# Patient Record
Sex: MALE | Race: WHITE | Employment: UNEMPLOYED | URBAN - METROPOLITAN AREA
[De-identification: names, ages, dates, MRNs, and addresses within clinical notes are randomized per-mention and may not be internally consistent; named-entity substitution may affect disease eponyms.]

---

## 2018-02-06 ENCOUNTER — ANESTHESIA EVENT (OUTPATIENT)
Dept: OPERATING ROOM | Age: 58
End: 2018-02-06
Payer: COMMERCIAL

## 2018-02-06 ENCOUNTER — ANESTHESIA (OUTPATIENT)
Dept: OPERATING ROOM | Age: 58
End: 2018-02-06
Payer: COMMERCIAL

## 2018-02-06 ENCOUNTER — HOSPITAL ENCOUNTER (OUTPATIENT)
Age: 58
Setting detail: OUTPATIENT SURGERY
Discharge: HOME | End: 2018-02-06
Attending: ORTHOPAEDIC SURGERY | Admitting: ORTHOPAEDIC SURGERY
Payer: COMMERCIAL

## 2018-02-06 VITALS
DIASTOLIC BLOOD PRESSURE: 80 MMHG | BODY MASS INDEX: 22.53 KG/M2 | SYSTOLIC BLOOD PRESSURE: 134 MMHG | OXYGEN SATURATION: 94 % | RESPIRATION RATE: 16 BRPM | HEART RATE: 45 BPM | HEIGHT: 73 IN | WEIGHT: 170 LBS | TEMPERATURE: 97.3 F

## 2018-02-06 VITALS
SYSTOLIC BLOOD PRESSURE: 87 MMHG | TEMPERATURE: 91.4 F | DIASTOLIC BLOOD PRESSURE: 53 MMHG | RESPIRATION RATE: 13 BRPM | OXYGEN SATURATION: 100 %

## 2018-02-06 LAB
EKG ATRIAL RATE: 51 BPM
EKG P AXIS: 26 DEGREES
EKG P-R INTERVAL: 126 MS
EKG Q-T INTERVAL: 460 MS
EKG QRS DURATION: 104 MS
EKG QTC CALCULATION (BAZETT): 423 MS
EKG R AXIS: 57 DEGREES
EKG T AXIS: 43 DEGREES
EKG VENTRICULAR RATE: 51 BPM

## 2018-02-06 PROCEDURE — 93010 ELECTROCARDIOGRAM REPORT: CPT | Performed by: INTERNAL MEDICINE

## 2018-02-06 PROCEDURE — 3700000001 HC ADD 15 MINUTES (ANESTHESIA): Performed by: ORTHOPAEDIC SURGERY

## 2018-02-06 PROCEDURE — 2500000003 HC RX 250 WO HCPCS: Performed by: ANESTHESIOLOGY

## 2018-02-06 PROCEDURE — 3600000003 HC SURGERY LEVEL 3 BASE: Performed by: ORTHOPAEDIC SURGERY

## 2018-02-06 PROCEDURE — 7100000011 HC PHASE II RECOVERY - ADDTL 15 MIN: Performed by: ORTHOPAEDIC SURGERY

## 2018-02-06 PROCEDURE — C1713 ANCHOR/SCREW BN/BN,TIS/BN: HCPCS | Performed by: ORTHOPAEDIC SURGERY

## 2018-02-06 PROCEDURE — 3700000000 HC ANESTHESIA ATTENDED CARE: Performed by: ORTHOPAEDIC SURGERY

## 2018-02-06 PROCEDURE — 7100000001 HC PACU RECOVERY - ADDTL 15 MIN: Performed by: ORTHOPAEDIC SURGERY

## 2018-02-06 PROCEDURE — 6360000002 HC RX W HCPCS: Performed by: ANESTHESIOLOGY

## 2018-02-06 PROCEDURE — 6360000002 HC RX W HCPCS: Performed by: ORTHOPAEDIC SURGERY

## 2018-02-06 PROCEDURE — 6370000000 HC RX 637 (ALT 250 FOR IP): Performed by: ORTHOPAEDIC SURGERY

## 2018-02-06 PROCEDURE — 7100000010 HC PHASE II RECOVERY - FIRST 15 MIN: Performed by: ORTHOPAEDIC SURGERY

## 2018-02-06 PROCEDURE — 7100000000 HC PACU RECOVERY - FIRST 15 MIN: Performed by: ORTHOPAEDIC SURGERY

## 2018-02-06 PROCEDURE — 2580000003 HC RX 258: Performed by: ANESTHESIOLOGY

## 2018-02-06 PROCEDURE — 64415 NJX AA&/STRD BRCH PLXS IMG: CPT | Performed by: ANESTHESIOLOGY

## 2018-02-06 PROCEDURE — 93005 ELECTROCARDIOGRAM TRACING: CPT

## 2018-02-06 PROCEDURE — 2580000003 HC RX 258: Performed by: ORTHOPAEDIC SURGERY

## 2018-02-06 PROCEDURE — 3600000013 HC SURGERY LEVEL 3 ADDTL 15MIN: Performed by: ORTHOPAEDIC SURGERY

## 2018-02-06 DEVICE — ANCHOR SUT L14.5MM DIA3MM BIOCOMP W/ 1.3MM SUTTAPE SUTTAK: Type: IMPLANTABLE DEVICE | Status: FUNCTIONAL

## 2018-02-06 DEVICE — ANCHOR SUTURE BIOCOMP 3X14.5 MM FIBERWIRE 2 TIGERWIRE: Type: IMPLANTABLE DEVICE | Status: FUNCTIONAL

## 2018-02-06 RX ORDER — SODIUM CHLORIDE 0.9 % (FLUSH) 0.9 %
10 SYRINGE (ML) INJECTION PRN
Status: DISCONTINUED | OUTPATIENT
Start: 2018-02-06 | End: 2018-02-06 | Stop reason: HOSPADM

## 2018-02-06 RX ORDER — MAGNESIUM HYDROXIDE 1200 MG/15ML
LIQUID ORAL CONTINUOUS PRN
Status: DISCONTINUED | OUTPATIENT
Start: 2018-02-06 | End: 2018-02-06 | Stop reason: HOSPADM

## 2018-02-06 RX ORDER — PROPOFOL 10 MG/ML
INJECTION, EMULSION INTRAVENOUS PRN
Status: DISCONTINUED | OUTPATIENT
Start: 2018-02-06 | End: 2018-02-06 | Stop reason: SDUPTHER

## 2018-02-06 RX ORDER — OXYCODONE HYDROCHLORIDE AND ACETAMINOPHEN 5; 325 MG/1; MG/1
1 TABLET ORAL EVERY 4 HOURS PRN
Status: DISCONTINUED | OUTPATIENT
Start: 2018-02-06 | End: 2018-02-06 | Stop reason: HOSPADM

## 2018-02-06 RX ORDER — SODIUM CHLORIDE 9 MG/ML
50 INJECTION, SOLUTION INTRAVENOUS CONTINUOUS
Status: ACTIVE | OUTPATIENT
Start: 2018-02-06 | End: 2018-02-06

## 2018-02-06 RX ORDER — SODIUM CHLORIDE, SODIUM LACTATE, POTASSIUM CHLORIDE, CALCIUM CHLORIDE 600; 310; 30; 20 MG/100ML; MG/100ML; MG/100ML; MG/100ML
INJECTION, SOLUTION INTRAVENOUS CONTINUOUS
Status: DISCONTINUED | OUTPATIENT
Start: 2018-02-06 | End: 2018-02-06

## 2018-02-06 RX ORDER — LIDOCAINE HYDROCHLORIDE 10 MG/ML
1 INJECTION, SOLUTION EPIDURAL; INFILTRATION; INTRACAUDAL; PERINEURAL
Status: DISCONTINUED | OUTPATIENT
Start: 2018-02-06 | End: 2018-02-06 | Stop reason: HOSPADM

## 2018-02-06 RX ORDER — MORPHINE SULFATE 2 MG/ML
2 INJECTION, SOLUTION INTRAMUSCULAR; INTRAVENOUS
Status: DISCONTINUED | OUTPATIENT
Start: 2018-02-06 | End: 2018-02-06 | Stop reason: HOSPADM

## 2018-02-06 RX ORDER — MEPERIDINE HYDROCHLORIDE 50 MG/ML
12.5 INJECTION INTRAMUSCULAR; INTRAVENOUS; SUBCUTANEOUS EVERY 5 MIN PRN
Status: DISCONTINUED | OUTPATIENT
Start: 2018-02-06 | End: 2018-02-06 | Stop reason: HOSPADM

## 2018-02-06 RX ORDER — OXYCODONE HYDROCHLORIDE AND ACETAMINOPHEN 5; 325 MG/1; MG/1
2 TABLET ORAL EVERY 4 HOURS PRN
Status: DISCONTINUED | OUTPATIENT
Start: 2018-02-06 | End: 2018-02-06 | Stop reason: HOSPADM

## 2018-02-06 RX ORDER — HYDROCODONE BITARTRATE AND ACETAMINOPHEN 5; 325 MG/1; MG/1
1 TABLET ORAL PRN
Status: DISCONTINUED | OUTPATIENT
Start: 2018-02-06 | End: 2018-02-06 | Stop reason: HOSPADM

## 2018-02-06 RX ORDER — ONDANSETRON 2 MG/ML
INJECTION INTRAMUSCULAR; INTRAVENOUS PRN
Status: DISCONTINUED | OUTPATIENT
Start: 2018-02-06 | End: 2018-02-06 | Stop reason: SDUPTHER

## 2018-02-06 RX ORDER — DIPHENHYDRAMINE HYDROCHLORIDE 50 MG/ML
12.5 INJECTION INTRAMUSCULAR; INTRAVENOUS
Status: DISCONTINUED | OUTPATIENT
Start: 2018-02-06 | End: 2018-02-06 | Stop reason: HOSPADM

## 2018-02-06 RX ORDER — METOCLOPRAMIDE HYDROCHLORIDE 5 MG/ML
10 INJECTION INTRAMUSCULAR; INTRAVENOUS
Status: DISCONTINUED | OUTPATIENT
Start: 2018-02-06 | End: 2018-02-06 | Stop reason: HOSPADM

## 2018-02-06 RX ORDER — HYDROMORPHONE HCL 110MG/55ML
0.5 PATIENT CONTROLLED ANALGESIA SYRINGE INTRAVENOUS EVERY 10 MIN PRN
Status: COMPLETED | OUTPATIENT
Start: 2018-02-06 | End: 2018-02-06

## 2018-02-06 RX ORDER — SODIUM CHLORIDE, SODIUM LACTATE, POTASSIUM CHLORIDE, CALCIUM CHLORIDE 600; 310; 30; 20 MG/100ML; MG/100ML; MG/100ML; MG/100ML
INJECTION, SOLUTION INTRAVENOUS CONTINUOUS
Status: DISCONTINUED | OUTPATIENT
Start: 2018-02-06 | End: 2018-02-06 | Stop reason: HOSPADM

## 2018-02-06 RX ORDER — SODIUM CHLORIDE 0.9 % (FLUSH) 0.9 %
10 SYRINGE (ML) INJECTION EVERY 12 HOURS SCHEDULED
Status: DISCONTINUED | OUTPATIENT
Start: 2018-02-06 | End: 2018-02-06 | Stop reason: HOSPADM

## 2018-02-06 RX ORDER — LISINOPRIL 10 MG/1
10 TABLET ORAL DAILY
COMMUNITY

## 2018-02-06 RX ORDER — MIDAZOLAM HYDROCHLORIDE 1 MG/ML
INJECTION INTRAMUSCULAR; INTRAVENOUS
Status: DISCONTINUED
Start: 2018-02-06 | End: 2018-02-06 | Stop reason: HOSPADM

## 2018-02-06 RX ORDER — ACETAMINOPHEN 325 MG/1
650 TABLET ORAL EVERY 4 HOURS PRN
Status: DISCONTINUED | OUTPATIENT
Start: 2018-02-06 | End: 2018-02-06 | Stop reason: HOSPADM

## 2018-02-06 RX ORDER — ONDANSETRON 2 MG/ML
4 INJECTION INTRAMUSCULAR; INTRAVENOUS
Status: DISCONTINUED | OUTPATIENT
Start: 2018-02-06 | End: 2018-02-06 | Stop reason: HOSPADM

## 2018-02-06 RX ORDER — FENTANYL CITRATE 50 UG/ML
INJECTION, SOLUTION INTRAMUSCULAR; INTRAVENOUS
Status: DISCONTINUED
Start: 2018-02-06 | End: 2018-02-06 | Stop reason: HOSPADM

## 2018-02-06 RX ORDER — ROCURONIUM BROMIDE 10 MG/ML
INJECTION, SOLUTION INTRAVENOUS PRN
Status: DISCONTINUED | OUTPATIENT
Start: 2018-02-06 | End: 2018-02-06 | Stop reason: SDUPTHER

## 2018-02-06 RX ORDER — HYDROCODONE BITARTRATE AND ACETAMINOPHEN 5; 325 MG/1; MG/1
2 TABLET ORAL PRN
Status: DISCONTINUED | OUTPATIENT
Start: 2018-02-06 | End: 2018-02-06 | Stop reason: HOSPADM

## 2018-02-06 RX ORDER — FENTANYL CITRATE 50 UG/ML
50 INJECTION, SOLUTION INTRAMUSCULAR; INTRAVENOUS EVERY 10 MIN PRN
Status: DISCONTINUED | OUTPATIENT
Start: 2018-02-06 | End: 2018-02-06 | Stop reason: HOSPADM

## 2018-02-06 RX ORDER — MORPHINE SULFATE 4 MG/ML
4 INJECTION, SOLUTION INTRAMUSCULAR; INTRAVENOUS
Status: DISCONTINUED | OUTPATIENT
Start: 2018-02-06 | End: 2018-02-06 | Stop reason: HOSPADM

## 2018-02-06 RX ORDER — MIDAZOLAM HYDROCHLORIDE 1 MG/ML
INJECTION INTRAMUSCULAR; INTRAVENOUS PRN
Status: DISCONTINUED | OUTPATIENT
Start: 2018-02-06 | End: 2018-02-06 | Stop reason: SDUPTHER

## 2018-02-06 RX ORDER — GLYCOPYRROLATE 0.2 MG/ML
INJECTION INTRAMUSCULAR; INTRAVENOUS PRN
Status: DISCONTINUED | OUTPATIENT
Start: 2018-02-06 | End: 2018-02-06 | Stop reason: SDUPTHER

## 2018-02-06 RX ORDER — ROPIVACAINE HYDROCHLORIDE 5 MG/ML
INJECTION, SOLUTION EPIDURAL; INFILTRATION; PERINEURAL PRN
Status: DISCONTINUED | OUTPATIENT
Start: 2018-02-06 | End: 2018-02-06 | Stop reason: SDUPTHER

## 2018-02-06 RX ORDER — ONDANSETRON 2 MG/ML
4 INJECTION INTRAMUSCULAR; INTRAVENOUS EVERY 6 HOURS PRN
Status: DISCONTINUED | OUTPATIENT
Start: 2018-02-06 | End: 2018-02-06 | Stop reason: HOSPADM

## 2018-02-06 RX ORDER — ROPIVACAINE HYDROCHLORIDE 5 MG/ML
INJECTION, SOLUTION EPIDURAL; INFILTRATION; PERINEURAL
Status: DISCONTINUED
Start: 2018-02-06 | End: 2018-02-06 | Stop reason: HOSPADM

## 2018-02-06 RX ADMIN — PROPOFOL 200 MG: 10 INJECTION, EMULSION INTRAVENOUS at 09:25

## 2018-02-06 RX ADMIN — FENTANYL CITRATE 100 MCG: 50 INJECTION, SOLUTION INTRAMUSCULAR; INTRAVENOUS at 09:25

## 2018-02-06 RX ADMIN — HYDROMORPHONE HYDROCHLORIDE 0.5 MG: 2 INJECTION, SOLUTION INTRAMUSCULAR; INTRAVENOUS; SUBCUTANEOUS at 11:55

## 2018-02-06 RX ADMIN — OXYCODONE HYDROCHLORIDE AND ACETAMINOPHEN 1 TABLET: 5; 325 TABLET ORAL at 12:11

## 2018-02-06 RX ADMIN — GLYCOPYRROLATE 0.2 MG: 0.2 INJECTION INTRAMUSCULAR; INTRAVENOUS at 10:05

## 2018-02-06 RX ADMIN — SODIUM CHLORIDE, POTASSIUM CHLORIDE, SODIUM LACTATE AND CALCIUM CHLORIDE: 600; 310; 30; 20 INJECTION, SOLUTION INTRAVENOUS at 08:53

## 2018-02-06 RX ADMIN — ROCURONIUM BROMIDE 50 MG: 10 INJECTION INTRAVENOUS at 09:25

## 2018-02-06 RX ADMIN — ONDANSETRON HYDROCHLORIDE 4 MG: 2 INJECTION, SOLUTION INTRAVENOUS at 09:42

## 2018-02-06 RX ADMIN — MIDAZOLAM HYDROCHLORIDE 2 MG: 1 INJECTION, SOLUTION INTRAMUSCULAR; INTRAVENOUS at 08:35

## 2018-02-06 RX ADMIN — FENTANYL CITRATE 100 MCG: 50 INJECTION, SOLUTION INTRAMUSCULAR; INTRAVENOUS at 08:35

## 2018-02-06 RX ADMIN — ROPIVACAINE HYDROCHLORIDE 40 ML: 5 INJECTION, SOLUTION EPIDURAL; INFILTRATION; PERINEURAL at 08:40

## 2018-02-06 RX ADMIN — SODIUM CHLORIDE, POTASSIUM CHLORIDE, SODIUM LACTATE AND CALCIUM CHLORIDE: 600; 310; 30; 20 INJECTION, SOLUTION INTRAVENOUS at 11:21

## 2018-02-06 RX ADMIN — CEFAZOLIN SODIUM 2 G: 2 SOLUTION INTRAVENOUS at 09:25

## 2018-02-06 RX ADMIN — HYDROMORPHONE HYDROCHLORIDE 0.5 MG: 2 INJECTION, SOLUTION INTRAMUSCULAR; INTRAVENOUS; SUBCUTANEOUS at 11:23

## 2018-02-06 RX ADMIN — HYDROMORPHONE HYDROCHLORIDE 0.5 MG: 2 INJECTION, SOLUTION INTRAMUSCULAR; INTRAVENOUS; SUBCUTANEOUS at 11:33

## 2018-02-06 RX ADMIN — HYDROMORPHONE HYDROCHLORIDE 0.5 MG: 2 INJECTION, SOLUTION INTRAMUSCULAR; INTRAVENOUS; SUBCUTANEOUS at 11:44

## 2018-02-06 ASSESSMENT — PULMONARY FUNCTION TESTS
PIF_VALUE: 12
PIF_VALUE: 11
PIF_VALUE: 12
PIF_VALUE: 11
PIF_VALUE: 12
PIF_VALUE: 15
PIF_VALUE: 2
PIF_VALUE: 27
PIF_VALUE: 12
PIF_VALUE: 3
PIF_VALUE: 12
PIF_VALUE: 2
PIF_VALUE: 12
PIF_VALUE: 11
PIF_VALUE: 12
PIF_VALUE: 5
PIF_VALUE: 12
PIF_VALUE: 2
PIF_VALUE: 1
PIF_VALUE: 12
PIF_VALUE: 2
PIF_VALUE: 12
PIF_VALUE: 12
PIF_VALUE: 6
PIF_VALUE: 12
PIF_VALUE: 11
PIF_VALUE: 12
PIF_VALUE: 11
PIF_VALUE: 11
PIF_VALUE: 12
PIF_VALUE: 12
PIF_VALUE: 2
PIF_VALUE: 1
PIF_VALUE: 11
PIF_VALUE: 12
PIF_VALUE: 11
PIF_VALUE: 12
PIF_VALUE: 11
PIF_VALUE: 12
PIF_VALUE: 5
PIF_VALUE: 12
PIF_VALUE: 7
PIF_VALUE: 12
PIF_VALUE: 2
PIF_VALUE: 12
PIF_VALUE: 10
PIF_VALUE: 12

## 2018-02-06 ASSESSMENT — PAIN SCALES - GENERAL
PAINLEVEL_OUTOF10: 8
PAINLEVEL_OUTOF10: 10
PAINLEVEL_OUTOF10: 6
PAINLEVEL_OUTOF10: 5
PAINLEVEL_OUTOF10: 8
PAINLEVEL_OUTOF10: 6
PAINLEVEL_OUTOF10: 10

## 2018-02-06 ASSESSMENT — PAIN DESCRIPTION - LOCATION: LOCATION: SHOULDER

## 2018-02-06 ASSESSMENT — PAIN - FUNCTIONAL ASSESSMENT: PAIN_FUNCTIONAL_ASSESSMENT: 0-10

## 2018-02-06 ASSESSMENT — PAIN DESCRIPTION - PAIN TYPE: TYPE: SURGICAL PAIN

## 2018-02-06 ASSESSMENT — PAIN DESCRIPTION - ORIENTATION: ORIENTATION: RIGHT;POSTERIOR

## 2018-02-06 NOTE — PROGRESS NOTES
Assisted Dr. Sandra Martins with nerve block. Patient tolerated procedure well. Patient will be monitored until surgery.

## 2018-02-07 NOTE — OP NOTE
[address] [address], [address]                                 OPERATIVE REPORT    PATIENT NAME: Jm Acosta                       :        1960  MED REC NO:   [id number]                              ROOM:  ACCOUNT NO:   [de-identified]                           ADMIT DATE: 2018  PROVIDER:     Ashley Granados MD    DATE OF PROCEDURE:  2018    PREOPERATIVE DIAGNOSIS:  Right shoulder instability with labral tear. POSTOPERATIVE DIAGNOSES:  1. Right shoulder instability with labral tear. 2.  Right shoulder adhesive capsulitis. PROCEDURES:  1. Right shoulder manipulation under anesthesia. 2.  Right shoulder arthroscopy with labral repair. 3.  Right shoulder arthroscopic capsulorrhaphy of the anterior capsule. 4.  Right shoulder arthroscopic remplissage. 5.  Right shoulder arthroscopic extensive debridement of glenohumeral  joint, labrum, biceps, and rotator cuff ring as well as subacromial space  and bursa. SURGEON:  Ashley Granados MD.    ASSISTANT:  Mary Knight PA-C    ANESTHESIA:  General endotracheal plus block. COMPLICATIONS:  None. INDICATIONS:  The patient is a 61-year-old male with a history of recurrent  right shoulder instability. He has known to have several dislocations. He  had a large anterior Bankart tear as well as a significant posterior  Hill-Sachs lesion. Preoperatively, we discussed multiple options for  treatment including arthroscopic labral repair with capsulorrhaphy and  remplissage procedure. We discussed multiple other options and discussing  the risks and benefits of each, he elected to proceed. Over the last few  months, he had been using a sling and developed a significant frozen  shoulder.   Understanding the risks and benefits of surgery to include,  infection, stiffness, nerve damage, continued pain and instability as well  as likely need for subsequent anterior  anchor for the Bankart repair as well as a single double loaded posterior  anchor in the Hill-Sachs lesion for remplissage to avoid over tightening in  the setting of a frozen shoulder. Given the patient's severe instability  issues however, it was felt that at least partial stabilization may provide  some support during his recovery. The double loaded _____ was placed into  the posterior Hill-Sachs lesion after debridement of the bone. Attention  was then directed to the subacromial space and a thorough bursectomy was  performed. A formal decompression was not performed. The bursal side of  the cuff was intact. The sutures had been grasped through the cannula  using the the Bird Beak and once found in the subacromial space were tied  down providing a decent apposition of infraspinatus tendon down to the  Hill-Sachs lesion. At this point, the space was then copiously irrigated  through the shaver and suctioned dry. The portal sites were closed with  3-0 nylon suture. All sponge and needle counts were correct prior to  closure. Sterile dressing was applied. He was placed in a sling, awoken  from general anesthetic. He was taken to recovery room in stable  condition. Andres Fonseca PA-C was present throughout the entire case. Given the  nature of the disease process and the procedure, a skilled surgical first  assistant was necessary during the case. The assistant was necessary to  hold retractors and manipulate the extremity during the procedure. A  certified scrub tech was at the back table managing the instruments and  supplies for the surgical case.         Mio Mancera MD    D: 02/06/2018 11:12:35       T: 02/06/2018 21:53:15     [email or web address]  Job#: [id number]     Doc#: [id number]    CC:

## 2020-01-08 LAB
ALBUMIN: 3.3 G/DL (ref 3.4–5)
ALP BLD-CCNC: 57 U/L (ref 33–120)
ALT SERPL-CCNC: 11 U/L (ref 10–52)
AST SERPL-CCNC: 14 U/L (ref 9–39)
BASOPHILS # BLD: 0.05 X10E9/L (ref 0–0.1)
BASOPHILS RELATIVE PERCENT: 0.7 % (ref 0–2)
BILIRUB SERPL-MCNC: 0.3 MG/DL (ref 0–1.2)
BILIRUBIN DIRECT: 0.1 MG/DL (ref 0–0.3)
EOSINOPHIL # BLD: 0.03 X10E9/L (ref 0–0.7)
EOSINOPHILS RELATIVE PERCENT: 0.4 % (ref 0–6)
ERYTHROCYTE [DISTWIDTH] IN BLOOD BY AUTOMATED COUNT: 14.3 % (ref 11.5–14)
HCT VFR BLD CALC: 43 % (ref 41–52)
HEMOGLOBIN: 13.7 G/DL (ref 13.5–17)
IMMATURE GRANULOCYTES %: 0.3 % (ref 0–0.9)
LYMPHOCYTES # BLD: 19.9 % (ref 13–44)
LYMPHOCYTES RELATIVE PERCENT: 1.37 X10E9/L (ref 1.2–4.8)
MCHC RBC AUTO-ENTMCNC: 31.9 G/DL (ref 32–36)
MCV RBC AUTO: 92 FL (ref 80–100)
MONOCYTES # BLD: 0.42 X10E9/L (ref 0.1–1)
MONOCYTES RELATIVE PERCENT: 6.1 % (ref 2–10)
NEUTROPHILS RELATIVE PERCENT: 72.6 % (ref 40–80)
NEUTROPHILS: 4.98 X10E9/L (ref 1.2–7.7)
PLATELET # BLD: 247 X10E9/L (ref 150–450)
RBC # BLD: 4.69 X10E12/L (ref 4.5–5.9)
TOTAL PROTEIN: 5.9 G/DL (ref 6.4–8.2)
WBC: 6.9 X10E9/L (ref 4.4–11.3)

## 2020-01-09 LAB
HBV SURFACE AB TITR SER: < 3.1 MIU/ML
HEPATITIS B CORE TOTAL ANTIBODY: NONREACTIVE
HEPATITIS B SURFACE ANTIGEN: NONREACTIVE
HEPATITIS C ANTIBODY: NORMAL
HIV AG/AB: NON REACTIVE
SPECIMEN SOURCE: NORMAL
SYPHILIS TREPONEMAL ANTIBODY: NONREACTIVE

## 2020-09-03 PROBLEM — M80.88XA: Status: ACTIVE | Noted: 2020-09-03

## 2022-10-10 NOTE — PROGRESS NOTES
----- Message from GEOVANY Edmonds sent at 10/10/2022  9:04 AM CDT -----  Please tell Ashley  Chest xray  is clear     noted red drainage coming from underneath dressing. Dr. Andrew Archuleta PA here reinforced dressing.  Fingers remain warm to touch color normal.

## 2023-04-28 ENCOUNTER — HOSPITAL ENCOUNTER (OUTPATIENT)
Dept: DATA CONVERSION | Facility: HOSPITAL | Age: 63
End: 2023-04-28
Attending: UROLOGY | Admitting: UROLOGY
Payer: COMMERCIAL

## 2023-04-28 DIAGNOSIS — F33.9 MAJOR DEPRESSIVE DISORDER, RECURRENT, UNSPECIFIED (CMS-HCC): ICD-10-CM

## 2023-04-28 DIAGNOSIS — F17.200 NICOTINE DEPENDENCE, UNSPECIFIED, UNCOMPLICATED: ICD-10-CM

## 2023-04-28 DIAGNOSIS — J44.9 CHRONIC OBSTRUCTIVE PULMONARY DISEASE, UNSPECIFIED (MULTI): ICD-10-CM

## 2023-04-28 DIAGNOSIS — F41.9 ANXIETY DISORDER, UNSPECIFIED: ICD-10-CM

## 2023-04-28 DIAGNOSIS — M19.90 UNSPECIFIED OSTEOARTHRITIS, UNSPECIFIED SITE: ICD-10-CM

## 2023-04-28 DIAGNOSIS — I10 ESSENTIAL (PRIMARY) HYPERTENSION: ICD-10-CM

## 2023-04-28 DIAGNOSIS — F43.10 POST-TRAUMATIC STRESS DISORDER, UNSPECIFIED: ICD-10-CM

## 2023-04-28 DIAGNOSIS — N35.919 UNSPECIFIED URETHRAL STRICTURE, MALE, UNSPECIFIED SITE: ICD-10-CM

## 2023-04-28 DIAGNOSIS — N35.911 UNSPECIFIED URETHRAL STRICTURE, MALE, MEATAL: ICD-10-CM

## 2023-04-28 DIAGNOSIS — Z01.812 ENCOUNTER FOR PREPROCEDURAL LABORATORY EXAMINATION: ICD-10-CM

## 2023-04-28 DIAGNOSIS — E78.5 HYPERLIPIDEMIA, UNSPECIFIED: ICD-10-CM

## 2023-04-28 DIAGNOSIS — Z87.440 PERSONAL HISTORY OF URINARY (TRACT) INFECTIONS: ICD-10-CM

## 2023-04-28 DIAGNOSIS — R33.8 OTHER RETENTION OF URINE: ICD-10-CM

## 2023-04-28 DIAGNOSIS — G89.4 CHRONIC PAIN SYNDROME: ICD-10-CM

## 2023-04-28 DIAGNOSIS — N48.1 BALANITIS: ICD-10-CM

## 2023-04-28 DIAGNOSIS — F11.10 OPIOID ABUSE, UNCOMPLICATED (MULTI): ICD-10-CM

## 2023-04-28 DIAGNOSIS — N40.1 BENIGN PROSTATIC HYPERPLASIA WITH LOWER URINARY TRACT SYMPTOMS: ICD-10-CM

## 2023-04-29 LAB — URINE CULTURE: NO GROWTH

## 2023-05-19 LAB
ALANINE AMINOTRANSFERASE (SGPT) (U/L) IN SER/PLAS: 10 U/L (ref 10–52)
ALBUMIN (G/DL) IN SER/PLAS: 4 G/DL (ref 3.4–5)
ALKALINE PHOSPHATASE (U/L) IN SER/PLAS: 48 U/L (ref 33–136)
ANION GAP IN SER/PLAS: 11 MMOL/L (ref 10–20)
ASPARTATE AMINOTRANSFERASE (SGOT) (U/L) IN SER/PLAS: 15 U/L (ref 9–39)
BILIRUBIN TOTAL (MG/DL) IN SER/PLAS: 0.5 MG/DL (ref 0–1.2)
CALCIUM (MG/DL) IN SER/PLAS: 9.1 MG/DL (ref 8.6–10.3)
CARBON DIOXIDE, TOTAL (MMOL/L) IN SER/PLAS: 28 MMOL/L (ref 21–32)
CHLORIDE (MMOL/L) IN SER/PLAS: 103 MMOL/L (ref 98–107)
CHOLESTEROL (MG/DL) IN SER/PLAS: 136 MG/DL (ref 0–199)
CHOLESTEROL IN HDL (MG/DL) IN SER/PLAS: 51.9 MG/DL
CHOLESTEROL IN LDL (MG/DL) IN SER/PLAS BY DIRECT ASSAY: 77 MG/DL (ref 0–129)
CHOLESTEROL/HDL RATIO: 2.6
CREATININE (MG/DL) IN SER/PLAS: 1.06 MG/DL (ref 0.5–1.3)
ERYTHROCYTE DISTRIBUTION WIDTH (RATIO) BY AUTOMATED COUNT: 14.6 % (ref 11.5–14.5)
ERYTHROCYTE MEAN CORPUSCULAR HEMOGLOBIN CONCENTRATION (G/DL) BY AUTOMATED: 32.2 G/DL (ref 32–36)
ERYTHROCYTE MEAN CORPUSCULAR VOLUME (FL) BY AUTOMATED COUNT: 89 FL (ref 80–100)
ERYTHROCYTES (10*6/UL) IN BLOOD BY AUTOMATED COUNT: 4.34 X10E12/L (ref 4.5–5.9)
GFR MALE: 80 ML/MIN/1.73M2
GLUCOSE (MG/DL) IN SER/PLAS: 89 MG/DL (ref 74–99)
HEMATOCRIT (%) IN BLOOD BY AUTOMATED COUNT: 38.8 % (ref 41–52)
HEMOGLOBIN (G/DL) IN BLOOD: 12.5 G/DL (ref 13.5–17.5)
LDL: 74 MG/DL (ref 0–99)
LEUKOCYTES (10*3/UL) IN BLOOD BY AUTOMATED COUNT: 4.7 X10E9/L (ref 4.4–11.3)
PLATELETS (10*3/UL) IN BLOOD AUTOMATED COUNT: 223 X10E9/L (ref 150–450)
POTASSIUM (MMOL/L) IN SER/PLAS: 3.8 MMOL/L (ref 3.5–5.3)
PROSTATE SPECIFIC AG (NG/ML) IN SER/PLAS: 0.12 NG/ML (ref 0–4)
PROTEIN TOTAL: 6.7 G/DL (ref 6.4–8.2)
SODIUM (MMOL/L) IN SER/PLAS: 138 MMOL/L (ref 136–145)
TRIGLYCERIDE (MG/DL) IN SER/PLAS: 52 MG/DL (ref 0–149)
UREA NITROGEN (MG/DL) IN SER/PLAS: 18 MG/DL (ref 6–23)
VLDL: 10 MG/DL (ref 0–40)

## 2023-07-20 LAB
ALANINE AMINOTRANSFERASE (SGPT) (U/L) IN SER/PLAS: 13 U/L (ref 10–52)
ALBUMIN (G/DL) IN SER/PLAS: 4.1 G/DL (ref 3.4–5)
ALKALINE PHOSPHATASE (U/L) IN SER/PLAS: 58 U/L (ref 33–136)
ANION GAP IN SER/PLAS: 12 MMOL/L (ref 10–20)
ASPARTATE AMINOTRANSFERASE (SGOT) (U/L) IN SER/PLAS: 15 U/L (ref 9–39)
BASOPHILS (10*3/UL) IN BLOOD BY AUTOMATED COUNT: 0.03 X10E9/L (ref 0–0.1)
BASOPHILS/100 LEUKOCYTES IN BLOOD BY AUTOMATED COUNT: 0.5 % (ref 0–2)
BILIRUBIN TOTAL (MG/DL) IN SER/PLAS: 0.4 MG/DL (ref 0–1.2)
C REACTIVE PROTEIN (MG/L) IN SER/PLAS BY HIGH SENSIT: 1.3 MG/L
C REACTIVE PROTEIN (MG/L) IN SER/PLAS: 0.15 MG/DL
CALCIDIOL (25 OH VITAMIN D3) (NG/ML) IN SER/PLAS: 13 NG/ML
CALCIUM (MG/DL) IN SER/PLAS: 8.8 MG/DL (ref 8.6–10.3)
CARBON DIOXIDE, TOTAL (MMOL/L) IN SER/PLAS: 24 MMOL/L (ref 21–32)
CHLORIDE (MMOL/L) IN SER/PLAS: 107 MMOL/L (ref 98–107)
CHOLESTEROL (MG/DL) IN SER/PLAS: 124 MG/DL (ref 0–199)
CHOLESTEROL IN HDL (MG/DL) IN SER/PLAS: 54.1 MG/DL
CHOLESTEROL/HDL RATIO: 2.3
COBALAMIN (VITAMIN B12) (PG/ML) IN SER/PLAS: 125 PG/ML (ref 211–911)
CREATININE (MG/DL) IN SER/PLAS: 0.71 MG/DL (ref 0.5–1.3)
EOSINOPHILS (10*3/UL) IN BLOOD BY AUTOMATED COUNT: 0.25 X10E9/L (ref 0–0.7)
EOSINOPHILS/100 LEUKOCYTES IN BLOOD BY AUTOMATED COUNT: 3.9 % (ref 0–6)
ERYTHROCYTE DISTRIBUTION WIDTH (RATIO) BY AUTOMATED COUNT: 15.2 % (ref 11.5–14.5)
ERYTHROCYTE MEAN CORPUSCULAR HEMOGLOBIN CONCENTRATION (G/DL) BY AUTOMATED: 32.5 G/DL (ref 32–36)
ERYTHROCYTE MEAN CORPUSCULAR VOLUME (FL) BY AUTOMATED COUNT: 90 FL (ref 80–100)
ERYTHROCYTES (10*6/UL) IN BLOOD BY AUTOMATED COUNT: 4.18 X10E12/L (ref 4.5–5.9)
ESTIMATED AVERAGE GLUCOSE FOR HBA1C: 111 MG/DL
GFR MALE: >90 ML/MIN/1.73M2
GLUCOSE (MG/DL) IN SER/PLAS: 78 MG/DL (ref 74–99)
HEMATOCRIT (%) IN BLOOD BY AUTOMATED COUNT: 37.5 % (ref 41–52)
HEMOGLOBIN (G/DL) IN BLOOD: 12.2 G/DL (ref 13.5–17.5)
HEMOGLOBIN A1C/HEMOGLOBIN TOTAL IN BLOOD: 5.5 %
HEPATITIS A VIRUS IGM AB PRESENCE IN SER/PLAS BY IMMUNOASSAY: NONREACTIVE
HEPATITIS B VIRUS CORE IGM AB PRESENCE IN SER/PLAS BY IMMUNOASSY: NONREACTIVE
HEPATITIS B VIRUS SURFACE AG PRESENCE IN SERUM: NONREACTIVE
HEPATITIS C VIRUS AB PRESENCE IN SERUM: NONREACTIVE
HIV 1/ 2 AG/AB SCREEN: NONREACTIVE
IMMATURE GRANULOCYTES/100 LEUKOCYTES IN BLOOD BY AUTOMATED COUNT: 0.2 % (ref 0–0.9)
LDL: 63 MG/DL (ref 0–99)
LEUKOCYTES (10*3/UL) IN BLOOD BY AUTOMATED COUNT: 6.4 X10E9/L (ref 4.4–11.3)
LYMPHOCYTES (10*3/UL) IN BLOOD BY AUTOMATED COUNT: 1.68 X10E9/L (ref 1.2–4.8)
LYMPHOCYTES/100 LEUKOCYTES IN BLOOD BY AUTOMATED COUNT: 26.5 % (ref 13–44)
MAGNESIUM (MG/DL) IN SER/PLAS: 2.18 MG/DL (ref 1.6–2.4)
MONOCYTES (10*3/UL) IN BLOOD BY AUTOMATED COUNT: 0.48 X10E9/L (ref 0.1–1)
MONOCYTES/100 LEUKOCYTES IN BLOOD BY AUTOMATED COUNT: 7.6 % (ref 2–10)
NEUTROPHILS (10*3/UL) IN BLOOD BY AUTOMATED COUNT: 3.9 X10E9/L (ref 1.2–7.7)
NEUTROPHILS/100 LEUKOCYTES IN BLOOD BY AUTOMATED COUNT: 61.3 % (ref 40–80)
PLATELETS (10*3/UL) IN BLOOD AUTOMATED COUNT: 195 X10E9/L (ref 150–450)
POTASSIUM (MMOL/L) IN SER/PLAS: 3.7 MMOL/L (ref 3.5–5.3)
PROSTATE SPECIFIC AG (NG/ML) IN SER/PLAS: 0.17 NG/ML (ref 0–4)
PROTEIN TOTAL: 7 G/DL (ref 6.4–8.2)
RHEUMATOID FACTOR (IU/ML) IN SERUM OR PLASMA: <10 IU/ML (ref 0–15)
SEDIMENTATION RATE, ERYTHROCYTE: 21 MM/H (ref 0–20)
SODIUM (MMOL/L) IN SER/PLAS: 139 MMOL/L (ref 136–145)
THYROTROPIN (MIU/L) IN SER/PLAS BY DETECTION LIMIT <= 0.05 MIU/L: 0.82 MIU/L (ref 0.44–3.98)
THYROXINE (T4) (UG/DL) IN SER/PLAS: 7.1 UG/DL (ref 4.5–11.1)
TRIGLYCERIDE (MG/DL) IN SER/PLAS: 35 MG/DL (ref 0–149)
UREA NITROGEN (MG/DL) IN SER/PLAS: 16 MG/DL (ref 6–23)
VLDL: 7 MG/DL (ref 0–40)

## 2023-09-14 NOTE — H&P
History & Physical Reviewed:   I have reviewed the History and Physical dated:  08-Apr-2023   History and Physical reviewed and relevant findings noted. Patient examined to review pertinent physical  findings.: Significant findings noted below   Findings: Urethral stricture, meatal stenosis, balanitis,  incomplete bladder emptying, BPH with moderate lower urinary tract symptoms  On today's visit, patient reports that he has begun having obstructive voiding symptoms similar to last year when he required cystoscopy dilation, reports hesitancy, straining to void, slow urinary flow, mixed incontinence with dribbling, often double  voiding  However urine has been clear no dysuria no hematuria no signs of infection  Patient is currently admitted for cystoscopy, urethral dilation, possible internal urethrotomy as clinically indicated  The considered procedures along with alternatives, complications, risks, benefits, short and long-term followup were reviewed in detail and patient indicates understanding and is agreeable to proceed and followup as recommended.    Patient was just recently in hospital as noted in the below medical history physical, on today's visit the patient seems appropriately interactive, follows a logical train of thought and answers questions appropriately, no anxiety seems stable and understands  our conversation        Most recent medical history physical from admission earlier this month is listed below for additional background medical information       Consult-Medicine [Charted Location: [place] [id number]] [Date of Service: 08-Apr-2023 13:00, Authored: 08-Apr-2023 13:56]- for Visit: [id number], Final, Appended Only, Signed in Full, General    Service:   Service: Medicine    Consult   Consult requested by (Attending Name): Bigg Lopez  Reason: Hospitalist team consulted for adult medical examination and optimization for behavioral health unit.    History of Present Illness:   Admission Reason:  Suicidal ideation and attempt  HPI:   FARA NATION is a 62 year old Male to [facility] inpatient psychiatric unit under the primary services of psychiatry due to suicidal ideation and a suicide attempt.  Patient with significant depressive symptoms compounded by his multiple  medical issues. Patient was medically cleared by EPAT . Hospitalist team consulted for adult medical examination and optimization for behavioral health unit.     Patient examined and seen. Alert and oriented x3, explained to patient assessment, right to , and the right to decline assessment. Patient verbalized understanding and agreed to assessment with RN as . Patient denies chest pain, shortness  of breath, palpitations, abdominal pain, fever or chills.     Hospitalist to evaluate medical optimization for psychiatric treatment and evaluation.  Neurological evaluation completed.  No acute or chronic medical issues identified at this time that could be contributing to underlying psychiatric symptoms. Pt is  medically optimized for inpatient behavioral health evaluation and treatment.    Labs reviewed, white blood cell count of 4.1 with a hemoglobin of 13.2 and hematocrit of 40.2.  BMP grossly unremarkable with a creatinine of 0.92 and a BUN of 8.  Blood glucose level 84.  Potassium 2.7 with a sodium of 139.  GFR greater than 90.  CK  level at 40.  Urine drug screen positive for fentanyl.  Urinalysis with no correlation with UTI.  COVID-19 negative.  Chest x-ray unremarkable.  EKG on admission of poor quality, likely sinus bradycardia.    PMHx: HTN, COPD, hypertension, hyperlipidemia, chronic back pain, polysubstance use, BPH with LUTS, history of urethral strictures, depression    PSHx: hernia repair, right elbow surgery , cystoscopy    SocHx: homeless, (+) etoh, (+) smoker, (+) illicit drug use (pt admits to taking non-prescribed fentanyl)    Fam Hx: Reviewed not pertinent to patient presentation    Review of  systems: 10 system were reviewed and were negative except what was mentioned in history of present illness       Review Family/Social History and ROS:   Social History     Smoking Status: moderate user (uses 11-30 cig/day, OR 0.5-1.5 ppd, OR 2-3 cans/pouches loose leaf tobacco per week, OR 0.5-1.5 vape pods per day) (1)  Alcohol Use: history of abuse(1)  Drug Use: history of abuse  opioids (1)  Drug 2 Use: denies (1)  Occupation: unemployed(1)  Social History:   [place] Citizen: Yes  Payee: N/A  Guardian/POA: Self  Current Stressors: Patient reports disagreement with friend whom he stays with, reports withdrawal symptoms unspecified  (1)           Allergies:  ·  No Known Allergies:     Objective:     Objective Information:       T   P  R  BP   MAP  SpO2   Value  36.6  49  16  109/55      99%  Date/Time 4/8 8:00 4/8 8:00 4/8 8:00 4/8 8:00    4/8 8:00  Range  (36.2C - 36.9C )  (49 - 78 )  (16 - 18 )  (109 - 170 )/ (55 - 91 )    (98% - 100% )  Highest temp of 36.9 C was recorded at 4/7 23:21        Pain reported at 4/7 16:37: 8 = Severe         Weights   4/8 2:44: Weight in kg (Weight (kg))  66  4/8 2:44: Weight in lbs ((lbs))  145.5  4/8 2:44: BMI (kg/m2) (BMI (kg/m2))  19.221    Physical Exam Narrative   · Physical Exam:   Constitutional: Well developed, awake/alert/oriented x3, no distress, cooperative  Eyes: PERRL, EOMI, clear sclera  ENMT: mucous membranes moist, no apparent injury, no lesions seen  Head/Neck: Neck supple, no apparent injury,   Respiratory/Thorax: Patent airways,  normal breath sounds   Cardiovascular: Regular, rate and rhythm, no murmurs, 2+ equal pulses of the extremities, normal S 1and S 2  Gastrointestinal: Nondistended, soft, non-tender,    Genitourinary: denies CVA tenderness  or suprapubic tenderness,  voiding freely   Musculoskeletal: ROM intact, no joint swelling,   Extremities: normal extremities,  no contusions or wounds seen   Skin: warm, dry, intact  Neurological: alert/oriented x 3,  speech clear, cranial nerves II through XII  grossly intact  Psychiatric: appropriate   Cranial Nerve Exam: II, III, IV, VI: Visual acuity within normal limits bilaterally, visual fields normal in all quadrants, AVELINO, EOMI  Cranial Nerve exam: V: Facial sensations intact bilaterally to dull, sharp, and light touch stimuli  Cranial Nerve exam VII: Facial muscle strength normal/equal bilaterally  Cranial Nerve Exam VIII: Hearing is normal bilaterally  Cranial Nerve IX,X: Palate and Uvula symmetrical, voice is normal  Cranial Nerve XI: Shoulder shrug strong, equal bilaterally  Cranial Nerve XII: Tongue moves symmetrically  Motor : Good muscle tone, Strength equal upper and lower extremities unless otherwise stated above  Cerebellar: normal gait unless otherwise stated above    Refresh Home Medications List   · Select to Refresh Home Medication List Refresh Home Medications    Medications     Medications:     CENTRAL NERVOUS SYSTEM AGENTS:    1. Acetaminophen:  650  mg  Oral  Every 6 Hours   PRN       2. Ibuprofen:  400  mg  Oral  Every 6 Hours   PRN       3. hydrOXYzine HCl (ATARAX) IntraMuscular:  25  mg  IntraMuscular  Every 6 Hours   PRN       4. hydrOXYzine Hydrochloride (ATARAX):  50  mg  Oral  Every 6 Hours   PRN         GASTROINTESTINAL AGENTS:    1. Magnesium Hydroxide -Al Hydrox -Simethicone Oral Liquid:  30  mL  Oral  Every 6 Hours   PRN       2. Magnesium Hydroxide Oral Liquid CONCENTRATE:  10  mL  Oral  Every 24 Hours   PRN         PSYCHOTHERAPEUTIC AGENTS:    1. Mirtazapine:  15  mg  Oral  At Bedtime    2. OLANZapine:  5  mg  Oral  Every 6 Hours   PRN       3. OLANZapine IntraMuscular:  5  mg  IntraMuscular  Every 6 Hours   PRN         RESPIRATORY AGENTS:    1. diphenhydrAMINE:  50  mg  Oral  Every 6 Hours   PRN       2. diphenhydrAMINE Injectable:  50  mg  IntraMuscular  Every 6 Hours   PRN         Recent Lab Results     Results:       I have reviewed these laboratory results:    Coronavirus 2019 by PCR   07-Apr-2023 17:59:00     Result Value   Fluid Source  Nasal, Nasopharyngeal   Coronavirus 2019,PCR  NOT DETECTED  Reference Range: Not Detected .This test has received FDA Emergency Use Authorization (EUA) and has been verified by [facility]. This test is only authorized for the duration of time  that circumsta     Drug Screen, Urine  07-Apr-2023 17:48:00     Result Value   Comments.  SEE BELOW Drug screen results are presumptive and should not be used to assess compliance with prescribed medication. Contact the performing [facility] laboratory to add-on definitive confirmatory testing if clinically indicated.  .Toxicology  scre   Amphetamine Screen, Urine  PRESUMPTIVE NEGATIVE CUTOFF LEVEL:  500 NG/ML Cross-reactivity has been reported with high concentrations of the following drugs: buproprion, chloroquine, chlorpromazine, ephedrine, mephentermine, fenfluramine, phentermine,  phenylpropanolamine   Barbiturate Screen, Urine  PRESUMPTIVE NEGATIVE PRESUMPTIVE NEGATIVE  CUTOFF LEVEL: 200 NG/ML   Benzodiazepine Screen, Urine  PRESUMPTIVE NEGATIVE PRESUMPTIVE NEGATIVE  CUTOFF LEVEL: 200 NG/ML   Cannabinoid Screen, Urine  PRESUMPTIVE NEGATIVE PRESUMPTIVE NEGATIVE  CUTOFF LEVEL: 50 NG/ML   Cocaine Metabolite Screen, Urine  PRESUMPTIVE NEGATIVE PRESUMPTIVE NEGATIVE  CUTOFF LEVEL: 150 NG/ML   Fentanyl Screen, Urine  PRESUMPTIVE POSITIVE PRESUMPTIVE POSITIVE  CUTOFF LEVEL:  5 NG/ML   A  Methadone Screen, Urine  PRESUMPTIVE NEGATIVE CUTOFF LEVEL: 150 NG/ML The metabolite L-alpha-acetylmethadol (LAAM) is not detected by this method in concentrations that would be found in the urine of patients on LAAM therapy.   Opiate Screen, Urine  PRESUMPTIVE NEGATIVE CUTOFF LEVEL: 300 NG/ML The opiate screen does not detect fentanyl, meperidine, or  tramadol. Oxycodone is not consistently detected (refer to Oxycodone Screen, Urine result).   Oxycodone Screen, Urine (item)  PRESUMPTIVE NEGATIVE CUTOFF LEVEL: 100  NG/ML  This test will accurately detect both oxycodone and oxymorphone.   PCP Screen, Urine  PRESUMPTIVE NEGATIVE CUTOFF LEVEL:  25 NG/ML Cross-reactivity has been reported with dextromethorphan.     Urinalysis with Culture if Indicated  07-Apr-2023 17:48:00     Result Value   Color, Urine  STRAW  Reference Range: STRAW,YELLOW   Appearance, Urine  CLEAR   Specific Gravity, Urine  1.002   L  pH, Urine  7.0   Protein, Urine  NEGATIVE   Glucose, Urine  NEGATIVE   Blood, Urine  NEGATIVE   Ketones, Urine  NEGATIVE   Bilirubin, Urine  NEGATIVE   Urobilinogen, Urine  <2.0   Nitrite, Urine  NEGATIVE   Leukocyte Esterase, Urine  NEGATIVE     Complete Blood Count + Differential  07-Apr-2023 17:21:00     Result Value   White Blood Cell Count  4.1   L  Red Blood Cell Count  4.61   HGB  13.2   L  HCT  40.2   L  MCV  87   MCHC  32.8   PLT  207   RDW-CV  13.7   Neutrophil %  68.5   Immature Granulocytes %  0.2   Lymphocyte %  23.9   Monocyte %  5.7   Eosinophil %  0.7   Basophil %  1.0   Neutrophil Count  2.78   Lymphocyte Count  0.97   L  Monocyte Count  0.23   Eosinophil Count  0.03   Basophil Count  0.04     Comprehensive Metabolic Panel  07-Apr-2023 17:21:00     Result Value   Glucose, Serum  84   NA  139   K  3.7   CL  105   Bicarbonate, Serum  26   Anion Gap, Serum  12   BUN  8   CREAT  0.92   GFR Male  >90   Calcium, Serum  9.3   ALB  4.1   ALKP  59   T Pro  7.2   T Bili  0.6   Alanine Aminotransferase, Serum  10   Aspartate Transaminase, Serum  15     Ethanol Level  07-Apr-2023 17:21:00     Result Value   Ethanol Level  <10     Acetylsalicylic Acid Level, Serum  07-Apr-2023 17:21:00     Result Value   Acetylsalicylic Acid Level, Serum  <3     Acetaminophen Level, Serum  07-Apr-2023 17:21:00     Result Value   Acetaminophen Level, Serum  <10.0     Creatine Kinase, Level  07-Apr-2023 17:21:00     Result Value   Creatine Kinase, Level  40       Radiology Results     Results:       Impression:    No airspace consolidation or  pleural effusion.        Xray Chest 1 View [Apr 7 2023  6:10PM]        Assessment:     Suicidal ideation and attempt    Depression     BPH with moderate lower urinary tract symptoms  H/o Urethral stricture, meatal stenosis, balanitis, incomplete bladder emptying  H/o multiple cystoscopy    COPD  History of aspiration pneumonia    Essential hypertension      Hyperlipidemia      Chronic back pain  History of sacral fracture      Polysubstance use    Alcohol, tobacco, fentanyl    PLAN  Admit to Behavioral Health Unit  Contract for Safety   Safety Protocols  Medications to be determined by psychiatry   Vital Signs BID  Group Therapy as appropriate  Social Work for outpatient therapy   Encourage Healthy Lifestyle and Exercise as appropriate  Substance use cessation encouraged. Nicotine patch at patient request  Continue home medications as appropriate   Patient with chronic back pain and substance abuse- defer use of narcotics. Utilize tylenol and ibuprofen on order. Consider Lidocaine patch for back pain if added analgesia needed.   No further recommendations, hospital medicine will sign off.     Plan of care was discussed extensively with patient. Patient verbalized understanding through teach back method. All questions and concerns addressed upon examination.     ***Of note, this documentation is completed using the Dragon Dictation system (voice recognition software). There may be spelling and/or grammatical errors that were not corrected prior to final submission.***     Plan of Care Reviewed With   Plan of Care Reviewed With: patient    Consult Status   Consult Order ID: [id number]    Electronic Signatures for Addendum Section:   Yuliana Contreras (APRN-CNP) (Signed Addendum 08-Apr-2023 15:19)   Patient without any complaints of urine retention acutely. Nursing reporting patient has been voiding without difficulty.    Electronic Signatures:  Yuliana Contreras (APRN-CNP)  (Signed 08-Apr-2023 15:10)   Authored: Service,  "History of Present Illness, Review Family/Social History and ROS, Allergies, Objective, Assessment/Recommendations, Note Completion      Last Updated: 08-Apr-2023 15:19 by Yuliana Contreras (APRN-CNP)    References:  1.  Data Referenced From \"Psychiatric Assessment - Social Work-Inpatient\" 08-Apr-2023 08:56   Home Medications Reviewed: no changes noted   Allergies Reviewed: no changes noted       Electronic Signatures:  D'amico, Louis (MD)  (Signed 28-Apr-2023 14:04)   Authored: History & Physical Reviewed, Note Completion      Last Updated: 28-Apr-2023 14:04 by D'amico, Louis (MD)   "

## 2023-10-02 NOTE — OP NOTE
Post Operative Note:     Post-Procedure Diagnosis: .   Procedure: CYSTOURETHROSCOPY WITH URETHRAL CALIBRATION  AND DILATION  INSERTION OF A 20 Italian COUD TIP BARD TAN CATHETER   Surgeon: LOUIS D'AMICO M.D.   Resident/Fellow/Other Assistant: None   Anesthesia: Parenteral MAC IV sedation along with  20 cc of 2% plain lidocaine jelly per urethra   I.V. Fluids: As per record   Estimated Blood Loss (mL): none   Specimen: yes. Bladder urine was sent for culture  sensitivity   Complications: None   Findings: PLEASE REFER TO THE FORMAL DRAGON VOICE  TRANSCRIPTION DICTATED OPERATIVE NOTE BELOW PLEASE REFER TO THE FORMAL DRAGON VOICE TRANSCRIPTION DICTATED OPERATIVE NOTE BELOW     Operative Report Dictated:  Dictation: not applicable - note contains Operative  Report   Note Recipients: D'amico, Louis, MD - [id number]  [Preferred]  Demond Bentley MD   Operative Report:    PREOPERATIVE DIAGNOSIS: []: Urethral stricture,  meatal stenosis, balanitis, incomplete bladder emptying, BPH with moderate lower urinary tract symptoms, normal PSA of 0.1, negative urinalysis culture, normal CBC BMP profile    POSTOPERATIVE DIAGNOSIS: []Same as preop    OPERATIVE PROCEDURE: []CYSTOSCOPY WITH URETHRAL CALIBRATION DILATION, INSERT 20 Italian COUD TIP TAN CATHETER    ANESTHESIA: []Parenteral MAC IV sedation along with 20 cc of 2% plain lidocaine jelly per urethra    ASSISTANTS: []None    IV FLUIDS: []As per record    ESTIMATED BLOOD LOSS: []None    PROCEDURE AS FOLLOWS: []    AN APPROPRIATE HUDDLE AND TIMEOUT PROCEDURE WAS APPROPRIATELY CARRIED OUT BEFORE AND AFTER ANESTHESIA WAS ADMINISTERED IN SATISFACTORY FASHION WITH SURGEON PATIENT AND ALL OPERATING ROOM PERSONNEL PRESENT ACCORDING TO ROUTINE POLICY.    After administration of appropriate parenteral MAC IV sedation the patient was placed in lithotomy position and prepped and draped per routine.  Initial examination again shows a narrow penile meatus and 20 cc of 2% plain  lidocaine jelly was gradually instilled into the urethra.  Karri sounds were used to dilate the meatus and distal urethra up to 24 Persian again noting a combination of distal urethral stricture and meatal stenosis.  Skin integrity has improved from prior examination, no unusual or abnormal localizing lesions    The 17 Persian rigid Olympus cystoscope was then used for further examination  The remainder of the internal urethra was unremarkable  The bladder neck shows minimal prostatic enlargement which is otherwise soft rectally at the end of the procedure  Entry into the bladder returned a residual of 300 cc of yellow urine and a sample was sent for culture sensitivity  The bladder was carefully inspected and appears to be over distended larger than normal with smooth-walled redundant folds that would be more consistent with some element of occult atonic bladder, there is minimal if any trabeculation of the Bugbee or  evidence suggestive of bladder neck obstruction.  There are no other lesions tumors or stones and the right and left ureteral orifice otherwise unremarkable  The cystoscope was removed and a 20 Persian coudé tip Ho catheter left indwelling for short time postoperatively and will be removed in the recovery area with a 300 cc voiding trial.  Patient tolerated procedure well and was returned to the outpatient recovery discharge area in satisfactory condition.    Follow-up    Await current urine culture sensitivity  Added Flomax daily in an effort to possibly improve bladder emptying and flow  Will recheck in the office in 1 month for a follow-up post cystoscopy visit to review any improvement with Flomax and establish further periodic urologic follow-up, currently clinically suspect patient should probably have cystoscopy dilations and examinations  about every 6 to 12 months urologically        Attestation:   Note Completion:  Attending Attestation I performed the procedure without a resident          Electronic Signatures:  D'amico, Louis (MD)  (Signed 28-Apr-2023 17:24)   Authored: Post Operative Note, Note Completion      Last Updated: 28-Apr-2023 17:24 by D'amico, Louis (MD)

## 2023-10-04 ENCOUNTER — TELEPHONE (OUTPATIENT)
Dept: ORTHOPEDIC SURGERY | Age: 63
End: 2023-10-04

## 2023-10-04 NOTE — TELEPHONE ENCOUNTER
Received referral from [facility] for cervical pain. Referral scanned into media.

## 2024-01-25 ENCOUNTER — HOSPITAL ENCOUNTER (EMERGENCY)
Facility: HOSPITAL | Age: 64
Discharge: HOME | End: 2024-01-25
Payer: COMMERCIAL

## 2024-01-25 ENCOUNTER — APPOINTMENT (OUTPATIENT)
Dept: RADIOLOGY | Facility: HOSPITAL | Age: 64
End: 2024-01-25
Payer: COMMERCIAL

## 2024-01-25 VITALS
SYSTOLIC BLOOD PRESSURE: 133 MMHG | TEMPERATURE: 98.4 F | RESPIRATION RATE: 16 BRPM | DIASTOLIC BLOOD PRESSURE: 71 MMHG | HEART RATE: 70 BPM | WEIGHT: 150 LBS | OXYGEN SATURATION: 98 % | HEIGHT: 73 IN | BODY MASS INDEX: 19.88 KG/M2

## 2024-01-25 DIAGNOSIS — R10.84 ABDOMINAL PAIN, GENERALIZED: Primary | ICD-10-CM

## 2024-01-25 DIAGNOSIS — E27.8 ADRENAL NODULE (MULTI): ICD-10-CM

## 2024-01-25 LAB
ALBUMIN SERPL BCP-MCNC: 4 G/DL (ref 3.4–5)
ALP SERPL-CCNC: 75 U/L (ref 33–136)
ALT SERPL W P-5'-P-CCNC: 7 U/L (ref 10–52)
ANION GAP SERPL CALC-SCNC: 11 MMOL/L (ref 10–20)
APPEARANCE UR: CLEAR
AST SERPL W P-5'-P-CCNC: 14 U/L (ref 9–39)
BASOPHILS # BLD AUTO: 0.03 X10*3/UL (ref 0–0.1)
BASOPHILS NFR BLD AUTO: 0.6 %
BILIRUB SERPL-MCNC: 0.9 MG/DL (ref 0–1.2)
BILIRUB UR STRIP.AUTO-MCNC: NEGATIVE MG/DL
BUN SERPL-MCNC: 21 MG/DL (ref 6–23)
CALCIUM SERPL-MCNC: 9 MG/DL (ref 8.6–10.3)
CHLORIDE SERPL-SCNC: 101 MMOL/L (ref 98–107)
CO2 SERPL-SCNC: 28 MMOL/L (ref 21–32)
COLOR UR: ABNORMAL
CREAT SERPL-MCNC: 0.98 MG/DL (ref 0.5–1.3)
EGFRCR SERPLBLD CKD-EPI 2021: 88 ML/MIN/1.73M*2
EOSINOPHIL # BLD AUTO: 0.1 X10*3/UL (ref 0–0.7)
EOSINOPHIL NFR BLD AUTO: 2 %
ERYTHROCYTE [DISTWIDTH] IN BLOOD BY AUTOMATED COUNT: 15 % (ref 11.5–14.5)
FLUAV RNA RESP QL NAA+PROBE: NOT DETECTED
FLUBV RNA RESP QL NAA+PROBE: NOT DETECTED
GLUCOSE SERPL-MCNC: 83 MG/DL (ref 74–99)
GLUCOSE UR STRIP.AUTO-MCNC: NEGATIVE MG/DL
HCT VFR BLD AUTO: 40.4 % (ref 41–52)
HGB BLD-MCNC: 13 G/DL (ref 13.5–17.5)
IMM GRANULOCYTES # BLD AUTO: 0.02 X10*3/UL (ref 0–0.7)
IMM GRANULOCYTES NFR BLD AUTO: 0.4 % (ref 0–0.9)
KETONES UR STRIP.AUTO-MCNC: ABNORMAL MG/DL
LACTATE SERPL-SCNC: 1.1 MMOL/L (ref 0.4–2)
LEUKOCYTE ESTERASE UR QL STRIP.AUTO: NEGATIVE
LIPASE SERPL-CCNC: 12 U/L (ref 9–82)
LYMPHOCYTES # BLD AUTO: 1.3 X10*3/UL (ref 1.2–4.8)
LYMPHOCYTES NFR BLD AUTO: 25.4 %
MCH RBC QN AUTO: 28.7 PG (ref 26–34)
MCHC RBC AUTO-ENTMCNC: 32.2 G/DL (ref 32–36)
MCV RBC AUTO: 89 FL (ref 80–100)
MONOCYTES # BLD AUTO: 0.3 X10*3/UL (ref 0.1–1)
MONOCYTES NFR BLD AUTO: 5.9 %
NEUTROPHILS # BLD AUTO: 3.36 X10*3/UL (ref 1.2–7.7)
NEUTROPHILS NFR BLD AUTO: 65.7 %
NITRITE UR QL STRIP.AUTO: NEGATIVE
NRBC BLD-RTO: 0 /100 WBCS (ref 0–0)
PH UR STRIP.AUTO: 5 [PH]
PLATELET # BLD AUTO: 234 X10*3/UL (ref 150–450)
POTASSIUM SERPL-SCNC: 4.7 MMOL/L (ref 3.5–5.3)
PROT SERPL-MCNC: 7.3 G/DL (ref 6.4–8.2)
PROT UR STRIP.AUTO-MCNC: NEGATIVE MG/DL
RBC # BLD AUTO: 4.53 X10*6/UL (ref 4.5–5.9)
RBC # UR STRIP.AUTO: NEGATIVE /UL
SARS-COV-2 RNA RESP QL NAA+PROBE: NOT DETECTED
SODIUM SERPL-SCNC: 135 MMOL/L (ref 136–145)
SP GR UR STRIP.AUTO: 1.03
UROBILINOGEN UR STRIP.AUTO-MCNC: 4 MG/DL
WBC # BLD AUTO: 5.1 X10*3/UL (ref 4.4–11.3)

## 2024-01-25 PROCEDURE — 99284 EMERGENCY DEPT VISIT MOD MDM: CPT | Mod: 25

## 2024-01-25 PROCEDURE — 87636 SARSCOV2 & INF A&B AMP PRB: CPT | Performed by: HOME HEALTH

## 2024-01-25 PROCEDURE — 81003 URINALYSIS AUTO W/O SCOPE: CPT | Performed by: HOME HEALTH

## 2024-01-25 PROCEDURE — 2500000004 HC RX 250 GENERAL PHARMACY W/ HCPCS (ALT 636 FOR OP/ED): Performed by: HOME HEALTH

## 2024-01-25 PROCEDURE — 83690 ASSAY OF LIPASE: CPT | Performed by: HOME HEALTH

## 2024-01-25 PROCEDURE — 96360 HYDRATION IV INFUSION INIT: CPT

## 2024-01-25 PROCEDURE — 74177 CT ABD & PELVIS W/CONTRAST: CPT

## 2024-01-25 PROCEDURE — 2550000001 HC RX 255 CONTRASTS: Performed by: HOME HEALTH

## 2024-01-25 PROCEDURE — 84075 ASSAY ALKALINE PHOSPHATASE: CPT | Performed by: HOME HEALTH

## 2024-01-25 PROCEDURE — 85025 COMPLETE CBC W/AUTO DIFF WBC: CPT | Performed by: HOME HEALTH

## 2024-01-25 PROCEDURE — 36415 COLL VENOUS BLD VENIPUNCTURE: CPT | Performed by: HOME HEALTH

## 2024-01-25 PROCEDURE — 83605 ASSAY OF LACTIC ACID: CPT | Performed by: HOME HEALTH

## 2024-01-25 RX ORDER — DICYCLOMINE HYDROCHLORIDE 20 MG/1
10 TABLET ORAL 4 TIMES DAILY PRN
Qty: 16 TABLET | Refills: 0 | Status: SHIPPED | OUTPATIENT
Start: 2024-01-25 | End: 2024-02-23 | Stop reason: HOSPADM

## 2024-01-25 RX ORDER — ONDANSETRON 4 MG/1
4 TABLET, ORALLY DISINTEGRATING ORAL EVERY 8 HOURS PRN
Qty: 12 TABLET | Refills: 0 | Status: SHIPPED | OUTPATIENT
Start: 2024-01-25 | End: 2024-02-23 | Stop reason: HOSPADM

## 2024-01-25 RX ORDER — DICYCLOMINE HYDROCHLORIDE 10 MG/ML
10 INJECTION INTRAMUSCULAR ONCE
Status: COMPLETED | OUTPATIENT
Start: 2024-01-25 | End: 2024-01-25

## 2024-01-25 RX ADMIN — IOHEXOL 75 ML: 350 INJECTION, SOLUTION INTRAVENOUS at 13:17

## 2024-01-25 RX ADMIN — SODIUM CHLORIDE, POTASSIUM CHLORIDE, SODIUM LACTATE AND CALCIUM CHLORIDE 1000 ML: 600; 310; 30; 20 INJECTION, SOLUTION INTRAVENOUS at 11:40

## 2024-01-25 RX ADMIN — DICYCLOMINE HYDROCHLORIDE 10 MG: 20 INJECTION, SOLUTION INTRAMUSCULAR at 11:39

## 2024-01-25 ASSESSMENT — COLUMBIA-SUICIDE SEVERITY RATING SCALE - C-SSRS
6. HAVE YOU EVER DONE ANYTHING, STARTED TO DO ANYTHING, OR PREPARED TO DO ANYTHING TO END YOUR LIFE?: NO
1. IN THE PAST MONTH, HAVE YOU WISHED YOU WERE DEAD OR WISHED YOU COULD GO TO SLEEP AND NOT WAKE UP?: NO
2. HAVE YOU ACTUALLY HAD ANY THOUGHTS OF KILLING YOURSELF?: NO

## 2024-01-25 ASSESSMENT — PAIN SCALES - GENERAL
PAINLEVEL_OUTOF10: 8
PAINLEVEL_OUTOF10: 0 - NO PAIN

## 2024-01-25 ASSESSMENT — PAIN DESCRIPTION - DESCRIPTORS: DESCRIPTORS: CRAMPING

## 2024-01-25 ASSESSMENT — LIFESTYLE VARIABLES
REASON UNABLE TO ASSESS: NO
HAVE YOU EVER FELT YOU SHOULD CUT DOWN ON YOUR DRINKING: NO
EVER HAD A DRINK FIRST THING IN THE MORNING TO STEADY YOUR NERVES TO GET RID OF A HANGOVER: NO
EVER FELT BAD OR GUILTY ABOUT YOUR DRINKING: NO
HAVE PEOPLE ANNOYED YOU BY CRITICIZING YOUR DRINKING: NO

## 2024-01-25 ASSESSMENT — PAIN - FUNCTIONAL ASSESSMENT: PAIN_FUNCTIONAL_ASSESSMENT: 0-10

## 2024-01-25 ASSESSMENT — PAIN DESCRIPTION - LOCATION: LOCATION: ABDOMEN

## 2024-01-25 NOTE — ED PROVIDER NOTES
HPI   Chief Complaint   Patient presents with    Abdominal Pain     5 days with loose stools and nausea        Patient is a 63-year-old male presenting to the ED with abdominal pain.  Patient has a past medical history of chronic pain and hypertension.  Patient is a former opioid user denies any recent alcohol or drug use.  Patient states for approximately last 5 days he has had progressively worsening abdominal pain.  Pain located in the lower abdominal region and specifically in the suprapubic and left lower quadrant.  Known history of diverticulitis but states that this does not feel similar.  Most recent colonoscopy scheduled approximately 6 years ago.  Reports loose stools but no watery stools.  No hematochezia, melena or tarry stools.  No previous abdominal surgeries.  Eating and drinking without complication with his most recent meal yesterday evening with no complication.  Denies urinary symptoms such as dysuria, hematuria increasing frequency or foul-smelling odor.  No fever or chills.  No recent illnesses or sick contacts                          No data recorded                Patient History   Past Medical History:   Diagnosis Date    Essential (primary) hypertension     Benign essential hypertension    Personal history of other diseases of the digestive system     History of diverticulosis    Personal history of other diseases of the musculoskeletal system and connective tissue     History of gout    Personal history of other diseases of the respiratory system     History of COPD     Past Surgical History:   Procedure Laterality Date    OTHER SURGICAL HISTORY  05/06/2020    Flexor tendon repair    OTHER SURGICAL HISTORY  05/06/2020    Oral surgery    OTHER SURGICAL HISTORY  05/06/2020    Shoulder surgery     No family history on file.  Social History     Tobacco Use    Smoking status: Every Day     Types: Cigarettes    Smokeless tobacco: Never   Substance Use Topics    Alcohol use: Not on file    Drug  use: Not on file       Physical Exam   ED Triage Vitals [01/25/24 1110]   Temperature Heart Rate Respirations BP   36.9 °C (98.4 °F) 68 16 139/68      Pulse Ox Temp src Heart Rate Source Patient Position   97 % -- -- --      BP Location FiO2 (%)     -- --       Physical Exam  Vitals reviewed.   Constitutional:       Appearance: He is well-developed.   HENT:      Head: Normocephalic.      Mouth/Throat:      Mouth: Mucous membranes are moist.      Pharynx: Oropharynx is clear.   Eyes:      Extraocular Movements: Extraocular movements intact.      Pupils: Pupils are equal, round, and reactive to light.   Cardiovascular:      Rate and Rhythm: Normal rate and regular rhythm.      Heart sounds: Normal heart sounds.   Pulmonary:      Effort: Pulmonary effort is normal.      Breath sounds: Normal breath sounds. No wheezing, rhonchi or rales.   Abdominal:      General: Abdomen is flat. Bowel sounds are normal.      Palpations: Abdomen is soft.      Tenderness: There is abdominal tenderness in the suprapubic area and left lower quadrant. There is no guarding or rebound. Negative signs include McBurney's sign, psoas sign and obturator sign.   Skin:     General: Skin is warm.   Neurological:      General: No focal deficit present.      Mental Status: He is alert.   Psychiatric:         Mood and Affect: Mood normal.         Behavior: Behavior normal.       Labs Reviewed   CBC WITH AUTO DIFFERENTIAL - Abnormal       Result Value    WBC 5.1      nRBC 0.0      RBC 4.53      Hemoglobin 13.0 (*)     Hematocrit 40.4 (*)     MCV 89      MCH 28.7      MCHC 32.2      RDW 15.0 (*)     Platelets 234      Neutrophils % 65.7      Immature Granulocytes %, Automated 0.4      Lymphocytes % 25.4      Monocytes % 5.9      Eosinophils % 2.0      Basophils % 0.6      Neutrophils Absolute 3.36      Immature Granulocytes Absolute, Automated 0.02      Lymphocytes Absolute 1.30      Monocytes Absolute 0.30      Eosinophils Absolute 0.10      Basophils  Absolute 0.03     COMPREHENSIVE METABOLIC PANEL - Abnormal    Glucose 83      Sodium 135 (*)     Potassium 4.7      Chloride 101      Bicarbonate 28      Anion Gap 11      Urea Nitrogen 21      Creatinine 0.98      eGFR 88      Calcium 9.0      Albumin 4.0      Alkaline Phosphatase 75      Total Protein 7.3      AST 14      Bilirubin, Total 0.9      ALT 7 (*)    URINALYSIS WITH REFLEX MICROSCOPIC - Abnormal    Color, Urine Ladonna (*)     Appearance, Urine Clear      Specific Gravity, Urine 1.028      pH, Urine 5.0      Protein, Urine NEGATIVE      Glucose, Urine NEGATIVE      Blood, Urine NEGATIVE      Ketones, Urine 5 (TRACE) (*)     Bilirubin, Urine NEGATIVE      Urobilinogen, Urine 4.0 (*)     Nitrite, Urine NEGATIVE      Leukocyte Esterase, Urine NEGATIVE     LIPASE - Normal    Lipase 12      Narrative:     Venipuncture immediately after or during the administration of Metamizole may lead to falsely low results. Testing should be performed immediately prior to Metamizole dosing.   LACTATE - Normal    Lactate 1.1      Narrative:     Venipuncture immediately after or during the administration of Metamizole may lead to falsely low results. Testing should be performed immediately  prior to Metamizole dosing.   SARS-COV-2 AND INFLUENZA A/B PCR - Normal    Flu A Result Not Detected      Flu B Result Not Detected      Coronavirus 2019, PCR Not Detected      Narrative:     This assay has received FDA Emergency Use Authorization (EUA) and  is only authorized for the duration of time that circumstances exist to justify the authorization of the emergency use of in vitro diagnostic tests for the detection of SARS-CoV-2 virus and/or diagnosis of COVID-19 infection under section 564(b)(1) of the Act, 21 U.S.C. 360bbb-3(b)(1). Testing for SARS-CoV-2 is only recommended for patients who meet current clinical and/or epidemiological criteria as defined by federal, state, or local public health directives. This assay is an in vitro  diagnostic nucleic acid amplification test for the qualitative detection of SARS-CoV-2, Influenza A, and Influenza B from nasopharyngeal specimens and has been validated for use at [facility]. Negative results do not preclude COVID-19 infections or Influenza A/B infections, and should not be used as the sole basis for diagnosis, treatment, or other management decisions. If Influenza A/B and RSV PCR results are negative, testing for Parainfluenza virus, Adenovirus and Metapneumovirus is routinely performed for [facility] pediatric oncology and intensive care inpatients, and is available on other patients by placing an add-on request.      CT abdomen pelvis w IV contrast   Final Result   Colonic diverticulosis but no findings of acute diverticulitis        Unchanged bilateral adrenal nodules which are most likely adrenal   adenomas        Small nonobstructive left upper pole renal stones             MACRO:   None.        Signed by: June Reid 1/25/2024 2:00 PM   Dictation workstation:   [id number]          ED Course & MDM   Diagnoses as of 01/25/24 1428   Abdominal pain, generalized   Adrenal nodule (CMS/HCC)       Medical Decision Making  Independent Historians:  patient    MDM:   63-year-old male presenting to the ED with abdominal pain.  Patient states that symptoms started approximate 5 days ago and progressively worsening.  Pain located in suprapubic and left lower quadrant region.  Has associated loose stools and intermittent episodes of watery stools.  No hematochezia, melena or tarry stools.  No previous abdominal surgeries.  Does have a previous history of diverticulitis but states that this feels different.  Most recent colonoscopy was approximately 6 years ago.  Denies urinary symptoms.  No chest pain or shortness of breath.  No recent illnesses or sick contacts.  No fever or chills.  On exam patient is nontoxic-appearing with no signs of tachycardia, afebrile normotensive.  Bowel  sounds auscultated x 4.  Patient has reproducible tenderness in left lower quadrant suprapubic region.  No guarding, [place] rebound tenderness.  Patient was given Bentyl IM.  I did avoid giving the patient narcotics given his history of opioid use.  Patient given IV fluids.  Labs and imaging ordered to further evaluate patient's symptoms.    My interpretation of labs just CBC with no leukocytosis.  Anemia with a hemoglobin of 13.0 and hematocrit of 40.4 consistent with previous laboratory studies.  CMP suggest mild hyponatremia with a sodium 135.  No DI or liver injury.  Lipase unremarkable dissipation pancreatitis.  Lactate within normal limits.  COVID and flu not detected via PCR.  Urinalysis suggest trace ketones consistent with dehydration.  No signs of hematuria or UTI.  CT scan mentions colonic diverticulosis without acute findings of diverticulitis.  Unchanged bilateral adrenal nodules.  Small nonobstructing left upper pole renal stones.  Discussed these findings with the patient which she understands.    Patient reassessed, patient resting comfortably.  Discussed with patient that symptoms consistent with a viral gastroenteritis.  Best treated with symptomatic care.  Encourage fluid intake to prevent dehydration.  Advised to progress slowly with a soft diet.  Patient  will be given a prescription for Bentyl p.o. and Zofran p.o.  Vies to follow-up with his PCP and return to ED for worse concerning symptoms.  Patient is able to tolerate p.o. including a sandwich and fluids without complication.  At this time patient is stable for discharge.  Diagnosed the patient with generalized abdominal pain and adrenal nodule    DDx/Differential: gastroenteritis, cystitis, UTI, diverticulitis, colitis, appendicitis    Diagnosis: generalized abdominal pain, adrenal        Dictation Disclaimer: Due to voice recognition software, sound alike and misspelled words may be contained in documentation. If you have any questions  please contact me.            Procedure  Procedures     Beto Mathias PA-C  01/25/24 1677

## 2024-02-14 ENCOUNTER — TELEPHONE (OUTPATIENT)
Dept: GASTROENTEROLOGY | Facility: CLINIC | Age: 64
End: 2024-02-14
Payer: MEDICARE

## 2024-02-14 NOTE — TELEPHONE ENCOUNTER
I left a message to call the office to schedule an appointment; referral from Dr. MERRY Cordoba for colon screening consult.  TM

## 2024-02-19 ENCOUNTER — HOSPITAL ENCOUNTER (EMERGENCY)
Facility: HOSPITAL | Age: 64
Discharge: STILL ADMITTED OR ADMINISTRATIVE | End: 2024-02-20
Attending: EMERGENCY MEDICINE
Payer: COMMERCIAL

## 2024-02-19 DIAGNOSIS — R45.851 SUICIDAL IDEATION: Primary | ICD-10-CM

## 2024-02-19 SDOH — HEALTH STABILITY: MENTAL HEALTH: SUICIDE ASSESSMENT: ADULT (C-SSRS)

## 2024-02-19 ASSESSMENT — LIFESTYLE VARIABLES
EVER FELT BAD OR GUILTY ABOUT YOUR DRINKING: NO
HAVE PEOPLE ANNOYED YOU BY CRITICIZING YOUR DRINKING: NO
HAVE YOU EVER FELT YOU SHOULD CUT DOWN ON YOUR DRINKING: NO
EVER HAD A DRINK FIRST THING IN THE MORNING TO STEADY YOUR NERVES TO GET RID OF A HANGOVER: NO

## 2024-02-19 ASSESSMENT — COLUMBIA-SUICIDE SEVERITY RATING SCALE - C-SSRS
1. IN THE PAST MONTH, HAVE YOU WISHED YOU WERE DEAD OR WISHED YOU COULD GO TO SLEEP AND NOT WAKE UP?: NO
2. HAVE YOU ACTUALLY HAD ANY THOUGHTS OF KILLING YOURSELF?: YES
4. HAVE YOU HAD THESE THOUGHTS AND HAD SOME INTENTION OF ACTING ON THEM?: YES
6. HAVE YOU EVER DONE ANYTHING, STARTED TO DO ANYTHING, OR PREPARED TO DO ANYTHING TO END YOUR LIFE?: YES
1. IN THE PAST MONTH, HAVE YOU WISHED YOU WERE DEAD OR WISHED YOU COULD GO TO SLEEP AND NOT WAKE UP?: YES
5. HAVE YOU STARTED TO WORK OUT OR WORKED OUT THE DETAILS OF HOW TO KILL YOURSELF? DO YOU INTEND TO CARRY OUT THIS PLAN?: YES
6. HAVE YOU EVER DONE ANYTHING, STARTED TO DO ANYTHING, OR PREPARED TO DO ANYTHING TO END YOUR LIFE?: YES
6. HAVE YOU EVER DONE ANYTHING, STARTED TO DO ANYTHING, OR PREPARED TO DO ANYTHING TO END YOUR LIFE?: NO
2. HAVE YOU ACTUALLY HAD ANY THOUGHTS OF KILLING YOURSELF?: NO

## 2024-02-19 ASSESSMENT — PAIN DESCRIPTION - LOCATION: LOCATION: ARM

## 2024-02-19 ASSESSMENT — PAIN DESCRIPTION - DESCRIPTORS: DESCRIPTORS: ACHING

## 2024-02-19 ASSESSMENT — PAIN - FUNCTIONAL ASSESSMENT: PAIN_FUNCTIONAL_ASSESSMENT: 0-10

## 2024-02-19 ASSESSMENT — PAIN DESCRIPTION - ORIENTATION: ORIENTATION: RIGHT

## 2024-02-19 ASSESSMENT — PAIN SCALES - GENERAL: PAINLEVEL_OUTOF10: 8

## 2024-02-19 ASSESSMENT — PAIN DESCRIPTION - PAIN TYPE: TYPE: ACUTE PAIN

## 2024-02-20 ENCOUNTER — APPOINTMENT (OUTPATIENT)
Dept: RADIOLOGY | Facility: HOSPITAL | Age: 64
End: 2024-02-20
Payer: COMMERCIAL

## 2024-02-20 ENCOUNTER — HOSPITAL ENCOUNTER (INPATIENT)
Facility: HOSPITAL | Age: 64
LOS: 3 days | Discharge: HOME | End: 2024-02-23
Attending: PSYCHIATRY & NEUROLOGY | Admitting: PSYCHIATRY & NEUROLOGY
Payer: COMMERCIAL

## 2024-02-20 ENCOUNTER — APPOINTMENT (OUTPATIENT)
Dept: CARDIOLOGY | Facility: HOSPITAL | Age: 64
End: 2024-02-20
Payer: COMMERCIAL

## 2024-02-20 VITALS
DIASTOLIC BLOOD PRESSURE: 76 MMHG | TEMPERATURE: 98.1 F | HEART RATE: 72 BPM | WEIGHT: 150 LBS | OXYGEN SATURATION: 96 % | BODY MASS INDEX: 19.88 KG/M2 | HEIGHT: 73 IN | RESPIRATION RATE: 16 BRPM | SYSTOLIC BLOOD PRESSURE: 123 MMHG

## 2024-02-20 DIAGNOSIS — F33.1 MDD (MAJOR DEPRESSIVE DISORDER), RECURRENT EPISODE, MODERATE: Primary | ICD-10-CM

## 2024-02-20 DIAGNOSIS — M54.50 CHRONIC MIDLINE LOW BACK PAIN, UNSPECIFIED WHETHER SCIATICA PRESENT: ICD-10-CM

## 2024-02-20 DIAGNOSIS — G89.29 CHRONIC MIDLINE LOW BACK PAIN, UNSPECIFIED WHETHER SCIATICA PRESENT: ICD-10-CM

## 2024-02-20 LAB
ALBUMIN SERPL BCP-MCNC: 4.6 G/DL (ref 3.4–5)
ALP SERPL-CCNC: 82 U/L (ref 33–136)
ALT SERPL W P-5'-P-CCNC: 8 U/L (ref 10–52)
AMPHETAMINES UR QL SCN: ABNORMAL
ANION GAP SERPL CALC-SCNC: 13 MMOL/L (ref 10–20)
APAP SERPL-MCNC: <10 UG/ML
APPEARANCE UR: CLEAR
AST SERPL W P-5'-P-CCNC: 13 U/L (ref 9–39)
BARBITURATES UR QL SCN: ABNORMAL
BASOPHILS # BLD AUTO: 0.05 X10*3/UL (ref 0–0.1)
BASOPHILS NFR BLD AUTO: 0.5 %
BENZODIAZ UR QL SCN: ABNORMAL
BILIRUB SERPL-MCNC: 0.4 MG/DL (ref 0–1.2)
BILIRUB UR STRIP.AUTO-MCNC: ABNORMAL MG/DL
BNP SERPL-MCNC: 14 PG/ML (ref 0–99)
BUN SERPL-MCNC: 15 MG/DL (ref 6–23)
BZE UR QL SCN: ABNORMAL
CALCIUM SERPL-MCNC: 9.2 MG/DL (ref 8.6–10.3)
CANNABINOIDS UR QL SCN: ABNORMAL
CARDIAC TROPONIN I PNL SERPL HS: 6 NG/L (ref 0–20)
CARDIAC TROPONIN I PNL SERPL HS: 6 NG/L (ref 0–20)
CHLORIDE SERPL-SCNC: 101 MMOL/L (ref 98–107)
CO2 SERPL-SCNC: 27 MMOL/L (ref 21–32)
COLOR UR: ABNORMAL
CREAT SERPL-MCNC: 1.35 MG/DL (ref 0.5–1.3)
EGFRCR SERPLBLD CKD-EPI 2021: 60 ML/MIN/1.73M*2
EOSINOPHIL # BLD AUTO: 0.12 X10*3/UL (ref 0–0.7)
EOSINOPHIL NFR BLD AUTO: 1.2 %
ERYTHROCYTE [DISTWIDTH] IN BLOOD BY AUTOMATED COUNT: 13.8 % (ref 11.5–14.5)
ETHANOL SERPL-MCNC: <10 MG/DL
FENTANYL+NORFENTANYL UR QL SCN: ABNORMAL
FLUAV RNA RESP QL NAA+PROBE: NOT DETECTED
FLUBV RNA RESP QL NAA+PROBE: NOT DETECTED
GLUCOSE SERPL-MCNC: 122 MG/DL (ref 74–99)
GLUCOSE UR STRIP.AUTO-MCNC: NEGATIVE MG/DL
HCT VFR BLD AUTO: 45.5 % (ref 41–52)
HGB BLD-MCNC: 14.9 G/DL (ref 13.5–17.5)
HOLD SPECIMEN: NORMAL
HYALINE CASTS #/AREA URNS AUTO: ABNORMAL /LPF
IMM GRANULOCYTES # BLD AUTO: 0.03 X10*3/UL (ref 0–0.7)
IMM GRANULOCYTES NFR BLD AUTO: 0.3 % (ref 0–0.9)
INR PPP: 1.1 (ref 0.9–1.1)
KETONES UR STRIP.AUTO-MCNC: ABNORMAL MG/DL
LACTATE SERPL-SCNC: 1.6 MMOL/L (ref 0.4–2)
LEUKOCYTE ESTERASE UR QL STRIP.AUTO: ABNORMAL
LIPASE SERPL-CCNC: 17 U/L (ref 9–82)
LYMPHOCYTES # BLD AUTO: 1.3 X10*3/UL (ref 1.2–4.8)
LYMPHOCYTES NFR BLD AUTO: 13.1 %
MAGNESIUM SERPL-MCNC: 2.14 MG/DL (ref 1.6–2.4)
MCH RBC QN AUTO: 29.7 PG (ref 26–34)
MCHC RBC AUTO-ENTMCNC: 32.7 G/DL (ref 32–36)
MCV RBC AUTO: 91 FL (ref 80–100)
MONOCYTES # BLD AUTO: 0.66 X10*3/UL (ref 0.1–1)
MONOCYTES NFR BLD AUTO: 6.6 %
MUCOUS THREADS #/AREA URNS AUTO: ABNORMAL /LPF
NEUTROPHILS # BLD AUTO: 7.77 X10*3/UL (ref 1.2–7.7)
NEUTROPHILS NFR BLD AUTO: 78.3 %
NITRITE UR QL STRIP.AUTO: NEGATIVE
NRBC BLD-RTO: 0 /100 WBCS (ref 0–0)
OPIATES UR QL SCN: ABNORMAL
OXYCODONE+OXYMORPHONE UR QL SCN: ABNORMAL
PCP UR QL SCN: ABNORMAL
PH UR STRIP.AUTO: 5 [PH]
PLATELET # BLD AUTO: 291 X10*3/UL (ref 150–450)
POTASSIUM SERPL-SCNC: 3.7 MMOL/L (ref 3.5–5.3)
PROT SERPL-MCNC: 8.3 G/DL (ref 6.4–8.2)
PROT UR STRIP.AUTO-MCNC: ABNORMAL MG/DL
PROTHROMBIN TIME: 11.9 SECONDS (ref 9.8–12.8)
RBC # BLD AUTO: 5.02 X10*6/UL (ref 4.5–5.9)
RBC # UR STRIP.AUTO: NEGATIVE /UL
RBC #/AREA URNS AUTO: ABNORMAL /HPF
SALICYLATES SERPL-MCNC: <3 MG/DL
SARS-COV-2 RNA RESP QL NAA+PROBE: NOT DETECTED
SODIUM SERPL-SCNC: 137 MMOL/L (ref 136–145)
SP GR UR STRIP.AUTO: 1.02
SQUAMOUS #/AREA URNS AUTO: ABNORMAL /HPF
UROBILINOGEN UR STRIP.AUTO-MCNC: 4 MG/DL
WBC # BLD AUTO: 9.9 X10*3/UL (ref 4.4–11.3)
WBC #/AREA URNS AUTO: ABNORMAL /HPF

## 2024-02-20 PROCEDURE — 73564 X-RAY EXAM KNEE 4 OR MORE: CPT | Mod: RIGHT SIDE | Performed by: RADIOLOGY

## 2024-02-20 PROCEDURE — 73564 X-RAY EXAM KNEE 4 OR MORE: CPT | Mod: RT

## 2024-02-20 PROCEDURE — 99222 1ST HOSP IP/OBS MODERATE 55: CPT | Performed by: REGISTERED NURSE

## 2024-02-20 PROCEDURE — 83690 ASSAY OF LIPASE: CPT | Performed by: PHYSICIAN ASSISTANT

## 2024-02-20 PROCEDURE — 85610 PROTHROMBIN TIME: CPT | Performed by: PHYSICIAN ASSISTANT

## 2024-02-20 PROCEDURE — 71045 X-RAY EXAM CHEST 1 VIEW: CPT

## 2024-02-20 PROCEDURE — 70450 CT HEAD/BRAIN W/O DYE: CPT | Performed by: RADIOLOGY

## 2024-02-20 PROCEDURE — 80053 COMPREHEN METABOLIC PANEL: CPT | Performed by: PHYSICIAN ASSISTANT

## 2024-02-20 PROCEDURE — 85025 COMPLETE CBC W/AUTO DIFF WBC: CPT | Performed by: PHYSICIAN ASSISTANT

## 2024-02-20 PROCEDURE — 81001 URINALYSIS AUTO W/SCOPE: CPT | Performed by: PHYSICIAN ASSISTANT

## 2024-02-20 PROCEDURE — 83880 ASSAY OF NATRIURETIC PEPTIDE: CPT | Performed by: PHYSICIAN ASSISTANT

## 2024-02-20 PROCEDURE — 83605 ASSAY OF LACTIC ACID: CPT | Performed by: PHYSICIAN ASSISTANT

## 2024-02-20 PROCEDURE — 36415 COLL VENOUS BLD VENIPUNCTURE: CPT | Performed by: PHYSICIAN ASSISTANT

## 2024-02-20 PROCEDURE — 2500000002 HC RX 250 W HCPCS SELF ADMINISTERED DRUGS (ALT 637 FOR MEDICARE OP, ALT 636 FOR OP/ED): Performed by: PSYCHIATRY & NEUROLOGY

## 2024-02-20 PROCEDURE — 70450 CT HEAD/BRAIN W/O DYE: CPT

## 2024-02-20 PROCEDURE — 2500000002 HC RX 250 W HCPCS SELF ADMINISTERED DRUGS (ALT 637 FOR MEDICARE OP, ALT 636 FOR OP/ED): Performed by: REGISTERED NURSE

## 2024-02-20 PROCEDURE — 80354 DRUG SCREENING FENTANYL: CPT | Mod: ELYLAB | Performed by: PHYSICIAN ASSISTANT

## 2024-02-20 PROCEDURE — 87636 SARSCOV2 & INF A&B AMP PRB: CPT | Performed by: PHYSICIAN ASSISTANT

## 2024-02-20 PROCEDURE — 84484 ASSAY OF TROPONIN QUANT: CPT | Performed by: PHYSICIAN ASSISTANT

## 2024-02-20 PROCEDURE — 71045 X-RAY EXAM CHEST 1 VIEW: CPT | Performed by: RADIOLOGY

## 2024-02-20 PROCEDURE — 1140000001 HC PRIVATE PSYCH ROOM DAILY

## 2024-02-20 PROCEDURE — 80143 DRUG ASSAY ACETAMINOPHEN: CPT | Performed by: PHYSICIAN ASSISTANT

## 2024-02-20 PROCEDURE — S4991 NICOTINE PATCH NONLEGEND: HCPCS | Performed by: PSYCHIATRY & NEUROLOGY

## 2024-02-20 PROCEDURE — 2500000004 HC RX 250 GENERAL PHARMACY W/ HCPCS (ALT 636 FOR OP/ED): Performed by: PHYSICIAN ASSISTANT

## 2024-02-20 PROCEDURE — 80307 DRUG TEST PRSMV CHEM ANLYZR: CPT | Performed by: PHYSICIAN ASSISTANT

## 2024-02-20 PROCEDURE — 93005 ELECTROCARDIOGRAM TRACING: CPT

## 2024-02-20 PROCEDURE — 87086 URINE CULTURE/COLONY COUNT: CPT | Mod: ELYLAB | Performed by: PHYSICIAN ASSISTANT

## 2024-02-20 PROCEDURE — 2500000001 HC RX 250 WO HCPCS SELF ADMINISTERED DRUGS (ALT 637 FOR MEDICARE OP): Performed by: REGISTERED NURSE

## 2024-02-20 PROCEDURE — 83735 ASSAY OF MAGNESIUM: CPT | Performed by: PHYSICIAN ASSISTANT

## 2024-02-20 RX ORDER — MIRTAZAPINE 30 MG/1
30 TABLET, FILM COATED ORAL NIGHTLY
COMMUNITY
Start: 2024-01-03 | End: 2024-02-23 | Stop reason: HOSPADM

## 2024-02-20 RX ORDER — DICYCLOMINE HYDROCHLORIDE 10 MG/1
20 CAPSULE ORAL 3 TIMES DAILY
Status: ON HOLD | COMMUNITY
Start: 2024-01-30 | End: 2024-02-20 | Stop reason: SDUPTHER

## 2024-02-20 RX ORDER — OLANZAPINE 5 MG/1
5 TABLET, FILM COATED ORAL EVERY 6 HOURS PRN
Status: DISCONTINUED | OUTPATIENT
Start: 2024-02-20 | End: 2024-02-23 | Stop reason: HOSPADM

## 2024-02-20 RX ORDER — HYDROXYZINE HYDROCHLORIDE 25 MG/1
50 TABLET, FILM COATED ORAL EVERY 6 HOURS PRN
Status: DISCONTINUED | OUTPATIENT
Start: 2024-02-20 | End: 2024-02-23 | Stop reason: HOSPADM

## 2024-02-20 RX ORDER — IBUPROFEN 200 MG
1 TABLET ORAL DAILY
Status: DISCONTINUED | OUTPATIENT
Start: 2024-02-20 | End: 2024-02-23 | Stop reason: HOSPADM

## 2024-02-20 RX ORDER — ALUMINUM HYDROXIDE, MAGNESIUM HYDROXIDE, AND SIMETHICONE 1200; 120; 1200 MG/30ML; MG/30ML; MG/30ML
10 SUSPENSION ORAL 4 TIMES DAILY PRN
Status: DISCONTINUED | OUTPATIENT
Start: 2024-02-20 | End: 2024-02-23 | Stop reason: HOSPADM

## 2024-02-20 RX ORDER — IBUPROFEN 600 MG/1
600 TABLET, FILM COATED ORAL EVERY 6 HOURS PRN
Status: DISCONTINUED | OUTPATIENT
Start: 2024-02-20 | End: 2024-02-23 | Stop reason: HOSPADM

## 2024-02-20 RX ORDER — DULOXETIN HYDROCHLORIDE 30 MG/1
30 CAPSULE, DELAYED RELEASE ORAL 2 TIMES DAILY
Status: DISCONTINUED | OUTPATIENT
Start: 2024-02-20 | End: 2024-02-21

## 2024-02-20 RX ORDER — DULOXETIN HYDROCHLORIDE 60 MG/1
60 CAPSULE, DELAYED RELEASE ORAL 2 TIMES DAILY
Status: ON HOLD | COMMUNITY
Start: 2024-01-03 | End: 2024-02-23 | Stop reason: SDUPTHER

## 2024-02-20 RX ORDER — ARIPIPRAZOLE 15 MG/1
15 TABLET ORAL 2 TIMES DAILY
COMMUNITY
Start: 2024-01-03 | End: 2024-02-23 | Stop reason: HOSPADM

## 2024-02-20 RX ORDER — CYANOCOBALAMIN 1000 UG/ML
1000 INJECTION, SOLUTION INTRAMUSCULAR; SUBCUTANEOUS ONCE
COMMUNITY
Start: 2024-01-31 | End: 2024-02-23 | Stop reason: HOSPADM

## 2024-02-20 RX ORDER — ACETAMINOPHEN 325 MG/1
650 TABLET ORAL EVERY 6 HOURS PRN
Status: DISCONTINUED | OUTPATIENT
Start: 2024-02-20 | End: 2024-02-23 | Stop reason: HOSPADM

## 2024-02-20 RX ORDER — CLONIDINE HYDROCHLORIDE 0.1 MG/1
0.1 TABLET ORAL NIGHTLY
Status: ON HOLD | COMMUNITY
End: 2024-02-23 | Stop reason: SDUPTHER

## 2024-02-20 RX ORDER — QUETIAPINE FUMARATE 25 MG/1
12.5 TABLET, FILM COATED ORAL NIGHTLY
Status: DISCONTINUED | OUTPATIENT
Start: 2024-02-20 | End: 2024-02-21

## 2024-02-20 RX ORDER — OLANZAPINE 10 MG/2ML
5 INJECTION, POWDER, FOR SOLUTION INTRAMUSCULAR EVERY 6 HOURS PRN
Status: DISCONTINUED | OUTPATIENT
Start: 2024-02-20 | End: 2024-02-23 | Stop reason: HOSPADM

## 2024-02-20 RX ORDER — HYDROXYZINE PAMOATE 25 MG/1
50 CAPSULE ORAL 3 TIMES DAILY PRN
COMMUNITY
Start: 2024-01-03 | End: 2024-02-23 | Stop reason: HOSPADM

## 2024-02-20 RX ORDER — TRAZODONE HYDROCHLORIDE 50 MG/1
50 TABLET ORAL NIGHTLY PRN
Status: DISCONTINUED | OUTPATIENT
Start: 2024-02-20 | End: 2024-02-23 | Stop reason: HOSPADM

## 2024-02-20 RX ADMIN — Medication 1 PATCH: at 15:59

## 2024-02-20 RX ADMIN — SODIUM CHLORIDE 1000 ML: 9 INJECTION, SOLUTION INTRAVENOUS at 01:10

## 2024-02-20 RX ADMIN — DULOXETINE HYDROCHLORIDE 30 MG: 30 CAPSULE, DELAYED RELEASE ORAL at 20:28

## 2024-02-20 RX ADMIN — QUETIAPINE FUMARATE 12.5 MG: 25 TABLET, FILM COATED ORAL at 20:28

## 2024-02-20 RX ADMIN — SODIUM CHLORIDE 500 ML: 9 INJECTION, SOLUTION INTRAVENOUS at 00:15

## 2024-02-20 RX ADMIN — TRAZODONE HYDROCHLORIDE 50 MG: 50 TABLET ORAL at 20:28

## 2024-02-20 RX ADMIN — IBUPROFEN 600 MG: 600 TABLET, FILM COATED ORAL at 20:28

## 2024-02-20 SDOH — SOCIAL STABILITY: SOCIAL INSECURITY: WITHIN THE LAST YEAR, HAVE YOU BEEN AFRAID OF YOUR PARTNER OR EX-PARTNER?: NO

## 2024-02-20 SDOH — HEALTH STABILITY: MENTAL HEALTH: HAVE YOU ACTUALLY HAD ANY THOUGHTS OF KILLING YOURSELF?: YES

## 2024-02-20 SDOH — SOCIAL STABILITY: SOCIAL NETWORK
DO YOU BELONG TO ANY CLUBS OR ORGANIZATIONS SUCH AS CHURCH GROUPS, UNIONS, FRATERNAL OR ATHLETIC GROUPS, OR SCHOOL GROUPS?: NO

## 2024-02-20 SDOH — HEALTH STABILITY: MENTAL HEALTH: HAVE YOU HAD THESE THOUGHTS AND HAD SOME INTENTION OF ACTING ON THEM?: NO

## 2024-02-20 SDOH — ECONOMIC STABILITY: INCOME INSECURITY: IN THE PAST 12 MONTHS, HAS THE ELECTRIC, GAS, OIL, OR WATER COMPANY THREATENED TO SHUT OFF SERVICE IN YOUR HOME?: YES

## 2024-02-20 SDOH — HEALTH STABILITY: MENTAL HEALTH: BEHAVIORS/MOOD: APPROPRIATE FOR SITUATION

## 2024-02-20 SDOH — ECONOMIC STABILITY: FOOD INSECURITY: HOW HARD IS IT FOR YOU TO PAY FOR THE VERY BASICS LIKE FOOD, HOUSING, MEDICAL CARE, AND HEATING?: VERY HARD

## 2024-02-20 SDOH — ECONOMIC STABILITY: TRANSPORTATION INSECURITY: IN THE PAST 12 MONTHS, HAS LACK OF TRANSPORTATION KEPT YOU FROM MEDICAL APPOINTMENTS OR FROM GETTING MEDICATIONS?: YES

## 2024-02-20 SDOH — SOCIAL STABILITY: SOCIAL NETWORK: HOW OFTEN DO YOU GET TOGETHER WITH FRIENDS OR RELATIVES?: NEVER

## 2024-02-20 SDOH — HEALTH STABILITY: MENTAL HEALTH: BEHAVIORS/MOOD: CALM;COOPERATIVE

## 2024-02-20 SDOH — ECONOMIC STABILITY: HOUSING INSECURITY
IN THE LAST 12 MONTHS, WAS THERE A TIME WHEN YOU DID NOT HAVE A STEADY PLACE TO SLEEP OR SLEPT IN A SHELTER (INCLUDING NOW)?: NO

## 2024-02-20 SDOH — SOCIAL STABILITY: SOCIAL NETWORK: IN A TYPICAL WEEK, HOW MANY TIMES DO YOU TALK ON THE PHONE WITH FAMILY, FRIENDS, OR NEIGHBORS?: NEVER

## 2024-02-20 SDOH — SOCIAL STABILITY: SOCIAL INSECURITY
WITHIN THE LAST YEAR, HAVE YOU BEEN KICKED, HIT, SLAPPED, OR OTHERWISE PHYSICALLY HURT BY YOUR PARTNER OR EX-PARTNER?: NO

## 2024-02-20 SDOH — HEALTH STABILITY: MENTAL HEALTH

## 2024-02-20 SDOH — HEALTH STABILITY: PHYSICAL HEALTH: ON AVERAGE, HOW MANY MINUTES DO YOU ENGAGE IN EXERCISE AT THIS LEVEL?: 0 MIN

## 2024-02-20 SDOH — HEALTH STABILITY: MENTAL HEALTH: NON-SPECIFIC ACTIVE SUICIDAL THOUGHTS (PAST 1 MONTH): YES

## 2024-02-20 SDOH — SOCIAL STABILITY: SOCIAL NETWORK: HOW OFTEN DO YOU ATTEND CHURCH OR RELIGIOUS SERVICES?: NEVER

## 2024-02-20 SDOH — HEALTH STABILITY: PHYSICAL HEALTH: ON AVERAGE, HOW MANY DAYS PER WEEK DO YOU ENGAGE IN MODERATE TO STRENUOUS EXERCISE (LIKE A BRISK WALK)?: 0 DAYS

## 2024-02-20 SDOH — SOCIAL STABILITY: SOCIAL INSECURITY: WITHIN THE LAST YEAR, HAVE YOU BEEN HUMILIATED OR EMOTIONALLY ABUSED IN OTHER WAYS BY YOUR PARTNER OR EX-PARTNER?: NO

## 2024-02-20 SDOH — HEALTH STABILITY: MENTAL HEALTH: WISH TO BE DEAD (PAST 1 MONTH): YES

## 2024-02-20 SDOH — SOCIAL STABILITY: SOCIAL INSECURITY
WITHIN THE LAST YEAR, HAVE TO BEEN RAPED OR FORCED TO HAVE ANY KIND OF SEXUAL ACTIVITY BY YOUR PARTNER OR EX-PARTNER?: NO

## 2024-02-20 SDOH — HEALTH STABILITY: MENTAL HEALTH: SUICIDE ASSESSMENT: ADULT (C-SSRS)

## 2024-02-20 SDOH — SOCIAL STABILITY: SOCIAL NETWORK: ARE YOU MARRIED, WIDOWED, DIVORCED, SEPARATED, NEVER MARRIED, OR LIVING WITH A PARTNER?: NEVER MARRIED

## 2024-02-20 SDOH — ECONOMIC STABILITY: FOOD INSECURITY: WITHIN THE PAST 12 MONTHS, THE FOOD YOU BOUGHT JUST DIDN'T LAST AND YOU DIDN'T HAVE MONEY TO GET MORE.: OFTEN TRUE

## 2024-02-20 SDOH — HEALTH STABILITY: MENTAL HEALTH: SLEEP PATTERN: DISTURBED/INTERRUPTED SLEEP

## 2024-02-20 SDOH — HEALTH STABILITY: MENTAL HEALTH: HAVE YOU BEEN THINKING ABOUT HOW YOU MIGHT DO THIS?: YES

## 2024-02-20 SDOH — SOCIAL STABILITY: SOCIAL INSECURITY: FAMILY BEHAVIORS: UNABLE TO ASSESS

## 2024-02-20 SDOH — SOCIAL STABILITY: SOCIAL INSECURITY
WITHIN THE LAST YEAR, HAVE YOU BEEN RAPED OR FORCED TO HAVE ANY KIND OF SEXUAL ACTIVITY BY YOUR PARTNER OR EX-PARTNER?: NO

## 2024-02-20 SDOH — SOCIAL STABILITY: SOCIAL INSECURITY: DOES ANYONE TRY TO KEEP YOU FROM HAVING/CONTACTING OTHER FRIENDS OR DOING THINGS OUTSIDE YOUR HOME?: NO

## 2024-02-20 SDOH — ECONOMIC STABILITY: INCOME INSECURITY: IN THE PAST 12 MONTHS HAS THE ELECTRIC, GAS, OIL, OR WATER COMPANY THREATENED TO SHUT OFF SERVICES IN YOUR HOME?: YES

## 2024-02-20 SDOH — HEALTH STABILITY: MENTAL HEALTH
DO YOU FEEL STRESS - TENSE, RESTLESS, NERVOUS, OR ANXIOUS, OR UNABLE TO SLEEP AT NIGHT BECAUSE YOUR MIND IS TROUBLED ALL THE TIME - THESE DAYS?: RATHER MUCH

## 2024-02-20 SDOH — ECONOMIC STABILITY: FOOD INSECURITY: WITHIN THE PAST 12 MONTHS, YOU WORRIED THAT YOUR FOOD WOULD RUN OUT BEFORE YOU GOT MONEY TO BUY MORE.: OFTEN TRUE

## 2024-02-20 SDOH — HEALTH STABILITY: MENTAL HEALTH: CONTENT: UNREMARKABLE

## 2024-02-20 SDOH — SOCIAL STABILITY: SOCIAL NETWORK: HOW OFTEN DO YOU ATTEND MEETINGS OF THE CLUBS OR ORGANIZATIONS YOU BELONG TO?: NEVER

## 2024-02-20 SDOH — HEALTH STABILITY: MENTAL HEALTH: HAVE YOU WISHED YOU WERE DEAD OR WISHED YOU COULD GO TO SLEEP AND NOT WAKE UP?: YES

## 2024-02-20 SDOH — SOCIAL STABILITY: SOCIAL INSECURITY: DO YOU FEEL ANYONE HAS EXPLOITED OR TAKEN ADVANTAGE OF YOU FINANCIALLY OR OF YOUR PERSONAL PROPERTY?: NO

## 2024-02-20 SDOH — HEALTH STABILITY: MENTAL HEALTH: SUICIDAL BEHAVIOR (LIFETIME): YES

## 2024-02-20 SDOH — HEALTH STABILITY: MENTAL HEALTH: ACTIVE SUICIDAL IDEATION WITH SPECIFIC PLAN AND INTENT (PAST 1 MONTH): YES

## 2024-02-20 SDOH — HEALTH STABILITY: MENTAL HEALTH: ACTIVE SUICIDAL IDEATION WITH SOME INTENT TO ACT, WITHOUT SPECIFIC PLAN (PAST 1 MONTH): YES

## 2024-02-20 SDOH — ECONOMIC STABILITY: TRANSPORTATION INSECURITY
IN THE PAST 12 MONTHS, HAS THE LACK OF TRANSPORTATION KEPT YOU FROM MEDICAL APPOINTMENTS OR FROM GETTING MEDICATIONS?: YES

## 2024-02-20 SDOH — HEALTH STABILITY: MENTAL HEALTH: HAVE YOU EVER DONE ANYTHING, STARTED TO DO ANYTHING, OR PREPARED TO DO ANYTHING TO END YOUR LIFE?: NO

## 2024-02-20 SDOH — SOCIAL STABILITY: SOCIAL INSECURITY: ARE THERE ANY APPARENT SIGNS OF INJURIES/BEHAVIORS THAT COULD BE RELATED TO ABUSE/NEGLECT?: NO

## 2024-02-20 SDOH — SOCIAL STABILITY: SOCIAL NETWORK
DO YOU BELONG TO ANY CLUBS OR ORGANIZATIONS SUCH AS CHURCH GROUPS UNIONS, FRATERNAL OR ATHLETIC GROUPS, OR SCHOOL GROUPS?: NO

## 2024-02-20 SDOH — SOCIAL STABILITY: SOCIAL NETWORK: HOW OFTEN DO YOU ATTENT MEETINGS OF THE CLUB OR ORGANIZATION YOU BELONG TO?: NEVER

## 2024-02-20 SDOH — HEALTH STABILITY: MENTAL HEALTH
HAVE YOU STARTED TO WORK OUT OR WORKED OUT THE DETAILS OF HOW TO KILL YOURSELF? DO YOU INTENT TO CARRY OUT THIS PLAN?: NO

## 2024-02-20 SDOH — HEALTH STABILITY: MENTAL HEALTH: EXPERIENCED ANY OF THE FOLLOWING LIFE EVENTS: DEATH OF FAMILY/FRIEND

## 2024-02-20 SDOH — ECONOMIC STABILITY: INCOME INSECURITY: IN THE LAST 12 MONTHS, WAS THERE A TIME WHEN YOU WERE NOT ABLE TO PAY THE MORTGAGE OR RENT ON TIME?: YES

## 2024-02-20 SDOH — HEALTH STABILITY: MENTAL HEALTH: WHEN DID YOU TRY TO KILL YOURSELF?: YEARS AGO

## 2024-02-20 SDOH — ECONOMIC STABILITY: INCOME INSECURITY: HOW HARD IS IT FOR YOU TO PAY FOR THE VERY BASICS LIKE FOOD, HOUSING, MEDICAL CARE, AND HEATING?: VERY HARD

## 2024-02-20 SDOH — HEALTH STABILITY: MENTAL HEALTH: SUICIDAL BEHAVIOR (3 MONTHS): NO

## 2024-02-20 SDOH — ECONOMIC STABILITY: HOUSING INSECURITY: IN THE LAST 12 MONTHS, WAS THERE A TIME WHEN YOU WERE NOT ABLE TO PAY THE MORTGAGE OR RENT ON TIME?: YES

## 2024-02-20 SDOH — SOCIAL STABILITY: SOCIAL NETWORK: VISITOR BEHAVIORS: UNABLE TO ASSESS

## 2024-02-20 SDOH — SOCIAL STABILITY: SOCIAL INSECURITY: ARE YOU MARRIED, WIDOWED, DIVORCED, SEPARATED, NEVER MARRIED, OR LIVING WITH A PARTNER?: NEVER MARRIED

## 2024-02-20 SDOH — ECONOMIC STABILITY: HOUSING INSECURITY: IN THE LAST 12 MONTHS, HOW MANY PLACES HAVE YOU LIVED?: 1

## 2024-02-20 SDOH — ECONOMIC STABILITY: FOOD INSECURITY: WITHIN THE PAST 12 MONTHS, YOU WORRIED THAT YOUR FOOD WOULD RUN OUT BEFORE YOU GOT THE MONEY TO BUY MORE.: OFTEN TRUE

## 2024-02-20 SDOH — HEALTH STABILITY: MENTAL HEALTH: HAVE YOU EVER TRIED TO KILL YOURSELF?: YES

## 2024-02-20 SDOH — HEALTH STABILITY: MENTAL HEALTH: IN THE PAST FEW WEEKS, HAVE YOU WISHED YOU WERE DEAD?: YES

## 2024-02-20 SDOH — HEALTH STABILITY: MENTAL HEALTH: IN THE PAST WEEK, HAVE YOU BEEN HAVING THOUGHTS ABOUT KILLING YOURSELF?: YES

## 2024-02-20 SDOH — SOCIAL STABILITY: SOCIAL INSECURITY: ARE YOU OR HAVE YOU BEEN THREATENED OR ABUSED PHYSICALLY, EMOTIONALLY, OR SEXUALLY BY ANYONE?: NO

## 2024-02-20 SDOH — SOCIAL STABILITY: SOCIAL INSECURITY: HAS ANYONE EVER THREATENED TO HURT YOUR FAMILY OR YOUR PETS?: NO

## 2024-02-20 SDOH — ECONOMIC STABILITY: TRANSPORTATION INSECURITY
IN THE PAST 12 MONTHS, HAS LACK OF TRANSPORTATION KEPT YOU FROM MEETINGS, WORK, OR FROM GETTING THINGS NEEDED FOR DAILY LIVING?: YES

## 2024-02-20 SDOH — HEALTH STABILITY: MENTAL HEALTH: ANXIETY SYMPTOMS: NO PROBLEMS REPORTED OR OBSERVED.

## 2024-02-20 SDOH — HEALTH STABILITY: MENTAL HEALTH: DELUSIONS: OTHER (COMMENT)

## 2024-02-20 SDOH — HEALTH STABILITY: MENTAL HEALTH: ARE YOU HAVING THOUGHTS OF KILLING YOURSELF RIGHT NOW?: YES

## 2024-02-20 SDOH — HEALTH STABILITY: MENTAL HEALTH: IN THE PAST FEW WEEKS, HAVE YOU FELT THAT YOU OR YOUR FAMILY WOULD BE BETTER OFF IF YOU WERE DEAD?: YES

## 2024-02-20 SDOH — SOCIAL STABILITY: SOCIAL INSECURITY: ABUSE: ADULT

## 2024-02-20 SDOH — HEALTH STABILITY: MENTAL HEALTH
STRESS IS WHEN SOMEONE FEELS TENSE, NERVOUS, ANXIOUS, OR CAN'T SLEEP AT NIGHT BECAUSE THEIR MIND IS TROUBLED. HOW STRESSED ARE YOU?: RATHER MUCH

## 2024-02-20 SDOH — ECONOMIC STABILITY: HOUSING INSECURITY: FEELS SAFE LIVING IN HOME: YES

## 2024-02-20 SDOH — HEALTH STABILITY: MENTAL HEALTH: SUICIDAL BEHAVIOR (DESCRIPTION): CUTTING YEARS AGO

## 2024-02-20 SDOH — SOCIAL STABILITY: SOCIAL INSECURITY: HAVE YOU HAD THOUGHTS OF HARMING ANYONE ELSE?: NO

## 2024-02-20 SDOH — SOCIAL STABILITY: SOCIAL NETWORK: PARENT/GUARDIAN/SIGNIFICANT OTHER INVOLVEMENT: NO INVOLVEMENT

## 2024-02-20 SDOH — HEALTH STABILITY: MENTAL HEALTH: HOW DID YOU TRY TO KILL YOURSELF?: CUTTING

## 2024-02-20 SDOH — HEALTH STABILITY: MENTAL HEALTH: DESCRIBE YOUR THOUGHTS OF KILLING YOURSELF RIGHT NOW:: "I'M HOPELESS WITH EVERYTHING", DENIES PLAN

## 2024-02-20 SDOH — SOCIAL STABILITY: SOCIAL INSECURITY: WERE YOU ABLE TO COMPLETE ALL THE BEHAVIORAL HEALTH SCREENINGS?: YES

## 2024-02-20 SDOH — HEALTH STABILITY: MENTAL HEALTH
DEPRESSION SYMPTOMS: APPETITE CHANGE;CHANGE IN ENERGY LEVEL;FEELINGS OF HELPLESSNESS;FEELINGS OF HOPELESSESS;FEELINGS OF WORTHLESSNESS;IMPAIRED CONCENTRATION;ISOLATIVE;LOSS OF INTEREST;SLEEP DISTURBANCE

## 2024-02-20 SDOH — SOCIAL STABILITY: SOCIAL INSECURITY: DO YOU FEEL UNSAFE GOING BACK TO THE PLACE WHERE YOU ARE LIVING?: YES

## 2024-02-20 ASSESSMENT — LIFESTYLE VARIABLES
TOTAL_SCORE: 0
SUBSTANCE_ABUSE_PAST_12_MONTHS: NO
AUDITORY DISTURBANCES: NOT PRESENT
AUDIT-C TOTAL SCORE: 0
AGITATION: NORMAL ACTIVITY
HEADACHE, FULLNESS IN HEAD: NOT PRESENT
HOW OFTEN DO YOU HAVE A DRINK CONTAINING ALCOHOL: NEVER
VISUAL DISTURBANCES: NOT PRESENT
HOW OFTEN DO YOU HAVE 6 OR MORE DRINKS ON ONE OCCASION: NEVER
PAROXYSMAL SWEATS: NO SWEAT VISIBLE
TREMOR: NO TREMOR
SUBSTANCE_ABUSE_PAST_12_MONTHS: NO
ANXIETY: NO ANXIETY, AT EASE
AUDIT-C TOTAL SCORE: 0
PRESCIPTION_ABUSE_PAST_12_MONTHS: NO
HOW MANY STANDARD DRINKS CONTAINING ALCOHOL DO YOU HAVE ON A TYPICAL DAY: PATIENT DOES NOT DRINK
CIWA TOTAL SCORE: 0
NAUSEA AND VOMITING: NO NAUSEA AND NO VOMITING
SKIP TO QUESTIONS 9-10: 1
ORIENTATION AND CLOUDING OF SENSORIUM: ORIENTED AND CAN DO SERIAL ADDITIONS
PRESCIPTION_ABUSE_PAST_12_MONTHS: NO

## 2024-02-20 ASSESSMENT — PATIENT HEALTH QUESTIONNAIRE - PHQ9
SUM OF ALL RESPONSES TO PHQ9 QUESTIONS 1 & 2: 4
2. FEELING DOWN, DEPRESSED OR HOPELESS: MORE THAN HALF THE DAYS
1. LITTLE INTEREST OR PLEASURE IN DOING THINGS: MORE THAN HALF THE DAYS

## 2024-02-20 ASSESSMENT — PAIN - FUNCTIONAL ASSESSMENT
PAIN_FUNCTIONAL_ASSESSMENT: 0-10

## 2024-02-20 ASSESSMENT — PAIN DESCRIPTION - LOCATION: LOCATION: BACK

## 2024-02-20 ASSESSMENT — PAIN SCALES - GENERAL
PAINLEVEL_OUTOF10: 3
PAINLEVEL_OUTOF10: 8
PAINLEVEL_OUTOF10: 0 - NO PAIN

## 2024-02-20 ASSESSMENT — ACTIVITIES OF DAILY LIVING (ADL): LACK_OF_TRANSPORTATION: YES

## 2024-02-20 NOTE — PROGRESS NOTES
"Emergency Medicine Transition of Care Note.    I received Joe Magaña in signout from ELIZABETH Chicas.  Please see the previous ED provider note for all HPI, PE and MDM up to the time of signout at 0600. This is in addition to the primary record.    In brief Joe Magaañ is an 63 y.o. male presenting for   Chief Complaint   Patient presents with    other      \"I feel woozy\" Pt will not elaborate on his symptoms and continues to fall asleep in triage. Pt admits to SI with a plan to cut wrists      At the time of signout we were awaiting: EPAT Placement         Medical Decision Making    Patient was evaluated by EPAT, ultimately accepted to [facility] for hospitalization for SI.      Time in the ED the patient's been calm and cooperative, no concerns.    Final diagnoses:   None           Procedure  Procedures    Raine Crowder, APRN-CNP   "

## 2024-02-20 NOTE — PROGRESS NOTES
EPAT - Social Work Psychiatric Assessment    Arrival Details  Mode of Arrival: Ambulance  Admission Source:  (work)  Admission Type: Voluntary  EPAT Assessment Start Date: 02/20/24  EPAT Assessment Start Time: 0300  Name of : MIGEL Darling LSW    History of Present Illness  Admission Reason: suicidal ideation  HPI: Patient is a 63 year old  male, with a history of depression, presenting to the ED for suicidal ideation. ED provider note, nursing notes, [place] suicide risk scale and community records reviewed, patient reportedly as feeling “woozy” while at work and coworker sent him to the hospital. During provider evaluation, patient voiced suicidal ideation with active plan to cut his wrist. He denies homicidal ideation, visual/auditory hallucinations or delusional thinking. Triage indicates high risk, negative BAL. Patient is currently linked with [facility] for psychiatry and case management, and taking clonidine and Cymbalta. Reportedly his  should take him to his psychiatrist for refill but he missed it. Patient has several prior admissions, 4/2023 [facility] and more. He has one prior SA via cutting.     Readmission Information   Readmission within 30 Days: No    Psychiatric Symptoms  Anxiety Symptoms: No problems reported or observed.  Depression Symptoms: Appetite change, Change in energy level, Feelings of helplessness, Feelings of hopelessess, Feelings of worthlessness, Impaired concentration, Isolative, Loss of interest, Sleep disturbance  Carey Symptoms: No problems reported or observed.    Psychosis Symptoms  Hallucination Type: No problems reported or observed.  Delusion Type: No problems reported or observed.    Additional Symptoms - Adult  Generalized Anxiety Disorder: No problems reported or observed.  Obsessive Compulsive Disorder: No problems reported or observed.  Panic Attack: No problems reported or observed.  Post Traumatic Stress Disorder: No problems reported or  "observed.  Delirium: No problems reported or observed.    Past Psychiatric History/Meds/Treatments  Past Psychiatric History: hx of depression // prior admission 4/2023 [facility] and more // hx of SA via cuting, no NSSIB // family hx of depression // denies trauma hx  Past Psychiatric Meds/Treatments: clonidine, cymbalta  Past Violence/Victimization History: none    Current Mental Health Contacts   Name/Phone Number: JUDE   Last Appointment Date: unknown  Provider Name/Phone Number: GILBERT  Provider Last Appointment Date: missed it today    Support System:  (denies)    Living Arrangement: House    Home Safety  Feels Safe Living in Home: Yes (however just got evicted, needs to find a place in 30 days)    Income Information  Employment Status for: Patient  Employment Status: Employed  Income Source: Employed  Current/Previous Occupation:  (\"side jobs\")    Infomous Service/Education History  Current or Previous  Service: None  Education Level:  (did not assess)  History of Learning Problems:  (did not assess)    Social/Cultural History  Social History: [place] citizen, pt is his own guardian  Cultural Requests During Hospitalization: none  Spiritual Requests During Hospitalization: none  Important Activities:  (denies)    Legal  Legal Considerations: Patient/ Family Ability to Make Healthcare Decisions  Assistance with Managing/Advocating Healthcare Needs:  (self)  Criminal Activity/ Legal Involvement Pertinent to Current Situation/ Hospitalization: none    Drug Screening  Have you used any substances (canabis, cocaine, heroin, hallucinogens, inhalants, etc.) in the past 12 months?: No  Have you used any prescription drugs other than prescribed in the past 12 months?: No  Is a toxicology screen needed?: Yes    Stage of Change  Stage of Change: Maintenance  History of Treatment: Inpatient  Type of Treatment Offered:  (none)  Treatment Offered:  (n/a)  Duration of Substance Use: unknown  Frequency of " "Substance Use: hx of using fentanyl, denies recent use  Age of First Substance Use: unknown    Psychosocial  Psychosocial (WDL): Within Defined Limits    Orientation  Orientation Level: Oriented X4    General Appearance  Motor Activity: Unremarkable  Speech Pattern:  (regular rate and tone)  General Attitude: Cooperative  Appearance/Hygiene: Unremarkable    Thought Process  Coherency:  (linear and organized)  Content: Unremarkable  Delusions:  (none)  Perception: Not altered  Hallucination: None  Judgment/Insight:  (fair)  Confusion: None  Cognition: Appropriate judgement    Sleep Pattern  Sleep Pattern: Disturbed/interrupted sleep    Risk Factors  Self Harm/Suicidal Ideation Plan: SI with plan to cut wrist  Previous Self Harm/Suicidal Plans: none  Risk Factors: Male, Lower socioeconomic status    Violence Risk Assessment  Assessment of Violence: None noted  Thoughts of Harm to Others: No    Ability to Assess Risk Screen  Risk Screen - Ability to Assess: Able to be screened  Ask Suicide-Screening Questions  1. In the past few weeks, have you wished you were dead?: Yes  2. In the past few weeks, have you felt that you or your family would be better off if you were dead?: Yes  3. In the past week, have you been having thoughts about killing yourself?: Yes  4. Have you ever tried to kill yourself?: Yes  How did you try to kill yourself?: cutting  When did you try to kill yourself?: years ago  5. Are you having thoughts of killing yourself right now?: Yes  Describe your thoughts of killing yourself right now:: \"I'm hopeless with everything\", denies plan  Calculated Risk Score: Imminent Risk  [place] Suicide Severity Rating Scale (Screener/Recent Self-Report)  1. Wish to be Dead (Past 1 Month): Yes  2. Non-Specific Active Suicidal Thoughts (Past 1 Month): Yes  3. Active Suicidal Ideation with any Methods (Not Plan) Without Intent to Act (Past 1 Month): Yes  4. Active Suicidal Ideation with Some Intent to Act, Without " Specific Plan (Past 1 Month): Yes  5. Active Suicidal Ideation with Specific Plan and Intent (Past 1 Month): Yes  6. Suicidal Behavior (Lifetime): Yes  6. Suicidal Behavior (3 Months): No  6. Suicidal Behavior (Description): cutting years ago  Calculated C-SSRS Risk Score (Lifetime/Recent): High Risk  Step 1: Risk Factors  Current & Past Psychiatric Dx: Mood disorder  Presenting Symptoms: Hopelessness or despair  Precipitants/Stressors: Triggering events leading to humiliation, shame, and/or despair (e.g. loss of relationship, financial or health status) (real or anticipated), Social isolation  Change in Treatment:  (none)  Access to Lethal Methods : No  Step 2: Protective Factors   Protective Factors Internal: Ability to cope with stress, Frustration tolerance  Protective Factors External: Cultural, spiritual and/or moral attitudes against suicide  Step 3: Suicidal Ideation Intensity  Most Severe Suicidal Ideation Identified: SI with plan to cut wrist  How Many Times Have You Had These Thoughts: Daily or almost daily  When You Have the Thoughts How Long do They Last : 1-4 hours/a lot of the time  Could/Can You Stop Thinking About Killing Yourself or Wanting to Die if You Want to: Can control thoughts with some difficulty  Are There Things - Anyone or Anything - That Stopped You From Wanting to Die or Acting on: Uncertain that deterrents stopped you  What Sort of Reasons Did You Have For Thinking About Wanting to Die or Killing Yourself: Completely to end or stop the pain (you couldn't go on living with the pain or how you were feeling)  Total Score: 18  Step 5: Documentation  Risk Level: High suicide risk    Patient is a 63 year old  male, with a history of depression, presenting to the ED for suicidal ideation. Prior to assessment, patient is calm and cooperative in the ED, comply with all lab works. Upon assessment, he presents as upset with dysphoric affect. Patient endorses low mood, difficulty  controlling worries, excessive worries, sleep disturbance, loss of appetite, anhedonia, helplessness, hopelessness, persistent thoughts of death and impulsivity. He denies homicidal ideation, visual/auditory hallucinations or delusional thinking. Patient reports he is currently residing at his friend's house however he just got evicted last week and needs to find another place to go in 30 days. He states today he was no feeling well during work and his coworker sent him to the hospital. Patient endorses suicidal ideation with a plan to cut his wrist. He states he has been on and off having suicidal thoughts in the past several months, mostly due to financial issues, housing insecurity, and loss of father in 12/2023. Patient repots he experienced worsening depression in the past several days, “I'm fed up with all the crap happening to me”, he states he has trouble transporting to places, stress from work, and feeling isolated. He states he used to go out and do things to make himself feel better but has lost motivations to do anything, “I don't want to do anything anymore”. By the end of assessment, he still endorses active suicidal ideation, “really hopeless about everything”, denies active plan. Patient does not seem actively responding to internal stimuli. She is unable to identify reasons for living, coping mechanisms, or social support.    Due to his SI symptoms, helplessness, hopelessness, persistent thoughts of death, recent loss of father, limited social support, multiple psychosocial stressors, and impulsivity, patient continues to be considered as an increasing risk of harm to himself. He is being recommended for psychiatric hospitalization for safety and stabilization, PAC Min Roman in agreement.       Psychiatric Impression and Plan of Care  Assessment and Plan: see above  Specific Resources Provided to Patient: psychiatric hospitalization  CM Notified: none  PHP/IOP Recommended: none  Specific  Information Provided for PHP/IOP: none    Outcome/Disposition  Patient's Perception of Outcome Achieved: patient agrees  Assessment, Recommendations and Risk Level Reviewed with: STARLA Roman  Contact Name: none  Contact Number(s): -  Contact Relationship: -  EPAT Assessment Completed Date: 02/20/24  EPAT Assessment Completed Time: 0321

## 2024-02-20 NOTE — H&P
"History Of Present Illness  Joe Magaña is a 63 y.o. year old male patient with past psych history of major depressive disorder who presented to the ED with suicidal ideation.  Patient initially presented stating that he was feeling \"woozy \"while at work and a coworker encouraged him to go to the hospital.  Patient states that he works in the  at the [employer].  Patient later expressed suicidal ideation.  Reports he is currently linked with the [facility], has a  through them.  Was supposed to have an appointment today.  Patient reports he has been dealing with worsening depression due to multiple stressors, patient states his father passed away 2 months ago.  He currently has unstable housing, a friend that he was living with is getting evicted.  Patient reports that he has not taken his psychiatric medications for 5 days as he ran out.  Patient reports he is currently prescribed Cymbalta, mirtazapine, was previously prescribed Abilify.  Patient previously treated on 5 W. in April 2023.  At that time patient started on Cymbalta and Seroquel for insomnia.  Patient reports occasional alcohol use, denies any other substance use.  Patient reports family history of depression, no family history of completed suicide.  Patient denies any past suicide attempts, denies that he had a plan with regard to his suicidal thoughts.     Past Medical History  Past Medical History:   Diagnosis Date    Essential (primary) hypertension     Benign essential hypertension    Personal history of other diseases of the digestive system     History of diverticulosis    Personal history of other diseases of the musculoskeletal system and connective tissue     History of gout    Personal history of other diseases of the respiratory system     History of COPD       Past Psychiatric History:   Previous therapy: yes  Previous psychiatric treatment and medication trials: yes  Previous psychiatric " hospitalizations: yes  Previous suicide attempts: no  History of violence: no    Allergies  No Known Allergies     MSE  General: Appropriately groomed and dressed.  Appearance: Appears stated age.  Attitude: Calm, cooperative.  Behavior: Appropriate eye contact.  Motor activity: No agitation or retardation. no EPS.  Normal gait.  Speech: Regular rate, rhythm, volume and tone.  Mood: Depressed  Affect: Flat  Thought process: Organized, linear, goal-directed.  Associations are logical.  Thought content: Does not endorse suicidal or homicidal ideation, no delusions elicited.  Thought perception: Did not endorse auditory or visual hallucinations.  Cognition: Alert, oriented x3.  no deficit in memory or attention.  Insight: Fair.  Judgment: Fair.    Psychiatric Risk Assessment  Violence Risk Assessment: major mental illness and male  Acute Risk of Harm to Others is Considered: low   Suicide Risk Assessment: , current psychiatric illness, and male  Protective Factors against Suicide: employment, hopefulness/future orientation, and social support/connectedness  Acute Risk of Harm to Self is Considered: moderate    Last Recorded Vitals  Vitals:    02/20/24 1526   BP: 146/83   Pulse: 78   Resp: 16   Temp: 36.7 °C (98.1 °F)   SpO2: 95%        Relevant Results  Scheduled medications  DULoxetine, 30 mg, oral, BID  nicotine, 1 patch, transdermal, Daily  QUEtiapine, 12.5 mg, oral, Nightly      Continuous medications     PRN medications  PRN medications: acetaminophen, alum-mag hydroxide-simeth, hydrOXYzine HCL, ibuprofen, OLANZapine, OLANZapine, traZODone     Results for orders placed or performed during the hospital encounter of 02/19/24 (from the past 96 hour(s))   CBC and Auto Differential   Result Value Ref Range    WBC 9.9 4.4 - 11.3 x10*3/uL    nRBC 0.0 0.0 - 0.0 /100 WBCs    RBC 5.02 4.50 - 5.90 x10*6/uL    Hemoglobin 14.9 13.5 - 17.5 g/dL    Hematocrit 45.5 41.0 - 52.0 %    MCV 91 80 - 100 fL    MCH 29.7 26.0 -  34.0 pg    MCHC 32.7 32.0 - 36.0 g/dL    RDW 13.8 11.5 - 14.5 %    Platelets 291 150 - 450 x10*3/uL    Neutrophils % 78.3 40.0 - 80.0 %    Immature Granulocytes %, Automated 0.3 0.0 - 0.9 %    Lymphocytes % 13.1 13.0 - 44.0 %    Monocytes % 6.6 2.0 - 10.0 %    Eosinophils % 1.2 0.0 - 6.0 %    Basophils % 0.5 0.0 - 2.0 %    Neutrophils Absolute 7.77 (H) 1.20 - 7.70 x10*3/uL    Immature Granulocytes Absolute, Automated 0.03 0.00 - 0.70 x10*3/uL    Lymphocytes Absolute 1.30 1.20 - 4.80 x10*3/uL    Monocytes Absolute 0.66 0.10 - 1.00 x10*3/uL    Eosinophils Absolute 0.12 0.00 - 0.70 x10*3/uL    Basophils Absolute 0.05 0.00 - 0.10 x10*3/uL   Comprehensive Metabolic Panel   Result Value Ref Range    Glucose 122 (H) 74 - 99 mg/dL    Sodium 137 136 - 145 mmol/L    Potassium 3.7 3.5 - 5.3 mmol/L    Chloride 101 98 - 107 mmol/L    Bicarbonate 27 21 - 32 mmol/L    Anion Gap 13 10 - 20 mmol/L    Urea Nitrogen 15 6 - 23 mg/dL    Creatinine 1.35 (H) 0.50 - 1.30 mg/dL    eGFR 60 (L) >60 mL/min/1.73m*2    Calcium 9.2 8.6 - 10.3 mg/dL    Albumin 4.6 3.4 - 5.0 g/dL    Alkaline Phosphatase 82 33 - 136 U/L    Total Protein 8.3 (H) 6.4 - 8.2 g/dL    AST 13 9 - 39 U/L    Bilirubin, Total 0.4 0.0 - 1.2 mg/dL    ALT 8 (L) 10 - 52 U/L   Magnesium   Result Value Ref Range    Magnesium 2.14 1.60 - 2.40 mg/dL   Lactate   Result Value Ref Range    Lactate 1.6 0.4 - 2.0 mmol/L   B-Type Natriuretic Peptide   Result Value Ref Range    BNP 14 0 - 99 pg/mL   Protime-INR   Result Value Ref Range    Protime 11.9 9.8 - 12.8 seconds    INR 1.1 0.9 - 1.1   Lipase   Result Value Ref Range    Lipase 17 9 - 82 U/L   Acute Toxicology Panel, Blood   Result Value Ref Range    Acetaminophen <10.0 10.0 - 30.0 ug/mL    Salicylate  <3 4 - 20 mg/dL    Alcohol <10 <=10 mg/dL   Troponin I, High Sensitivity, Initial   Result Value Ref Range    Troponin I, High Sensitivity 6 0 - 20 ng/L   Sars-CoV-2 and Influenza A/B PCR   Result Value Ref Range    Flu A Result Not  Detected Not Detected    Flu B Result Not Detected Not Detected    Coronavirus 2019, PCR Not Detected Not Detected   ECG 12 lead   Result Value Ref Range    Ventricular Rate 81 BPM    Atrial Rate 81 BPM    TN Interval 152 ms    QRS Duration 90 ms    QT Interval 400 ms    QTC Calculation(Bazett) 464 ms    P Axis 72 degrees    R Axis 62 degrees    T Axis 55 degrees    QRS Count 13 beats    Q Onset 218 ms    P Onset 142 ms    P Offset 196 ms    T Offset 418 ms    QTC Fredericia 442 ms   Troponin, High Sensitivity, 1 Hour   Result Value Ref Range    Troponin I, High Sensitivity 6 0 - 20 ng/L   Drug Screen, Urine With Reflex to Confirmation   Result Value Ref Range    Amphetamine Screen, Urine Presumptive Negative Presumptive Negative    Barbiturate Screen, Urine Presumptive Negative Presumptive Negative    Benzodiazepines Screen, Urine Presumptive Negative Presumptive Negative    Cannabinoid Screen, Urine Presumptive Negative Presumptive Negative    Cocaine Metabolite Screen, Urine Presumptive Negative Presumptive Negative    Fentanyl Screen, Urine Presumptive Positive (A) Presumptive Negative    Opiate Screen, Urine Presumptive Negative Presumptive Negative    Oxycodone Screen, Urine Presumptive Negative Presumptive Negative    PCP Screen, Urine Presumptive Negative Presumptive Negative   Urinalysis with Reflex Culture and Microscopic   Result Value Ref Range    Color, Urine Ladonna (N) Straw, Yellow    Appearance, Urine Clear Clear    Specific Gravity, Urine 1.023 1.005 - 1.035    pH, Urine 5.0 5.0, 5.5, 6.0, 6.5, 7.0, 7.5, 8.0    Protein, Urine 30 (1+) (N) NEGATIVE mg/dL    Glucose, Urine NEGATIVE NEGATIVE mg/dL    Blood, Urine NEGATIVE NEGATIVE    Ketones, Urine 5 (TRACE) (A) NEGATIVE mg/dL    Bilirubin, Urine SMALL (1+) (A) NEGATIVE    Urobilinogen, Urine 4.0 (N) <2.0 mg/dL    Nitrite, Urine NEGATIVE NEGATIVE    Leukocyte Esterase, Urine TRACE (A) NEGATIVE   Microscopic Only, Urine   Result Value Ref Range    WBC,  Urine 21-50 (A) 1-5, NONE /HPF    RBC, Urine 3-5 NONE, 1-2, 3-5 /HPF    Squamous Epithelial Cells, Urine 1-9 (SPARSE) Reference range not established. /HPF    Mucus, Urine 2+ Reference range not established. /LPF    Hyaline Casts, Urine 3+ (A) NONE /LPF         Assessment/Plan   Principal Problem:    MDD (major depressive disorder), recurrent episode, moderate (CMS/HCC)     Patient presented ED with suicidal ideation.  Patient stressors include loss of his father 2 months ago, currently homeless as his friend that he has been staying with is getting evicted.  Patient reports he has not taken his psychiatric medication in 5 days as he was not able to fill it.  Patient currently contacted with the [facility].  Reports he is prescribed Cymbalta and mirtazapine.  States he has been doubling up on the mirtazapine for sleep.  Advised patient that he needs to continue to take his medication as prescribed.  Will restart Cymbalta 30 mg oral twice daily, will start Seroquel 12.5 mg oral daily at bedtime for insomnia as patient has done well on this medication in the past.    Impression:     Labs and Chart: reviewed   Case discussed with treatment team members  Encouraged patient to attend group and other mileu activity  Collateral from family - pending  Discharge planing  Medication:       -Cymbalta 30 mg oral twice daily       -Seroquel 12.5 mg oral daily at bedtime    Medication Consent  Medication Consent: risks, benefits, side effects reviewed for all ordered meds and patient expressed understanding and consent obtained    Beto Leung, UMBERTO-CNP

## 2024-02-20 NOTE — SIGNIFICANT EVENT
Application for Emergency Admission      Ready for Transfer?  Is the patient medically cleared for transfer to inpatient psychiatry: Yes  Has the patient been accepted to an inpatient psychiatric hospital: Yes    Application for Emergency Admission  IN ACCORDANCE [place]  The Chief Clinical Officer of: [facility] 2/20/2024 .2:27 PM    Reason for Hospitalization  The undersigned has reason to believe that: Joe Magaña Is a mentally ill person subject to hospitalization by court order under division B Section 5122.01 of the Revised Code, i.e., this person:    1.Yes  Represents a substantial risk of physical harm to self as manifested by evidence of threats of, or attempts at, suicide or serious self-inflicted bodily harm    2.No Represents a substantial risk of physical harm to others as manifested by evidence of recent homicidal or other violent behavior, evidence of recent threats that place another in reasonable fear of violent behavior and serious physical harm, or other evidence of present dangerousness    3.No Represents a substantial and immediate risk of serious physical impairment or injury to self as manifested by  evidence that the person is unable to provide for and is not providing for the person's basic physical needs because of the person's mental illness and that appropriate provision for those needs cannot be made  immediately available in the community    4.Yes Would benefit from treatment in a hospital for his mental illness and is in need of such treatment as manifested by evidence of behavior that creates a grave and imminent risk to substantial rights of others or  himself.    5.No Would benefit from treatment as manifested by evidence of behavior that indicates all of the following:       (a) The person is unlikely to survive safely in the community without supervision, based on a clinical determination.       (b) The person has a history of lack of  compliance with treatment for mental illness and one of the following applies:      (i) At least twice within the thirty-six months prior to the filing of an affidavit seeking court-ordered treatment of the person under section 5122.111 of the Revised Code, the lack of compliance has been a significant factor in necessitating hospitalization in a hospital or receipt of services in a forensic or other mental health unit of a correctional facility, provided that the thirty-six-month period shall be extended by the length of any hospitalization or incarceration of the person that occurred within the thirty-six-month period.      (ii) Within the forty-eight months prior to the filing of an affidavit seeking court-ordered treatment of the person under section 5122.111 of the Revised Code, the lack of compliance resulted in one or more acts of serious violent behavior toward self or others or threats of, or attempts at, serious physical harm to self or others, provided that the forty-eight-month period shall be extended by the length of any hospitalization or incarceration of the person that occurred within the forty-eight-month period.      (c) The person, as a result of mental illness, is unlikely to voluntarily participate in necessary treatment.       (d) In view of the person's treatment history and current behavior, the person is in need of treatment in order to prevent a relapse or deterioration that would be likely to result in substantial risk of serious harm to the person or others.    (e) Represents a substantial risk of physical harm to self or others if allowed to remain at liberty pending examination.    Therefore, it is requested that said person be admitted to the above named facility.    STATEMENT OF BELIEF    Must be filled out by one of the following: a psychiatrist, licensed physician, licensed clinical psychologist, health or ,  or .  (Statement shall include the  circumstances under which the individual was taken into custody and the reason for the person's belief that hospitalization is necessary. The statement shall also include a reference to efforts made to secure the individual's property at his residence if he was taken into custody there. Every reasonable and appropriate effort should be made to take this person into custody in the least conspicuous manner possible.)    Patient presented to the ED with concerns for SI, would benefit from inpatient psychiatric hospitalization for stabilization     OCTAVIA Javed 2/20/2024   Dr Vazquez    _____________________________________________________________   Place of Employment: [facility]    STATEMENT OF OBSERVATION BY PSYCHIATRIST, LICENSED PHYSICIAN, OR LICENSED CLINICAL PSYCHOLOGIST, IF APPLICABLE    Place of Observation (e.g., [facility], general hospital, office, emergency facility)  (If applicable, please complete)    OCTAVIA Javed 2/20/2024    _____________________________________________________________

## 2024-02-20 NOTE — CARE PLAN
Problem: Ineffective Coping  Goal: STG - Verbalizes control of suicidal thoughts/behaviors  Outcome: Progressing     Problem: Ineffective Coping  Goal: Verbalizes ways to manage anxiety  Outcome: Progressing   The patient's goals for the shift include eat dinner    The clinical goals for the shift include complete admit     Pt cooperative with admit process. Pt states that he works at  the [employer], has to walk from work to home and has issues leaving at 4 am to get home at times. Pt states that he has no family anymore, dad just passed and feels that he has no work friends, just acquaintances. Pt was at work when passed out and work friends called to have him brought in. Pt told er he was having suicidal thoughts, pt states that he is not having any suicidal thoughts now on unit. Pt says that he was living with a friend who just got evicted so he will have to move. Pt states  that he is ok with it due to friends home having bed bugs. Pt has applied at [facility] and asking shauna for help Pt up to dayroom for meals and to watch tv.

## 2024-02-20 NOTE — ED PROVIDER NOTES
"HPI   Chief Complaint   Patient presents with    other      \"I feel woozy\" Pt will not elaborate on his symptoms and continues to fall asleep in triage. Pt admits to SI with a plan to cut wrists        63-year-old male patient with history of hypertension, COPD comes into the emergency department today with complaints of feeling woozy while at work.  His coworkers became concerned and called EMS and therefore is in the emergency department today.  He describes that he often feels this way.  Denies any shortness of breath or chest discomfort.  Denies any drug use.  States he occasionally drinks alcohol.  Patient states he has some right knee pain secondary to fall many days ago.  Denies blood thinner use.  Also complains of suicidal ideations with plans to cut his wrists.  States that 1 suicide attempt in his past.  Denies homicidal ideations auditory visual donations                          [employer] Coma Scale Score: 15                     Patient History   Past Medical History:   Diagnosis Date    Essential (primary) hypertension     Benign essential hypertension    Personal history of other diseases of the digestive system     History of diverticulosis    Personal history of other diseases of the musculoskeletal system and connective tissue     History of gout    Personal history of other diseases of the respiratory system     History of COPD     Past Surgical History:   Procedure Laterality Date    OTHER SURGICAL HISTORY  05/06/2020    Flexor tendon repair    OTHER SURGICAL HISTORY  05/06/2020    Oral surgery    OTHER SURGICAL HISTORY  05/06/2020    Shoulder surgery     No family history on file.  Social History     Tobacco Use    Smoking status: Every Day     Types: Cigarettes    Smokeless tobacco: Never   Substance Use Topics    Alcohol use: Not on file    Drug use: Not on file       Physical Exam   ED Triage Vitals [02/19/24 2345]   Temperature Heart Rate Respirations BP   36.6 °C (97.9 °F) 89 17 (!) 153/95    "   Pulse Ox Temp Source Heart Rate Source Patient Position   98 % Temporal Monitor Sitting      BP Location FiO2 (%)     Right arm --       Physical Exam  Constitutional:       General: He is not in acute distress.     Appearance: He is not ill-appearing or diaphoretic.      Comments: Thin   HENT:      Head: Normocephalic and atraumatic.      Nose: Nose normal.   Eyes:      Extraocular Movements: Extraocular movements intact.      Conjunctiva/sclera: Conjunctivae normal.      Pupils: Pupils are equal, round, and reactive to light.   Cardiovascular:      Rate and Rhythm: Normal rate and regular rhythm.   Pulmonary:      Effort: Pulmonary effort is normal. No respiratory distress.      Breath sounds: Normal breath sounds. No stridor. No wheezing.   Abdominal:      General: Abdomen is flat.      Palpations: Abdomen is soft.   Musculoskeletal:         General: Normal range of motion.      Cervical back: Normal range of motion.   Skin:     General: Skin is warm and dry.      Findings: Bruising (Anterior right knee) present.   Neurological:      General: No focal deficit present.      Mental Status: He is alert and oriented to person, place, and time. Mental status is at baseline.      Comments: NIH: 0   Psychiatric:      Comments: Flat affect         ED Course & MDM        Medical Decision Making  63-year-old male patient with history of hypertension, COPD comes into the emergency department today with complaints of feeling woozy while at work.  His coworkers became concerned and called EMS and therefore is in the emergency department today.  He describes that he often feels this way.  Denies any shortness of breath or chest discomfort.  Denies any drug use.  States he occasionally drinks alcohol.  Patient states he has some right knee pain secondary to fall many days ago.  Denies blood thinner use.  Also complains of suicidal ideations with plans to cut his wrists.  States that 1 suicide attempt in his past.  Denies  homicidal ideations auditory visual donations    CT study of the head, chest x-ray, x-ray of the knee, EKG, laboratory studies are ordered.  Rule out ACS, arrhythmias, electrolyte abnormalities, pneumonia, leukocytosis, intracranial bleed, brain edema, calvarial fracture, right knee fracture, drug use, intoxication    Patient's BMP is negative at 14 troponin negative lactate negative lipase negative negative for alcohol Tylenol and aspirin.  Patient's creatinine 1.35 GFR 60.  This is new for the patient caging possible dehydration this could be contributing to patient's feeling of lightheadedness.  Patient magnesium negative CBC negative for no elevated white count.    More IV saline ordered for the patient as well as orthostatics.    Medically cleared for psychiatric evaluation.    Patient evaluated by psychiatry recommending inpatient psychiatric care.    Handoff to R[facility] Crowder pending EPAT placement          Labs Reviewed   CBC WITH AUTO DIFFERENTIAL - Abnormal       Result Value    WBC 9.9      nRBC 0.0      RBC 5.02      Hemoglobin 14.9      Hematocrit 45.5      MCV 91      MCH 29.7      MCHC 32.7      RDW 13.8      Platelets 291      Neutrophils % 78.3      Immature Granulocytes %, Automated 0.3      Lymphocytes % 13.1      Monocytes % 6.6      Eosinophils % 1.2      Basophils % 0.5      Neutrophils Absolute 7.77 (*)     Immature Granulocytes Absolute, Automated 0.03      Lymphocytes Absolute 1.30      Monocytes Absolute 0.66      Eosinophils Absolute 0.12      Basophils Absolute 0.05     COMPREHENSIVE METABOLIC PANEL - Abnormal    Glucose 122 (*)     Sodium 137      Potassium 3.7      Chloride 101      Bicarbonate 27      Anion Gap 13      Urea Nitrogen 15      Creatinine 1.35 (*)     eGFR 60 (*)     Calcium 9.2      Albumin 4.6      Alkaline Phosphatase 82      Total Protein 8.3 (*)     AST 13      Bilirubin, Total 0.4      ALT 8 (*)    MAGNESIUM - Normal    Magnesium 2.14     LACTATE - Normal    Lactate  1.6      Narrative:     Venipuncture immediately after or during the administration of Metamizole may lead to falsely low results. Testing should be performed immediately  prior to Metamizole dosing.   B-TYPE NATRIURETIC PEPTIDE - Normal    BNP 14      Narrative:        <100 pg/mL - Heart failure unlikely  100-299 pg/mL - Intermediate probability of acute heart                  failure exacerbation. Correlate with clinical                  context and patient history.    >=300 pg/mL - Heart Failure likely. Correlate with clinical                  context and patient history.    BNP testing is performed using different testing methodology at [facility] than at other [facility]. Direct result comparisons should only be made within the same method.      PROTIME-INR - Normal    Protime 11.9      INR 1.1     LIPASE - Normal    Lipase 17      Narrative:     Venipuncture immediately after or during the administration of Metamizole may lead to falsely low results. Testing should be performed immediately prior to Metamizole dosing.   SARS-COV-2 AND INFLUENZA A/B PCR - Normal    Flu A Result Not Detected      Flu B Result Not Detected      Coronavirus 2019, PCR Not Detected      Narrative:     This assay has received FDA Emergency Use Authorization (EUA) and  is only authorized for the duration of time that circumstances exist to justify the authorization of the emergency use of in vitro diagnostic tests for the detection of SARS-CoV-2 virus and/or diagnosis of COVID-19 infection under section 564(b)(1) of the Act, 21 U.S.C. 360bbb-3(b)(1). Testing for SARS-CoV-2 is only recommended for patients who meet current clinical and/or epidemiological criteria as defined by federal, state, or local public health directives. This assay is an in vitro diagnostic nucleic acid amplification test for the qualitative detection of SARS-CoV-2, Influenza A, and Influenza B from nasopharyngeal specimens and has been  validated for use at [facility]. Negative results do not preclude COVID-19 infections or Influenza A/B infections, and should not be used as the sole basis for diagnosis, treatment, or other management decisions. If Influenza A/B and RSV PCR results are negative, testing for Parainfluenza virus, Adenovirus and Metapneumovirus is routinely performed for [facility] pediatric oncology and intensive care inpatients, and is available on other patients by placing an add-on request.    ACUTE TOXICOLOGY PANEL, BLOOD - Normal    Acetaminophen <10.0      Salicylate  <3      Alcohol <10     SERIAL TROPONIN-INITIAL - Normal    Troponin I, High Sensitivity 6      Narrative:     Less than 99th percentile of normal range cutoff-  Female and children under 18 years old <14 ng/L; Male <21 ng/L: Negative  Repeat testing should be performed if clinically indicated.     Female and children under 18 years old 14-50 ng/L; Male 21-50 ng/L:  Consistent with possible cardiac damage and possible increased clinical   risk. Serial measurements may help to assess extent of myocardial damage.     >50 ng/L: Consistent with cardiac damage, increased clinical risk and  myocardial infarction. Serial measurements may help assess extent of   myocardial damage.      NOTE: Children less than 1 year old may have higher baseline troponin   levels and results should be interpreted in conjunction with the overall   clinical context.     NOTE: Troponin I testing is performed using a different   testing methodology at [facility] than at other   [facility]. Direct result comparisons should only   be made within the same method.   SERIAL TROPONIN, 1 HOUR - Normal    Troponin I, High Sensitivity 6      Narrative:     Less than 99th percentile of normal range cutoff-  Female and children under 18 years old <14 ng/L; Male <21 ng/L: Negative  Repeat testing should be performed if clinically indicated.     Female and children  under 18 years old 14-50 ng/L; Male 21-50 ng/L:  Consistent with possible cardiac damage and possible increased clinical   risk. Serial measurements may help to assess extent of myocardial damage.     >50 ng/L: Consistent with cardiac damage, increased clinical risk and  myocardial infarction. Serial measurements may help assess extent of   myocardial damage.      NOTE: Children less than 1 year old may have higher baseline troponin   levels and results should be interpreted in conjunction with the overall   clinical context.     NOTE: Troponin I testing is performed using a different   testing methodology at [facility] than at other   [facility]. Direct result comparisons should only   be made within the same method.   TROPONIN SERIES- (INITIAL, 1 HR)    Narrative:     The following orders were created for panel order Troponin I Series, High Sensitivity (0, 1 HR).  Procedure                               Abnormality         Status                     ---------                               -----------         ------                     Troponin I, High Sensiti...[[id number]]  Normal              Final result               Troponin, High Sensitivi...[[id number]]  Normal              Final result                 Please view results for these tests on the individual orders.   DRUG SCREEN, URINE WITH REFLEX TO CONFIRMATION   URINALYSIS WITH REFLEX CULTURE AND MICROSCOPIC    Narrative:     The following orders were created for panel order Urinalysis with Reflex Culture and Microscopic.  Procedure                               Abnormality         Status                     ---------                               -----------         ------                     Urinalysis with Reflex C...[[id number]]                                                 Extra Urine Gray Tube[[id number]]                                                         Please view results for these tests on the individual orders.    URINALYSIS WITH REFLEX CULTURE AND MICROSCOPIC   EXTRA URINE GRAY TUBE        XR knee right 4+ views   Final Result   No acute osseous abnormality.             MACRO:   None        Signed by: Muriel Enciso 2/20/2024 1:16 AM   Dictation workstation:   [id number]      XR chest 1 view   Final Result   No acute cardiopulmonary process.        MACRO:   None        Signed by: Muriel Enciso 2/20/2024 1:15 AM   Dictation workstation:   [id number]      CT head wo IV contrast   Final Result   No acute intracranial abnormality.             MACRO:   None        Signed by: Muriel Enciso 2/20/2024 1:13 AM   Dictation workstation:   [id number]            Procedure  ECG 12 lead    Performed by: Min Roman PA-C  Authorized by: Min Roman PA-C    Interpretation:     Details:  My EKG interpretation  Rate:     ECG rate:  81    ECG rate assessment: normal    Rhythm:     Rhythm: sinus rhythm    ST segments:     ST segments:  Normal  T waves:     T waves: normal         Min Roman PA-C  02/20/24 0555

## 2024-02-21 ENCOUNTER — APPOINTMENT (OUTPATIENT)
Dept: RADIOLOGY | Facility: HOSPITAL | Age: 64
End: 2024-02-21
Payer: COMMERCIAL

## 2024-02-21 LAB
ANION GAP SERPL CALC-SCNC: 12 MMOL/L (ref 10–20)
BACTERIA UR CULT: NO GROWTH
BUN SERPL-MCNC: 20 MG/DL (ref 6–23)
CALCIUM SERPL-MCNC: 8.5 MG/DL (ref 8.6–10.3)
CHLORIDE SERPL-SCNC: 105 MMOL/L (ref 98–107)
CHOLEST SERPL-MCNC: 132 MG/DL (ref 0–199)
CHOLESTEROL/HDL RATIO: 3.6
CO2 SERPL-SCNC: 27 MMOL/L (ref 21–32)
CREAT SERPL-MCNC: 1.03 MG/DL (ref 0.5–1.3)
EGFRCR SERPLBLD CKD-EPI 2021: 83 ML/MIN/1.73M*2
EST. AVERAGE GLUCOSE BLD GHB EST-MCNC: 105 MG/DL
GLUCOSE SERPL-MCNC: 97 MG/DL (ref 74–99)
HBA1C MFR BLD: 5.3 %
HDLC SERPL-MCNC: 36.7 MG/DL
HOLD SPECIMEN: NORMAL
HOLD SPECIMEN: NORMAL
LDLC SERPL CALC-MCNC: 84 MG/DL
NON HDL CHOLESTEROL: 95 MG/DL (ref 0–149)
POTASSIUM SERPL-SCNC: 4.3 MMOL/L (ref 3.5–5.3)
SODIUM SERPL-SCNC: 140 MMOL/L (ref 136–145)
TRIGL SERPL-MCNC: 59 MG/DL (ref 0–149)
TSH SERPL-ACNC: 0.4 MIU/L (ref 0.44–3.98)
VLDL: 12 MG/DL (ref 0–40)

## 2024-02-21 PROCEDURE — 83036 HEMOGLOBIN GLYCOSYLATED A1C: CPT | Mod: ELYLAB | Performed by: REGISTERED NURSE

## 2024-02-21 PROCEDURE — 1140000001 HC PRIVATE PSYCH ROOM DAILY

## 2024-02-21 PROCEDURE — 2500000001 HC RX 250 WO HCPCS SELF ADMINISTERED DRUGS (ALT 637 FOR MEDICARE OP): Performed by: PSYCHIATRY & NEUROLOGY

## 2024-02-21 PROCEDURE — 2500000002 HC RX 250 W HCPCS SELF ADMINISTERED DRUGS (ALT 637 FOR MEDICARE OP, ALT 636 FOR OP/ED): Performed by: PSYCHIATRY & NEUROLOGY

## 2024-02-21 PROCEDURE — 73060 X-RAY EXAM OF HUMERUS: CPT | Mod: RT

## 2024-02-21 PROCEDURE — S4991 NICOTINE PATCH NONLEGEND: HCPCS | Performed by: PSYCHIATRY & NEUROLOGY

## 2024-02-21 PROCEDURE — 73060 X-RAY EXAM OF HUMERUS: CPT | Mod: RIGHT SIDE | Performed by: RADIOLOGY

## 2024-02-21 PROCEDURE — 80048 BASIC METABOLIC PNL TOTAL CA: CPT | Performed by: REGISTERED NURSE

## 2024-02-21 PROCEDURE — 80061 LIPID PANEL: CPT | Performed by: REGISTERED NURSE

## 2024-02-21 PROCEDURE — 73030 X-RAY EXAM OF SHOULDER: CPT | Mod: RT

## 2024-02-21 PROCEDURE — 97165 OT EVAL LOW COMPLEX 30 MIN: CPT | Mod: GO

## 2024-02-21 PROCEDURE — 99221 1ST HOSP IP/OBS SF/LOW 40: CPT | Performed by: REGISTERED NURSE

## 2024-02-21 PROCEDURE — 2500000001 HC RX 250 WO HCPCS SELF ADMINISTERED DRUGS (ALT 637 FOR MEDICARE OP): Performed by: REGISTERED NURSE

## 2024-02-21 PROCEDURE — 84443 ASSAY THYROID STIM HORMONE: CPT | Performed by: REGISTERED NURSE

## 2024-02-21 PROCEDURE — 99232 SBSQ HOSP IP/OBS MODERATE 35: CPT | Performed by: PSYCHIATRY & NEUROLOGY

## 2024-02-21 PROCEDURE — 36415 COLL VENOUS BLD VENIPUNCTURE: CPT | Performed by: REGISTERED NURSE

## 2024-02-21 PROCEDURE — 2500000005 HC RX 250 GENERAL PHARMACY W/O HCPCS: Performed by: REGISTERED NURSE

## 2024-02-21 PROCEDURE — 73030 X-RAY EXAM OF SHOULDER: CPT | Mod: RIGHT SIDE | Performed by: RADIOLOGY

## 2024-02-21 RX ORDER — DULOXETIN HYDROCHLORIDE 20 MG/1
40 CAPSULE, DELAYED RELEASE ORAL 2 TIMES DAILY
Status: DISCONTINUED | OUTPATIENT
Start: 2024-02-21 | End: 2024-02-22

## 2024-02-21 RX ORDER — CLONIDINE HYDROCHLORIDE 0.1 MG/1
0.1 TABLET ORAL NIGHTLY
Status: DISCONTINUED | OUTPATIENT
Start: 2024-02-21 | End: 2024-02-23 | Stop reason: HOSPADM

## 2024-02-21 RX ORDER — LIDOCAINE 560 MG/1
3 PATCH PERCUTANEOUS; TOPICAL; TRANSDERMAL DAILY
Status: DISCONTINUED | OUTPATIENT
Start: 2024-02-21 | End: 2024-02-23 | Stop reason: HOSPADM

## 2024-02-21 RX ORDER — QUETIAPINE FUMARATE 25 MG/1
25 TABLET, FILM COATED ORAL NIGHTLY
Status: DISCONTINUED | OUTPATIENT
Start: 2024-02-21 | End: 2024-02-23

## 2024-02-21 RX ADMIN — DULOXETINE HYDROCHLORIDE 30 MG: 30 CAPSULE, DELAYED RELEASE ORAL at 09:09

## 2024-02-21 RX ADMIN — QUETIAPINE FUMARATE 25 MG: 25 TABLET, FILM COATED ORAL at 20:33

## 2024-02-21 RX ADMIN — Medication 1 PATCH: at 09:09

## 2024-02-21 RX ADMIN — DULOXETINE HYDROCHLORIDE 40 MG: 20 CAPSULE, DELAYED RELEASE ORAL at 20:33

## 2024-02-21 RX ADMIN — IBUPROFEN 600 MG: 600 TABLET, FILM COATED ORAL at 06:34

## 2024-02-21 RX ADMIN — LIDOCAINE 3 PATCH: 4 PATCH TOPICAL at 16:48

## 2024-02-21 RX ADMIN — CLONIDINE HYDROCHLORIDE 0.1 MG: 0.1 TABLET ORAL at 20:34

## 2024-02-21 SDOH — ECONOMIC STABILITY: GENERAL

## 2024-02-21 SDOH — HEALTH STABILITY: MENTAL HEALTH: ANXIETY SYMPTOMS: GENERALIZED

## 2024-02-21 SDOH — HEALTH STABILITY: MENTAL HEALTH
DEPRESSION SYMPTOMS: APPETITE CHANGE;CHANGE IN ENERGY LEVEL;FEELINGS OF HELPLESSNESS;FEELINGS OF HOPELESSESS;SLEEP DISTURBANCE

## 2024-02-21 SDOH — ECONOMIC STABILITY: HOUSING INSECURITY: FEELS SAFE LIVING IN HOME: YES

## 2024-02-21 ASSESSMENT — PAIN DESCRIPTION - LOCATION: LOCATION: BACK

## 2024-02-21 ASSESSMENT — COLUMBIA-SUICIDE SEVERITY RATING SCALE - C-SSRS
1. SINCE LAST CONTACT, HAVE YOU WISHED YOU WERE DEAD OR WISHED YOU COULD GO TO SLEEP AND NOT WAKE UP?: NO
5. HAVE YOU STARTED TO WORK OUT OR WORKED OUT THE DETAILS OF HOW TO KILL YOURSELF? DO YOU INTEND TO CARRY OUT THIS PLAN?: NO
6. HAVE YOU EVER DONE ANYTHING, STARTED TO DO ANYTHING, OR PREPARED TO DO ANYTHING TO END YOUR LIFE?: NO
2. HAVE YOU ACTUALLY HAD ANY THOUGHTS OF KILLING YOURSELF?: NO

## 2024-02-21 ASSESSMENT — PAIN SCALES - GENERAL
PAINLEVEL_OUTOF10: 6
PAINLEVEL_OUTOF10: 0 - NO PAIN
PAINLEVEL_OUTOF10: 0 - NO PAIN

## 2024-02-21 ASSESSMENT — PAIN - FUNCTIONAL ASSESSMENT
PAIN_FUNCTIONAL_ASSESSMENT: 0-10

## 2024-02-21 NOTE — CARE PLAN
The patient's goals for the shift include sleeping    The clinical goals for the shift include patient will sleep through the night    Over the shift, the patient did make progress toward the following goals. Patient was calm and cooperative throughout shift. He took PRN trazodone and slept through the night. He denies any SI or HI at this time but does have anxiety related to housing issues.

## 2024-02-21 NOTE — CARE PLAN
Problem: Ineffective Coping  Goal: STG - Verbalizes control of suicidal thoughts/behaviors  Outcome: Progressing     Problem: Ineffective Coping  Goal: Verbalizes ways to manage anxiety  Outcome: Progressing   The patient's goals for the shift include rest, go to a group    The clinical goals for the shift include no med side effects    Over the shift, the patient did not make progress toward the following goals of attending group. Barriers to progression include depression, labile mood. Recommendations to address these barriers include continued hospitalization and med review.     Pt up this morning to breakfast. Pt states that he slept well the first part of the night but woke early due to some back pain. Pt denies any suicidal thoughts, denies any homicidal thoughts, denies any auditory or visual hallucinations. Pt upset at times over coffee, would not participate in groups today. Pt stating things like he just needed his coffee. Pt was seen by hospitalist and sent to [facility]. Pt cooperative. Pt up to eat meals. Pt mildly interactive with peers. Pt is now back in bed resting.

## 2024-02-21 NOTE — PROGRESS NOTES
"Occupational Therapy    OT Behavioral Health Evaluation    Patient Name: Joe Magaña  MRN: [id number]  Today's Date: 2/21/2024       OT Intervention Plan:  Skilled OT interventions to include: therapeutic use of self, therapeutic use of occupations and activities, therapeutic groups (including task skill groups, life skill groups, coping skill groups, anger/grief management groups, and ADL/IADL groups), and 1:1 sessions focused on individualized goals for mental health management to improve ADL/IADL performance.         Subjective   Current Problem:  No diagnosis found.  General:  General  Reason for Referral: impaired ADLs & IADLs  Referred By: Dr. Wakefield  Past Medical History Relevant to Rehab: HTN, COPD, depression, 1 prior suicide attempt via cutting  Prior to Session Communication: Bedside nurse  Patient Position Received: Up in chair  General Comment: 62 y/o male presents to ED initially due to experiencing wooziness & passing out at work. Pt reports this was due to increase in BP. Upon being admitted, pt endorsed SI. Pt reports he has dealt w/ passive SI for many years. These feelings have increased recently due to a variety of strsesors, including financial issues, housing issues (friend he was living w/ just got evicted), & his father recently passing. Pt has limited coping skills & social supports, & currently has no alternate housing plans. At time of assessment, pt denies SI, HI, & A/V hallucinations.    Precautions:     Meaningful Occupations:  works part-time in  at [employer]. When asked about how he spends his time, pt only able to name job & watching TV. He says, \"If I didn't have this part-time job, I don't know what I'd do.\" He admits he has lost interest in many of his previous hobbies & leisure activities over the years.     Sources of Support:     & psychiatrist at [facility]. Otherwise very limited. Based on pt's report he seems to be pretty socially " isolated.    Prior Level of Function/Living Situation:    Activities of Daily Living/Self Care  Living Situation: currently uncertain - friend pt was living w/ has been evicted & pt has no current backup plan for housing  Hygiene/Grooming: independence  Bathing: independence  Dressing: independence  Toileting: independence  Continence: independence  Feeding: independence  Functional Mobility: independence  Medication Use/Follows Medical Advice: compliant  ADL Comment:      Instrumental Activities of Daily Living  Parenting/: N/a   Driving/Community Mobility: impaired - Pt has license but no car, says he walks everywhere  Financial Management: impaired  Physical Self-care : WFL  Emotional Self-care: impaired  Home Care/Chores: WFL  Cooking: WFL  Safety Judgement: impaired  Rest/Sleep: impaired  Education:  unknown  Work/Volunteering:   part time  IADL/Daily Routine Comment:       Social Participation/Community Involvement:  Socializing: Pt reports very limited social support & social involvement.  Balanced Relationships:  Pt recently lost his father. He does not report any other close family connections or friendships.    Emotional Regulation Skills:  Emotional Expression:  Affect: constricted  Speech: regular rhythm, rate, volume, & tone  Mood/Impulse Modulation: poor anxiety management, poor depression management, and poor grief management  Coping Skills:  Helpful: very limited per pt report  Harmful: suicidal or homicidal ideation    Cognitive & Task Performance Skills:  Orientation: person, place, time, and situation  Attention Span: sustained  Problem-solving: impaired  Decision-making: WFL  Thought Content: [facility]  Thought Processes: coherent  Organizational Skills: thought processes are linear and organized  Short Term Memory: [facility]  Remote Memory: WFL  Safety Judgement: impaired  Perseveration:  not present  Insight/Judgement:  impaired    Communication and Interpersonal Skills:  Boundaries: continue to  assess  Appropriate Disclosure: WFL  Assertion: Continue to assess   Communication/Interpersonal Comment:      Goals:   Encounter Problems       Encounter Problems (Active)       OT Goals       Pt will explore, identify, and appropriately utilize effective coping strategies to cope with daily stressors and manage emotions with independence prior to discharge.        Start:  02/21/24    Expected End:  03/20/24            Pt will demonstrate ability to appropriately modulate emotions and impulses with independence prior to discharge.        Start:  02/21/24    Expected End:  03/20/24            Pt will explore, identify, and appropriately utilize effective communication strategies to express feelings, wants, and needs with independence prior to discharge.        Start:  02/21/24    Expected End:  03/20/24                 Education:  Pt provided overview of stay on inpatient psychiatric unit, including general expectations, occupational therapy's role, and interdisciplinary approach to care. Pt verbalized understanding.

## 2024-02-21 NOTE — CONSULTS
"Consults    Reason For Consult  Adult medical examination and optimization for behavioral health unit      History Of Present Illness  Joe Magaña is a 63 y.o. male presented to ED with complaint of suicidal ideations with plan to cut his wrist. He did complain of right knee pain after sustaining a fall a few days prior to admission. He stated he was at work and felt \"woozy\" so he came to the ED. CXR was negative for acute cardiopulmonary process, right knee xrays were negative for acute fracture or effusion, toxicology + for fentanyl, UA positive for trace leukocytes, WBC 21-50, bacteria  not noted, CT of head negative. CMP shows creatinine 1.35 eGFR 60. CBC with diff unremarkable. Patient was medically cleared for EPAT evaluation. Hospitalist team consulted for adult medical examination and optimization for behavioral health unit.     Patient examined and seen. Alert and oriented x3, explained to patient assessment, right to , and the right to decline assessment. Patient verbalized understanding and agreed to assessment with RN as . Patient denies chest pain, shortness of breath, palpitations, abdominal pain, fever or chills.   States that he is not suicidal and is not having hallucinations. Reports right humerus and right shoulder tenderness with palpation and difficulty with ROM. Will obtain Xrays of right shoulder and humerus. Patient has a history of right shoulder labral repair 2018 by Orthopedics Team. He states he started having pain to his right arm after his fall prior to coming to the ED. Reports snorting fentanyl for his chronic back and neck pain prior to admission.  Repeat BMP showed close 97, sodium 140, potassium 4.3, creatinine 1.03 down from 1.35 on admission, EGFR 83 resumed normal.  Hemoglobin A1c 5.3.    Hospitalist to evaluate medical optimization for psychiatric treatment and evaluation.  Neurological evaluation completed.  No acute or chronic medical issues identified at " this time that could be contributing to underlying psychiatric symptoms. Pt is medically optimized for inpatient behavioral health evaluation and treatment.     Review of systems: 10 system were reviewed and were negative except what was mentioned in history of present illness           Past Medical History  Polysubstance abuse, HTN, COPD, right knee pain from fall  Hx of chronic back and neck pain   Right shoulder arthoplasty 2018   HTN   BPH   Arthritis       Social History  Daily smoker  Denies alcohol use  Snorts fentanyl     Family History  Reviewed, not pertinent to admission      Allergies  Patient has no known allergies.       Physical Exam  Constitutional: elderly appearing, awake/alert/oriented x3, no distress, cooperative  Eyes: PERRL, EOMI, clear sclera  ENMT: mucous membranes moist, no apparent injury, no lesions seen  Head/Neck: Neck supple, no apparent injury,   Respiratory/Thorax: Patent airways,  normal breath sounds   Cardiovascular: Regular, rate and rhythm, no murmurs,  normal S 1and S 2  Gastrointestinal: Nondistended, soft, non-tender,    Genitourinary: denies CVA tenderness  or suprapubic tenderness,  voiding freely   Musculoskeletal: ROM intact, no joint swelling, right humerus with tenderness to palpation, flexion and extension within WNL, but patient reports at times tenderness with movement, decrease range of motion to put arm above head - unchanged from prior   Extremities: normal extremities,  no contusions or wounds seen   Skin: warm, dry, intact  Neurological: alert/oriented x 3, speech clear, cranial nerves II through XII  grossly intact  Psychiatric: appropriate mood and behavior  Cranial Nerve Exam: II, III, IV, VI: Visual acuity within normal limits bilaterally, visual fields normal in all quadrants, AVELINO, EOMI  Cranial Nerve exam: V: Facial sensations intact bilaterally to dull, sharp, and light touch stimuli  Cranial Nerve exam VII: Facial muscle strength normal/equal  bilaterally  Cranial Nerve Exam VIII: Hearing is normal bilaterally  Cranial Nerve IX,X: Palate and Uvula symmetrical, voice is normal  Cranial Nerve XI: Shoulder shrug strong, equal bilaterally  Cranial Nerve XII: Tongue moves symmetrically  Motor : Good muscle tone, Strength equal upper and lower extremities unless otherwise stated above  Cerebellar: normal gait unless otherwise stated above         Last Recorded Vitals  /69   Pulse 73   Temp 37 °C (98.6 °F)   Resp 16   SpO2 94%     Relevant Results  Scheduled medications  cloNIDine, 0.1 mg, oral, Nightly  DULoxetine, 40 mg, oral, BID  nicotine, 1 patch, transdermal, Daily  QUEtiapine, 25 mg, oral, Nightly      Continuous medications     PRN medications  PRN medications: acetaminophen, alum-mag hydroxide-simeth, hydrOXYzine HCL, ibuprofen, OLANZapine, OLANZapine, traZODone    Results for orders placed or performed during the hospital encounter of 02/20/24 (from the past 24 hour(s))   Lipid Panel   Result Value Ref Range    Cholesterol 132 0 - 199 mg/dL    HDL-Cholesterol 36.7 mg/dL    Cholesterol/HDL Ratio 3.6     LDL Calculated 84 <=99 mg/dL    VLDL 12 0 - 40 mg/dL    Triglycerides 59 0 - 149 mg/dL    Non HDL Cholesterol 95 0 - 149 mg/dL   Thyroid Stimulating Hormone   Result Value Ref Range    Thyroid Stimulating Hormone 0.40 (L) 0.44 - 3.98 mIU/L   Lavender Top   Result Value Ref Range    Extra Tube Hold for add-ons.    SST TOP   Result Value Ref Range    Extra Tube Hold for add-ons.           Assessment/Plan     # Suicidal Ideations   Worsening Depression   Admit to Behavioral Health Unit  Contract for Safety   Safety Protocols  Medications to be determined by psychiatry   Vital Signs BID  Group Therapy as appropriate  Social Work for outpatient therapy   Encourage Healthy Lifestyle and Exercise as appropriate     #Tobacco Dependency / Substance Abuse  - Fentanyl   Nicotine cessation   Risks discussed  Encourage abstinence  Inpatient/Outpatient  treatment therapies     # Right Shoulder Pain / Humerus Pain   S/P mechanical fall prior to admission   Right Shoulder and Humerus Xray pending   CT head negative  CT of chest negative   Right Knee Xrays negative for acute findings   Scratches are seen on right lateral of patella, no signs of infection, edema or erythema noted. Full ROM   Add Lidocaine patch  Tylenol   Supportive care     # HTN  Continue clonidine if ok with psych   Hemodynamically stable at this time     Thank you for consult  Medicine to follow for results    Time spent  49  minutes obtaining labs, imaging, recommendations, interview, assessment, examination, medication review/ordering, and EMR review.    Plan of care was discussed extensively with patient, RN and Psych NP. Patient verbalized understanding through teach back method. All questions and concerns addressed upon examination.     Of note, this documentation is completed using the Dragon Dictation system (voice recognition software). There may be spelling and/or grammatical errors that were not corrected prior to final submission.              Ailyn Jay, APRN-CNP

## 2024-02-21 NOTE — PROGRESS NOTES
"Social Work Assessment and Discharge Planning Note     02/21/24 0945   Arrival Details   Admission Type   (inpatient psych)   History of Present Illness   Admission Reason Suicidal Ideation, depression   HPI Pt with hx of depression here due to SI.  Pt states he is here because \"I passed out at work and they couldn't wake me up. I was real woozy.\"  Per chart while in ED pt disclosed SI to cut wrist.  Pt admits to depression exacerbated by back pain impacting low sleep. Pt admits to use of street fentanyl last week - self medicating back pain.  Pt denies current HI, and a/v hallucinations.  When asked about current SI, pt says \"not yet\" and laughed, saying he has to see how the day goes.   SW Readmission Information    Readmission within 30 Days No   Psychiatric Symptoms   Anxiety Symptoms Generalized  (worse worrying)   Depression Symptoms Appetite change;Change in energy level;Feelings of helplessness;Feelings of hopelessess;Sleep disturbance   Psychosis Symptoms   Hallucination Type No problems reported or observed.   Delusion Type No problems reported or observed.   Past Psychiatric History/Meds/Treatments   Past Psychiatric History Hx of 4/2023 OD at [facility], hx [place], Denies hx of cutting,  only \"talking about it.\"  Sister had depression.   Past Psychiatric Meds/Treatments Clonadine, Cymbalta   Past Violence/Victimization History denies both   Current Mental Health Contacts    Name/Phone Number [facility]   Provider Name/Phone Number [facility]   Provider Last Appointment Date missed it 2/20/24   Support System   Support System   ([facility] (friend he lives with/awkward situation), downstairs tenant [facility])   Living Arrangement   Living Arrangement   (living with friend, who is being evicted in 30 days)   Home Safety   Feels Safe Living in Home Yes   Income Information   Employment Status Employed   Income Source   (2-3 nights a week at [employer], was denied SSI appeal, gets no " "food stamps)   Shift Worked   (Nights 2-3 per week)   Income/Expense Information   (very low income, but knowlegable about food sites and meals. Said [facility] helped with food stamp application)   Financial Concerns   (gets paid every two weeks, gets paid next week)   Employment/ Finance Comments Plans to get a new  and a new doctor to reapply for SSi.   [place] Service/Education History   Current or Previous  Service None   Education Level High school   History of Learning Problems No   History of School Behavior Problems No   School History [employer]   Social/Cultural History   Social History Pt was raised in [place].  Sister is .  Father who was a support  2023 and step mother is not supportive. Pt is only family member remaining. He reports he is heterosexual, never  and never had kids.   Cultural Requests During Hospitalization none   Spiritual Requests During Hospitalization none   Important Activities   (fishing, billards, car racing, websites on his phone.)   Legal   Legal Concerns none   Drug Screening   Have you used any substances (canabis, cocaine, heroin, hallucinogens, inhalants, etc.) in the past 12 months?   (Pt reported \"no\", but later reported recent street Fentanyl use)   Stage of Change   Stage of Change Precontemplation  (Maintenance?)   History of Treatment   ([place], hx [facility], sober living per chart)   Type of Treatment Offered   (assessment, meetings, resources)   Treatment Offered Declined   Frequency of Substance Use etoh - 1 lg beer monthly,  street Fentanyl \"a little bit last week\"   Age of First Substance Use 42 per chart   Psychosocial   Behaviors/Mood Anxious;Cooperative        Pt with hx of depression, here due to SI to cut self.  Pt reports feeling \"trapped\" by multiple stressors, including case mgt turnover, and processes and red tape to access resources. This writer reached out to [facility] liaison to help ensure timely engagement with  " "post-discharge.      Pt's stressors include symptoms, medical issues, financial issues, housing insecurity, food insecurity, grief issues - father's death 12/2023, limited support, and transportation dependency.  Chart reflects other stressors may be work related or bed bug related, but pt did not discuss either of those with LISW.      Pt is knowledgeable about utilizing/ sometimes utilizes insurance and  for transportation and food help thru [facility]. He has been working with [facility] on housing and knows about [facility].  Has been working with [facility] and Dentistry for some health issues.   Pt refused shelter information saying, \"I've gone that route before and wasted 1.5 years of my life.\"  LISW encouraged use of local library to reduce isolation and provided grief/grief support information.      Pt plan's to return to friend's at  [address] [facility] and follow up with his providers at [facility].     "

## 2024-02-21 NOTE — PROGRESS NOTES
Joe Magaña is a 63 y.o. male on day 1 of admission presenting with MDD.    Subjective   Patient reports slightly improved mood, states he is sleeping better.  Patient tolerating medication with no adverse effects.  Will increase Cymbalta to 40 mg oral twice daily today, Seroquel to 25 mg oral daily at bedtime.  Patient encouraged remain active on the unit, participating group therapy.    Objective     MSE  General: Appropriately groomed and dressed.  Appearance: Appears stated age.  Attitude: Calm, cooperative.  Behavior: Appropriate eye contact.  Motor activity: No agitation or retardation. no EPS.  Normal gait.  Speech: Regular rate, rhythm, volume and tone.  Mood: Less depressed  Affect: Appropriate range  Thought process: Organized, linear, goal-directed.  Associations are logical.  Thought content: Does not endorse suicidal or homicidal ideation, no delusions elicited.  Thought perception: Did not endorse auditory or visual hallucinations.  Cognition: Alert, oriented x3.  no deficit in memory or attention.  Insight: Fair.  Judgment: Fair.    Last Recorded Vitals  /69   Pulse 73   Temp 37 °C (98.6 °F)   Resp 16   SpO2 94%      Relevant Results  Scheduled medications  cloNIDine, 0.1 mg, oral, Nightly  DULoxetine, 40 mg, oral, BID  nicotine, 1 patch, transdermal, Daily  QUEtiapine, 25 mg, oral, Nightly      Continuous medications     PRN medications  PRN medications: acetaminophen, alum-mag hydroxide-simeth, hydrOXYzine HCL, ibuprofen, OLANZapine, OLANZapine, traZODone    Results for orders placed or performed during the hospital encounter of 02/20/24 (from the past 24 hour(s))   Lipid Panel   Result Value Ref Range    Cholesterol 132 0 - 199 mg/dL    HDL-Cholesterol 36.7 mg/dL    Cholesterol/HDL Ratio 3.6     LDL Calculated 84 <=99 mg/dL    VLDL 12 0 - 40 mg/dL    Triglycerides 59 0 - 149 mg/dL    Non HDL Cholesterol 95 0 - 149 mg/dL   Thyroid Stimulating Hormone   Result Value Ref Range    Thyroid  Stimulating Hormone 0.40 (L) 0.44 - 3.98 mIU/L   Hemoglobin A1C   Result Value Ref Range    Hemoglobin A1C 5.3 see below %    Estimated Average Glucose 105 Not Established mg/dL   Lavender Top   Result Value Ref Range    Extra Tube Hold for add-ons.    SST TOP   Result Value Ref Range    Extra Tube Hold for add-ons.    Basic Metabolic Panel   Result Value Ref Range    Glucose 97 74 - 99 mg/dL    Sodium 140 136 - 145 mmol/L    Potassium 4.3 3.5 - 5.3 mmol/L    Chloride 105 98 - 107 mmol/L    Bicarbonate 27 21 - 32 mmol/L    Anion Gap 12 10 - 20 mmol/L    Urea Nitrogen 20 6 - 23 mg/dL    Creatinine 1.03 0.50 - 1.30 mg/dL    eGFR 83 >60 mL/min/1.73m*2    Calcium 8.5 (L) 8.6 - 10.3 mg/dL        Assessment/Plan   Diagnosis:  MDD    Impression:     Labs and Chart: reviewed  Case discussed with treatment team members  Encouraged patient to attend group and other mileu activity  Collateral from family - pending  Discharge planing  Medication:       -Cymbalta 40 mg oral twice daily       -Seroquel 25 mg oral daily at bedtime    Medication Consent  Medication Consent: risks, benefits, side effects reviewed for all ordered meds and patient expressed understanding and consent obtained    UMBERTO Renteria-CNP

## 2024-02-22 PROCEDURE — 2500000001 HC RX 250 WO HCPCS SELF ADMINISTERED DRUGS (ALT 637 FOR MEDICARE OP): Performed by: REGISTERED NURSE

## 2024-02-22 PROCEDURE — 1140000001 HC PRIVATE PSYCH ROOM DAILY

## 2024-02-22 PROCEDURE — 2500000005 HC RX 250 GENERAL PHARMACY W/O HCPCS: Performed by: REGISTERED NURSE

## 2024-02-22 PROCEDURE — 2500000001 HC RX 250 WO HCPCS SELF ADMINISTERED DRUGS (ALT 637 FOR MEDICARE OP): Performed by: PSYCHIATRY & NEUROLOGY

## 2024-02-22 PROCEDURE — 2500000002 HC RX 250 W HCPCS SELF ADMINISTERED DRUGS (ALT 637 FOR MEDICARE OP, ALT 636 FOR OP/ED): Performed by: PSYCHIATRY & NEUROLOGY

## 2024-02-22 PROCEDURE — S4991 NICOTINE PATCH NONLEGEND: HCPCS | Performed by: PSYCHIATRY & NEUROLOGY

## 2024-02-22 PROCEDURE — 99232 SBSQ HOSP IP/OBS MODERATE 35: CPT | Performed by: PSYCHIATRY & NEUROLOGY

## 2024-02-22 RX ORDER — DULOXETIN HYDROCHLORIDE 30 MG/1
60 CAPSULE, DELAYED RELEASE ORAL 2 TIMES DAILY
Status: DISCONTINUED | OUTPATIENT
Start: 2024-02-22 | End: 2024-02-23 | Stop reason: HOSPADM

## 2024-02-22 RX ADMIN — LIDOCAINE 3 PATCH: 4 PATCH TOPICAL at 08:53

## 2024-02-22 RX ADMIN — IBUPROFEN 600 MG: 600 TABLET, FILM COATED ORAL at 02:43

## 2024-02-22 RX ADMIN — DULOXETINE HYDROCHLORIDE 40 MG: 20 CAPSULE, DELAYED RELEASE ORAL at 08:54

## 2024-02-22 RX ADMIN — CLONIDINE HYDROCHLORIDE 0.1 MG: 0.1 TABLET ORAL at 20:24

## 2024-02-22 RX ADMIN — QUETIAPINE FUMARATE 25 MG: 25 TABLET, FILM COATED ORAL at 20:24

## 2024-02-22 RX ADMIN — DULOXETINE HYDROCHLORIDE 60 MG: 30 CAPSULE, DELAYED RELEASE ORAL at 20:24

## 2024-02-22 RX ADMIN — HYDROXYZINE HYDROCHLORIDE 50 MG: 25 TABLET ORAL at 09:01

## 2024-02-22 RX ADMIN — IBUPROFEN 600 MG: 600 TABLET, FILM COATED ORAL at 08:52

## 2024-02-22 RX ADMIN — TRAZODONE HYDROCHLORIDE 50 MG: 50 TABLET ORAL at 20:24

## 2024-02-22 RX ADMIN — HYDROXYZINE HYDROCHLORIDE 50 MG: 25 TABLET ORAL at 23:13

## 2024-02-22 RX ADMIN — Medication 1 PATCH: at 08:52

## 2024-02-22 RX ADMIN — IBUPROFEN 600 MG: 600 TABLET, FILM COATED ORAL at 20:25

## 2024-02-22 ASSESSMENT — PAIN DESCRIPTION - LOCATION
LOCATION: BACK
LOCATION: BACK

## 2024-02-22 ASSESSMENT — COLUMBIA-SUICIDE SEVERITY RATING SCALE - C-SSRS
5. HAVE YOU STARTED TO WORK OUT OR WORKED OUT THE DETAILS OF HOW TO KILL YOURSELF? DO YOU INTEND TO CARRY OUT THIS PLAN?: NO
6. HAVE YOU EVER DONE ANYTHING, STARTED TO DO ANYTHING, OR PREPARED TO DO ANYTHING TO END YOUR LIFE?: NO
1. SINCE LAST CONTACT, HAVE YOU WISHED YOU WERE DEAD OR WISHED YOU COULD GO TO SLEEP AND NOT WAKE UP?: NO
2. HAVE YOU ACTUALLY HAD ANY THOUGHTS OF KILLING YOURSELF?: NO
6. HAVE YOU EVER DONE ANYTHING, STARTED TO DO ANYTHING, OR PREPARED TO DO ANYTHING TO END YOUR LIFE?: NO
1. SINCE LAST CONTACT, HAVE YOU WISHED YOU WERE DEAD OR WISHED YOU COULD GO TO SLEEP AND NOT WAKE UP?: NO
2. HAVE YOU ACTUALLY HAD ANY THOUGHTS OF KILLING YOURSELF?: NO

## 2024-02-22 ASSESSMENT — PAIN SCALES - GENERAL
PAINLEVEL_OUTOF10: 10 - WORST POSSIBLE PAIN
PAINLEVEL_OUTOF10: 7

## 2024-02-22 ASSESSMENT — PAIN DESCRIPTION - ORIENTATION: ORIENTATION: MID

## 2024-02-22 NOTE — CARE PLAN
Problem: Ineffective Coping  Goal: LTG - Verbalizes alternatives to suicide  Outcome: Progressing  Goal: STG - Verbalizes control of suicidal thoughts/behaviors  Outcome: Progressing  Goal: Notifies staff when experiencing harmful thoughts toward self/others  Outcome: Progressing  Goal: Verbalizes improved well being  Outcome: Progressing  Goal: Verbalizes ways to manage anxiety  Outcome: Progressing     Problem: Self Care Deficit  Goal: STG - Patient completes hygiene  Outcome: Progressing  Goal: Increase group attendance  Outcome: Progressing  Goal: Accepts need for medications  Outcome: Progressing     Problem: Alteration in Sleep  Goal: STG - Reports nightly sleep, duration, and quality  Outcome: Progressing  Goal: STG - Identifies sleep hygiene aids  Outcome: Progressing  Goal: STG - Informs staff if unable to sleep  Outcome: Progressing     Problem: Altered Thought Processes as Evidenced by  Goal: STG - Desires improvement in ability to think and concentrate  Outcome: Progressing     Problem: Ineffective Coping  Goal: Identifies ineffective coping skills  Outcome: Progressing  Goal: Identifies healthy coping skills  Outcome: Progressing  Goal: Demonstrates healthy coping skills  Outcome: Progressing  Goal: Participates in unit activities  Outcome: Progressing     Problem: Alteration in Sleep  Goal: STG - Identifies sleep hygiene aids  Outcome: Progressing  Goal: STG - Informs staff if unable to sleep  Outcome: Progressing     Problem: Anxiety  Goal: Attempts to manage anxiety with help  Outcome: Progressing  Goal: Verbalizes ways to manage anxiety  Outcome: Progressing  Goal: Implements measures to reduce anxiety  Outcome: Progressing   The patient's goals for the shift include sleep    The clinical goals for the shift include attend group activities    Pt denies SI/HI/AVH, visual on unit, Encouraged to attend groups. Verbal with select peers and staff. Compliant with plan care.

## 2024-02-22 NOTE — CARE PLAN
Problem: Ineffective Coping  Goal: LTG - Verbalizes alternatives to suicide  Outcome: Progressing  Goal: STG - Verbalizes control of suicidal thoughts/behaviors  Outcome: Progressing  Goal: Notifies staff when experiencing harmful thoughts toward self/others  Outcome: Progressing  Goal: Verbalizes improved well being  Outcome: Progressing  Goal: Verbalizes ways to manage anxiety  Outcome: Progressing     Problem: Self Care Deficit  Goal: STG - Patient completes hygiene  Outcome: Progressing  Goal: Increase group attendance  Outcome: Progressing  Goal: Accepts need for medications  Outcome: Progressing     Problem: Alteration in Sleep  Goal: STG - Reports nightly sleep, duration, and quality  Outcome: Progressing  Goal: STG - Identifies sleep hygiene aids  Outcome: Progressing  Goal: STG - Informs staff if unable to sleep  Outcome: Progressing     Problem: Altered Thought Processes as Evidenced by  Goal: STG - Desires improvement in ability to think and concentrate  Outcome: Progressing     Problem: Sensory Perceptual Alteration as Evidenced by  Goal: Patient/Family participate in treatment and discharge plans  Outcome: Progressing  Goal: Participates in unit activities  Outcome: Progressing  Goal: Discusses signs/symptoms of illness/treatment options  Outcome: Progressing  Goal: Initiates reality-based interactions  Outcome: Progressing     Problem: Ineffective Coping  Goal: Identifies ineffective coping skills  Outcome: Progressing  Goal: Identifies healthy coping skills  Outcome: Progressing  Goal: Demonstrates healthy coping skills  Outcome: Progressing  Goal: Participates in unit activities  Outcome: Progressing     Problem: Alteration in Sleep  Goal: STG - Identifies sleep hygiene aids  Outcome: Progressing  Goal: STG - Informs staff if unable to sleep  Outcome: Progressing     Problem: Anxiety  Goal: Attempts to manage anxiety with help  Outcome: Progressing  Goal: Verbalizes ways to manage anxiety  Outcome:  Progressing  Goal: Implements measures to reduce anxiety  Outcome: Progressing     Problem: Self Care Deficit  Goal: STG - Patient completes hygiene  Outcome: Progressing   The patient's goals for the shift include sleep    The clinical goals for the shift include sleep    Pt has been visible on the unit and social with peers and ate a snack. Pt currently denies suicidal and homicidal ideation and denies auditory and visual hallucinations. Pt affect is reactive and mood is anxious and depressed. And pt took bed time meds. Will continue to monitor.

## 2024-02-22 NOTE — PROGRESS NOTES
Joe Magaña is a 63 y.o. male on day 2 of admission presenting with MDD.    Subjective   Patient reports improved mood, still had some difficulty with sleep due to back pain.  Patient admits that he has been using fentanyl that he gets on the street at times to treat his back pain.  Patient understands the dangers of continued use of this illicit substance.  Patient reports that he was previously in pain management, but was unable to make it to a recent intake appointment.  Will attempt to reach out to [facility] and dentistry who referred the patient for pain management to see if they can assist with getting him another appointment.  In the meantime we will increase Cymbalta to 60 mg oral twice daily.  Patient agreeable to plan.    Objective     MSE  General: Appropriately groomed and dressed.  Appearance: Appears stated age.  Attitude: Calm, cooperative.  Behavior: Appropriate eye contact.  Motor activity: No agitation or retardation. no EPS.  Normal gait.  Speech: Regular rate, rhythm, volume and tone.  Mood: Less depressed  Affect: Appropriate range  Thought process: Organized, linear, goal-directed.  Associations are logical.  Thought content: Does not endorse suicidal or homicidal ideation, no delusions elicited.  Thought perception: Did not endorse auditory or visual hallucinations.  Cognition: Alert, oriented x3.  no deficit in memory or attention.  Insight: Fair.  Judgment: Fair.    Last Recorded Vitals  /83   Pulse 61   Temp 36.6 °C (97.9 °F)   Resp 16   SpO2 96%      Relevant Results  Scheduled medications  cloNIDine, 0.1 mg, oral, Nightly  DULoxetine, 60 mg, oral, BID  lidocaine, 3 patch, transdermal, Daily  nicotine, 1 patch, transdermal, Daily  QUEtiapine, 25 mg, oral, Nightly      Continuous medications     PRN medications  PRN medications: acetaminophen, alum-mag hydroxide-simeth, hydrOXYzine HCL, ibuprofen, OLANZapine, OLANZapine, traZODone    No results found for this or any  previous visit (from the past 24 hour(s)).     Assessment/Plan   Diagnosis:  MDD    Impression:     Labs and Chart: reviewed  Case discussed with treatment team members  Encouraged patient to attend group and other mileu activity  Collateral from family - pending  Discharge planing  Medication:       -Cymbalta 60 mg oral twice daily       -Seroquel 25 mg oral daily at bedtime    Medication Consent  Medication Consent: risks, benefits, side effects reviewed for all ordered meds and patient expressed understanding and consent obtained    UMBERTO Renteria-CNP

## 2024-02-22 NOTE — NURSING NOTE
"Pt requested and given PRN atarax 50mg po for anxiety rated 8 out of 10 with 10 being the worst. Per pt he is anxious because \"everything is up in the air right now.\"    "

## 2024-02-23 ENCOUNTER — APPOINTMENT (OUTPATIENT)
Dept: RADIOLOGY | Facility: HOSPITAL | Age: 64
End: 2024-02-23
Payer: COMMERCIAL

## 2024-02-23 ENCOUNTER — PHARMACY VISIT (OUTPATIENT)
Dept: PHARMACY | Facility: CLINIC | Age: 64
End: 2024-02-23
Payer: COMMERCIAL

## 2024-02-23 VITALS
DIASTOLIC BLOOD PRESSURE: 82 MMHG | RESPIRATION RATE: 16 BRPM | HEART RATE: 59 BPM | TEMPERATURE: 97.7 F | OXYGEN SATURATION: 98 % | SYSTOLIC BLOOD PRESSURE: 140 MMHG

## 2024-02-23 PROCEDURE — 20610 DRAIN/INJ JOINT/BURSA W/O US: CPT | Performed by: ORTHOPAEDIC SURGERY

## 2024-02-23 PROCEDURE — 97530 THERAPEUTIC ACTIVITIES: CPT | Mod: GO

## 2024-02-23 PROCEDURE — RXMED WILLOW AMBULATORY MEDICATION CHARGE

## 2024-02-23 PROCEDURE — 2500000005 HC RX 250 GENERAL PHARMACY W/O HCPCS: Performed by: REGISTERED NURSE

## 2024-02-23 PROCEDURE — 2500000001 HC RX 250 WO HCPCS SELF ADMINISTERED DRUGS (ALT 637 FOR MEDICARE OP): Performed by: PSYCHIATRY & NEUROLOGY

## 2024-02-23 PROCEDURE — 72100 X-RAY EXAM L-S SPINE 2/3 VWS: CPT

## 2024-02-23 PROCEDURE — 99239 HOSP IP/OBS DSCHRG MGMT >30: CPT | Performed by: PSYCHIATRY & NEUROLOGY

## 2024-02-23 PROCEDURE — 99222 1ST HOSP IP/OBS MODERATE 55: CPT | Performed by: ORTHOPAEDIC SURGERY

## 2024-02-23 PROCEDURE — 72100 X-RAY EXAM L-S SPINE 2/3 VWS: CPT | Performed by: RADIOLOGY

## 2024-02-23 RX ORDER — LIDOCAINE 560 MG/1
3 PATCH PERCUTANEOUS; TOPICAL; TRANSDERMAL DAILY
Qty: 21 PATCH | Refills: 0 | Status: ON HOLD | OUTPATIENT
Start: 2024-02-24 | End: 2024-08-17

## 2024-02-23 RX ORDER — LIDOCAINE IN NACL,ISO-OSMOT/PF 30 MG/3 ML
9 SYRINGE (ML) INJECTION ONCE
Status: DISCONTINUED | OUTPATIENT
Start: 2024-02-23 | End: 2024-02-23

## 2024-02-23 RX ORDER — LIDOCAINE HYDROCHLORIDE 10 MG/ML
10 INJECTION, SOLUTION INFILTRATION; PERINEURAL ONCE
Status: DISCONTINUED | OUTPATIENT
Start: 2024-02-23 | End: 2024-02-23 | Stop reason: HOSPADM

## 2024-02-23 RX ORDER — QUETIAPINE FUMARATE 25 MG/1
25 TABLET, FILM COATED ORAL NIGHTLY
Status: DISCONTINUED | OUTPATIENT
Start: 2024-02-23 | End: 2024-02-23 | Stop reason: HOSPADM

## 2024-02-23 RX ORDER — QUETIAPINE FUMARATE 25 MG/1
25 TABLET, FILM COATED ORAL NIGHTLY
Qty: 30 TABLET | Refills: 0 | Status: ON HOLD | OUTPATIENT
Start: 2024-02-23 | End: 2024-08-17

## 2024-02-23 RX ORDER — QUETIAPINE FUMARATE 25 MG/1
50 TABLET, FILM COATED ORAL NIGHTLY
Status: DISCONTINUED | OUTPATIENT
Start: 2024-02-23 | End: 2024-02-23

## 2024-02-23 RX ORDER — TRIAMCINOLONE ACETONIDE 40 MG/ML
40 INJECTION, SUSPENSION INTRA-ARTICULAR; INTRAMUSCULAR ONCE
Status: DISCONTINUED | OUTPATIENT
Start: 2024-02-23 | End: 2024-02-23 | Stop reason: HOSPADM

## 2024-02-23 RX ORDER — CLONIDINE HYDROCHLORIDE 0.1 MG/1
0.1 TABLET ORAL NIGHTLY
Qty: 30 TABLET | Refills: 0 | Status: SHIPPED | OUTPATIENT
Start: 2024-02-23 | End: 2025-08-08 | Stop reason: HOSPADM

## 2024-02-23 RX ORDER — DULOXETIN HYDROCHLORIDE 60 MG/1
60 CAPSULE, DELAYED RELEASE ORAL 2 TIMES DAILY
Qty: 60 CAPSULE | Refills: 0 | Status: ON HOLD | OUTPATIENT
Start: 2024-02-23 | End: 2024-08-17

## 2024-02-23 RX ADMIN — DULOXETINE HYDROCHLORIDE 60 MG: 30 CAPSULE, DELAYED RELEASE ORAL at 08:52

## 2024-02-23 RX ADMIN — LIDOCAINE 3 PATCH: 4 PATCH TOPICAL at 08:55

## 2024-02-23 ASSESSMENT — ENCOUNTER SYMPTOMS
GASTROINTESTINAL NEGATIVE: 1
ARTHRALGIAS: 1
SHORTNESS OF BREATH: 0
EYES NEGATIVE: 1
HEMATOLOGIC/LYMPHATIC NEGATIVE: 1
ENDOCRINE NEGATIVE: 1
NEUROLOGICAL NEGATIVE: 1
PSYCHIATRIC NEGATIVE: 1
ALLERGIC/IMMUNOLOGIC NEGATIVE: 1
RESPIRATORY NEGATIVE: 1
BACK PAIN: 1
COUGH: 0
CONSTITUTIONAL NEGATIVE: 1
CARDIOVASCULAR NEGATIVE: 1
JOINT SWELLING: 0

## 2024-02-23 ASSESSMENT — PAIN - FUNCTIONAL ASSESSMENT: PAIN_FUNCTIONAL_ASSESSMENT: 0-10

## 2024-02-23 ASSESSMENT — PAIN DESCRIPTION - ORIENTATION: ORIENTATION: RIGHT

## 2024-02-23 ASSESSMENT — PAIN SCALES - GENERAL: PAINLEVEL_OUTOF10: 8

## 2024-02-23 ASSESSMENT — PAIN DESCRIPTION - LOCATION: LOCATION: BACK

## 2024-02-23 NOTE — CONSULTS
Inpatient consult to Orthopaedic Surgery  Consult performed by: OCTAVIA Reilly  Consult ordered by: OCTAVIA Villagomez  Reason for consult: right shoulder and lumbar back pain          Consult Note  Patient: Joe Magaña  Unit/Bed: 552/552-A  YOB: 1960  MRN: [id number]  Acct: [id number]   Admitting Diagnosis: MDD (major depressive disorder), recurrent episode, moderate (CMS/Formerly [facility]) [F33.1]  Date:  2/20/2024  Hospital Day: 3    Complaint:  Right shoulder and lumbar back pain     History of Present Illness:  Joe Magaña is a 63 year old male patient per chart review with history of drug addiction, chronic pain disorder, depression, HTN, diverticulosis, gout, COPD who was admitted to the psychiatric floor for recurrent episode moderate depression. During hospitalization he reports to have had a fall one week prior landing on right side and is now experiencing right shoulder and lumbar back pain. He denies any numbness or tingling to upper or lower extremities. Denies any saddle anesthesia or loss of bowel or bladder.     PMHx:  Past Medical History:   Diagnosis Date    Addiction to drug (CMS/Formerly [facility])     Chronic pain disorder     Depression     Essential (primary) hypertension     Benign essential hypertension    Personal history of other diseases of the digestive system     History of diverticulosis    Personal history of other diseases of the musculoskeletal system and connective tissue     History of gout    Personal history of other diseases of the respiratory system     History of COPD    Psychiatric illness     Substance abuse (CMS/Formerly [facility])     Suicide attempt (CMS/Formerly [facility])        PSHx:  Past Surgical History:   Procedure Laterality Date    OTHER SURGICAL HISTORY  05/06/2020    Flexor tendon repair    OTHER SURGICAL HISTORY  05/06/2020    Oral surgery    OTHER SURGICAL HISTORY  05/06/2020    Shoulder surgery       Social Hx:  Social History     Socioeconomic History    Marital status: Single      Spouse name: None    Number of children: None    Years of education: None    Highest education level: None   Occupational History    None   Tobacco Use    Smoking status: Every Day     Packs/day: 1.50     Years: 15.00     Additional pack years: 0.00     Total pack years: 22.50     Types: Cigarettes    Smokeless tobacco: Never   Vaping Use    Vaping Use: Never used   Substance and Sexual Activity    Alcohol use: Not Currently    Drug use: Not Currently    Sexual activity: Defer   Other Topics Concern    None   Social History Narrative    None     Social Determinants of Health     Financial Resource Strain: High Risk (2/20/2024)    Overall Financial Resource Strain (CARDIA)     Difficulty of Paying Living Expenses: Very hard   Food Insecurity: Food Insecurity Present (2/20/2024)    Hunger Vital Sign     Worried About Running Out of Food in the Last Year: Often true     Ran Out of Food in the Last Year: Often true   Transportation Needs: Unmet Transportation Needs (2/20/2024)    PRAPARE - Transportation     Lack of Transportation (Medical): Yes     Lack of Transportation (Non-Medical): Yes   Physical Activity: Inactive (2/20/2024)    Exercise Vital Sign     Days of Exercise per Week: 0 days     Minutes of Exercise per Session: 0 min   Stress: Stress Concern Present (2/20/2024)    [employer] - Occupational Stress Questionnaire     Feeling of Stress : Rather much   Social Connections: Socially Isolated (2/20/2024)    Social Connection and Isolation Panel [NHANES]     Frequency of Communication with Friends and Family: Never     Frequency of Social Gatherings with Friends and Family: Never     Attends Gnosticist Services: Never     Active Member of Clubs or Organizations: No     Attends Club or Organization Meetings: Never     Marital Status: Never    Intimate Partner Violence: Not At Risk (2/20/2024)    Humiliation, Afraid, Rape, and Kick questionnaire     Fear of Current or  Ex-Partner: No     Emotionally Abused: No     Physically Abused: No     Sexually Abused: No   Housing Stability: High Risk (2/20/2024)    Housing Stability Vital Sign     Unable to Pay for Housing in the Last Year: Yes     Number of Places Lived in the Last Year: 1     Unstable Housing in the Last Year: No       Family Hx:  No family history on file.    Review of Systems:   Review of Systems   Constitutional: Negative.    HENT: Negative.     Eyes: Negative.    Respiratory: Negative.  Negative for cough and shortness of breath.    Cardiovascular: Negative.  Negative for chest pain.   Gastrointestinal: Negative.    Endocrine: Negative.    Genitourinary: Negative.    Musculoskeletal:  Positive for arthralgias and back pain. Negative for joint swelling.   Skin: Negative.    Allergic/Immunologic: Negative.    Neurological: Negative.    Hematological: Negative.    Psychiatric/Behavioral: Negative.           Physical Examination:    Visit Vitals  /82   Pulse 59   Temp 36.5 °C (97.7 °F)   Resp 16      Physical Exam  Vitals and nursing note reviewed.   Constitutional:       General: He is not in acute distress.  HENT:      Head: Normocephalic.      Nose: Nose normal.   Eyes:      Extraocular Movements: Extraocular movements intact.      Pupils: Pupils are equal, round, and reactive to light.   Cardiovascular:      Rate and Rhythm: Normal rate and regular rhythm.   Abdominal:      General: Bowel sounds are normal.      Palpations: Abdomen is soft.   Musculoskeletal:         General: Tenderness (posterior right shoulder) present.        Arms:       Cervical back: Normal range of motion.      Comments: Tenderness to right posterior shoulder to palpation. No ROM deficits. Good upper extremity strength and sensation.   No midline spinal process tenderness or step off appreciated.    Skin:     General: Skin is warm.      Capillary Refill: Capillary refill takes less than 2 seconds.   Neurological:      General: No focal  "deficit present.      Mental Status: He is alert.   Psychiatric:         Mood and Affect: Mood normal.         Behavior: Behavior normal.         LABS:  CBC: No results found for: \"WBC\", \"RBC\", \"HGB\", \"HCT\", \"MCV\", \"MCH\", \"MCHC\", \"RDW\", \"PLT\", \"MPV\"  CBC with Differential:  No results found for: \"WBC\", \"RBC\", \"HGB\", \"HCT\", \"PLT\", \"MCV\", \"MCH\", \"MCHC\", \"RDW\", \"NRBC\", \"SEGSPCT\", \"BANDSPCT\", \"BLASTSPCT\", \"METASPCT\", \"LYMPHOPCT\", \"PROMYELOPCT\", \"MONOPCT\", \"MYELOPCT\", \"EOSPCT\", \"BASOPCT\", \"MONOSABS\", \"LYMPHSABS\", \"EOSABS\", \"BASOSABS\", \"DIFFTYPE\"  CMP:    Lab Results   Component Value Date     02/21/2024    K 4.3 02/21/2024     02/21/2024    CO2 27 02/21/2024    BUN 20 02/21/2024    CREATININE 1.03 02/21/2024    GLUCOSE 97 02/21/2024    CALCIUM 8.5 (L) 02/21/2024     BMP:    Lab Results   Component Value Date     02/21/2024    K 4.3 02/21/2024     02/21/2024    CO2 27 02/21/2024    BUN 20 02/21/2024    CREATININE 1.03 02/21/2024    CALCIUM 8.5 (L) 02/21/2024    GLUCOSE 97 02/21/2024     Magnesium:No results found for: \"MG\"  Troponin:  No results found for: \"TROPONINI\"        Assessment:    63 year old male patient admitted to the psychiatric floor for recurrent moderate depression reported to have right shoulder and lumbar back pain after a fall one week ago. He has had surgery on this right shoulder in the past for recurrent shoulder dislocations noting that part of the humeral head was shaved down. He did not have shoulder replacement for this.     Upon assessment patient is seen ambulating without any difficulty. He was examined in the consultation room. He has no limitations on his ROM of right upper extremity. He does endorse pain to right posterior shoulder with palpation. Has good bilateral upper extremity strength and sensation.   There is no midline spinal process tenderness or step off appreciated.     Right shoulder imaging was reviewed and is negative for acute fracture or dislocation. " There is high riding humeral head that is consistent with with rotator cuff arthropathy. Discussed treatment options with patient to include steroid injection, therapy, NSAIDs. He would like to proceed with injection at this time.     Will obtain lumbar imaging to rule out acute compression fracture secondary to fall.   Plan is for steroid injection to right shoulder.      Addendum: X-ray lumbar spine shows no acute or chronic compression fractures.  Osteopenia and multilevel degenerative changes similar to prior imaging.  Patient should follow-up with pain management as an outpatient for his chronic back pain.    Orthopedics will sign off.  Please call for any questions or concerns.  Plan:  Lumbar spine imaging to rule out acute compression fracture   Steroid injection right shoulder   Continue pain control per chart review   Orthopedics to sign off after injection. Can follow up with Dr. Memo Sands as an outpatient as he has performed surgical intervention on shoulder in the past.             Electronically signed by OCTAVIA Reilly on 2/23/2024 at 1:03 PM        In a face to face encounter, I performed a history and physical examination, discussed pertinent diagnostic studies if indicated, and discussed diagnosis and management strategies with both the patient and the mid-level provider. I reviewed the mid-level's note and agree with the documented findings and plan of care.      Patient describes acute on chronic right shoulder pain and chronic low back pain.  X-rays demonstrate severe rotator cuff arthropathy to the right shoulder and multilevel degenerative disease to the lumbar spine.  He is generally tender about the midline lumbar spine and about the lumbar paraspinal musculature.  He has pain with both active and passive range of motion to the right shoulder and shows clinical evidence for deficient rotator cuff.  Treatment options were discussed.  We talked about operative and nonoperative  strategies.  Recommendations were made for subacromial injection to the right shoulder and for follow-up with Dr. Memo Sands for ongoing care of the right shoulder and the [facility] spine team for ongoing evaluation and care for his multilevel lumbar degenerative disease.  Patient is agreeable with this strategy.      Procedure: Using aseptic technique and after obtaining verbal consent 40 mg Kenalog and 9 cc 1% lidocaine without epinephrine was injected to the right shoulder subacromial space using a posterior approach.  The patient tolerated this well.  A Band-Aid was placed.  He was instructed on rest and ice to the shoulder.

## 2024-02-23 NOTE — PROGRESS NOTES
"Occupational Therapy     REHAB Therapy & Treatment    Patient Name: Joe Magaña  MRN: [id number]  Today's Date: 2/23/2024    Attendance:  Attendance  Activity: Discussion/reminisce (Short Circuiting Stress DVD/discussion)  Participation: Passive participation    Therapeutic Activity  Treatment Approach  Approach : 15 to 25 minutes, 1 to1 Therapy sessions  Patient Stated Goals: none  Cognition: Attention (Pt attentive focused for duration of session on another Pt's d/c plans. When redirected, Pt focused for ~5mns before returning to 1:1 chat w/another Pt, though his statements are linear, organized & Pt is alert & oriented.)  Social Skills: Interacts independently in social activity  Emotional: Mood (Pt affect animated & appropriate)  Treatment Approach Comments: Pt came to the OT session late, sat away from others & spoke w/another Pt frequently interrupting the educational DVD being viewed on “Short Circuiting Stress”. Pt left his handout on \"A+B=C\" equation & rational problem solving tips, \"I'm not sure I need that, \"per Pt.He did identify his own s/s of depression, but left w/o returing to further discuss using a balanced schedule to create a more productive day. Little evidence of learning noted today.      Encounter Problems       Encounter Problems (Resolved)       OT Goals       Pt will explore, identify, and appropriately utilize effective coping strategies to cope with daily stressors and manage emotions with independence prior to discharge.  (Adequate for Discharge)       Start:  02/21/24    Expected End:  03/20/24    Resolved:  02/23/24         Pt will demonstrate ability to appropriately modulate emotions and impulses with independence prior to discharge.  (Adequate for Discharge)       Start:  02/21/24    Expected End:  03/20/24    Resolved:  02/23/24         Pt will explore, identify, and appropriately utilize effective communication strategies to express feelings, wants, and needs with independence " prior to discharge.  (Adequate for Discharge)       Start:  02/21/24    Expected End:  03/20/24    Resolved:  02/23/24              Additional Comments: Pt is brighter in affect and indep chatting/socializing with another Pt for increased (+) socialization.

## 2024-02-23 NOTE — CARE PLAN
Problem: Ineffective Coping  Goal: LTG - Verbalizes alternatives to suicide  Outcome: Adequate for Discharge  Goal: STG - Verbalizes control of suicidal thoughts/behaviors  Outcome: Adequate for Discharge  Goal: Notifies staff when experiencing harmful thoughts toward self/others  Outcome: Adequate for Discharge  Goal: Verbalizes improved well being  Outcome: Adequate for Discharge  Goal: Verbalizes ways to manage anxiety  Outcome: Adequate for Discharge     Problem: Self Care Deficit  Goal: STG - Patient completes hygiene  Outcome: Adequate for Discharge  Goal: Increase group attendance  Outcome: Adequate for Discharge     Problem: Alteration in Sleep  Goal: STG - Reports nightly sleep, duration, and quality  Outcome: Adequate for Discharge  Goal: STG - Identifies sleep hygiene aids  Outcome: Adequate for Discharge  Goal: STG - Informs staff if unable to sleep  Outcome: Adequate for Discharge     Problem: Sensory Perceptual Alteration as Evidenced by  Goal: Patient/Family participate in treatment and discharge plans  Outcome: Adequate for Discharge  Goal: Participates in unit activities  Outcome: Adequate for Discharge  Goal: Discusses signs/symptoms of illness/treatment options  Outcome: Adequate for Discharge     Problem: Ineffective Coping  Goal: Identifies ineffective coping skills  Outcome: Adequate for Discharge  Goal: Identifies healthy coping skills  Outcome: Adequate for Discharge  Goal: Demonstrates healthy coping skills  Outcome: Adequate for Discharge  Goal: Participates in unit activities  Outcome: Adequate for Discharge     Problem: Alteration in Sleep  Goal: STG - Identifies sleep hygiene aids  Outcome: Adequate for Discharge  Goal: STG - Informs staff if unable to sleep  Outcome: Adequate for Discharge     Problem: Self Care Deficit  Goal: STG - Patient completes hygiene  Outcome: Adequate for Discharge     Problem: SW- Mental Health Issues   Goal: Increase awareness and possible linkage with  "community and/or mental health resources, as desired   Outcome: Adequate for Discharge     Problem: SW  - Decline in perceived support and in skill application   Goal: Attend and participate in unit activities, including therapeutic, recreational, and educational groups  Outcome: Adequate for Discharge     Problem: OT Goals  Goal: Pt will explore, identify, and appropriately utilize effective coping strategies to cope with daily stressors and manage emotions with independence prior to discharge.   Outcome: Adequate for Discharge  Goal: Pt will demonstrate ability to appropriately modulate emotions and impulses with independence prior to discharge.   Outcome: Adequate for Discharge  Goal: Pt will explore, identify, and appropriately utilize effective communication strategies to express feelings, wants, and needs with independence prior to discharge.   Outcome: Adequate for Discharge   The patient's goals for the shift include \"I'm ready to go home tomorrow.\"    The clinical goals for the shift include Pt. will sleep > 6 hours tonight.    .Discharge instructions reviewed verbally and in writing including f/u appointments and medications. Patient verbalizes understanding and signed as such. All belongings returned for discharge. Patient denies SI, HI, A/V hallucinations, mood is stable. Pt had gone xray performed and is pending Ortho to inject Kenalog to shoulder.  "

## 2024-02-23 NOTE — NURSING NOTE
Dr Rizo in to administer injection to right shoulder. Pt tolerated well, discharged as ordered. RN escorted to ED entrance to round trip arranged transportation, return to work paper provided to patient. Medications sent with patient from [facility].

## 2024-02-23 NOTE — CARE PLAN
"The patient's goals for the shift include \"I'm ready to go home tomorrow.\"    The clinical goals for the shift include Pt. will sleep > 6 hours tonight.    Pt reports he continues to feel moderately depressed and anxious.  Pt reports his suicidal thoughts are coming and going, but denies SI at this time.  Pt reports he is worried that when he's discharged he may have suicidal thoughts.  Discussed coping strategies to implement if thoughts present.  Pt denies HI and A/V hallucinations.  Pt is A & O x 4.  Pt affect is constricted.  Pt calm.  Pt compliant with medications.    Pt appeared to sleep throughout the night.    Problem: Ineffective Coping  Goal: LTG - Verbalizes alternatives to suicide  Outcome: Progressing  Goal: STG - Verbalizes control of suicidal thoughts/behaviors  Outcome: Progressing  Goal: Notifies staff when experiencing harmful thoughts toward self/others  Outcome: Progressing  Goal: Verbalizes improved well being  Outcome: Progressing  Goal: Verbalizes ways to manage anxiety  Outcome: Progressing     Problem: Self Care Deficit  Goal: STG - Patient completes hygiene  Outcome: Progressing     Problem: Alteration in Sleep  Goal: STG - Reports nightly sleep, duration, and quality  Outcome: Progressing  Goal: STG - Informs staff if unable to sleep  Outcome: Progressing     Problem: Sensory Perceptual Alteration as Evidenced by  Goal: Discusses signs/symptoms of illness/treatment options  Outcome: Progressing     Problem: Ineffective Coping  Goal: Identifies healthy coping skills  Outcome: Progressing  Goal: Demonstrates healthy coping skills  Outcome: Progressing     Problem: Anxiety  Goal: Implements measures to reduce anxiety  Outcome: Progressing     Problem: Self Care Deficit  Goal: STG - Patient completes hygiene  Outcome: Progressing     Problem: Self Care Deficit  Goal: Accepts need for medications  Outcome: Met     Problem: Altered Thought Processes as Evidenced by  Goal: STG - Desires " improvement in ability to think and concentrate  Outcome: Met     Problem: Sensory Perceptual Alteration as Evidenced by  Goal: Initiates reality-based interactions  Outcome: Met

## 2024-02-23 NOTE — DISCHARGE SUMMARY
"Discharge Diagnosis:  MDD (major depressive disorder), recurrent episode, moderate (CMS/HCC)    Hospital Course:  Patient is a 63-year-old male with a history of MDD and substance use who was admitted to [facility] for suicidal ideation. Due to acutely elevated and imminent risk for self-harm/harm to others, patient required a level of care equivalent to inpatient hospitalization for safety, evaluation, treatment and stabilization.     The patient was admitted to [facility] 5W under the care of Dr. Wakefield, restricted to the terrell and placed on suicide, behavior and elopement precautions.  At the beginning of hospitalization, patient presented to the ED with suicidal ideation. Patient initially presented stating that he was feeling \"woozy \"while at work and a coworker encouraged him to go to the hospital. Patient states that he works in the  at the [employer]. Patient later expressed suicidal ideation. Reported he is currently linked with the [facility], has a  through them. Was supposed to have an appointment today. Patient reported he has been dealing with worsening depression due to multiple stressors, patient states his father passed away 2 months ago. He currently has unstable housing, a friend that he was living with is getting evicted. Patient reports that he has not taken his psychiatric medications for 5 days as he ran out. Patient reports he is currently prescribed Cymbalta, mirtazapine, was previously prescribed Abilify. Patient previously treated on 5 W. in April 2023. At that time patient started on Cymbalta and Seroquel for insomnia. Patient reported family history of depression, no family history of completed suicide. Patient denies any past suicide attempts, denies that he had a plan with regard to his suicidal thoughts.  Reported that he has been occasionally using fentanyl off the street to treat his chronic back pain.    The treatment team made " the following interventions: medication, group/milieu therapy, individual therapy    Over the course of hospitalization, patient reported improvement and objective signs of improvement were noted by staff.  Patient reported improved mood.  Patient future oriented looking forward to returning to work and spending time with friends.  Patient seen by orthopedics who recommended steroid injection in right shoulder.  Prior to discharge patient exhibiting good insight onto the importance of abstaining from substance use, following up with outpatient psychiatric services.    Psychiatric Medications: Cymbalta 60 mg oral twice daily, Seroquel 25 mg oral daily at bedtime.  Patient tolerated medications without side effects.    Prior to the date of discharge, patient was able to contract for safety and stated they felt safe and appropriate for discharge.  The treatment team found the patient not to be an imminent danger to self or others.  The patient denied suicidal or homicidal ideation and did not endorse auditory and visual hallucinations.  The patient's condition at the time of discharge was stable and initial symptoms improved over the course of hospitalization.    The patient was discharged home under the supervision of family with a 30-day supply of  Cymbalta 60 mg oral twice daily, Seroquel 25 mg oral daily at bedtime    The patient was instructed to call the patient's outpatient provider in the event of worsening symptoms or medication side effects.  Should the patient be unable to maintain their personal safety or the safety of others, instructions were provided to dial 9-1-1 or go to the closest emergency room.    Discharge Mental Status Exam:  General:  Patient is awake, alert, and oriented to person, place, time, and situation.    Appearance:  Appears well-hydrated, well-nourished, and well-groomed and approximately stated age.   Attitude:  Patient was calm and cooperative throughout the interview, which is  appropriate to the context of the interview and the topics discussed.   Behavior:  Eye contact is appropriate with topics of discussion.   Motor Activity:  Motor activity is normal. No psychomotor disturbances or abnormal involuntary movements were noted, including psychomotor agitation, psychomotor retardation, involuntary movements, extrapyramidal symptoms, akathisia, or tardive dyskinesia. Gait is normal.   Speech:  Speech is spontaneous, coherent, fluent and of appropriate quantity, rate, volume, and tone and non-vulgar/vulgar.  Speech and mannerisms are consistent with topics of discussion.   Affect:  Euthymic with a full range, mood congruent and appropriate to content.   Thought Process:  Thought process was linear, organized, and goal-directed and devoid of loose associations, flight of ideas, thought blocking or tangents.   Thought Content:  Thought content was devoid of suicidal ideation or intent, homicidal ideation or intent, self-harm ideation or intent, delusions, illusions, obsessions, or paranoia.   Thought Perception:  Did not endorse auditory or visual hallucinations. Patient did not appear to be internally distracted or preoccupied.   Cognition:  Knowledge and intelligence are believed to be average.  Recent and remote memory, fund of knowledge, and abstract reasoning appear grossly intact, appropriate for age and education, and there are no impairments in attention, concentration, or language.   Insight:  Insight regarding psychiatric conditions is good.   Judgment:  Judgment is good.    Recent Labs:  No results found for this or any previous visit (from the past 24 hour(s)).     Risk Assessment at Discharge:  Violence Risk Assessment: major mental illness and male  Acute Risk of Harm to Others is Considered: low   Risk Mitigated by: Adherence to treatment, strong therapeutic alliance. Follow-up.    Suicide Risk Assessment: , chronic medical illness, chronic pain, current psychiatric  illness, male, and substance abuse  Protective Factors against Suicide: employment, hopefulness/future orientation, positive family relationships, and social support/connectedness  Acute Risk of Harm to Self is Considered: low  Risk Mitigated by: Adherence to treatment, strong therapeutic alliance. Follow-up.      Beto Leung, APRN-CNP

## 2024-02-23 NOTE — DISCHARGE INSTR - APPOINTMENTS
[facility] with Nicko Bolton  [address], [place] [address]  02/29/2024 at 11am   [phone]  Please bring Insurance Card, Photo ID and Hospital Discharge Paperwork.

## 2024-02-24 LAB
ATRIAL RATE: 81 BPM
P AXIS: 72 DEGREES
P OFFSET: 196 MS
P ONSET: 142 MS
PR INTERVAL: 152 MS
Q ONSET: 218 MS
QRS COUNT: 13 BEATS
QRS DURATION: 90 MS
QT INTERVAL: 400 MS
QTC CALCULATION(BAZETT): 464 MS
QTC FREDERICIA: 442 MS
R AXIS: 62 DEGREES
T AXIS: 55 DEGREES
T OFFSET: 418 MS
VENTRICULAR RATE: 81 BPM

## 2024-02-25 LAB
FENTANYL UR CFM-MCNC: >200 NG/ML
NORFENTANYL UR CFM-MCNC: >200 NG/ML

## 2024-03-13 ENCOUNTER — APPOINTMENT (OUTPATIENT)
Dept: RADIOLOGY | Facility: HOSPITAL | Age: 64
End: 2024-03-13
Payer: COMMERCIAL

## 2024-03-13 ENCOUNTER — HOSPITAL ENCOUNTER (EMERGENCY)
Facility: HOSPITAL | Age: 64
Discharge: HOME | End: 2024-03-13
Attending: STUDENT IN AN ORGANIZED HEALTH CARE EDUCATION/TRAINING PROGRAM
Payer: COMMERCIAL

## 2024-03-13 ENCOUNTER — APPOINTMENT (OUTPATIENT)
Dept: CARDIOLOGY | Facility: HOSPITAL | Age: 64
End: 2024-03-13
Payer: COMMERCIAL

## 2024-03-13 VITALS
DIASTOLIC BLOOD PRESSURE: 98 MMHG | HEART RATE: 61 BPM | BODY MASS INDEX: 21.2 KG/M2 | OXYGEN SATURATION: 94 % | TEMPERATURE: 97.7 F | WEIGHT: 160 LBS | SYSTOLIC BLOOD PRESSURE: 189 MMHG | RESPIRATION RATE: 20 BRPM | HEIGHT: 73 IN

## 2024-03-13 DIAGNOSIS — Z12.11 COLON CANCER SCREENING: ICD-10-CM

## 2024-03-13 DIAGNOSIS — R04.2 HEMOPTYSIS: Primary | ICD-10-CM

## 2024-03-13 LAB
ABO GROUP (TYPE) IN BLOOD: NORMAL
ABO GROUP (TYPE) IN BLOOD: NORMAL
ALBUMIN SERPL BCP-MCNC: 3.9 G/DL (ref 3.4–5)
ALP SERPL-CCNC: 77 U/L (ref 33–136)
ALT SERPL W P-5'-P-CCNC: 7 U/L (ref 10–52)
ANION GAP SERPL CALC-SCNC: 8 MMOL/L (ref 10–20)
ANTIBODY SCREEN: NORMAL
APTT PPP: 34 SECONDS (ref 27–38)
AST SERPL W P-5'-P-CCNC: 10 U/L (ref 9–39)
BASOPHILS # BLD AUTO: 0.03 X10*3/UL (ref 0–0.1)
BASOPHILS NFR BLD AUTO: 0.4 %
BILIRUB SERPL-MCNC: 0.5 MG/DL (ref 0–1.2)
BNP SERPL-MCNC: 23 PG/ML (ref 0–99)
BUN SERPL-MCNC: 13 MG/DL (ref 6–23)
CALCIUM SERPL-MCNC: 8.9 MG/DL (ref 8.6–10.3)
CARDIAC TROPONIN I PNL SERPL HS: 4 NG/L (ref 0–20)
CARDIAC TROPONIN I PNL SERPL HS: 4 NG/L (ref 0–20)
CHLORIDE SERPL-SCNC: 104 MMOL/L (ref 98–107)
CO2 SERPL-SCNC: 30 MMOL/L (ref 21–32)
CREAT SERPL-MCNC: 0.95 MG/DL (ref 0.5–1.3)
EGFRCR SERPLBLD CKD-EPI 2021: >90 ML/MIN/1.73M*2
EOSINOPHIL # BLD AUTO: 0.08 X10*3/UL (ref 0–0.7)
EOSINOPHIL NFR BLD AUTO: 1 %
ERYTHROCYTE [DISTWIDTH] IN BLOOD BY AUTOMATED COUNT: 14.5 % (ref 11.5–14.5)
FLUAV RNA RESP QL NAA+PROBE: NOT DETECTED
FLUBV RNA RESP QL NAA+PROBE: NOT DETECTED
GLUCOSE SERPL-MCNC: 82 MG/DL (ref 74–99)
HCT VFR BLD AUTO: 40.6 % (ref 41–52)
HGB BLD-MCNC: 13.3 G/DL (ref 13.5–17.5)
IMM GRANULOCYTES # BLD AUTO: 0.02 X10*3/UL (ref 0–0.7)
IMM GRANULOCYTES NFR BLD AUTO: 0.3 % (ref 0–0.9)
INR PPP: 1 (ref 0.9–1.1)
LYMPHOCYTES # BLD AUTO: 1.1 X10*3/UL (ref 1.2–4.8)
LYMPHOCYTES NFR BLD AUTO: 14.3 %
MAGNESIUM SERPL-MCNC: 2.15 MG/DL (ref 1.6–2.4)
MCH RBC QN AUTO: 29.8 PG (ref 26–34)
MCHC RBC AUTO-ENTMCNC: 32.8 G/DL (ref 32–36)
MCV RBC AUTO: 91 FL (ref 80–100)
MONOCYTES # BLD AUTO: 0.46 X10*3/UL (ref 0.1–1)
MONOCYTES NFR BLD AUTO: 6 %
NEUTROPHILS # BLD AUTO: 6.01 X10*3/UL (ref 1.2–7.7)
NEUTROPHILS NFR BLD AUTO: 78 %
NRBC BLD-RTO: 0 /100 WBCS (ref 0–0)
PLATELET # BLD AUTO: 215 X10*3/UL (ref 150–450)
POTASSIUM SERPL-SCNC: 3.9 MMOL/L (ref 3.5–5.3)
PROT SERPL-MCNC: 6.9 G/DL (ref 6.4–8.2)
PROTHROMBIN TIME: 10.7 SECONDS (ref 9.8–12.8)
RBC # BLD AUTO: 4.46 X10*6/UL (ref 4.5–5.9)
RH FACTOR (ANTIGEN D): NORMAL
RH FACTOR (ANTIGEN D): NORMAL
SARS-COV-2 RNA RESP QL NAA+PROBE: NOT DETECTED
SODIUM SERPL-SCNC: 138 MMOL/L (ref 136–145)
WBC # BLD AUTO: 7.7 X10*3/UL (ref 4.4–11.3)

## 2024-03-13 PROCEDURE — 84484 ASSAY OF TROPONIN QUANT: CPT

## 2024-03-13 PROCEDURE — 2550000001 HC RX 255 CONTRASTS: Performed by: STUDENT IN AN ORGANIZED HEALTH CARE EDUCATION/TRAINING PROGRAM

## 2024-03-13 PROCEDURE — 96360 HYDRATION IV INFUSION INIT: CPT | Mod: 59

## 2024-03-13 PROCEDURE — 36415 COLL VENOUS BLD VENIPUNCTURE: CPT

## 2024-03-13 PROCEDURE — 71275 CT ANGIOGRAPHY CHEST: CPT

## 2024-03-13 PROCEDURE — 2500000004 HC RX 250 GENERAL PHARMACY W/ HCPCS (ALT 636 FOR OP/ED)

## 2024-03-13 PROCEDURE — 99285 EMERGENCY DEPT VISIT HI MDM: CPT | Mod: 25

## 2024-03-13 PROCEDURE — 36415 COLL VENOUS BLD VENIPUNCTURE: CPT | Performed by: STUDENT IN AN ORGANIZED HEALTH CARE EDUCATION/TRAINING PROGRAM

## 2024-03-13 PROCEDURE — 80053 COMPREHEN METABOLIC PANEL: CPT

## 2024-03-13 PROCEDURE — 86901 BLOOD TYPING SEROLOGIC RH(D): CPT

## 2024-03-13 PROCEDURE — 83880 ASSAY OF NATRIURETIC PEPTIDE: CPT

## 2024-03-13 PROCEDURE — 87636 SARSCOV2 & INF A&B AMP PRB: CPT

## 2024-03-13 PROCEDURE — 93005 ELECTROCARDIOGRAM TRACING: CPT

## 2024-03-13 PROCEDURE — 85610 PROTHROMBIN TIME: CPT

## 2024-03-13 PROCEDURE — 85025 COMPLETE CBC W/AUTO DIFF WBC: CPT

## 2024-03-13 PROCEDURE — 83735 ASSAY OF MAGNESIUM: CPT

## 2024-03-13 RX ORDER — PREDNISONE 20 MG/1
20 TABLET ORAL DAILY
Qty: 5 TABLET | Refills: 0 | Status: SHIPPED | OUTPATIENT
Start: 2024-03-13 | End: 2024-03-18

## 2024-03-13 RX ORDER — DOXYCYCLINE 100 MG/1
100 CAPSULE ORAL 2 TIMES DAILY
Qty: 14 CAPSULE | Refills: 0 | Status: SHIPPED | OUTPATIENT
Start: 2024-03-13 | End: 2024-03-20

## 2024-03-13 RX ORDER — AMOXICILLIN AND CLAVULANATE POTASSIUM 875; 125 MG/1; MG/1
1 TABLET, FILM COATED ORAL EVERY 12 HOURS
Qty: 14 TABLET | Refills: 0 | Status: SHIPPED | OUTPATIENT
Start: 2024-03-13 | End: 2024-03-20

## 2024-03-13 RX ORDER — POLYETHYLENE GLYCOL 3350, SODIUM CHLORIDE, SODIUM BICARBONATE, POTASSIUM CHLORIDE 420; 11.2; 5.72; 1.48 G/4L; G/4L; G/4L; G/4L
4000 POWDER, FOR SOLUTION ORAL ONCE
Qty: 4000 ML | Refills: 0 | Status: SHIPPED | OUTPATIENT
Start: 2024-03-13 | End: 2024-03-13

## 2024-03-13 RX ADMIN — SODIUM CHLORIDE 1000 ML: 9 INJECTION, SOLUTION INTRAVENOUS at 16:40

## 2024-03-13 RX ADMIN — IOHEXOL 75 ML: 350 INJECTION, SOLUTION INTRAVENOUS at 19:24

## 2024-03-13 ASSESSMENT — PAIN SCALES - GENERAL: PAINLEVEL_OUTOF10: 7

## 2024-03-13 ASSESSMENT — LIFESTYLE VARIABLES
EVER FELT BAD OR GUILTY ABOUT YOUR DRINKING: NO
EVER HAD A DRINK FIRST THING IN THE MORNING TO STEADY YOUR NERVES TO GET RID OF A HANGOVER: NO
HAVE PEOPLE ANNOYED YOU BY CRITICIZING YOUR DRINKING: NO
HAVE YOU EVER FELT YOU SHOULD CUT DOWN ON YOUR DRINKING: NO

## 2024-03-13 ASSESSMENT — COLUMBIA-SUICIDE SEVERITY RATING SCALE - C-SSRS
1. IN THE PAST MONTH, HAVE YOU WISHED YOU WERE DEAD OR WISHED YOU COULD GO TO SLEEP AND NOT WAKE UP?: NO
2. HAVE YOU ACTUALLY HAD ANY THOUGHTS OF KILLING YOURSELF?: NO
6. HAVE YOU EVER DONE ANYTHING, STARTED TO DO ANYTHING, OR PREPARED TO DO ANYTHING TO END YOUR LIFE?: NO

## 2024-03-13 ASSESSMENT — PAIN - FUNCTIONAL ASSESSMENT: PAIN_FUNCTIONAL_ASSESSMENT: 0-10

## 2024-03-13 NOTE — ED PROVIDER NOTES
HPI   Chief Complaint   Patient presents with    Coughing Up Blood    Shortness of Breath       History provided by: Patient    Limitations to history: None    CC: Hemoptysis    HPI: 63-year-old male presents emergency department to be evaluated for hemoptysis.  He says that his symptoms started yesterday with palpitations.  Says the palpitations come and go nothing particular makes it better or worse.  Denies having any chest pain.  Patient states then he coughed and coughed up bright red blood.  Denies blood clots, states that it is thin.  Denies having several episodes of coughing before hand.  Denies any changes in his voice or difficulty swallowing.  He denies history of easy bleeding or bruising, denies use of aspirin, Plavix, anticoagulations.  States that he intermittently feels short of breath.  Denies any pleuritic pain.  Denies history of CAD, has not ever required stent placement or bypass but he does not believe he is ever had a stress test.  Denies history of heart failure and denies pain or swelling in the extremities.  Denies history of PEs or DVTs and denies recent plane flights or surgeries or history of cancer.  He does smoke avidly but denies known history of COPD with use of breathing treatments.  Denies blood in the urine or stool.  Denies hematemesis.  Denies weakness and fatigue.  Denies having pain anywhere.  Denies all other systemic symptoms.    ROS: Negative unless mentioned in HPI    Social Hx: Smoker.  Denies alcohol use.  Patient has a history of substance abuse.    Medical Hx: Medical history significant for hypertension and chronic back pain.  Denies allergies.  Immunizations up-to-date.    Physical exam:    Constitutional: Patient is well-nourished and well-developed.  Sitting comfortably in the room and in no distress.  Oriented to person, place, time, and situation.    HEENT: Head is normocephalic, atraumatic. Patient's airway is patent.  Tympanic membranes are clear bilaterally.   Nasal mucosa clear.  Mouth with normal mucosa.  Throat is not erythematous and there are no oropharyngeal exudates, uvula is midline.  No obvious facial deformities.    Eyes: Clear bilaterally.  Pupils are equal round and reactive to light and accommodation.  Extraocular movements intact.      Cardiac: Regular rate, regular rhythm.  Heart sounds S1, S2.  No murmurs, rubs, or gallops.  PMI nondisplaced.  No JVD.    Respiratory: 98% on room air.  Regular respiratory rate and effort.  Breath sounds are clear and equal bilaterally, no adventitious lung sounds.  Patient is speaking in full sentences and is in no apparent respiratory distress. No use of accessory muscles.      Gastrointestinal: Abdomen is soft, nondistended, and nontender.  There are no obvious deformities.  No rebound tenderness or guarding.  Bowel sounds are normal active.    Genitourinary: No CVA or flank tenderness.    Musculoskeletal: No reproducible tenderness.  No obvious skin or bony deformities.  Patient has equal range of motion in all extremities and no strength deficiencies.  No muscle or joint tenderness. No back or neck tenderness.  Capillary refill less than 3 seconds.  Strong peripheral pulses.  No sensory deficits.    Neurological: Patient is alert and oriented.  No focal deficits.  5/5 strength in all extremities.  Cranial nerves II through XII intact. GCS15.     Skin: Skin is normal color for race and is warm, dry, and intact.  No evidence of trauma.  No lesions, rashes, bruising, jaundice, or masses.    Psych: Appropriate mood and affect.  No apparent risk to self or others.    Heme/lymph: No adenopathy, lymphadenopathy, or splenomegaly    Physical exam is otherwise negative unless stated above or in history of present illness.                          No data recorded                   Patient History   Past Medical History:   Diagnosis Date    Addiction to drug (CMS/[facility])     Chronic pain disorder     Depression     Essential  (primary) hypertension     Benign essential hypertension    Personal history of other diseases of the digestive system     History of diverticulosis    Personal history of other diseases of the musculoskeletal system and connective tissue     History of gout    Personal history of other diseases of the respiratory system     History of COPD    Psychiatric illness     Substance abuse (CMS/[facility])     Suicide attempt (CMS/[facility])      Past Surgical History:   Procedure Laterality Date    OTHER SURGICAL HISTORY  05/06/2020    Flexor tendon repair    OTHER SURGICAL HISTORY  05/06/2020    Oral surgery    OTHER SURGICAL HISTORY  05/06/2020    Shoulder surgery     No family history on file.  Social History     Tobacco Use    Smoking status: Every Day     Packs/day: 1.50     Years: 15.00     Additional pack years: 0.00     Total pack years: 22.50     Types: Cigarettes    Smokeless tobacco: Never   Vaping Use    Vaping Use: Never used   Substance Use Topics    Alcohol use: Not Currently    Drug use: Not Currently       Physical Exam   ED Triage Vitals [03/13/24 1554]   Temperature Heart Rate Respirations BP   36.5 °C (97.7 °F) 76 20 (!) 187/99      Pulse Ox Temp src Heart Rate Source Patient Position   98 % -- -- --      BP Location FiO2 (%)     -- --       Physical Exam    ED Course & MDM   Diagnoses as of 03/13/24 2021   Hemoptysis     Patient updated on plan for lab testing, IV insertion, radiology imaging, and medications to be administered while in the ER (if indicated). Patient updated on expected wait times for testing and results. Patient provided my name and told to ask any staff member for questions or concerns if they should arise. Electronic medical record reviewed.     MDM    Upon initial assessment, patient was healthy non-toxic appearing and in no apparent distress.     Patient presented to the emergency department with the chief complaint intermittent shortness of breath and hemoptysis.  Breath sounds are clear and  equal bilaterally, 98% on room air.  No muffled heart sounds, JVD, murmur.  No peripheral edema or erythema.  On arrival to the emergency department, vital signs were within normal limits    Patient was given a liter IV normal saline.  Will get coagulation screen, type and screen, EKG and troponin, BNP, CTA to rule out a PE.  Low suspicion for esophageal rupture given that the patient was not having any coughing or retching before hand and has no neck pain, crepitus, or change in his voice.  Low suspicion for a tracheal or esophageal mass given that the patient has no difficulty swallowing.      Patient's EKG was performed at 1632 interpreted by me.  Normal sinus rhythm, 75 beats minute.  Normal axis.  No ST elevation or depression.  No prolonged QT.  Patient's original repeat troponin within normal limits.  BNP and magnesium are within normal limits.  Coagulation screen shows no acute abnormalities.  CMP shows no acute abnormalities.  CBC shows a normocytic anemia similar to baseline. Swabs are negative.   CT shows no PE however does have a mass in the right upper lobe that suspicious for malignancy as well as potential postobstructive pneumonia.  Patient also has an incidental left adrenal stable mass.  I did discuss my concern for malignancy with the patient especially given his smoking history.  He feels better and would like to go home.  I will discharge him with some steroids for the cough and will start him on Augmentin and doxycycline in case he is developing postobstructive pneumonia.  Will have him follow-up with pulmonology where he can get a biopsy for further evaluation.  All questions and concerns addressed.  Reasons to return to ER discussed.  Patient verbalized understanding and agreement with the treatment plan and they remained hemodynamically stable in the ER.    This note was dictated using a speech recognition program.  While an attempt was made at proof-reading to minimize errors, minor errors  in transcription may be present    Medical Decision Making      Procedure  Procedures     Juvenal Brice PA-C  03/13/24 2023

## 2024-03-14 LAB
ATRIAL RATE: 75 BPM
P AXIS: 12 DEGREES
Q ONSET: 220 MS
QRS COUNT: 12 BEATS
QRS DURATION: 82 MS
QT INTERVAL: 396 MS
QTC CALCULATION(BAZETT): 442 MS
QTC FREDERICIA: 426 MS
R AXIS: 57 DEGREES
T AXIS: 51 DEGREES
T OFFSET: 418 MS
VENTRICULAR RATE: 75 BPM

## 2024-03-29 DIAGNOSIS — Z12.11 COLON CANCER SCREENING: ICD-10-CM

## 2024-03-29 RX ORDER — POLYETHYLENE GLYCOL 3350, SODIUM CHLORIDE, SODIUM BICARBONATE, POTASSIUM CHLORIDE 420; 11.2; 5.72; 1.48 G/4L; G/4L; G/4L; G/4L
4000 POWDER, FOR SOLUTION ORAL ONCE
Qty: 4000 ML | Refills: 0 | Status: SHIPPED | OUTPATIENT
Start: 2024-03-29 | End: 2024-03-29

## 2024-04-15 ENCOUNTER — TELEPHONE (OUTPATIENT)
Dept: GASTROENTEROLOGY | Facility: CLINIC | Age: 64
End: 2024-04-15
Payer: MEDICARE

## 2024-04-15 NOTE — TELEPHONE ENCOUNTER
This patient was referred by [facility] for a colon consult. Central Scheduling turned the referral into a colonoscopy order and scheduled the patient. Looks like he has some health issues. Do you want him to come in for a consult first, or is it ok to just have him proceed with the colon?

## 2024-04-15 NOTE — TELEPHONE ENCOUNTER
Left message for patient to call back to schedule visit for consult. Patient was scheduled for colonoscopy in error. Informed patient he would need to be seen in office first.

## 2024-04-25 ENCOUNTER — LAB (OUTPATIENT)
Dept: LAB | Facility: LAB | Age: 64
End: 2024-04-25
Payer: COMMERCIAL

## 2024-04-25 DIAGNOSIS — R91.8 LUNG MASS: ICD-10-CM

## 2024-04-25 LAB
ERYTHROCYTE [DISTWIDTH] IN BLOOD BY AUTOMATED COUNT: 15 % (ref 11.5–14.5)
HCT VFR BLD AUTO: 40.6 % (ref 41–52)
HGB BLD-MCNC: 13 G/DL (ref 13.5–17.5)
INR PPP: 1 (ref 0.9–1.1)
MCH RBC QN AUTO: 28.9 PG (ref 26–34)
MCHC RBC AUTO-ENTMCNC: 32 G/DL (ref 32–36)
MCV RBC AUTO: 90 FL (ref 80–100)
NRBC BLD-RTO: 0 /100 WBCS (ref 0–0)
PLATELET # BLD AUTO: 287 X10*3/UL (ref 150–450)
PROTHROMBIN TIME: 11.3 SECONDS (ref 9.8–12.8)
RBC # BLD AUTO: 4.5 X10*6/UL (ref 4.5–5.9)
WBC # BLD AUTO: 6.7 X10*3/UL (ref 4.4–11.3)

## 2024-04-25 PROCEDURE — 85027 COMPLETE CBC AUTOMATED: CPT

## 2024-04-25 PROCEDURE — 36415 COLL VENOUS BLD VENIPUNCTURE: CPT

## 2024-04-25 PROCEDURE — 85610 PROTHROMBIN TIME: CPT

## 2024-04-29 ENCOUNTER — HOSPITAL ENCOUNTER (OUTPATIENT)
Dept: RADIOLOGY | Facility: HOSPITAL | Age: 64
Discharge: HOME | End: 2024-04-29
Payer: COMMERCIAL

## 2024-04-29 VITALS
HEIGHT: 73 IN | RESPIRATION RATE: 15 BRPM | HEART RATE: 70 BPM | OXYGEN SATURATION: 96 % | DIASTOLIC BLOOD PRESSURE: 89 MMHG | TEMPERATURE: 96.8 F | BODY MASS INDEX: 21.07 KG/M2 | WEIGHT: 158.95 LBS | SYSTOLIC BLOOD PRESSURE: 154 MMHG

## 2024-04-29 VITALS
RESPIRATION RATE: 16 BRPM | HEART RATE: 63 BPM | TEMPERATURE: 97 F | DIASTOLIC BLOOD PRESSURE: 80 MMHG | SYSTOLIC BLOOD PRESSURE: 147 MMHG | OXYGEN SATURATION: 96 %

## 2024-04-29 DIAGNOSIS — R91.8 ABNORMAL FINDINGS ON DIAGNOSTIC IMAGING OF LUNG: ICD-10-CM

## 2024-04-29 PROCEDURE — 32408 CORE NDL BX LNG/MED PERQ: CPT

## 2024-04-29 PROCEDURE — 88313 SPECIAL STAINS GROUP 2: CPT | Performed by: STUDENT IN AN ORGANIZED HEALTH CARE EDUCATION/TRAINING PROGRAM

## 2024-04-29 PROCEDURE — 88342 IMHCHEM/IMCYTCHM 1ST ANTB: CPT | Performed by: STUDENT IN AN ORGANIZED HEALTH CARE EDUCATION/TRAINING PROGRAM

## 2024-04-29 PROCEDURE — 32408 CORE NDL BX LNG/MED PERQ: CPT | Performed by: RADIOLOGY

## 2024-04-29 PROCEDURE — 88312 SPECIAL STAINS GROUP 1: CPT | Performed by: STUDENT IN AN ORGANIZED HEALTH CARE EDUCATION/TRAINING PROGRAM

## 2024-04-29 PROCEDURE — 71045 X-RAY EXAM CHEST 1 VIEW: CPT

## 2024-04-29 PROCEDURE — 2500000005 HC RX 250 GENERAL PHARMACY W/O HCPCS: Performed by: RADIOLOGY

## 2024-04-29 PROCEDURE — 88305 TISSUE EXAM BY PATHOLOGIST: CPT | Performed by: STUDENT IN AN ORGANIZED HEALTH CARE EDUCATION/TRAINING PROGRAM

## 2024-04-29 PROCEDURE — 0761T DGTZ GLS MCRSCP SL IMM EA 1: CPT | Mod: TC,ELYLAB | Performed by: INTERNAL MEDICINE

## 2024-04-29 PROCEDURE — 2720000007 HC OR 272 NO HCPCS

## 2024-04-29 PROCEDURE — 88341 IMHCHEM/IMCYTCHM EA ADD ANTB: CPT | Performed by: STUDENT IN AN ORGANIZED HEALTH CARE EDUCATION/TRAINING PROGRAM

## 2024-04-29 PROCEDURE — 71045 X-RAY EXAM CHEST 1 VIEW: CPT | Mod: 59

## 2024-04-29 PROCEDURE — 71045 X-RAY EXAM CHEST 1 VIEW: CPT | Performed by: RADIOLOGY

## 2024-04-29 RX ORDER — HYDROXYZINE PAMOATE 50 MG/1
50 CAPSULE ORAL ONCE
COMMUNITY

## 2024-04-29 RX ORDER — LIDOCAINE HYDROCHLORIDE 20 MG/ML
INJECTION, SOLUTION EPIDURAL; INFILTRATION; INTRACAUDAL; PERINEURAL
Status: COMPLETED | OUTPATIENT
Start: 2024-04-29 | End: 2024-04-29

## 2024-04-29 RX ORDER — GABAPENTIN 300 MG/1
300 CAPSULE ORAL
COMMUNITY
Start: 2024-03-28

## 2024-04-29 RX ADMIN — LIDOCAINE HYDROCHLORIDE 10 ML: 20 INJECTION, SOLUTION EPIDURAL; INFILTRATION; INTRACAUDAL; PERINEURAL at 10:50

## 2024-04-29 ASSESSMENT — PAIN SCALES - GENERAL
PAINLEVEL_OUTOF10: 6
PAINLEVEL_OUTOF10: 9
PAINLEVEL_OUTOF10: 6
PAINLEVEL_OUTOF10: 9
PAINLEVEL_OUTOF10: 6
PAINLEVEL_OUTOF10: 9
PAINLEVEL_OUTOF10: 6
PAINLEVEL_OUTOF10: 9
PAINLEVEL_OUTOF10: 6
PAINLEVEL_OUTOF10: 6
PAINLEVEL_OUTOF10: 9

## 2024-04-29 ASSESSMENT — PAIN - FUNCTIONAL ASSESSMENT
PAIN_FUNCTIONAL_ASSESSMENT: 0-10

## 2024-04-29 ASSESSMENT — PAIN DESCRIPTION - DESCRIPTORS
DESCRIPTORS: ACHING

## 2024-04-29 ASSESSMENT — COLUMBIA-SUICIDE SEVERITY RATING SCALE - C-SSRS
1. IN THE PAST MONTH, HAVE YOU WISHED YOU WERE DEAD OR WISHED YOU COULD GO TO SLEEP AND NOT WAKE UP?: NO
2. HAVE YOU ACTUALLY HAD ANY THOUGHTS OF KILLING YOURSELF?: NO

## 2024-04-29 NOTE — POST-PROCEDURE NOTE
Per Kasia VERDUGO in Specials.  OK for pt to be discharged according to order at 1530, Dr. Schoenberger had reviewed 2nd Xray.  Transportation arranged for pt by hospital. Pt instructed in discharge instructions not to do any heavy lifting for 1 week and to limit his activity for 24 hours.

## 2024-04-29 NOTE — Clinical Note
Pt received to CT dept for right lung biopsy.  Procedure discussed with pt.  Pt verbalizes understanding.  Pt has 1 belonging bag under cart, dentures upper/lower, and cell phone.

## 2024-04-29 NOTE — Clinical Note
Sample # 2 obtained.  Sample placed on 0.9% NS moistened telfa.  Pt tolerating fairly.  Biopsy needle removed.

## 2024-04-29 NOTE — Clinical Note
The site was marked. Prepped with: ChloraPrep. The patient was draped. Dr. Schoenberger prepped and draped Right chest.

## 2024-04-29 NOTE — PRE-PROCEDURE NOTE
Interventional Radiology Preprocedure Note    Indication for procedure: The encounter diagnosis was Abnormal findings on diagnostic imaging of lung. Right Lung Mass    Relevant review of systems: NA    Relevant Labs:   Lab Results   Component Value Date    CREATININE 0.95 03/13/2024    EGFR >90 03/13/2024    INR 1.0 04/25/2024    PROTIME 11.3 04/25/2024       Planned Sedation/Anesthesia: None    Airway assessment: normal    Directed physical examination:    Alert and Oriented    Mallampati:  NA    ASA Score: ASA 2 - Patient with mild systemic disease with no functional limitations    Benefits, risks and alternatives of procedure and planned sedation have been discussed with the patient and/or their representative. All questions answered and they agree to proceed.

## 2024-05-08 LAB
LAB AP ASR DISCLAIMER: NORMAL
LABORATORY COMMENT REPORT: NORMAL
PATH REPORT.FINAL DX SPEC: NORMAL
PATH REPORT.GROSS SPEC: NORMAL
PATH REPORT.RELEVANT HX SPEC: NORMAL
PATH REPORT.TOTAL CANCER: NORMAL

## 2024-05-13 PROBLEM — I10 HTN (HYPERTENSION): Status: ACTIVE | Noted: 2024-05-13

## 2024-05-13 PROBLEM — J44.9 COPD (CHRONIC OBSTRUCTIVE PULMONARY DISEASE) (MULTI): Status: ACTIVE | Noted: 2024-05-13

## 2024-05-13 PROBLEM — E78.5 HLD (HYPERLIPIDEMIA): Status: ACTIVE | Noted: 2024-05-13

## 2024-05-13 PROBLEM — M10.9 GOUT: Status: ACTIVE | Noted: 2024-05-13

## 2024-05-13 PROBLEM — K57.92 DIVERTICULITIS: Status: ACTIVE | Noted: 2024-05-13

## 2024-05-13 PROBLEM — T14.91XA SUICIDE ATTEMPT (MULTI): Status: ACTIVE | Noted: 2024-05-13

## 2024-05-13 PROBLEM — F19.10 POLYSUBSTANCE ABUSE (MULTI): Status: ACTIVE | Noted: 2024-05-13

## 2024-05-21 ENCOUNTER — APPOINTMENT (OUTPATIENT)
Dept: RADIOLOGY | Facility: CLINIC | Age: 64
End: 2024-05-21
Payer: COMMERCIAL

## 2024-05-25 ENCOUNTER — APPOINTMENT (OUTPATIENT)
Dept: RADIOLOGY | Facility: HOSPITAL | Age: 64
End: 2024-05-25
Payer: MEDICARE

## 2024-05-25 ENCOUNTER — HOSPITAL ENCOUNTER (EMERGENCY)
Facility: HOSPITAL | Age: 64
Discharge: HOME | End: 2024-05-25
Attending: STUDENT IN AN ORGANIZED HEALTH CARE EDUCATION/TRAINING PROGRAM
Payer: MEDICARE

## 2024-05-25 VITALS
SYSTOLIC BLOOD PRESSURE: 167 MMHG | HEIGHT: 73 IN | DIASTOLIC BLOOD PRESSURE: 92 MMHG | BODY MASS INDEX: 21.2 KG/M2 | HEART RATE: 88 BPM | OXYGEN SATURATION: 94 % | RESPIRATION RATE: 18 BRPM | WEIGHT: 160 LBS | TEMPERATURE: 97.9 F

## 2024-05-25 DIAGNOSIS — M79.18 MUSCULOSKELETAL PAIN: ICD-10-CM
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
## 2024-05-25 DIAGNOSIS — M25.562 ACUTE PAIN OF LEFT KNEE: ICD-10-CM

## 2024-05-25 PROCEDURE — 72125 CT NECK SPINE W/O DYE: CPT

## 2024-05-25 PROCEDURE — 73502 X-RAY EXAM HIP UNI 2-3 VIEWS: CPT | Mod: RIGHT SIDE | Performed by: RADIOLOGY

## 2024-05-25 PROCEDURE — 99285 EMERGENCY DEPT VISIT HI MDM: CPT | Mod: 25

## 2024-05-25 PROCEDURE — 73564 X-RAY EXAM KNEE 4 OR MORE: CPT | Mod: LT

## 2024-05-25 PROCEDURE — 70450 CT HEAD/BRAIN W/O DYE: CPT

## 2024-05-25 PROCEDURE — 70450 CT HEAD/BRAIN W/O DYE: CPT | Performed by: SURGERY

## 2024-05-25 PROCEDURE — 72125 CT NECK SPINE W/O DYE: CPT | Performed by: SURGERY

## 2024-05-25 PROCEDURE — 2500000001 HC RX 250 WO HCPCS SELF ADMINISTERED DRUGS (ALT 637 FOR MEDICARE OP): Performed by: STUDENT IN AN ORGANIZED HEALTH CARE EDUCATION/TRAINING PROGRAM

## 2024-05-25 PROCEDURE — 73502 X-RAY EXAM HIP UNI 2-3 VIEWS: CPT | Mod: RT

## 2024-05-25 RX ORDER — OXYCODONE HYDROCHLORIDE 5 MG/1
5 TABLET ORAL ONCE
Status: COMPLETED | OUTPATIENT
Start: 2024-05-25 | End: 2024-05-25

## 2024-05-25 RX ORDER — ACETAMINOPHEN 325 MG/1
650 TABLET ORAL EVERY 6 HOURS PRN
Qty: 56 TABLET | Refills: 0 | Status: SHIPPED | OUTPATIENT
Start: 2024-05-25 | End: 2024-06-01

## 2024-05-25 RX ORDER — OXYCODONE HYDROCHLORIDE 5 MG/1
5 TABLET ORAL EVERY 8 HOURS PRN
Qty: 3 TABLET | Refills: 0 | Status: SHIPPED | OUTPATIENT
Start: 2024-05-25 | End: 2024-05-26

## 2024-05-25 RX ORDER — NAPROXEN 500 MG/1
500 TABLET ORAL 2 TIMES DAILY PRN
Qty: 10 TABLET | Refills: 0 | Status: SHIPPED | OUTPATIENT
Start: 2024-05-25 | End: 2024-05-30

## 2024-05-25 RX ADMIN — OXYCODONE HYDROCHLORIDE 5 MG: 5 TABLET ORAL at 04:53

## 2024-05-25 ASSESSMENT — PAIN - FUNCTIONAL ASSESSMENT
PAIN_FUNCTIONAL_ASSESSMENT: 0-10

## 2024-05-25 ASSESSMENT — PAIN DESCRIPTION - LOCATION: LOCATION: KNEE

## 2024-05-25 ASSESSMENT — PAIN DESCRIPTION - DESCRIPTORS: DESCRIPTORS: ACHING;TENDER

## 2024-05-25 ASSESSMENT — PAIN DESCRIPTION - ORIENTATION: ORIENTATION: LEFT

## 2024-05-25 ASSESSMENT — PAIN DESCRIPTION - ONSET: ONSET: SUDDEN

## 2024-05-25 ASSESSMENT — PAIN DESCRIPTION - PROGRESSION: CLINICAL_PROGRESSION: NOT CHANGED

## 2024-05-25 ASSESSMENT — COLUMBIA-SUICIDE SEVERITY RATING SCALE - C-SSRS
2. HAVE YOU ACTUALLY HAD ANY THOUGHTS OF KILLING YOURSELF?: NO
1. IN THE PAST MONTH, HAVE YOU WISHED YOU WERE DEAD OR WISHED YOU COULD GO TO SLEEP AND NOT WAKE UP?: NO
6. HAVE YOU EVER DONE ANYTHING, STARTED TO DO ANYTHING, OR PREPARED TO DO ANYTHING TO END YOUR LIFE?: NO

## 2024-05-25 ASSESSMENT — PAIN SCALES - GENERAL
PAINLEVEL_OUTOF10: 10 - WORST POSSIBLE PAIN
PAINLEVEL_OUTOF10: 7
PAINLEVEL_OUTOF10: 6

## 2024-05-25 ASSESSMENT — PAIN DESCRIPTION - FREQUENCY: FREQUENCY: CONSTANT/CONTINUOUS

## 2024-05-25 ASSESSMENT — PAIN DESCRIPTION - PAIN TYPE
TYPE: ACUTE PAIN
TYPE: ACUTE PAIN

## 2024-05-25 NOTE — ED PROVIDER NOTES
HPI   Chief Complaint   Patient presents with    Fall     Fall from standing onto concrete sidewalk. Complaining of knee pain       Patient is a 63-year-old male presenting to the emergency department for complaints of left knee pain status post fall.  Patient states he tripped while walking on the sidewalk landing on his left knee and right side.  Patient dates this happened approximately 4 to 5 PM yesterday evening.  Patient states he started having worsening pain and swelling to his left knee which is ultimately why he presented to the emergency department.  Patient states he hit his head but does not lose consciousness.  Patient takes no blood thinners.  Aside from the left knee pain the patient distally Endorses right hip pain as well as slight right head neck pain.  Patient denies any visual changes, vomiting, chest pain, difficulty breathing.      History provided by:  Patient                      Tere Coma Scale Score: 15                     Patient History   Past Medical History:   Diagnosis Date    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)     Chronic pain disorder     Depression     Essential (primary) hypertension     Benign essential hypertension    Personal history of other diseases of the digestive system     History of diverticulosis    Personal history of other diseases of the musculoskeletal system and connective tissue     History of gout    Personal history of other diseases of the respiratory system     History of COPD    Psychiatric illness     Substance abuse (Multi)     Suicide attempt (Multi)      Past Surgical History:   Procedure Laterality Date    OTHER SURGICAL HISTORY  05/06/2020    Flexor tendon repair    OTHER SURGICAL HISTORY  05/06/2020    Oral surgery    OTHER SURGICAL HISTORY  05/06/2020    Shoulder surgery     No family history on file.  Social History     Tobacco Use    Smoking status: Every Day     Current packs/day: 1.50     Average packs/day: 1.5 packs/day for 15.0 years (22.5 ttl pk-yrs)      Types: Cigarettes    Smokeless tobacco: Never   Vaping Use    Vaping status: Never Used   Substance Use Topics    Alcohol use: Not Currently    Drug use: Yes       Physical Exam   ED Triage Vitals [05/25/24 0400]   Temperature Heart Rate Respirations BP   36.4 °C (97.5 °F) (!) 101 18 (!) 178/104      Pulse Ox Temp Source Heart Rate Source Patient Position   (!) 92 % Tympanic Monitor Standing      BP Location FiO2 (%)     Right arm --       Physical Exam  Vitals and nursing note reviewed.   Constitutional:       General: He is not in acute distress.     Appearance: Normal appearance. He is not ill-appearing, toxic-appearing or diaphoretic.   HENT:      Head: Normocephalic and atraumatic.      Nose: Nose normal.      Mouth/Throat:      Mouth: Mucous membranes are moist.      Pharynx: No oropharyngeal exudate or posterior oropharyngeal erythema.   Eyes:      General: No scleral icterus.     Extraocular Movements: Extraocular movements intact.      Pupils: Pupils are equal, round, and reactive to light.   Cardiovascular:      Rate and Rhythm: Regular rhythm. Tachycardia present.      Pulses: Normal pulses.      Heart sounds: Normal heart sounds. No murmur heard.     No friction rub. No gallop.   Pulmonary:      Effort: Pulmonary effort is normal. No respiratory distress.      Breath sounds: Normal breath sounds. No stridor. No wheezing, rhonchi or rales.   Chest:      Chest wall: No tenderness.   Abdominal:      General: Abdomen is flat. There is no distension.      Palpations: Abdomen is soft. There is no mass.      Tenderness: There is no abdominal tenderness. There is no guarding.      Hernia: No hernia is present.   Musculoskeletal:         General: Swelling and tenderness present. No deformity or signs of injury. Normal range of motion.      Cervical back: Normal range of motion and neck supple. No rigidity.   Skin:     General: Skin is warm and dry.      Capillary Refill: Capillary refill takes less than 2  seconds.      Coloration: Skin is not jaundiced or pale.      Findings: No bruising, erythema, lesion or rash.   Neurological:      General: No focal deficit present.      Mental Status: He is alert and oriented to person, place, and time. Mental status is at baseline.   Psychiatric:         Mood and Affect: Mood normal.         Behavior: Behavior normal.         ED Course & MDM   Diagnoses as of 05/25/24 0721   Fall, initial encounter   Acute pain of left knee   Musculoskeletal pain       Medical Decision Making  Patient is 63-year-old male presenting to the emergency department complaints of left knee pain, right hip pain, right head neck pain status post mechanical fall.  Patient does have some diffuse tenderness and swelling to the left knee with decreased active and passive range of motion. X-rays and CT were ordered.  Pain medication was ordered.    Patient improvement after being medicated.  X-rays reviewed without any noted fractures.  Official radiology read pending.  CT head negative for acute intracranial abnormalities.  CT C-spine negative for fractures or subluxations. X-ray knee negative for fractures but noted joint effusion.  No fractures of the right hip appreciated.  Patient was placed in a knee immobilizer and given referral to orthopedics as well as prescriptions for Tylenol, naproxen, oxycodone for breakthrough pain.  Patient was given return precautions.  All questions were answered.  Patient verbalized understanding of all instructions given to them.  Patient appreciative care and agreeable to discharge.        Amount and/or Complexity of Data Reviewed  Radiology: ordered. Decision-making details documented in ED Course.    Labs Reviewed - No data to display  XR knee left 4+ views   Final Result   No acute osseous injury of the left knee identified. Suspect joint   effusion.        MACRO:   None        Signed by: Gustavo Banks 5/25/2024 7:17 AM   Dictation workstation:   [id number]      XR hip  right with pelvis when performed 2 or 3 views   Final Result   No acute osseous injury of the pelvis or right hip identified.        MACRO:   None        Signed by: Gustavo Banks 5/25/2024 7:18 AM   Dictation workstation:   [id number]      CT head wo IV contrast   Final Result   No evidence of acute intracranial abnormality.        No acute cervical spine fracture or malalignment.             MACRO:   None        Signed by: Bill Dunne 5/25/2024 6:55 AM   Dictation workstation:   [id number]      CT cervical spine wo IV contrast   Final Result   No evidence of acute intracranial abnormality.        No acute cervical spine fracture or malalignment.             MACRO:   None        Signed by: Bill Dunne 5/25/2024 6:55 AM   Dictation workstation:   [id number]              Procedure  Procedures     Felipe Gamez DO  05/25/24 0721

## 2024-05-25 NOTE — DISCHARGE INSTRUCTIONS
Please follow up with your Primary Care Provider (PCP) within the next 2-3 days regarding your ED visit.  You have been given a referral to an orthopedic doctor.  Please call her office schedule follow-up appointment.  Please call your doctor or return to the nearest emergency department any new or worsening symptoms that are concerning to you.  These documents have a lot of useful information! Please read them, so you know what to expect, what you can do for yourself at home, and also to know concerning signs warrant a return to the Emergency Department for additional evaluation.  You are welcome back any time. Thank you for entrusting your care to us, I hope we made your visit as pleasant as possible. Wishing you well!    Dr. Gamez

## 2024-05-29 ENCOUNTER — APPOINTMENT (OUTPATIENT)
Dept: RADIOLOGY | Facility: CLINIC | Age: 64
End: 2024-05-29
Payer: COMMERCIAL

## 2024-05-29 NOTE — PROGRESS NOTES
HPI:   Joe Magaña is a 63 y.o. male with a pmhx of HTN, HLD, COPD, polysubstance abuse and current smoker who is referred to me by Dr Crouch for evaluation and treatment of lung nodule.  He mentioned he has large amount of hemoptysis with dark blood about 2-month ago which prompted him present to the ED.  Hemoptysis stopped spontaneously.  During workup he was found to have a 2.8 cm right upper lobe nodule.  He had CT-guided biopsy of nodule done by IR.  The result was nondiagnostic.  He presented today for further evaluation.  At baseline he he walks around without issue.  He does sometimes endorse on the breath.  He does not use any oxygen at baseline.  He denies history of heart attack, chest pain, chest pressure or stroke.  His weight has been stable he has been almost a lifelong smoker.    Of note, he fell last week at sidewalk.  He does not suffer any major injury besides bruise of the left knee.    PMHx: per HPI  PSHx: hernia repair, right elbow surgery, shoulder repair, right branchial artery repair  SHx: current smoker (40ppy), social ETOH use, snort fentanyl   FMHx: mother and aunt have lung cancer    Current Outpatient Medications:     acetaminophen (TylenoL) 325 mg tablet, Take 2 tablets (650 mg) by mouth every 6 hours if needed for mild pain (1 - 3) or fever (temp greater than 38.0 C) for up to 7 days., Disp: 56 tablet, Rfl: 0    cloNIDine (Catapres) 0.1 mg tablet, Take 1 tablet (0.1 mg) by mouth once daily at bedtime., Disp: 30 tablet, Rfl: 0    DULoxetine (Cymbalta) 60 mg DR capsule, Take 1 capsule (60 mg) by mouth 2 times a day., Disp: 60 capsule, Rfl: 0    gabapentin (Neurontin) 300 mg capsule, 1 capsule (300 mg)., Disp: , Rfl:     hydrOXYzine pamoate (Vistaril) 50 mg capsule, Take 1 capsule (50 mg) by mouth 1 time., Disp: , Rfl:     lidocaine 4 % patch, Place 3 patches over 12 hours on the skin once daily. Remove & discard patch within 12 hours or as directed by MD. Do not start before  February 24, 2024. (Patient not taking: Reported on 4/29/2024), Disp: 21 patch, Rfl: 0    QUEtiapine (SEROquel) 25 mg tablet, Take 1 tablet (25 mg) by mouth once daily at bedtime., Disp: 30 tablet, Rfl: 0   No Known Allergies       ROS  General: negative for fever, chills, weight loss, night sweat  Head: negative for severe headache, vision change, blurred vision,   CV: negative for chest pain, dizziness, lightheadedness   Pulm: negative for shortness of breath, dyspnea on exertion, hemoptysis  GI: negative for diarrhea, constipation, abdominal pain, nausea or vomiting, BRBPR  : negative for dysuria, hematuria, incontinence  Skin: negative for rash  Heme: negative for blood thinner, bleeding disorder or clotting disorder  Endo: negative for heat or cold intolerance, weight gain or weight loss  MSK: negative for rash, edema, weakness    PHYSICAL EXAM  Visit Vitals  /82   Pulse 70   Temp 35.4 °C (95.7 °F)   Resp 14   Constitution: well-developed well-nourished male sitting in chair in no acute distress  HEENT: NCAT, moist mucosal membrane, neck supple, no crepitus, sclera anicteric  Lymph nodes: no cervical or supraclavicular lymphadenopathy  Cardiac: RRR, normal S1, S2, no mrg  Pulmonary: normal air movement, CTAP, no wcr  Abdomen: soft, non-distended, non-tender, no rigidity, guarding or rebound tenderness, no splenohepatomegaly  Neuro: AOx3, CNII-XII grossly normal  Ext: warm, dry, no edema noted  Skin: dry, clean and intact  Psych: mood and affect wnl    Assessment and Plan:  This is a 63 y.o. male current smoker with lung nodule  I reviewed the CT scan from 4/29/24, 3/13/24 and 10/8/2008. It showed 29mm lung rounded solid nodule at RUL with surrounding GGO changes. This was biopsied by imaging guidance on 4/29. This lung nodule was not present on 2008 CT scan. There is mediastinal lymphadenopathy.  I reviewed the ECHO from 6/10/21. It showed EF of 45%, RV function is normal, RVSP 19.9 mmHg  I reviewed  pathology report from 4/29/24. It showed lung parenchyma with fibroelastotic scar and reactive pneumocytic changes, hemosiderin-laden macrophages, large vessel with fibrinoid necrosis and bronchus with squamous metaplasia. No evidence of carcinoma.   I reviewed the labs from 4/25. It showed normal H/H with Hgb of 13. Normal INR.    In my opinion, high risk patient presented with right upper lobe nodule after episodes of hemoptysis.  This nodule was biopsied but showed no malignancy.  The biopsy was nondiagnostic but it showed hemosiderin laden macrophages.  Given the smoking history of this patient, I am still concerned this is a primary lung cancer.  The other differential includes evolving pulmonary hematoma, granuloma and pneumonia.  I would recommend starting with PET/CT and pulmonary function test.  Based on the results of the PET/CT, I will decide if he needs further biopsy.    I had a candid discussion with the patient. I outlined the treatment plan as above. The patient consented to proceed.     Silvina Fitch MD  Thoracic Surgeon  [facility]   of Medicine  [facility]  Office phone: [phone]  Fax: [phone]  Pager: [phone]    Amount of time spent:  -Prep time on date of patient encounter: 30 min  -Time spend with patient/family/caregiver: 30 min  -Additional time spent on patient care activities: 15 min  -Documentation time in medical record: 15 min  -Other time spent on date of patient encounter: 0 min  Total time: 90 min

## 2024-05-31 ENCOUNTER — OFFICE VISIT (OUTPATIENT)
Dept: SURGERY | Facility: CLINIC | Age: 64
End: 2024-05-31
Payer: COMMERCIAL

## 2024-05-31 VITALS
SYSTOLIC BLOOD PRESSURE: 130 MMHG | HEART RATE: 70 BPM | HEIGHT: 73 IN | DIASTOLIC BLOOD PRESSURE: 82 MMHG | TEMPERATURE: 95.7 F | BODY MASS INDEX: 21.2 KG/M2 | OXYGEN SATURATION: 98 % | RESPIRATION RATE: 14 BRPM | WEIGHT: 160 LBS

## 2024-05-31 DIAGNOSIS — R91.8 LUNG MASS: ICD-10-CM

## 2024-05-31 PROCEDURE — 99215 OFFICE O/P EST HI 40 MIN: CPT | Performed by: STUDENT IN AN ORGANIZED HEALTH CARE EDUCATION/TRAINING PROGRAM

## 2024-05-31 PROCEDURE — 4004F PT TOBACCO SCREEN RCVD TLK: CPT | Performed by: STUDENT IN AN ORGANIZED HEALTH CARE EDUCATION/TRAINING PROGRAM

## 2024-05-31 PROCEDURE — 3079F DIAST BP 80-89 MM HG: CPT | Performed by: STUDENT IN AN ORGANIZED HEALTH CARE EDUCATION/TRAINING PROGRAM

## 2024-05-31 PROCEDURE — 3075F SYST BP GE 130 - 139MM HG: CPT | Performed by: STUDENT IN AN ORGANIZED HEALTH CARE EDUCATION/TRAINING PROGRAM

## 2024-05-31 PROCEDURE — 99205 OFFICE O/P NEW HI 60 MIN: CPT | Performed by: STUDENT IN AN ORGANIZED HEALTH CARE EDUCATION/TRAINING PROGRAM

## 2024-05-31 NOTE — LETTER
May 31, 2024     Bk Crouch MD  [address] [address]    Patient: Joe Magaña   YOB: 1960   Date of Visit: 5/31/2024       Dear Dr. Bk Crouch MD:    Thank you for referring Joe Magaña to me for evaluation. Below are my notes for this consultation.  If you have questions, please do not hesitate to call me. I look forward to following your patient along with you.       Sincerely,     Silvina Fitch MD      CC: No Recipients  ______________________________________________________________________________________    HPI:   Joe Magaña is a 63 y.o. male with a pmhx of HTN, HLD, COPD, polysubstance abuse and current smoker who is referred to me by Dr Crouch for evaluation and treatment of lung nodule.  He mentioned he has large amount of hemoptysis with dark blood about 2-month ago which prompted him present to the ED.  Hemoptysis stopped spontaneously.  During workup he was found to have a 2.8 cm right upper lobe nodule.  He had CT-guided biopsy of nodule done by IR.  The result was nondiagnostic.  He presented today for further evaluation.  At baseline he he walks around without issue.  He does sometimes endorse on the breath.  He does not use any oxygen at baseline.  He denies history of heart attack, chest pain, chest pressure or stroke.  His weight has been stable he has been almost a lifelong smoker.    Of note, he fell last week at sidewalk.  He does not suffer any major injury besides bruise of the left knee.    PMHx: per HPI  PSHx: hernia repair, right elbow surgery, shoulder repair, right branchial artery repair  SHx: current smoker (40ppy), social ETOH use, snort fentanyl   FMHx: mother and aunt have lung cancer    Current Outpatient Medications:   •  acetaminophen (TylenoL) 325 mg tablet, Take 2 tablets (650 mg) by mouth every 6 hours if needed for mild pain (1 - 3) or fever (temp greater than 38.0 C) for up to 7 days., Disp: 56 tablet, Rfl: 0  •   cloNIDine (Catapres) 0.1 mg tablet, Take 1 tablet (0.1 mg) by mouth once daily at bedtime., Disp: 30 tablet, Rfl: 0  •  DULoxetine (Cymbalta) 60 mg DR capsule, Take 1 capsule (60 mg) by mouth 2 times a day., Disp: 60 capsule, Rfl: 0  •  gabapentin (Neurontin) 300 mg capsule, 1 capsule (300 mg)., Disp: , Rfl:   •  hydrOXYzine pamoate (Vistaril) 50 mg capsule, Take 1 capsule (50 mg) by mouth 1 time., Disp: , Rfl:   •  lidocaine 4 % patch, Place 3 patches over 12 hours on the skin once daily. Remove & discard patch within 12 hours or as directed by MD. Do not start before February 24, 2024. (Patient not taking: Reported on 4/29/2024), Disp: 21 patch, Rfl: 0  •  QUEtiapine (SEROquel) 25 mg tablet, Take 1 tablet (25 mg) by mouth once daily at bedtime., Disp: 30 tablet, Rfl: 0   No Known Allergies       ROS  General: negative for fever, chills, weight loss, night sweat  Head: negative for severe headache, vision change, blurred vision,   CV: negative for chest pain, dizziness, lightheadedness   Pulm: negative for shortness of breath, dyspnea on exertion, hemoptysis  GI: negative for diarrhea, constipation, abdominal pain, nausea or vomiting, BRBPR  : negative for dysuria, hematuria, incontinence  Skin: negative for rash  Heme: negative for blood thinner, bleeding disorder or clotting disorder  Endo: negative for heat or cold intolerance, weight gain or weight loss  MSK: negative for rash, edema, weakness    PHYSICAL EXAM  Visit Vitals  /82   Pulse 70   Temp 35.4 °C (95.7 °F)   Resp 14   Constitution: well-developed well-nourished male sitting in chair in no acute distress  HEENT: NCAT, moist mucosal membrane, neck supple, no crepitus, sclera anicteric  Lymph nodes: no cervical or supraclavicular lymphadenopathy  Cardiac: RRR, normal S1, S2, no mrg  Pulmonary: normal air movement, CTAP, no wcr  Abdomen: soft, non-distended, non-tender, no rigidity, guarding or rebound tenderness, no splenohepatomegaly  Neuro: AOx3,  CNII-XII grossly normal  Ext: warm, dry, no edema noted  Skin: dry, clean and intact  Psych: mood and affect wnl    Assessment and Plan:  This is a 63 y.o. male current smoker with lung nodule  I reviewed the CT scan from 4/29/24, 3/13/24 and 10/8/2008. It showed 29mm lung rounded solid nodule at RUL with surrounding GGO changes. This was biopsied by imaging guidance on 4/29. This lung nodule was not present on 2008 CT scan. There is mediastinal lymphadenopathy.  I reviewed the ECHO from 6/10/21. It showed EF of 45%, RV function is normal, RVSP 19.9 mmHg  I reviewed pathology report from 4/29/24. It showed lung parenchyma with fibroelastotic scar and reactive pneumocytic changes, hemosiderin-laden macrophages, large vessel with fibrinoid necrosis and bronchus with squamous metaplasia. No evidence of carcinoma.   I reviewed the labs from 4/25. It showed normal H/H with Hgb of 13. Normal INR.    In my opinion, high risk patient presented with right upper lobe nodule after episodes of hemoptysis.  This nodule was biopsied but showed no malignancy.  The biopsy was nondiagnostic but it showed hemosiderin laden macrophages.  Given the smoking history of this patient, I am still concerned this is a primary lung cancer.  The other differential includes evolving pulmonary hematoma, granuloma and pneumonia.  I would recommend starting with PET/CT and pulmonary function test.  Based on the results of the PET/CT, I will decide if he needs further biopsy.    I had a candid discussion with the patient. I outlined the treatment plan as above. The patient consented to proceed.     Silvina Fitch MD  Thoracic Surgeon  [facility]   of Medicine  [facility]  Office phone: [phone]  Fax: [phone]  Pager: [phone]    Amount of time spent:  -Prep time on date of patient encounter: 30 min  -Time spend with  patient/family/caregiver: 30 min  -Additional time spent on patient care activities: 15 min  -Documentation time in medical record: 15 min  -Other time spent on date of patient encounter: 0 min  Total time: 90 min

## 2024-06-07 ENCOUNTER — HOSPITAL ENCOUNTER (OUTPATIENT)
Dept: RADIOLOGY | Facility: CLINIC | Age: 64
Discharge: HOME | End: 2024-06-07
Payer: MEDICARE

## 2024-06-07 DIAGNOSIS — R91.8 OTHER NONSPECIFIC ABNORMAL FINDING OF LUNG FIELD: ICD-10-CM

## 2024-06-07 PROCEDURE — 78815 PET IMAGE W/CT SKULL-THIGH: CPT | Mod: PI

## 2024-06-07 PROCEDURE — 3430000001 HC RX 343 DIAGNOSTIC RADIOPHARMACEUTICALS

## 2024-06-07 PROCEDURE — A9552 F18 FDG: HCPCS

## 2024-06-07 RX ORDER — FLUDEOXYGLUCOSE F 18 200 MCI/ML
12 INJECTION, SOLUTION INTRAVENOUS
Status: COMPLETED | OUTPATIENT
Start: 2024-06-07 | End: 2024-06-07

## 2024-06-07 RX ADMIN — FLUDEOXYGLUCOSE F 18 12 MILLICURIE: 200 INJECTION, SOLUTION INTRAVENOUS at 11:18

## 2024-06-12 ENCOUNTER — APPOINTMENT (OUTPATIENT)
Dept: GASTROENTEROLOGY | Facility: CLINIC | Age: 64
End: 2024-06-12
Payer: MEDICARE

## 2024-06-21 ENCOUNTER — OFFICE VISIT (OUTPATIENT)
Dept: SURGERY | Facility: CLINIC | Age: 64
End: 2024-06-21
Payer: MEDICARE

## 2024-06-21 VITALS
SYSTOLIC BLOOD PRESSURE: 151 MMHG | DIASTOLIC BLOOD PRESSURE: 86 MMHG | TEMPERATURE: 97.5 F | WEIGHT: 156 LBS | HEIGHT: 73 IN | OXYGEN SATURATION: 96 % | BODY MASS INDEX: 20.67 KG/M2

## 2024-06-21 DIAGNOSIS — R59.0 MEDIASTINAL LYMPHADENOPATHY: ICD-10-CM

## 2024-06-21 DIAGNOSIS — R91.8 LUNG MASS: Primary | ICD-10-CM

## 2024-06-21 PROCEDURE — 99215 OFFICE O/P EST HI 40 MIN: CPT | Performed by: STUDENT IN AN ORGANIZED HEALTH CARE EDUCATION/TRAINING PROGRAM

## 2024-06-21 PROCEDURE — 3079F DIAST BP 80-89 MM HG: CPT | Performed by: STUDENT IN AN ORGANIZED HEALTH CARE EDUCATION/TRAINING PROGRAM

## 2024-06-21 PROCEDURE — 3077F SYST BP >= 140 MM HG: CPT | Performed by: STUDENT IN AN ORGANIZED HEALTH CARE EDUCATION/TRAINING PROGRAM

## 2024-06-21 PROCEDURE — 4004F PT TOBACCO SCREEN RCVD TLK: CPT | Performed by: STUDENT IN AN ORGANIZED HEALTH CARE EDUCATION/TRAINING PROGRAM

## 2024-06-21 ASSESSMENT — PATIENT HEALTH QUESTIONNAIRE - PHQ9
1. LITTLE INTEREST OR PLEASURE IN DOING THINGS: NOT AT ALL
2. FEELING DOWN, DEPRESSED OR HOPELESS: NOT AT ALL
SUM OF ALL RESPONSES TO PHQ9 QUESTIONS 1 AND 2: 0

## 2024-06-21 NOTE — PROGRESS NOTES
Subjective   Joe Magaña  is a 63 y.o. male with a pmhx of HTN, HLD, COPD, polysubstance abuse and current smoker who presents today for lung nodule follow up.  He is doing okay.  He denies any new episode of hemoptysis.  He is still smoking.  He is not ready for quitting yet.    There is no significant change in patient's past medical history, past surgical history, social history, family history, or review of systems since last visit.   Past Medical History:   Diagnosis Date    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)     Chronic pain disorder     Depression     Essential (primary) hypertension     Benign essential hypertension    Personal history of other diseases of the digestive system     History of diverticulosis    Personal history of other diseases of the musculoskeletal system and connective tissue     History of gout    Personal history of other diseases of the respiratory system     History of COPD    Psychiatric illness     Substance abuse (Multi)     Suicide attempt (Multi)      Past Surgical History:   Procedure Laterality Date    OTHER SURGICAL HISTORY  05/06/2020    Flexor tendon repair    OTHER SURGICAL HISTORY  05/06/2020    Oral surgery    OTHER SURGICAL HISTORY  05/06/2020    Shoulder surgery     No family history on file.  Social History     Socioeconomic History    Marital status: Single     Spouse name: Not on file    Number of children: Not on file    Years of education: Not on file    Highest education level: Not on file   Occupational History    Not on file   Tobacco Use    Smoking status: Every Day     Current packs/day: 1.50     Average packs/day: 1.5 packs/day for 15.0 years (22.5 ttl pk-yrs)     Types: Cigarettes    Smokeless tobacco: Never   Vaping Use    Vaping status: Never Used   Substance and Sexual Activity    Alcohol use: Not Currently    Drug use: Not Currently    Sexual activity: Defer   Other Topics Concern    Not on file   Social History Narrative    Not on file     Social Determinants  of Health     Financial Resource Strain: High Risk (2/20/2024)    Overall Financial Resource Strain (CARDIA)     Difficulty of Paying Living Expenses: Very hard   Food Insecurity: Food Insecurity Present (2/20/2024)    Hunger Vital Sign     Worried About Running Out of Food in the Last Year: Often true     Ran Out of Food in the Last Year: Often true   Transportation Needs: Unmet Transportation Needs (2/20/2024)    PRAPARE - Transportation     Lack of Transportation (Medical): Yes     Lack of Transportation (Non-Medical): Yes   Physical Activity: Inactive (2/20/2024)    Exercise Vital Sign     Days of Exercise per Week: 0 days     Minutes of Exercise per Session: 0 min   Stress: Stress Concern Present (2/20/2024)    [place] [employer] - Occupational Stress Questionnaire     Feeling of Stress : Rather much   Social Connections: Socially Isolated (2/20/2024)    Social Connection and Isolation Panel [NHANES]     Frequency of Communication with Friends and Family: Never     Frequency of Social Gatherings with Friends and Family: Never     Attends [employer] Services: Never     Active Member of Clubs or Organizations: No     Attends Club or Organization Meetings: Never     Marital Status: Never    Intimate Partner Violence: Not At Risk (2/20/2024)    Humiliation, Afraid, Rape, and Kick questionnaire     Fear of Current or Ex-Partner: No     Emotionally Abused: No     Physically Abused: No     Sexually Abused: No   Housing Stability: High Risk (2/20/2024)    Housing Stability Vital Sign     Unable to Pay for Housing in the Last Year: Yes     Number of Places Lived in the Last Year: 1     Unstable Housing in the Last Year: No       Objective   Visit Vitals  /86 (BP Location: Right arm, Patient Position: Sitting)   Temp 36.4 °C (97.5 °F) (Temporal)     Physical Exam  Constitutional:       General: He is not in acute distress.     Appearance: Normal appearance. He is not ill-appearing,  toxic-appearing or diaphoretic.   HENT:      Head: Normocephalic and atraumatic.      Mouth/Throat:      Mouth: Mucous membranes are moist.      Pharynx: Oropharynx is clear.   Eyes:      General: No scleral icterus.     Extraocular Movements: Extraocular movements intact.      Conjunctiva/sclera: Conjunctivae normal.      Pupils: Pupils are equal, round, and reactive to light.   Cardiovascular:      Rate and Rhythm: Normal rate and regular rhythm.      Pulses: Normal pulses.      Heart sounds: Normal heart sounds.   Pulmonary:      Effort: Pulmonary effort is normal. No respiratory distress.      Breath sounds: Normal breath sounds. No stridor. No wheezing, rhonchi or rales.   Chest:      Chest wall: No tenderness.   Abdominal:      General: There is no distension.      Palpations: Abdomen is soft.      Tenderness: There is no abdominal tenderness. There is no guarding or rebound.   Musculoskeletal:         General: No swelling or tenderness. Normal range of motion.      Cervical back: Normal range of motion and neck supple. No rigidity or tenderness.      Right lower leg: No edema.      Left lower leg: No edema.   Skin:     General: Skin is warm and dry.      Coloration: Skin is not jaundiced.   Neurological:      General: No focal deficit present.      Mental Status: He is alert and oriented to person, place, and time. Mental status is at baseline.   Psychiatric:         Mood and Affect: Mood normal.         Behavior: Behavior normal.         Diagnostic Review  I reviewed the PET-CT from 6/7/24. It was not best quality. The hilar and 4R lymph node showed activities.   I reviewed the CT scan from 4/29/24, 3/13/24 and 10/8/2008. It showed 29mm lung rounded solid nodule at RUL with surrounding GGO changes. This was biopsied by imaging guidance on 4/29. This lung nodule was not present on 2008 CT scan. There is mediastinal lymphadenopathy.  I reviewed the ECHO from 6/10/21. It showed EF of 45%, RV function is normal,  RVSP 19.9 mmHg  I reviewed pathology report from 4/29/24. It showed lung parenchyma with fibroelastotic scar and reactive pneumocytic changes, hemosiderin-laden macrophages, large vessel with fibrinoid necrosis and bronchus with squamous metaplasia. No evidence of carcinoma.   I reviewed the labs from 4/25. It showed normal H/H with Hgb of 13. Normal INR.      Assessment/Plan   Joe Magaña  is doing well.  I discussed with him regarding the PET scan result which is nondiagnostic.  It does show activity in mediastinal and hilar lymph nodes and the RUL nodule with mild activity.  It is reasonable to repeat a PET/CT.  However more importantly, given sustained enlargement of mediastinal and hilar lymph nodes and persistent right upper lobe nodule, I recommend repeat biopsy via EBUS.     I had a discussion with him regarding the options of repeat biopsy.  EBUS is least invasive and could sampling the lymph nodes around the airway.  Surgery was most invasive but as high as accuracy.  I recommend obtaining EBUS.  I will  make a referral to my interventional pulmonology colleague.  Patient consent to proceed.    Silvina Fitch MD  Thoracic Surgeon  [facility]   of Medicine  [facility]  Office phone: [phone]  Fax: [phone]  Pager: [phone]    Amount of time spent:  -Prep time on date of patient encounter: 15 min  -Time spend with patient/family/caregiver: 15 min  -Additional time spent on patient care activities: 5 min  -Documentation time in medical record: 10 min  -Other time spent on date of patient encounter: 0 min  Total time: 45 min

## 2024-06-24 DIAGNOSIS — R91.1 NODULE OF RIGHT LUNG: Primary | ICD-10-CM

## 2024-06-24 DIAGNOSIS — R59.0 MEDIASTINAL LYMPHADENOPATHY: ICD-10-CM

## 2024-06-24 DIAGNOSIS — Z01.818 PRE-OP TESTING: ICD-10-CM

## 2024-06-24 NOTE — PROGRESS NOTES
Bronchoscopy Scheduling Request    Pre-bronchoscopy visit: New patient visit with Bronchoscopy group provider  Please schedule procedure: Next available    Cytology on-site:  Yes  Location:  [facility]  Performing physician:  Advanced diagnostic bronchoscopist  Referring physician:  Silvina Fitch MD, Argelia Carter, APRN-CNP  Indication:  RUL nodule, intrathoracic adenopathy; current smoker; 40 PY; non-diagnostic CT-TTNA  Sedation / Anesthesia:  GA  Procedure:  TBNA, TBBx, Navigational bronchoscopy, Radial EBUS, Staging EBUS  Time:  Tier 3  Fluorscopy:   Yes  Imaging needed:  CT [place] - same day as procedure  Labs:  CBC, BMP  Meds:  None  Special Considerations:   Non-diagnostic PET-CT in 05/2024; nodule appears to be regressing in size when comparing CT angio 03/2024 to CT low dose from PET in 06/2024   Reviewed by:  Giancarlo Moon MD on 06/24/24

## 2024-07-01 NOTE — PROGRESS NOTES
Patient: Joe Magaña    [id number]  : 1960 -- AGE 63 y.o.    Provider: OCTAVIA Saucedo     Location [facility]   Service Date: 2024              [facility]  New Visit Note      HISTORY OF PRESENT ILLNESS     The patient's referring provider is: No ref. provider found    HISTORY OF PRESENT ILLNESS   Joe Magaña is a 63 y.o. male who presents to a [facility] for an pre-bronchoscopy evaluation with concerns of right lung mass . I have independently interviewed and examined the patient in the office and reviewed available records.    Current History  Joe Magaña has known mediastinal and hilar lymph nodes and the RUL nodule with mild activity, he had a PET scan that was non diagnostic, he was referred for  biopsy via EBUS.     On today's visit, the patient reports At baseline,  has dyspnea on exertion, but none at rest. He works part time and he is on his feet for 6-7 hours at a time.  They are active in her everyday life and carries loads and does strenuous exercise. He is only troubled by breathlessness except on strenuous exercise (mMRC 0).  They are s short of breath when hurrying on level ground or walking up a slight hill (mMRC 1).  Sleeps with a couple pillows, denies orthopnea, pnd, or jorge luis.  Weight has been mostly stable.  Relates chronic cough with intermittent productive phlegm with brown color, C/o wheezing. C/o night cough. No hemoptysis. No fever or shivering chills. Has a runny nose, and a tingling sensation in the back of his throat. Denies chest pain or heartburn.     Previous pulmonary history:  no history of recurrent infections, or lung disease as a child.  No previous lung hx, never on oxygen or inhaler therapy.     Inhalers/nebulized medications: albuterol intermittently    Hospitalization History: Not been hospitalized over the last year for breathing related problem.    Sleep  history:  Denies snoring, apnea, feeling tired during the day or taking naps during the day.       ALLERGIES AND MEDICATIONS     ALLERGIES  No Known Allergies    MEDICATIONS  Current Outpatient Medications   Medication Sig Dispense Refill    cloNIDine (Catapres) 0.1 mg tablet Take 1 tablet (0.1 mg) by mouth once daily at bedtime. 30 tablet 0    DULoxetine (Cymbalta) 60 mg DR capsule Take 1 capsule (60 mg) by mouth 2 times a day. 60 capsule 0    gabapentin (Neurontin) 300 mg capsule 1 capsule (300 mg).      hydrOXYzine pamoate (Vistaril) 50 mg capsule Take 1 capsule (50 mg) by mouth 1 time.      lidocaine 4 % patch Place 3 patches over 12 hours on the skin once daily. Remove & discard patch within 12 hours or as directed by MD. Do not start before February 24, 2024. (Patient not taking: Reported on 4/29/2024) 21 patch 0    QUEtiapine (SEROquel) 25 mg tablet Take 1 tablet (25 mg) by mouth once daily at bedtime. 30 tablet 0     No current facility-administered medications for this visit.         PAST HISTORY     PAST MEDICAL HISTORY  He  has a past medical history of 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), Chronic pain disorder, Depression, Essential (primary) hypertension, Personal history of other diseases of the digestive system, Personal history of other diseases of the musculoskeletal system and connective tissue, Personal history of other diseases of the respiratory system, Psychiatric illness, Substance abuse (Multi), and Suicide attempt (Multi).    PAST SURGICAL HISTORY  Past Surgical History:   Procedure Laterality Date    OTHER SURGICAL HISTORY  05/06/2020    Flexor tendon repair    OTHER SURGICAL HISTORY  05/06/2020    Oral surgery    OTHER SURGICAL HISTORY  05/06/2020    Shoulder surgery       IMMUNIZATION HISTORY  Immunization History   Administered Date(s) Administered    Quincus SARS-CoV-2 Vaccination 12/21/2021       SOCIAL HISTORY  He  reports that he has been smoking cigarettes. He has a 22.5 pack-year smoking  history. He has never used smokeless tobacco. He reports that he does not currently use alcohol. He reports that he does not currently use drugs. 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  DOES/DOES NOT EC: does not have exposure to birds or exotic animals.    FAMILY HISTORY  No family history on file.  DOES/DOES NOT EC: does have a family history of pulmonary disease. Mother and sister had lung cancer and smoked  DOES/DOES NOT EC: does not have a family history of cancer.  DOES/DOES NOT EC: does not have a family history of autoimmune disorders.    RESULTS/DATA     Pulmonary Function Test Results     No testing done     Chest Radiograph     XR chest 1 view 04/29/2024    Narrative  Interpreted By:  Schoenberger, Joseph,  STUDY:  XR CHEST 1 VIEW;  4/29/2024 2:47 pm    INDICATION:  Signs/Symptoms:Post right Lung Biopsy.    COMPARISON:  Exam performed at 11:36 a.m.    ACCESSION NUMBER(S):  [id number]    ORDERING CLINICIAN:  JOSEPH SCHOENBERGER    FINDINGS:  Findings are unchanged. There is no pneumothorax post right lung  biopsy. The mass in the periphery of the right upper lobe is  unchanged.        CARDIOMEDIASTINAL SILHOUETTE:  Cardiomediastinal silhouette is normal in size and configuration.    LUNGS:  No new focal lung opacity.    ABDOMEN:  No remarkable upper abdominal findings.    BONES:  No acute osseous changes.    Impression  1.  Satisfactory appearance post right upper lobe mass biopsy        MACRO:  None    Signed by: Joseph Schoenberger 4/29/2024 4:30 PM  Dictation workstation:   [id number]      Chest CT Scan     CT angio chest for pulmonary embolism 03/13/2024    COMPARISON:  CXR: 02/22/24, 09/13/23, 06/05/23, 04/07/23, 01/25/21; CT AP:  01/25/24, CT C/A/P 9/1/2020    TECHNIQUE:  CTA of the chest was  performed with intravenous contrast.  Images are reviewed and processed at a workstation according to the CT  angiogram protocol with 3-D and/or MIP post processing imaging  generated.  Omnipaque 350 75 mL was administered intravenously.  Automated mA/kV exposure control was utilized and patient examination  was performed in strict accordance with principles of ALARA.  FINDINGS:  Pulmonary arteries are adequately opacified without acute or chronic  filling defects.  The thoracic aorta is normal in course and caliber  without dissection or aneurysm.  The heart is normal in size without pericardial effusion.  Thoracic  lymph nodes are not enlarged.  There is no pleural effusion, pleural thickening, or pneumothorax.  The airways are patent.  There is a mass in the right upper lobe measuring 2.2 x 2.5 cm.  Surrounding this lesion, there is a ground glass infiltrate in the  right upper lobe.  Upper abdomen demonstrates no acute pathology. There is a stable mass  in the left adrenal gland measuring 2.6 x 2.5 cm. There are no acute  fractures.  No suspicious bony lesions.    Impression  1. No pulmonary emboli.  2. There is a mass in the right upper lobe. This is suspicious for  neoplasm. Surrounding this, there is an infiltrate which may represent  postobstructive pneumonia.  3. Stable left adrenal mass.  Signed by Beto Le MD      -----------------------------------------------------  PET scan: 6/7/2024    FINDINGS:  Nondiagnostic study secondary to marked uptake within the soft  tissues and musculature.      IMPRESSION:  Nondiagnostic study secondary to marked uptake within the soft  tissues and musculature. A repeat study is recommended following a  strict oncologic protocol (low carb high-protein diet for 24 hours  prior to the study, 6 hours NPO before the study, and no exercise for  24 hours before the study).      Echocardiogram        TRANSTHORACIC ECHOCARDIOGRAM REPORT 6/10/2021        Patient Name:      FARA NATION     Reading Physician:  [id number] Kurtis Beckham DO  Study Date:       6/10/2021          Referring           [id number] JESSICA LARIOS       PHYSICIAN INTERPRETATION:  Left Ventricle: The left ventricular systolic function is mildly decreased, with an estimated ejection fraction of 45%. There is global hypokinesis of the left ventricle with minor regional variations. The left ventricular cavity size is normal. There is mild concentric left ventricular hypertrophy. Spectral Doppler shows an impaired relaxation pattern of left ventricular diastolic filling.  Left Atrium: The left atrium is mildly dilated.  Right Ventricle: The right ventricle is normal in size. There is normal right ventricular global systolic function.  Right Atrium: The right atrium is normal in size.  Aortic Valve: The aortic valve appears structurally normal. The aortic valve appears tricuspid. There is mild aortic valve cusp calcification. There is no evidence of aortic valve stenosis.  There is no evidence of aortic valve regurgitation. The peak instantaneous gradient of the aortic valve is 7.4 mmHg. The mean gradient of the aortic valve is 4.0 mmHg.  Mitral Valve: The mitral valve is normal in structure. There is no evidence of mitral valve stenosis. There is normal mitral valve leaflet mobility. There is mild mitral annular calcification. There is mild mitral valve regurgitation.  Tricuspid Valve: The tricuspid valve is structurally normal. There is normal tricuspid valve leaflet mobility. There is trace tricuspid regurgitation.  Pulmonic Valve: The pulmonic valve is not well visualized. There is no indication of pulmonic valve regurgitation.  Pericardium: There is no pericardial effusion noted.  Aorta: The aortic root is normal.  Pulmonary Artery: The pulmonary artery is not well visualized. The tricuspid regurgitant velocity is 2.14 m/s, and with an estimated right atrial pressure  of 3 mmHg, the estimated pulmonary artery pressure is normal with the RVSP at 19.9 mmHg.  Systemic Veins: The inferior vena cava was not well visualized.  In comparison to the previous echocardiogram(s): Prior examinations are available and were reviewed for comparison purposes. The left ventricular function is worse. The left ventricular hypertrophy is unchanged. The left ventricular diastolic function is unchanged.        CONCLUSIONS:   1. The left ventricular systolic function is mildly decreased with a 45% estimated ejection fraction.   2. Spectral Doppler shows an impaired relaxation pattern of left ventricular diastolic filling.   3. There is mitral stenosis is not seen.   4. Aortic valve stenosis is not present.   5. The pulmonary artery is not well visualized.   6. There is global hypokinesis of the left ventricle with minor regional variations.          TRICUSPID VALVE/RVSP:                              Normal Ranges:  Peak TR Velocity: 2.14 m/s  RV Syst Pressure: 21.3 mmHg (< 30mmHg)         REVIEW OF SYSTEMS     REVIEW OF SYSTEMS  Review of Systems   All other systems reviewed and are negative.        PHYSICAL EXAM     VITAL SIGNS: There were no vitals taken for this visit.     CURRENT WEIGHT: [unfilled]  BMI: [unfilled]  PREVIOUS WEIGHTS:  Wt Readings from Last 3 Encounters:   06/21/24 70.8 kg (156 lb)   05/31/24 72.6 kg (160 lb)   05/25/24 72.6 kg (160 lb)       Physical Exam  Constitutional:       Comments: Clear voice    Pulmonary:      Effort: Pulmonary effort is normal.   Neurological:      General: No focal deficit present.      Mental Status: He is alert and oriented to person, place, and time. Mental status is at baseline.   Psychiatric:         Mood and Affect: Mood normal.         Behavior: Behavior normal.         Thought Content: Thought content normal.         Judgment: Judgment normal.         ASSESSMENT/PLAN     Mr. Magaña is a 63 y.o. male and  has a past medical history of Addiction to drug  (Multi), Chronic pain disorder, Depression, Essential (primary) hypertension, Personal history of other diseases of the digestive system, Personal history of other diseases of the musculoskeletal system and connective tissue, Personal history of other diseases of the respiratory system, Psychiatric illness, Substance abuse (Multi), and Suicide attempt (Multi). He was referred to the [facility] for pre-bronchoscopy evaluation of pulmonary nodules     Problem List and Orders      Assessment and Plan / Recommendations:  Problem List Items Addressed This Visit    None          Patient's visit was converted to a virtual visit.    1. right upper lobe mass  - Plan for bronchoscopy with biopsy   - Lab work prior to procedure needs labs   - Not on any anticoagulation     I explained the procedure to the patient. We discussed that the bronchoscopy will be performed by a member of the Interventional Pulmonary Team; depending on scheduling and provider availability. We also discussed that the IP providers function as a team and not infrequently may have to fill in for one another if there are emergent issues that need attention at the same time. The patient / family expressed understanding and agreed to proceed. All questions were answered.     Patient's visit was converted to a virtual visit. Spoke with the patient via phone for 13 minutes.    Prep: 10 minutes  Phone: 13 minutes  Total: 23 minutes            Thank you for visiting the Pulmonary clinic today!       Dianne Ramirez CNP  My office number is [phone] -     Best way to get a hold of me is to call my office --> Please do not send me follow my health messages  Any test results will be discussed at next visit -- please make sure to make a follow up appt after testing.

## 2024-07-01 NOTE — H&P (VIEW-ONLY)
Patient: Joe Magaña    [id number]  : 1960 -- AGE 63 y.o.    Provider: OCTAVIA Saucedo     Location [facility]   Service Date: 2024              [facility]  New Visit Note      HISTORY OF PRESENT ILLNESS     The patient's referring provider is: No ref. provider found    HISTORY OF PRESENT ILLNESS   Joe Magaña is a 63 y.o. male who presents to a [facility] for an pre-bronchoscopy evaluation with concerns of right lung mass . I have independently interviewed and examined the patient in the office and reviewed available records.    Current History  Joe Magaña has known mediastinal and hilar lymph nodes and the RUL nodule with mild activity, he had a PET scan that was non diagnostic, he was referred for  biopsy via EBUS.     On today's visit, the patient reports At baseline,  has dyspnea on exertion, but none at rest. He works part time and he is on his feet for 6-7 hours at a time.  They are active in her everyday life and carries loads and does strenuous exercise. He is only troubled by breathlessness except on strenuous exercise (mMRC 0).  They are s short of breath when hurrying on level ground or walking up a slight hill (mMRC 1).  Sleeps with a couple pillows, denies orthopnea, pnd, or jorge luis.  Weight has been mostly stable.  Relates chronic cough with intermittent productive phlegm with brown color, C/o wheezing. C/o night cough. No hemoptysis. No fever or shivering chills. Has a runny nose, and a tingling sensation in the back of his throat. Denies chest pain or heartburn.     Previous pulmonary history:  no history of recurrent infections, or lung disease as a child.  No previous lung hx, never on oxygen or inhaler therapy.     Inhalers/nebulized medications: albuterol intermittently    Hospitalization History: Not been hospitalized over the last year for breathing related problem.    Sleep  history:  Denies snoring, apnea, feeling tired during the day or taking naps during the day.       ALLERGIES AND MEDICATIONS     ALLERGIES  No Known Allergies    MEDICATIONS  Current Outpatient Medications   Medication Sig Dispense Refill    cloNIDine (Catapres) 0.1 mg tablet Take 1 tablet (0.1 mg) by mouth once daily at bedtime. 30 tablet 0    DULoxetine (Cymbalta) 60 mg DR capsule Take 1 capsule (60 mg) by mouth 2 times a day. 60 capsule 0    gabapentin (Neurontin) 300 mg capsule 1 capsule (300 mg).      hydrOXYzine pamoate (Vistaril) 50 mg capsule Take 1 capsule (50 mg) by mouth 1 time.      lidocaine 4 % patch Place 3 patches over 12 hours on the skin once daily. Remove & discard patch within 12 hours or as directed by MD. Do not start before February 24, 2024. (Patient not taking: Reported on 4/29/2024) 21 patch 0    QUEtiapine (SEROquel) 25 mg tablet Take 1 tablet (25 mg) by mouth once daily at bedtime. 30 tablet 0     No current facility-administered medications for this visit.         PAST HISTORY     PAST MEDICAL HISTORY  He  has a past medical history of 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), Chronic pain disorder, Depression, Essential (primary) hypertension, Personal history of other diseases of the digestive system, Personal history of other diseases of the musculoskeletal system and connective tissue, Personal history of other diseases of the respiratory system, Psychiatric illness, Substance abuse (Multi), and Suicide attempt (Multi).    PAST SURGICAL HISTORY  Past Surgical History:   Procedure Laterality Date    OTHER SURGICAL HISTORY  05/06/2020    Flexor tendon repair    OTHER SURGICAL HISTORY  05/06/2020    Oral surgery    OTHER SURGICAL HISTORY  05/06/2020    Shoulder surgery       IMMUNIZATION HISTORY  Immunization History   Administered Date(s) Administered    Red-rabbit SARS-CoV-2 Vaccination 12/21/2021       SOCIAL HISTORY  He  reports that he has been smoking cigarettes. He has a 22.5 pack-year smoking  history. He has never used smokeless tobacco. He reports that he does not currently use alcohol. He reports that he does not currently use drugs. He Patient  currently smoking 1 ppd x 30 years, previously 3 ppd x 10 years    OCCUPATIONAL/ENVIRONMENTAL HISTORY  Previously worked as: [employer], [employer]   Suspected exposure  to asbestos, silica, beryllium or inhaled metals.  DOES/DOES NOT EC: does not have exposure to birds or exotic animals.    FAMILY HISTORY  No family history on file.  DOES/DOES NOT EC: does have a family history of pulmonary disease. Mother and sister had lung cancer and smoked  DOES/DOES NOT EC: does not have a family history of cancer.  DOES/DOES NOT EC: does not have a family history of autoimmune disorders.    RESULTS/DATA     Pulmonary Function Test Results     No testing done     Chest Radiograph     XR chest 1 view 04/29/2024    Narrative  Interpreted By:  Schoenberger, Joseph,  STUDY:  XR CHEST 1 VIEW;  4/29/2024 2:47 pm    INDICATION:  Signs/Symptoms:Post right Lung Biopsy.    COMPARISON:  Exam performed at 11:36 a.m.    ACCESSION NUMBER(S):  [id number]    ORDERING CLINICIAN:  JOSEPH SCHOENBERGER    FINDINGS:  Findings are unchanged. There is no pneumothorax post right lung  biopsy. The mass in the periphery of the right upper lobe is  unchanged.        CARDIOMEDIASTINAL SILHOUETTE:  Cardiomediastinal silhouette is normal in size and configuration.    LUNGS:  No new focal lung opacity.    ABDOMEN:  No remarkable upper abdominal findings.    BONES:  No acute osseous changes.    Impression  1.  Satisfactory appearance post right upper lobe mass biopsy        MACRO:  None    Signed by: Joseph Schoenberger 4/29/2024 4:30 PM  Dictation workstation:   [id number]      Chest CT Scan     CT angio chest for pulmonary embolism 03/13/2024    COMPARISON:  CXR: 02/22/24, 09/13/23, 06/05/23, 04/07/23, 01/25/21; CT AP:  01/25/24, CT C/A/P 9/1/2020    TECHNIQUE:  CTA of the chest was  performed with intravenous contrast.  Images are reviewed and processed at a workstation according to the CT  angiogram protocol with 3-D and/or MIP post processing imaging  generated.  Omnipaque 350 75 mL was administered intravenously.  Automated mA/kV exposure control was utilized and patient examination  was performed in strict accordance with principles of ALARA.  FINDINGS:  Pulmonary arteries are adequately opacified without acute or chronic  filling defects.  The thoracic aorta is normal in course and caliber  without dissection or aneurysm.  The heart is normal in size without pericardial effusion.  Thoracic  lymph nodes are not enlarged.  There is no pleural effusion, pleural thickening, or pneumothorax.  The airways are patent.  There is a mass in the right upper lobe measuring 2.2 x 2.5 cm.  Surrounding this lesion, there is a ground glass infiltrate in the  right upper lobe.  Upper abdomen demonstrates no acute pathology. There is a stable mass  in the left adrenal gland measuring 2.6 x 2.5 cm. There are no acute  fractures.  No suspicious bony lesions.    Impression  1. No pulmonary emboli.  2. There is a mass in the right upper lobe. This is suspicious for  neoplasm. Surrounding this, there is an infiltrate which may represent  postobstructive pneumonia.  3. Stable left adrenal mass.  Signed by Beto Le MD      -----------------------------------------------------  PET scan: 6/7/2024    FINDINGS:  Nondiagnostic study secondary to marked uptake within the soft  tissues and musculature.      IMPRESSION:  Nondiagnostic study secondary to marked uptake within the soft  tissues and musculature. A repeat study is recommended following a  strict oncologic protocol (low carb high-protein diet for 24 hours  prior to the study, 6 hours NPO before the study, and no exercise for  24 hours before the study).      Echocardiogram        TRANSTHORACIC ECHOCARDIOGRAM REPORT 6/10/2021        Patient Name:      FARA NATION     Reading Physician:  [id number] Kurtis Beckham DO  Study Date:       6/10/2021          Referring           [id number] JESSICA LARIOS       PHYSICIAN INTERPRETATION:  Left Ventricle: The left ventricular systolic function is mildly decreased, with an estimated ejection fraction of 45%. There is global hypokinesis of the left ventricle with minor regional variations. The left ventricular cavity size is normal. There is mild concentric left ventricular hypertrophy. Spectral Doppler shows an impaired relaxation pattern of left ventricular diastolic filling.  Left Atrium: The left atrium is mildly dilated.  Right Ventricle: The right ventricle is normal in size. There is normal right ventricular global systolic function.  Right Atrium: The right atrium is normal in size.  Aortic Valve: The aortic valve appears structurally normal. The aortic valve appears tricuspid. There is mild aortic valve cusp calcification. There is no evidence of aortic valve stenosis.  There is no evidence of aortic valve regurgitation. The peak instantaneous gradient of the aortic valve is 7.4 mmHg. The mean gradient of the aortic valve is 4.0 mmHg.  Mitral Valve: The mitral valve is normal in structure. There is no evidence of mitral valve stenosis. There is normal mitral valve leaflet mobility. There is mild mitral annular calcification. There is mild mitral valve regurgitation.  Tricuspid Valve: The tricuspid valve is structurally normal. There is normal tricuspid valve leaflet mobility. There is trace tricuspid regurgitation.  Pulmonic Valve: The pulmonic valve is not well visualized. There is no indication of pulmonic valve regurgitation.  Pericardium: There is no pericardial effusion noted.  Aorta: The aortic root is normal.  Pulmonary Artery: The pulmonary artery is not well visualized. The tricuspid regurgitant velocity is 2.14 m/s, and with an estimated right atrial pressure  of 3 mmHg, the estimated pulmonary artery pressure is normal with the RVSP at 19.9 mmHg.  Systemic Veins: The inferior vena cava was not well visualized.  In comparison to the previous echocardiogram(s): Prior examinations are available and were reviewed for comparison purposes. The left ventricular function is worse. The left ventricular hypertrophy is unchanged. The left ventricular diastolic function is unchanged.        CONCLUSIONS:   1. The left ventricular systolic function is mildly decreased with a 45% estimated ejection fraction.   2. Spectral Doppler shows an impaired relaxation pattern of left ventricular diastolic filling.   3. There is mitral stenosis is not seen.   4. Aortic valve stenosis is not present.   5. The pulmonary artery is not well visualized.   6. There is global hypokinesis of the left ventricle with minor regional variations.          TRICUSPID VALVE/RVSP:                              Normal Ranges:  Peak TR Velocity: 2.14 m/s  RV Syst Pressure: 21.3 mmHg (< 30mmHg)         REVIEW OF SYSTEMS     REVIEW OF SYSTEMS  Review of Systems   All other systems reviewed and are negative.        PHYSICAL EXAM     VITAL SIGNS: There were no vitals taken for this visit.     CURRENT WEIGHT: [unfilled]  BMI: [unfilled]  PREVIOUS WEIGHTS:  Wt Readings from Last 3 Encounters:   06/21/24 70.8 kg (156 lb)   05/31/24 72.6 kg (160 lb)   05/25/24 72.6 kg (160 lb)       Physical Exam  Constitutional:       Comments: Clear voice    Pulmonary:      Effort: Pulmonary effort is normal.   Neurological:      General: No focal deficit present.      Mental Status: He is alert and oriented to person, place, and time. Mental status is at baseline.   Psychiatric:         Mood and Affect: Mood normal.         Behavior: Behavior normal.         Thought Content: Thought content normal.         Judgment: Judgment normal.         ASSESSMENT/PLAN     Mr. Magaña is a 63 y.o. male and  has a past medical history of Addiction to drug  (Multi), Chronic pain disorder, Depression, Essential (primary) hypertension, Personal history of other diseases of the digestive system, Personal history of other diseases of the musculoskeletal system and connective tissue, Personal history of other diseases of the respiratory system, Psychiatric illness, Substance abuse (Multi), and Suicide attempt (Multi). He was referred to the [facility] for pre-bronchoscopy evaluation of pulmonary nodules     Problem List and Orders      Assessment and Plan / Recommendations:  Problem List Items Addressed This Visit    None          Patient's visit was converted to a virtual visit.    1. right upper lobe mass  - Plan for bronchoscopy with biopsy   - Lab work prior to procedure needs labs   - Not on any anticoagulation     I explained the procedure to the patient. We discussed that the bronchoscopy will be performed by a member of the Interventional Pulmonary Team; depending on scheduling and provider availability. We also discussed that the IP providers function as a team and not infrequently may have to fill in for one another if there are emergent issues that need attention at the same time. The patient / family expressed understanding and agreed to proceed. All questions were answered.     Patient's visit was converted to a virtual visit. Spoke with the patient via phone for 13 minutes.    Prep: 10 minutes  Phone: 13 minutes  Total: 23 minutes            Thank you for visiting the Pulmonary clinic today!       Dianne Ramirez CNP  My office number is [phone] -     Best way to get a hold of me is to call my office --> Please do not send me follow my health messages  Any test results will be discussed at next visit -- please make sure to make a follow up appt after testing.

## 2024-07-09 ENCOUNTER — HOSPITAL ENCOUNTER (OUTPATIENT)
Dept: RESPIRATORY THERAPY | Facility: HOSPITAL | Age: 64
End: 2024-07-09
Payer: MEDICARE

## 2024-07-11 ENCOUNTER — LAB (OUTPATIENT)
Dept: LAB | Facility: LAB | Age: 64
End: 2024-07-11
Payer: MEDICARE

## 2024-07-11 ENCOUNTER — APPOINTMENT (OUTPATIENT)
Dept: PULMONOLOGY | Facility: CLINIC | Age: 64
End: 2024-07-11
Payer: MEDICARE

## 2024-07-11 VITALS — WEIGHT: 160 LBS | HEIGHT: 73 IN | BODY MASS INDEX: 21.2 KG/M2

## 2024-07-11 DIAGNOSIS — Z01.811 PREOP PULMONARY/RESPIRATORY EXAM: ICD-10-CM

## 2024-07-11 DIAGNOSIS — R91.8 LUNG MASS: Primary | ICD-10-CM

## 2024-07-11 DIAGNOSIS — R91.1 NODULE OF RIGHT LUNG: ICD-10-CM

## 2024-07-11 DIAGNOSIS — R59.0 MEDIASTINAL LYMPHADENOPATHY: ICD-10-CM

## 2024-07-11 DIAGNOSIS — J42 CHRONIC BRONCHITIS, UNSPECIFIED CHRONIC BRONCHITIS TYPE (MULTI): ICD-10-CM

## 2024-07-11 DIAGNOSIS — Z01.818 PRE-OP TESTING: ICD-10-CM

## 2024-07-11 LAB
ANION GAP SERPL CALC-SCNC: 12 MMOL/L (ref 10–20)
BUN SERPL-MCNC: 17 MG/DL (ref 6–23)
CALCIUM SERPL-MCNC: 8.9 MG/DL (ref 8.6–10.3)
CHLORIDE SERPL-SCNC: 106 MMOL/L (ref 98–107)
CO2 SERPL-SCNC: 27 MMOL/L (ref 21–32)
CREAT SERPL-MCNC: 1.02 MG/DL (ref 0.5–1.3)
EGFRCR SERPLBLD CKD-EPI 2021: 84 ML/MIN/1.73M*2
ERYTHROCYTE [DISTWIDTH] IN BLOOD BY AUTOMATED COUNT: 15.1 % (ref 11.5–14.5)
GLUCOSE SERPL-MCNC: 94 MG/DL (ref 74–99)
HCT VFR BLD AUTO: 40.4 % (ref 41–52)
HGB BLD-MCNC: 12.9 G/DL (ref 13.5–17.5)
MCH RBC QN AUTO: 29.1 PG (ref 26–34)
MCHC RBC AUTO-ENTMCNC: 31.9 G/DL (ref 32–36)
MCV RBC AUTO: 91 FL (ref 80–100)
NRBC BLD-RTO: 0 /100 WBCS (ref 0–0)
PLATELET # BLD AUTO: 267 X10*3/UL (ref 150–450)
POTASSIUM SERPL-SCNC: 4.3 MMOL/L (ref 3.5–5.3)
RBC # BLD AUTO: 4.43 X10*6/UL (ref 4.5–5.9)
SODIUM SERPL-SCNC: 141 MMOL/L (ref 136–145)
WBC # BLD AUTO: 6.6 X10*3/UL (ref 4.4–11.3)

## 2024-07-11 PROCEDURE — 36415 COLL VENOUS BLD VENIPUNCTURE: CPT

## 2024-07-11 PROCEDURE — 4004F PT TOBACCO SCREEN RCVD TLK: CPT | Performed by: NURSE PRACTITIONER

## 2024-07-11 PROCEDURE — 85027 COMPLETE CBC AUTOMATED: CPT

## 2024-07-11 PROCEDURE — 80048 BASIC METABOLIC PNL TOTAL CA: CPT

## 2024-07-11 PROCEDURE — 99202 OFFICE O/P NEW SF 15 MIN: CPT | Performed by: NURSE PRACTITIONER

## 2024-07-11 ASSESSMENT — PATIENT HEALTH QUESTIONNAIRE - PHQ9
SUM OF ALL RESPONSES TO PHQ9 QUESTIONS 1 AND 2: 0
1. LITTLE INTEREST OR PLEASURE IN DOING THINGS: NOT AT ALL
2. FEELING DOWN, DEPRESSED OR HOPELESS: NOT AT ALL

## 2024-07-11 ASSESSMENT — ENCOUNTER SYMPTOMS
OCCASIONAL FEELINGS OF UNSTEADINESS: 0
LOSS OF SENSATION IN FEET: 0
DEPRESSION: 0

## 2024-07-11 ASSESSMENT — PAIN SCALES - GENERAL: PAINLEVEL: 8

## 2024-07-14 ENCOUNTER — ANESTHESIA EVENT (OUTPATIENT)
Dept: GASTROENTEROLOGY | Facility: HOSPITAL | Age: 64
End: 2024-07-14
Payer: MEDICARE

## 2024-07-15 ENCOUNTER — HOSPITAL ENCOUNTER (OUTPATIENT)
Dept: GASTROENTEROLOGY | Facility: HOSPITAL | Age: 64
Discharge: HOME | End: 2024-07-15
Payer: MEDICARE

## 2024-07-15 ENCOUNTER — ANESTHESIA (OUTPATIENT)
Dept: GASTROENTEROLOGY | Facility: HOSPITAL | Age: 64
End: 2024-07-15
Payer: MEDICARE

## 2024-07-15 ENCOUNTER — HOSPITAL ENCOUNTER (OUTPATIENT)
Dept: RADIOLOGY | Facility: HOSPITAL | Age: 64
Discharge: HOME | End: 2024-07-15
Payer: MEDICARE

## 2024-07-15 VITALS
HEIGHT: 73 IN | OXYGEN SATURATION: 96 % | WEIGHT: 160 LBS | SYSTOLIC BLOOD PRESSURE: 156 MMHG | BODY MASS INDEX: 21.2 KG/M2 | TEMPERATURE: 97.9 F | HEART RATE: 54 BPM | DIASTOLIC BLOOD PRESSURE: 80 MMHG | RESPIRATION RATE: 16 BRPM

## 2024-07-15 DIAGNOSIS — R91.1 NODULE OF RIGHT LUNG: ICD-10-CM

## 2024-07-15 DIAGNOSIS — R59.0 MEDIASTINAL LYMPHADENOPATHY: ICD-10-CM

## 2024-07-15 PROCEDURE — 31629 BRONCHOSCOPY/NEEDLE BX EACH: CPT | Performed by: STUDENT IN AN ORGANIZED HEALTH CARE EDUCATION/TRAINING PROGRAM

## 2024-07-15 PROCEDURE — 88172 CYTP DX EVAL FNA 1ST EA SITE: CPT | Mod: TC,59,MCY | Performed by: STUDENT IN AN ORGANIZED HEALTH CARE EDUCATION/TRAINING PROGRAM

## 2024-07-15 PROCEDURE — 71250 CT THORAX DX C-: CPT | Performed by: RADIOLOGY

## 2024-07-15 PROCEDURE — 88305 TISSUE EXAM BY PATHOLOGIST: CPT | Mod: TC,59,SUR | Performed by: STUDENT IN AN ORGANIZED HEALTH CARE EDUCATION/TRAINING PROGRAM

## 2024-07-15 PROCEDURE — 31623 DX BRONCHOSCOPE/BRUSH: CPT | Performed by: STUDENT IN AN ORGANIZED HEALTH CARE EDUCATION/TRAINING PROGRAM

## 2024-07-15 PROCEDURE — 81458 SO GSAP DNA CPY NMBR&MCRSTL: CPT | Performed by: STUDENT IN AN ORGANIZED HEALTH CARE EDUCATION/TRAINING PROGRAM

## 2024-07-15 PROCEDURE — 2500000005 HC RX 250 GENERAL PHARMACY W/O HCPCS

## 2024-07-15 PROCEDURE — 7100000001 HC RECOVERY ROOM TIME - INITIAL BASE CHARGE

## 2024-07-15 PROCEDURE — 7100000009 HC PHASE TWO TIME - INITIAL BASE CHARGE

## 2024-07-15 PROCEDURE — 7100000010 HC PHASE TWO TIME - EACH INCREMENTAL 1 MINUTE

## 2024-07-15 PROCEDURE — 7100000002 HC RECOVERY ROOM TIME - EACH INCREMENTAL 1 MINUTE

## 2024-07-15 PROCEDURE — 3700000002 HC GENERAL ANESTHESIA TIME - EACH INCREMENTAL 1 MINUTE

## 2024-07-15 PROCEDURE — 31652 BRONCH EBUS SAMPLNG 1/2 NODE: CPT | Performed by: STUDENT IN AN ORGANIZED HEALTH CARE EDUCATION/TRAINING PROGRAM

## 2024-07-15 PROCEDURE — 2720000007 HC OR 272 NO HCPCS

## 2024-07-15 PROCEDURE — 71250 CT THORAX DX C-: CPT

## 2024-07-15 PROCEDURE — 3700000001 HC GENERAL ANESTHESIA TIME - INITIAL BASE CHARGE

## 2024-07-15 PROCEDURE — 2500000004 HC RX 250 GENERAL PHARMACY W/ HCPCS (ALT 636 FOR OP/ED)

## 2024-07-15 RX ORDER — LIDOCAINE HYDROCHLORIDE 10 MG/ML
0.1 INJECTION INFILTRATION; PERINEURAL ONCE
OUTPATIENT
Start: 2024-07-15 | End: 2024-07-15

## 2024-07-15 RX ORDER — ACETAMINOPHEN 325 MG/1
650 TABLET ORAL EVERY 4 HOURS PRN
OUTPATIENT
Start: 2024-07-15

## 2024-07-15 RX ORDER — ONDANSETRON HYDROCHLORIDE 2 MG/ML
INJECTION, SOLUTION INTRAVENOUS AS NEEDED
Status: DISCONTINUED | OUTPATIENT
Start: 2024-07-15 | End: 2024-07-15

## 2024-07-15 RX ORDER — PROPOFOL 10 MG/ML
INJECTION, EMULSION INTRAVENOUS AS NEEDED
Status: DISCONTINUED | OUTPATIENT
Start: 2024-07-15 | End: 2024-07-15

## 2024-07-15 RX ORDER — ROCURONIUM BROMIDE 10 MG/ML
INJECTION, SOLUTION INTRAVENOUS AS NEEDED
Status: DISCONTINUED | OUTPATIENT
Start: 2024-07-15 | End: 2024-07-15

## 2024-07-15 RX ORDER — SODIUM CHLORIDE, SODIUM LACTATE, POTASSIUM CHLORIDE, CALCIUM CHLORIDE 600; 310; 30; 20 MG/100ML; MG/100ML; MG/100ML; MG/100ML
INJECTION, SOLUTION INTRAVENOUS CONTINUOUS PRN
Status: DISCONTINUED | OUTPATIENT
Start: 2024-07-15 | End: 2024-07-15

## 2024-07-15 RX ORDER — ONDANSETRON HYDROCHLORIDE 2 MG/ML
4 INJECTION, SOLUTION INTRAVENOUS ONCE AS NEEDED
OUTPATIENT
Start: 2024-07-15

## 2024-07-15 RX ORDER — ALBUTEROL SULFATE 0.83 MG/ML
2.5 SOLUTION RESPIRATORY (INHALATION) ONCE AS NEEDED
OUTPATIENT
Start: 2024-07-15

## 2024-07-15 RX ORDER — LIDOCAINE HYDROCHLORIDE 20 MG/ML
INJECTION, SOLUTION INFILTRATION; PERINEURAL AS NEEDED
Status: DISCONTINUED | OUTPATIENT
Start: 2024-07-15 | End: 2024-07-15

## 2024-07-15 RX ORDER — FENTANYL CITRATE 50 UG/ML
INJECTION, SOLUTION INTRAMUSCULAR; INTRAVENOUS CONTINUOUS PRN
Status: DISCONTINUED | OUTPATIENT
Start: 2024-07-15 | End: 2024-07-15

## 2024-07-15 RX ORDER — PHENYLEPHRINE HCL IN 0.9% NACL 0.4MG/10ML
SYRINGE (ML) INTRAVENOUS AS NEEDED
Status: DISCONTINUED | OUTPATIENT
Start: 2024-07-15 | End: 2024-07-15

## 2024-07-15 RX ORDER — SODIUM CHLORIDE, SODIUM LACTATE, POTASSIUM CHLORIDE, CALCIUM CHLORIDE 600; 310; 30; 20 MG/100ML; MG/100ML; MG/100ML; MG/100ML
100 INJECTION, SOLUTION INTRAVENOUS CONTINUOUS
OUTPATIENT
Start: 2024-07-15

## 2024-07-15 RX ORDER — MIDAZOLAM HYDROCHLORIDE 1 MG/ML
INJECTION INTRAMUSCULAR; INTRAVENOUS AS NEEDED
Status: DISCONTINUED | OUTPATIENT
Start: 2024-07-15 | End: 2024-07-15

## 2024-07-15 ASSESSMENT — PAIN - FUNCTIONAL ASSESSMENT
PAIN_FUNCTIONAL_ASSESSMENT: 0-10
PAIN_FUNCTIONAL_ASSESSMENT: WONG-BAKER FACES
PAIN_FUNCTIONAL_ASSESSMENT: 0-10

## 2024-07-15 ASSESSMENT — PAIN SCALES - GENERAL
PAINLEVEL_OUTOF10: 5 - MODERATE PAIN
PAINLEVEL_OUTOF10: 5 - MODERATE PAIN
PAINLEVEL_OUTOF10: 0 - NO PAIN

## 2024-07-15 NOTE — LETTER
DearJoe       6/27/2024                                                                                          INFORMATION FOR YOUR PROCEDURE    INSTRUCTIONS MUST BE FOLLOWED OR YOU RUN THE RISK OF YOUR CASE BEING CANCELED    Information is attached to this e-mail for your upcoming procedure. (Look for the paperclip in your email to access this information)  Lab requisitions (if needed), are also included as well as procedural instructions.       You are scheduled for your Bronchoscopy on  7/15/24 , with Dr. Oscar Hannah  [facility] [address] [place] [address]    09:30 AM    - CT  Scan  [facility]   2nd Floor Radiology  [address]    When finished with the CT scan,   please make your way to [facility] Room 1300.  This is right around the corner from [facility].  Located on the first floor.  There are information desks there for your convenience and if you have questions.    Check In will be at  10:30  AM.  This allows us to prepare you for the actual procedure.                                        Bronchoscopy   Location:  [address] [facility]                                         Bronchoscopy / Endoscopy Suite    NPO (No food or drink) after midnight the night before your procedure. This includes coffee, water, and soda, hard candy, gum or mints.  If taking your morning medications, a small sip of water is allowed to get the medication down.    For your safety, you must have a responsible, adult  accompany you to your procedure.  You will not be permitted to drive yourself home if you have received any type of anesthesia or sedation.    Automated calls about your upcoming scheduled appointments can be quite confusing for patients.    The times may vary depending on what you have scheduled for procedure day. Follow the time/s I have given you on your information sheet so there is no confusion.  Be aware you may receive  conflicting times from different departments.      Blood work will need to be done prior to your procedure, preferably at a  Facility.  There are no restrictions for testing. I have attached a requisition for you.    Our Nurse Practitioner,  Dianne Ramirez CNP, will call you on 7/11/24, @ 3:00 PM    This is a phone visit to go over your medical history prior to your procedure, and is a necessary part of your medical workup.  The patient must be present at this visit.      Please reach out to me with any questions you may have  Katelynn  [phone] or Shane @ [phone]    Have a nice day!    Katelynn Austin    Bronchoscopy   Interventional Pulmonology    MD Rebecca Can MD Sameer Avasarala, MD Catalina Teba, MD Andrew Dunatchik, MD      [facility]  Pulmonary, Critical Care and Sleep Medicine  [address] [place] [address]  P -[phone]  - [phone]  [email or web address]

## 2024-07-15 NOTE — ANESTHESIA PROCEDURE NOTES
Airway  Date/Time: 7/15/2024 1:57 PM  Urgency: elective    Airway not difficult    Staffing  Performed: EDMUND   Authorized by: Ana Russell MD    Performed by: Braden Muhammad  Patient location during procedure: OR    Indications and Patient Condition  Indications for airway management: anesthesia  Spontaneous Ventilation: absent  Sedation level: deep  Preoxygenated: yes  Patient position: sniffing  Mask difficulty assessment: 1 - vent by mask    Final Airway Details  Final airway type: endotracheal airway      Successful airway: ETT  Cuffed: yes   Successful intubation technique: direct laryngoscopy  Facilitating devices/methods: intubating stylet  Endotracheal tube insertion site: oral  Blade: Marialuisa  Blade size: #4  ETT size (mm): 8.5  Cormack-Lehane Classification: grade IIa - partial view of glottis  Placement verified by: chest auscultation and capnometry   Measured from: lips  ETT to lips (cm): 21  Number of attempts at approach: 1    Additional Comments  Atraumatic intubation per MSAS2 under direct supervision with no complications. Lips and gums in preanesthetic condition.

## 2024-07-15 NOTE — ANESTHESIA POSTPROCEDURE EVALUATION
Patient: Joe Magaña    Procedure Summary       Date: 07/15/24 Room / Location: [facility]    Anesthesia Start: 1349 Anesthesia Stop: 1537    Procedure: BRONCHOSCOPY Diagnosis:       Nodule of right lung      Mediastinal lymphadenopathy    Scheduled Providers: Oscar Hannah MD Responsible Provider: Ana Russell MD    Anesthesia Type: general ASA Status: 3            Anesthesia Type: general    Vitals Value Taken Time   /62 07/15/24 1545   Temp 36.6 °C (97.9 °F) 07/15/24 1533   Pulse 61 07/15/24 1545   Resp 20 07/15/24 1545   SpO2 95 % 07/15/24 1545       Anesthesia Post Evaluation    Patient location during evaluation: PACU  Patient participation: complete - patient participated  Level of consciousness: awake  Pain management: adequate  Airway patency: patent  Cardiovascular status: acceptable  Respiratory status: acceptable  Hydration status: acceptable  Postoperative Nausea and Vomiting: none        There were no known notable events for this encounter.

## 2024-07-15 NOTE — ANESTHESIA PREPROCEDURE EVALUATION
Patient: Joe Magaña    Procedure Information       Date/Time: 07/15/24 1130    Scheduled providers: Oscar Hannah MD    Procedure: BRONCHOSCOPY    Location: [facility]            Relevant Problems   Anesthesia (within normal limits)  O family hx MH      Cardiac   (+) HTN (hypertension)      Pulmonary   (+) COPD (chronic obstructive pulmonary disease) (Multi)      Neuro   (+) MDD (major depressive disorder), recurrent episode, moderate (Multi)      GI (within normal limits)      /Renal (within normal limits)      Liver (within normal limits)      Endocrine (within normal limits)      Hematology (within normal limits)      Musculoskeletal (within normal limits)      HEENT (within normal limits)      ID (within normal limits)      Skin (within normal limits)      GYN (within normal limits)      Respiratory   (+) Lung mass      Circulatory   (+) Mediastinal lymphadenopathy       Clinical information reviewed:                   NPO Detail:  No data recorded     Physical Exam    Airway  Mallampati: IV  TM distance: >3 FB     Cardiovascular - normal exam     Dental   (+) upper dentures, lower dentures     Pulmonary - normal exam     Abdominal          Vitals:    07/15/24 1054   BP: 156/90   Pulse: 71   Resp: 18   Temp: 36.5 °C (97.7 °F)   SpO2: 97%       Past Surgical History:   Procedure Laterality Date    ELBOW SURGERY      OTHER SURGICAL HISTORY  05/06/2020    Flexor tendon repair    OTHER SURGICAL HISTORY  05/06/2020    Oral surgery    OTHER SURGICAL HISTORY  05/06/2020    Shoulder surgery     Past Medical History:   Diagnosis Date    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)     Arthritis     Chronic pain disorder     Degenerative disc disease, lumbar     Depression     Essential (primary) hypertension     Benign essential hypertension    Personal history of other diseases of the digestive system     History of diverticulosis    Personal history of other diseases of the musculoskeletal system and  connective tissue     History of gout    Personal history of other diseases of the respiratory system     History of COPD    Psychiatric illness     Substance abuse (Multi)     Suicide attempt (Multi)        Current Outpatient Medications:     gabapentin (Neurontin) 300 mg capsule, 1 capsule (300 mg)., Disp: , Rfl:     hydrOXYzine pamoate (Vistaril) 50 mg capsule, Take 1 capsule (50 mg) by mouth 1 time., Disp: , Rfl:     cloNIDine (Catapres) 0.1 mg tablet, Take 1 tablet (0.1 mg) by mouth once daily at bedtime., Disp: 30 tablet, Rfl: 0    DULoxetine (Cymbalta) 60 mg DR capsule, Take 1 capsule (60 mg) by mouth 2 times a day., Disp: 60 capsule, Rfl: 0    lidocaine 4 % patch, Place 3 patches over 12 hours on the skin once daily. Remove & discard patch within 12 hours or as directed by MD. Do not start before February 24, 2024. (Patient not taking: Reported on 4/29/2024), Disp: 21 patch, Rfl: 0    QUEtiapine (SEROquel) 25 mg tablet, Take 1 tablet (25 mg) by mouth once daily at bedtime., Disp: 30 tablet, Rfl: 0  Prior to Admission medications    Medication Sig Start Date End Date Taking? Authorizing Provider   gabapentin (Neurontin) 300 mg capsule 1 capsule (300 mg). 3/28/24  Yes Historical Provider, MD   hydrOXYzine pamoate (Vistaril) 50 mg capsule Take 1 capsule (50 mg) by mouth 1 time.   Yes Historical Provider, MD   cloNIDine (Catapres) 0.1 mg tablet Take 1 tablet (0.1 mg) by mouth once daily at bedtime. 2/23/24 3/24/24  OCTAVIA Renteria   DULoxetine (Cymbalta) 60 mg DR capsule Take 1 capsule (60 mg) by mouth 2 times a day. 2/23/24 3/24/24  OCTAVIA Renteria   lidocaine 4 % patch Place 3 patches over 12 hours on the skin once daily. Remove & discard patch within 12 hours or as directed by MD. Do not start before February 24, 2024.  Patient not taking: Reported on 4/29/2024 2/24/24   OCTAVIA Renteria   QUEtiapine (SEROquel) 25 mg tablet Take 1 tablet (25 mg) by mouth once daily at  bedtime. 2/23/24 3/24/24  Beto Leung, APRN-CNP     No Known Allergies  Social History     Tobacco Use    Smoking status: Every Day     Current packs/day: 1.00     Average packs/day: 1 pack/day for 15.0 years (15.0 ttl pk-yrs)     Types: Cigarettes    Smokeless tobacco: Never   Substance Use Topics    Alcohol use: Yes     Comment: occasional         Chemistry    Lab Results   Component Value Date/Time     07/11/2024 1439    K 4.3 07/11/2024 1439     07/11/2024 1439    CO2 27 07/11/2024 1439    BUN 17 07/11/2024 1439    CREATININE 1.02 07/11/2024 1439    Lab Results   Component Value Date/Time    CALCIUM 8.9 07/11/2024 1439    ALKPHOS 77 03/13/2024 1647    AST 10 03/13/2024 1647    ALT 7 (L) 03/13/2024 1647    BILITOT 0.5 03/13/2024 1647          Lab Results   Component Value Date/Time    WBC 6.6 07/11/2024 1439    HGB 12.9 (L) 07/11/2024 1439    HCT 40.4 (L) 07/11/2024 1439     07/11/2024 1439     Lab Results   Component Value Date/Time    PROTIME 11.3 04/25/2024 1146    INR 1.0 04/25/2024 1146     Encounter Date: 02/19/24   ECG 12 lead   Result Value    Ventricular Rate 81    Atrial Rate 81    [place] Interval 152    QRS Duration 90    QT Interval 400    QTC Calculation(Bazett) 464    P Axis 72    R Axis 62    T Axis 55    QRS Count 13    Q Onset 218    P Onset 142    P Offset 196    T Offset 418    QTC Fredericia 442    Narrative    Normal sinus rhythm  Normal ECG  When compared with ECG of 12-SEP-2023 23:46,  Sinus rhythm has replaced Ectopic atrial rhythm  Left posterior fascicular block is no longer Present  See ED provider note for full interpretation and clinical correlation  Confirmed by Raine Contreras ([id number]) on 2/24/2024 12:05:26 PM     No results found for this or any previous visit from the past 1095 days.       Anesthesia Plan    History of general anesthesia?: yes  History of complications of general anesthesia?: no    ASA 3     general     intravenous induction   Trial extubation is  planned.  Anesthetic plan and risks discussed with patient.  Use of blood products discussed with patient who consented to blood products.    Plan discussed with [facility].

## 2024-07-15 NOTE — ANESTHESIA PROCEDURE NOTES
Peripheral IV  Date/Time: 7/15/2024 3:32 PM  Inserted by: Braden Muhammad    Placement  Needle size: 20 G  Laterality: left  Location: hand  Local anesthetic: none  Site prep: chlorhexidine  Technique: anatomical landmarks  Attempts: 1

## 2024-07-15 NOTE — DISCHARGE INSTRUCTIONS
-You received sedation today:  - Do not drive or operate any machinery or power tools of any kind.   - No alcoholic beverages today, not even beer or wine.  - Do not make any important decisions or sign any legal documents.  - No over the counter medications that contain alcohol or that may cause drowsiness.  - Do not make any important decisions or sign any legal documents.       The anesthetics, sedatives or narcotics which were given to you today will be acting in your body for the next 24 hours, so you might feel a little sleepy or groggy. This feeling should slowly wear off.   Carefully read and follow the instructions below:   You received sedation today.   Do not drive or operate machinery or power tools of any kind.   No alcoholic beverages today, not even beer or wine.   No over the counter medications that contain alcohol or may cause drowsiness.   Do not make important decisions or sign legal documents.     Do not use Aspirin containing products or non-steroidal medications for the next 24 hours.  (Examples of these types of medications include: Advil, Aleve, Ecotrin, Ibuprofen, Motrin or Naprosyn.  This list is not all-inclusive.  Check with your physician or pharmacist before resuming these medications.)  Tylenol, cough medicine, cough drops or throat lozenges may be used when you are allowed to resume eating and drinking.     Call your physician if any of these symptoms occur:   High fever over 101 degrees or chills (a low grade fever is common for 24 hours)   Rash or hives   Persistent nausea or vomiting   Inability to urinate within 8 hours after the procedure  Go directly to the emergency room if you notice any of the following:   Shortness of breath   Chest pain  Coughing up large amounts of bright red blood greater than a teaspoonful of blood clots (about a teaspoonful for the next 24-48 hours is normal, especially if you had a biopsy)  Resume all normal medications unless directed otherwise by  your doctor.     Your doctor recommends these additional instructions:      Follow up with your referring physician as previously scheduled.    If you experience any problems or have any questions following discharge, please call:   Before 5 pm: [phone]   After 5pm and on weekends: [phone] / [phone] and ask for the Pulmonary Fellow on-call (Pager Number: [phone])

## 2024-07-16 DIAGNOSIS — C34.90: Primary | ICD-10-CM

## 2024-07-19 LAB
LAB AP ASR DISCLAIMER: NORMAL
LABORATORY COMMENT REPORT: NORMAL
PATH REPORT.COMMENTS IMP SPEC: NORMAL
PATH REPORT.FINAL DX SPEC: NORMAL
PATH REPORT.GROSS SPEC: NORMAL
PATH REPORT.TOTAL CANCER: NORMAL

## 2024-07-22 ENCOUNTER — PATIENT OUTREACH (OUTPATIENT)
Dept: HEMATOLOGY/ONCOLOGY | Facility: CLINIC | Age: 64
End: 2024-07-22
Payer: MEDICARE

## 2024-07-22 LAB
LABORATORY COMMENT REPORT: NORMAL
LABORATORY COMMENT REPORT: NORMAL
PATH REPORT.FINAL DX SPEC: NORMAL
PATH REPORT.GROSS SPEC: NORMAL
PATH REPORT.INTRAOP OBS SPEC DOC: NORMAL
PATH REPORT.TOTAL CANCER: NORMAL
RESIDENT REVIEW: NORMAL
TEST COMMENT - SURGICAL SENDOUT REQUEST: NORMAL

## 2024-07-22 SDOH — ECONOMIC STABILITY: GENERAL
WHICH OF THE FOLLOWING WOULD YOU LIKE TO GET CONNECTED TO IN ORDER TO RECEIVE A DISCOUNT OR FOR FREE? (CHOOSE ALL THAT APPLY): COMPUTER;BROADBAND INTERNET SUBSCRIPTION

## 2024-07-22 SDOH — ECONOMIC STABILITY: GENERAL
WHICH OF THE FOLLOWING DO YOU KNOW HOW TO USE AND HAVE ACCESS TO EVERY DAY? (CHOOSE ALL THAT APPLY): SMARTPHONE WITH CELLULAR DATA PLAN

## 2024-07-22 NOTE — PROGRESS NOTES
"Call: Call was placed and Mr. Magaña answered.    Dx History: Patient states he was not given a diagnosis yet and still hasn't been given any updates. When asked the history of this recent journey, he states that in April he was getting rumbling in his chest, \"like a lot of fluid in there\" and he felt like he had to cough. When he sat up and coughed, he expelled a large amount of blood. He then went to the ER and started testing.    SDOH/Barriers to Care: Patient endorses several barriers to care including emotiona;, stress related, lack of support systems, financial and transportation based. SDOH completed. Patient did say that he has not received confirmation for a ride yet, but did attempt to reach his . Active listening was used, and our phone number was given in case patient needed to reach us for resources/support.    Confirmation: Patient states that he does plan on coming Thursday (although he has not secured a ride at this time). He is aware of the location and has our phone number for call-back. No further needs at this time.  "

## 2024-07-25 ENCOUNTER — OFFICE VISIT (OUTPATIENT)
Dept: HEMATOLOGY/ONCOLOGY | Facility: CLINIC | Age: 64
End: 2024-07-25
Payer: MEDICARE

## 2024-07-25 ENCOUNTER — APPOINTMENT (OUTPATIENT)
Dept: HEMATOLOGY/ONCOLOGY | Facility: CLINIC | Age: 64
End: 2024-07-25
Payer: MEDICARE

## 2024-07-25 DIAGNOSIS — C34.11 MALIGNANT NEOPLASM OF UPPER LOBE OF RIGHT LUNG (MULTI): ICD-10-CM

## 2024-07-25 DIAGNOSIS — C34.01 MALIGNANT NEOPLASM OF HILUS OF RIGHT LUNG (MULTI): Primary | ICD-10-CM

## 2024-07-26 LAB
ELECTRONICALLY SIGNED BY: NORMAL
FOCUSED SOLID TUMOR DNA/RNA RESULTS: NORMAL
FOCUSED SOLID TUMOR DNA/RNA RESULTS: NORMAL

## 2024-08-01 ENCOUNTER — APPOINTMENT (OUTPATIENT)
Dept: HEMATOLOGY/ONCOLOGY | Facility: CLINIC | Age: 64
End: 2024-08-01
Payer: MEDICARE

## 2024-08-01 ENCOUNTER — OFFICE VISIT (OUTPATIENT)
Dept: HEMATOLOGY/ONCOLOGY | Facility: CLINIC | Age: 64
End: 2024-08-01
Payer: MEDICARE

## 2024-08-01 VITALS
OXYGEN SATURATION: 95 % | BODY MASS INDEX: 23.9 KG/M2 | SYSTOLIC BLOOD PRESSURE: 133 MMHG | WEIGHT: 157.7 LBS | HEART RATE: 68 BPM | HEIGHT: 68 IN | RESPIRATION RATE: 14 BRPM | TEMPERATURE: 98.6 F | DIASTOLIC BLOOD PRESSURE: 82 MMHG

## 2024-08-01 DIAGNOSIS — C34.90: ICD-10-CM

## 2024-08-01 PROCEDURE — 4004F PT TOBACCO SCREEN RCVD TLK: CPT | Performed by: INTERNAL MEDICINE

## 2024-08-01 PROCEDURE — 99205 OFFICE O/P NEW HI 60 MIN: CPT | Performed by: INTERNAL MEDICINE

## 2024-08-01 PROCEDURE — 3075F SYST BP GE 130 - 139MM HG: CPT | Performed by: INTERNAL MEDICINE

## 2024-08-01 PROCEDURE — 3008F BODY MASS INDEX DOCD: CPT | Performed by: INTERNAL MEDICINE

## 2024-08-01 PROCEDURE — 3079F DIAST BP 80-89 MM HG: CPT | Performed by: INTERNAL MEDICINE

## 2024-08-01 PROCEDURE — 99215 OFFICE O/P EST HI 40 MIN: CPT | Performed by: INTERNAL MEDICINE

## 2024-08-01 ASSESSMENT — ENCOUNTER SYMPTOMS
BACK PAIN: 1
ADENOPATHY: 0
VOMITING: 0
CONSTIPATION: 0
DEPRESSION: 0
DIFFICULTY URINATING: 0
DIARRHEA: 1
SHORTNESS OF BREATH: 1
OCCASIONAL FEELINGS OF UNSTEADINESS: 0
HEADACHES: 0
COUGH: 1
ABDOMINAL PAIN: 0
UNEXPECTED WEIGHT CHANGE: 0
LOSS OF SENSATION IN FEET: 0
DIZZINESS: 0
LEG SWELLING: 0
FATIGUE: 1
NAUSEA: 0
EYE PROBLEMS: 0
NECK PAIN: 1
ARTHRALGIAS: 0
APPETITE CHANGE: 1

## 2024-08-01 ASSESSMENT — PAIN SCALES - GENERAL: PAINLEVEL: 8

## 2024-08-01 NOTE — PROGRESS NOTES
[facility] - Medical Oncology New Patient Visit    Patient ID: Joe Magaña is a 63 y.o. male.  Referring Physician: Silvina Fitch MD  [id number] [place] [address]  Primary Care Provider: UMBERTO Roland-CNP      Chief Concern: Patient is here to establish care for newly diagnosed lung cancer    HPI Patient is a 64yo M presenting to establish care. Oncologic history summarized below. Overall he reports not feeling too well. He is working full time - walks to work (about 5m from where he is staying, current undomiciled staying on porch). Legs feel stiff/weak sometimes - has chronic lower neck and mid-back pain that does seem worse recently. Feels like he is struggling to get good rest due to living situation which makes things worse. Breathing is a little labored. Has chronic cough, occasionally productive. Feels he has lost weight and then gained back, appetite is not great. 2 weeks ago had diarrhea for about 1 week - 4-5 episodes per day including incontinent episodes x2 overnight. Improved, but does feel this is coming back a little.     Diagnosis: Lung adenocarcinoma  Stage: Cancer Staging   No matching staging information was found for the patient.   TBD suspect [id number] - IVB  Current sites of disease: R lung, mediastinal LNs, possible liver  Molecular and ancillary testing:     PD-L1 <1%  NGS without actionable mutations    Oncologic History:  3/13/24 - Presented to ER with hemoptysis, CT with RUL mass suspicious for neoplasm  4/29/24 - CT guided R lung biopsy with no evidence of malignancy  6/7/24 - PET non-diagnostic  7/15/24 - CT with decrease in RUL nodule, increase in R perihilar lesion and new R supraclavicular LN, new hepatic hypodensities concerning for metastatic disease  7/15/24 - EBUS with biopsy of RUL with NSCLC (adenocarcinoma), including bronchial brush RUL and 4L lymph node. Level 11L negative.    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)  No date: Arthritis  No date: Chronic pain disorder  No date: Degenerative disc disease, lumbar  No date: Depression  No date: Essential (primary) hypertension      Comment:  Benign essential hypertension  No date: Personal history of other diseases of the digestive system      Comment:  History of diverticulosis  No date: Personal history of other diseases of the musculoskeletal   system and connective tissue      Comment:  History of gout  No date: Personal history of other diseases of the respiratory system      Comment:  History of COPD  No date: Psychiatric illness  No date: Substance abuse (Multi)  No date: Suicide attempt (Multi)     Past Surgical History:  No date: ELBOW SURGERY  05/06/2020: OTHER SURGICAL HISTORY      Comment:  Flexor tendon repair  05/06/2020: OTHER SURGICAL HISTORY      Comment:  Oral surgery  05/06/2020: OTHER SURGICAL HISTORY      Comment:  Shoulder surgery     Social Hx:  Chronic pain  Opioid overdose noted 9/2023 - fentanyl/oxycodone  ER visit 2/2024 noted SI (1 suicide attempt in past)  Undomiciled, unstable housing for a while  Currently smokes about 1ppd x many years, quit for about 7 years in his 20s  Works at [employer]    Family Hx  Father passed away this year from dementia  Mother passed away at 99 from lung cancer, she did go through chemo  Sister passed away in 2023 from cancer (lung/liver)     Meds (Current):  Current Outpatient Medications   Medication Instructions    cloNIDine (CATAPRES) 0.1 mg, oral, Nightly    DULoxetine (CYMBALTA) 60 mg, oral, 2 times daily    gabapentin (NEURONTIN) 300 mg    hydrOXYzine pamoate (VISTARIL) 50 mg, oral, Once    lidocaine 4 % patch 3 patches, transdermal, Daily, Remove & discard patch within 12 hours or as directed by MD.    QUEtiapine (SEROQUEL) 25 mg, oral, Nightly      No Known Allergies    Review of Systems   Constitutional:  Positive for appetite change and fatigue. Negative for unexpected  "weight change.   HENT:   Negative for hearing loss.    Eyes:  Negative for eye problems.   Respiratory:  Positive for cough and shortness of breath.    Cardiovascular:  Negative for chest pain and leg swelling.   Gastrointestinal:  Positive for diarrhea. Negative for abdominal pain, constipation, nausea and vomiting.   Genitourinary:  Negative for difficulty urinating.    Musculoskeletal:  Positive for back pain and neck pain. Negative for arthralgias.   Skin:  Negative for rash.   Neurological:  Negative for dizziness and headaches.   Hematological:  Negative for adenopathy.      Objective   BSA: 1.85 meters squared  /82 (BP Location: Left arm, Patient Position: Sitting)   Pulse 68   Temp 37 °C (98.6 °F) (Temporal)   Resp 14   Ht (S) 1.728 m (5' 8.03\")   Wt 71.5 kg (157 lb 11.2 oz)   SpO2 95%   BMI 23.96 kg/m²   Performance Status:  (1) Restricted in physically strenuous activity, ambulatory and able to do work of light nature     Physical Exam  Vitals and nursing note reviewed.   Constitutional:       General: He is not in acute distress.  HENT:      Head: Normocephalic and atraumatic.   Eyes:      Pupils: Pupils are equal, round, and reactive to light.   Cardiovascular:      Rate and Rhythm: Normal rate and regular rhythm.      Heart sounds: Normal heart sounds.   Pulmonary:      Effort: Pulmonary effort is normal.      Breath sounds: Normal breath sounds.   Abdominal:      General: There is no distension.   Musculoskeletal:         General: No swelling.   Skin:     General: Skin is warm and dry.      Findings: No rash.   Neurological:      General: No focal deficit present.      Mental Status: He is alert and oriented to person, place, and time.   Psychiatric:         Mood and Affect: Mood normal.         Behavior: Behavior normal.        Results:  Labs:  Lab Results   Component Value Date    WBC 6.6 07/11/2024    HGB 12.9 (L) 07/11/2024    HCT 40.4 (L) 07/11/2024    MCV 91 07/11/2024     " 07/11/2024      Lab Results   Component Value Date    NEUTROABS 6.01 03/13/2024      Lab Results   Component Value Date    GLUCOSE 94 07/11/2024    CALCIUM 8.9 07/11/2024     07/11/2024    K 4.3 07/11/2024    CO2 27 07/11/2024     07/11/2024    BUN 17 07/11/2024    CREATININE 1.02 07/11/2024     Lab Results   Component Value Date    ALT 7 (L) 03/13/2024    AST 10 03/13/2024    ALKPHOS 77 03/13/2024    BILITOT 0.5 03/13/2024      Imaging:  I have personally reviewed the below imaging and concur with the reported findings unless otherwise stated:    === Results for orders placed during the hospital encounter of 07/15/24 ===    CT chest wo IV contrast for [facility] planning [[id number]] 07/15/2024    Status: Normal  1.  When compared to prior exam the previously noted right upper lung  pulmonary nodule with adjacent ground-glass opacities has decreased  in overall size, however there has been interval increase in size of  and apparently separate, spiculated right perihilar lesion as well as  a new right supraclavicular enlarged lymph node. PET-CT and/or repeat  tissue sampling are again recommended as warranted.  2. Multiple new hepatic hypodensities which are incompletely  characterized though they are suspicious for metastatic disease.  Liver MRI is recommended for further evaluation.  3. Stable subcarinal lymph node.  4. Other chronic findings as above.    === Results for orders placed during the hospital encounter of 03/13/24 ===    CT angio chest for pulmonary embolism [[id number]] 03/13/2024    Status: Normal    Addendum 3/13/2024  7:54 PM ------------------------------------------------  This finding was discussed with and acknowledged by Dr. Rell Gamez  on 3/13/2024 at 7:50 PM  Signed by Beto Le MD  1. No pulmonary emboli.  2. There is a mass in the right upper lobe. This is suspicious for  neoplasm. Surrounding this, there is an infiltrate which may represent  postobstructive pneumonia.  3.  Stable left adrenal mass.  Signed by Beto Le MD    === Results for orders placed during the hospital encounter of 06/07/24 ===    NM PET CT bone skull base to mid thigh [[id number]] 06/07/2024    Status: Normal  Nondiagnostic study secondary to marked uptake within the soft  tissues and musculature. A repeat study is recommended following a  strict oncologic protocol (low carb high-protein diet for 24 hours  prior to the study, 6 hours NPO before the study, and no exercise for  24 hours before the study).    I personally reviewed the image(s) / study and agree with the  findings and interpretation as stated. This study was interpreted at  [facility].    Signed by: Memo Washington 6/7/2024 1:24 PM  Dictation workstation:   [id number]      Pathology:    Lab Results   Component Value Date    FINALINTERP  07/15/2024     A. LUNG FINE NEEDLE ASPIRATION RIGHT UPPER LOBE NODULE, CYTOLOGY AND CELL BLOCK:   --MALIGNANT CELLS DERIVED FROM NON-SMALL CELL CARCINOMA.     B. BRONCHIAL BRUSH RIGHT UPPER LOBE, CYTOLOGY AND CELL BLOCK:  --MALIGNANT CELLS DERIVED FROM NON-SMALL CELL CARCINOMA.     C. LYMPH NODE 11 L PULMONARY FINE NEEDLE ASPIRATION, CYTOLOGY AND CELL BLOCK:  --LYMPHOID SPECIMEN.  --NO MALIGNANT CELLS IDENTIFIED.     D. LYMPH NODE 4 L PULMONARY FINE NEEDLE ASPIRATION, CYTOLOGY AND CELL BLOCK:  --MALIGNANT CELLS DERIVED FROM NON-SMALL CELL CARCINOMA.  SEE ALSO SURGICAL PATHOLOGY REPORT, [id number].    Molecular testing (NGS) has been ordered and results will be issued in a separate report.  The cell block contains moderate tumor cellularity representing roughly 40% of all nucleated cells.        COMDX  07/15/2024     The tumor cells are positive for TTF1 immunostain, while they are negative for p40.    PD-L1 22C3  by Immunohistochemistry with Interpretation, pembrolizumab  (KEYTRUDA)    Block used:  A1    Interpretation: Negative    Tumor Proportion Score (TPS): <1%      Intended  use:  PD-L1 22C3 by IHC with Interpretation is a qualitative immunohistochemical assay using Monoclonal Mouse Anti-PD-L1, Clone 22C3 intended for use in the detection of PD-L1 protein in formalin-fixed, paraffin-embedded (FFPE) non-small cell lung cancer (NSCLC) tissue using the Optiview detection on a [place] BenchMark Ultra. The specimen submitted for testing should contain at least 100 viable tumor cells to be considered adequate for evaluation. This assay is indicated as an aid in identifying NSCLC patients for treatment with pembrolizumab (KEYTRUDA).    Methodology:  PD-L1 protein expression is determined by using Tumor Proportion Score (TPS), which is the percentage of viable tumor cells showing partial or complete membrane staining at any intensity. In the clinical setting of first-line therapy (treatment-naïve patients), the specimen is considered PD-L1 positive if the TPS is equal to or greater than 50 percent. In the setting of second-line therapy, the specimen is considered positive if the TPS is equal to or greater than 1 percent.    Reference Range:  High Expression >=50% TPS  Low Expression 1-49% TPS  No Expression <1% TPS    This assay is validated and FDA-approved for lung cancer specimens only. For all other specimen types, results should be interpreted with caution and within the appropriate clinical context. The use of this assay on decalcified tissues has not been validated and is not recommended.   One or more of the reagents used to perform assays on this specimen MAY have contained components considered to be Laboratory Developed Tests (LDT).  LDT's have not been cleared or approved by the U.S. Food and Drug Administration.  These assays/tests were developed and their performance characteristics determined by the Department of Pathology Immunohistochemistry Lab at [facility]. The FDA does not require this test to go through premarket FDA review. This test is  used for clinical purposes. It should not be regarded as investigational or for research. This laboratory is certified under the Clinical Laboratory Improvement Amendments (CLIA) as qualified to perform high complexity clinical laboratory testing.  The assays/tests were performed with appropriate positive and negative controls which stained appropriately.         Assessment/Plan      Joe Magaña is a 63 y.o. male here for recommendations and to establish care for newly diagnosed suspected stage IV lung adenocarcinoma with PD-L1 <1% and no actionable mutations on NGS.    Reviewed overall work-up to date with patient including implications of stage IV disease and need for PET scan and brain MRI to complete staging. I do suspect stage IV disease based on new liver lesions on CT however PET will be helpful to confirm this.    Discussed palliative goals of treatment and that typically standard treatment involves a combination of chemotherapy (carboplatin/pemetrexed) and immunotherapy (typically pembrolizumab) given low PD-L1. He is very worried about chemotherapy and not sure he wants to go through it - he watched his mother go through it for lung cancer in the past. Discussed logistics and side effects of carboplatin and pemetrexed including but not limited to myelosuppression (infection, anemia, thrombocytopenia), neuropathy, hearing loss, renal dysfunction, fatigue, nausea, rash and swelling. We did discuss that we have come a long way in terms of supporting patients and chemo in general.    Discussed logistics and side effects of pembrolizumab including but not limited to risk of immune-related AEs - most commonly thyroiditis, rash, colitis, fatigue but also rare risk of pneumonitis, myocarditis, nephritis, hepatitis, and other endocrine or neurologic AEs.     We also discussed that although not standard, we could consider a dual checkpoint inhibitor approach to treatment. This is only approved for PD-L1 >1%  though data from checkmate 227 trial did suggest benefit in patients with PD-L1 negative disease. Again we discussed this would not be standard but if he is absolutely against a chemotherapy approach this would be my recommendation    We also discussed that in order to consider these treatments I think we need to find a more stable housing situation so that he can be safe while receiving treatment. Social determinants of health including housing/food instability and history of opioid abuse are barriers to him receiving optimal care.    - Plan MRI brain and PET to complete staging  - Refer to ANNIKA ASAP   - Phone call in 1 week to follow-up results and plan next steps in terms of treatment    Aminta Jha MD  [facility]                   Aminta Jha MD  [facility]

## 2024-08-02 ENCOUNTER — SOCIAL WORK (OUTPATIENT)
Dept: CASE MANAGEMENT | Facility: HOSPITAL | Age: 64
End: 2024-08-02
Payer: MEDICARE

## 2024-08-02 NOTE — PROGRESS NOTES
"Social Work Note  8/2/2024 SW met with patient yesterday after his consult with Dr. Jha. Patient has been homeless for some time.  Patient stated he is living on an enclosed front porch presently.  He walks to work and that is where he bathes/uses the bathroom.  He has previously stayed in a shelter and said his [facility]  said he needed to go to an emergency shelter to get himself \"established\" so he can get into a more permanent shelter.  SW suggested he follow this suggestion and call [facility] as well to see when he could get into there.  Patient had to work last night so he stated he would call today.  SW explained having somewhere to stay would help him during his treatment.  SW left a  for his , with patient's permission today ([phone]) [facility].  SW also completed some research on other shelters and possible rent assistance.    Patient was denied social security disability a year ago this past March.  He appealed and was denied again.  ANNIKA explained we could work on this once patient has a plan for treatment.  Patient does not have support.  He relies on  to set up transportation for his appointments.  He is covered under Medicare A and B.  Patient is being treated for metastatic lung cancer.  He also has a diagnosis of MDD, hx of polysubstance abuse.  He did attempt suicide in the past.  He receives counseling through [facility].  ANNIKA will remain available to assist patient.  Mary Kay Obrien, MSW, LSW   "

## 2024-08-05 ENCOUNTER — HOSPITAL ENCOUNTER (OUTPATIENT)
Dept: RADIOLOGY | Facility: CLINIC | Age: 64
Discharge: HOME | End: 2024-08-05
Payer: MEDICARE

## 2024-08-05 ENCOUNTER — SOCIAL WORK (OUTPATIENT)
Dept: CASE MANAGEMENT | Facility: HOSPITAL | Age: 64
End: 2024-08-05
Payer: MEDICARE

## 2024-08-05 DIAGNOSIS — C34.90: ICD-10-CM

## 2024-08-05 PROCEDURE — A9552 F18 FDG: HCPCS | Performed by: INTERNAL MEDICINE

## 2024-08-05 PROCEDURE — A9575 INJ GADOTERATE MEGLUMI 0.1ML: HCPCS | Performed by: INTERNAL MEDICINE

## 2024-08-05 PROCEDURE — 2500000004 HC RX 250 GENERAL PHARMACY W/ HCPCS (ALT 636 FOR OP/ED): Performed by: INTERNAL MEDICINE

## 2024-08-05 PROCEDURE — 70553 MRI BRAIN STEM W/O & W/DYE: CPT

## 2024-08-05 PROCEDURE — 78815 PET IMAGE W/CT SKULL-THIGH: CPT | Mod: PS

## 2024-08-05 PROCEDURE — 78815 PET IMAGE W/CT SKULL-THIGH: CPT | Mod: PET TUMOR SUBSQ TX STRATEGY | Performed by: STUDENT IN AN ORGANIZED HEALTH CARE EDUCATION/TRAINING PROGRAM

## 2024-08-05 PROCEDURE — 70553 MRI BRAIN STEM W/O & W/DYE: CPT | Performed by: RADIOLOGY

## 2024-08-05 PROCEDURE — 3430000001 HC RX 343 DIAGNOSTIC RADIOPHARMACEUTICALS: Performed by: INTERNAL MEDICINE

## 2024-08-05 RX ORDER — FLUDEOXYGLUCOSE F 18 200 MCI/ML
11.6 INJECTION, SOLUTION INTRAVENOUS
Status: COMPLETED | OUTPATIENT
Start: 2024-08-05 | End: 2024-08-05

## 2024-08-05 RX ORDER — GADOTERATE MEGLUMINE 376.9 MG/ML
0.2 INJECTION INTRAVENOUS
Status: COMPLETED | OUTPATIENT
Start: 2024-08-05 | End: 2024-08-05

## 2024-08-05 NOTE — PROGRESS NOTES
Social Work Note  8/5/2024 SW followed up with patient today.  He has not been able to contact a shelter as patient had appointments and will need to go to work this evening.  He does prefer [facility] or [facility] as they are in [place] where patient works.  SW encouraged patient to make phone calls to get into a shelter as he needs to get on waiting lists if nothing is open.   SW left another message for CM at [facility], Eric Anglin. Patient did state he had transportation set through [facility] for his appointments today.  SW will remain available to assist patient.  Mary Kay Obrien, MSW, LSW

## 2024-08-06 ENCOUNTER — TUMOR BOARD CONFERENCE (OUTPATIENT)
Dept: HEMATOLOGY/ONCOLOGY | Facility: CLINIC | Age: 64
End: 2024-08-06
Payer: MEDICARE

## 2024-08-06 NOTE — TUMOR BOARD NOTE
Patient Name: FARA NATION  MRN: [id number]  Physician: Abhijeet  Date of Collection: 07-  Report Date: 7.19.2024  Primary Location of Tumor: Right Upper Lobe  Histology: Adenocarcinoma  Stage: IV  Location of Metastasis: Liver, Bone  PDL 1: <1%  Actionable Alteration:  None    Disease Relevant Alterations: None    Recommendations: Standard of Care:Chemotherapy+Immunotherapy    Clinical Trials (First line):   [id number]):A Phase 1b, Multicenter, 2-Part, Open-Label Study of Datopotamab Deruxtecan (Data-DXd) in Combination With Durvalumab With or Without [place] Chemotherapy in Subjects With Advanced or Metastatic Non-Small Cell Lung Cancer (TROPION-Lung04)

## 2024-08-08 ENCOUNTER — TELEMEDICINE (OUTPATIENT)
Dept: HEMATOLOGY/ONCOLOGY | Facility: CLINIC | Age: 64
End: 2024-08-08
Payer: MEDICARE

## 2024-08-08 DIAGNOSIS — C34.91 NSCLC OF RIGHT LUNG (MULTI): Primary | ICD-10-CM

## 2024-08-08 DIAGNOSIS — E55.9 VITAMIN D DEFICIENCY: ICD-10-CM

## 2024-08-08 DIAGNOSIS — C34.90: ICD-10-CM

## 2024-08-08 PROCEDURE — 99442 PR PHYS/QHP TELEPHONE EVALUATION 11-20 MIN: CPT | Performed by: INTERNAL MEDICINE

## 2024-08-08 RX ORDER — PROCHLORPERAZINE EDISYLATE 5 MG/ML
10 INJECTION INTRAMUSCULAR; INTRAVENOUS EVERY 6 HOURS PRN
OUTPATIENT
Start: 2024-08-16

## 2024-08-08 RX ORDER — ONDANSETRON HYDROCHLORIDE 8 MG/1
8 TABLET, FILM COATED ORAL EVERY 8 HOURS PRN
Qty: 30 TABLET | Refills: 5 | Status: SHIPPED | OUTPATIENT
Start: 2024-08-08

## 2024-08-08 RX ORDER — DIPHENHYDRAMINE HYDROCHLORIDE 50 MG/ML
50 INJECTION INTRAMUSCULAR; INTRAVENOUS AS NEEDED
OUTPATIENT
Start: 2024-08-16

## 2024-08-08 RX ORDER — ALBUTEROL SULFATE 0.83 MG/ML
3 SOLUTION RESPIRATORY (INHALATION) AS NEEDED
OUTPATIENT
Start: 2024-08-16

## 2024-08-08 RX ORDER — HEPARIN 100 UNIT/ML
500 SYRINGE INTRAVENOUS AS NEEDED
OUTPATIENT
Start: 2024-08-15

## 2024-08-08 RX ORDER — FOLIC ACID 1 MG/1
1000 TABLET ORAL DAILY
Qty: 30 TABLET | Refills: 11 | Status: SHIPPED | OUTPATIENT
Start: 2024-08-08

## 2024-08-08 RX ORDER — PALONOSETRON 0.05 MG/ML
0.25 INJECTION, SOLUTION INTRAVENOUS ONCE
OUTPATIENT
Start: 2024-08-16

## 2024-08-08 RX ORDER — PROCHLORPERAZINE MALEATE 10 MG
10 TABLET ORAL EVERY 6 HOURS PRN
Qty: 30 TABLET | Refills: 5 | Status: SHIPPED | OUTPATIENT
Start: 2024-08-08

## 2024-08-08 RX ORDER — PROCHLORPERAZINE MALEATE 5 MG
10 TABLET ORAL EVERY 6 HOURS PRN
OUTPATIENT
Start: 2024-08-16

## 2024-08-08 RX ORDER — HEPARIN SODIUM,PORCINE/PF 10 UNIT/ML
50 SYRINGE (ML) INTRAVENOUS AS NEEDED
OUTPATIENT
Start: 2024-08-15

## 2024-08-08 RX ORDER — EPINEPHRINE 0.3 MG/.3ML
0.3 INJECTION SUBCUTANEOUS EVERY 5 MIN PRN
OUTPATIENT
Start: 2024-08-16

## 2024-08-08 RX ORDER — CYANOCOBALAMIN 1000 UG/ML
1000 INJECTION, SOLUTION INTRAMUSCULAR; SUBCUTANEOUS ONCE
OUTPATIENT
Start: 2024-08-16

## 2024-08-08 RX ORDER — ACETAMINOPHEN 500 MG
2000 TABLET ORAL DAILY
Qty: 30 CAPSULE | Refills: 11 | Status: SHIPPED | OUTPATIENT
Start: 2024-08-08 | End: 2025-08-03

## 2024-08-08 RX ORDER — FAMOTIDINE 10 MG/ML
20 INJECTION INTRAVENOUS ONCE AS NEEDED
OUTPATIENT
Start: 2024-08-16

## 2024-08-09 ENCOUNTER — SOCIAL WORK (OUTPATIENT)
Dept: CASE MANAGEMENT | Facility: HOSPITAL | Age: 64
End: 2024-08-09
Payer: MEDICARE

## 2024-08-09 DIAGNOSIS — C34.91 NSCLC OF RIGHT LUNG (MULTI): ICD-10-CM

## 2024-08-09 NOTE — PROGRESS NOTES
Telephone call with patient    He is having a harder time overall - more short of breath with exertion, feels like his sinuses are draining a lot. No other new changes.    Reviewed scan results - PET does confirm likely stage IV disease. MRI brain with some changes that he thinks he has been told about before, not clearly related to cancer but will watch. Discussed implications of this, palliative goals of therapy, prognosis with and without treatment. Given negative PD-L1 we discussed standard chemoimmunotherapy and various combinations that are options. He wants to try standard treatment but is very concerned about side effects and wishes to try lower dose if possible.     Discussed logistics and side effects of carboplatin and pemetrexed including but not limited to myelosuppression (infection, anemia, thrombocytopenia), neuropathy, hearing loss, renal dysfunction, fatigue, nausea, rash and swelling.    Discussed logistics and side effects of pembrolizumab including but not limited to risk of immune-related AEs - most commonly thyroiditis, rash, colitis, fatigue but also rare risk of pneumonitis, myocarditis, nephritis, hepatitis, and other endocrine or neurologic AEs.    Consent obtained verbally with Aminta Harris RN as witness. Will plan carboplatin dose reduction with C1    Start folic acid. Will send PRN zofran/compazine to pharmacy along with vitamin D supplement as he reports being low and not being able to afford the 50k unit dose previously recommended. Plan to start ASAP.    SW also following patient given undomiciled currently.    RTC to start treatment    Time on call: 17:42    Aminta Jha MD  [facility]

## 2024-08-09 NOTE — PROGRESS NOTES
Social Work Note  8/9/2024 SW spoke with patient this am to check to see how he was feeling and if he had thought about when he wished to go into a shelter.  He stated he should hear today about his scheduled tx.  He was contacted later in the day.  Patient said he would go to [place] in the hopes of getting into [facility].  He wants to work as he can during treatment.  SW let patient know that his CM has not contacted me.  He said he would be reaching out for transport to his treatment.  SW will remain available to assist patient.  Mary Kay Obrien, MSW, LSW

## 2024-08-12 DIAGNOSIS — C34.91 NSCLC OF RIGHT LUNG (MULTI): Primary | ICD-10-CM

## 2024-08-13 ENCOUNTER — PATIENT OUTREACH (OUTPATIENT)
Dept: HEMATOLOGY/ONCOLOGY | Facility: CLINIC | Age: 64
End: 2024-08-13
Payer: MEDICARE

## 2024-08-16 ENCOUNTER — NUTRITION (OUTPATIENT)
Dept: HEMATOLOGY/ONCOLOGY | Facility: CLINIC | Age: 64
End: 2024-08-16

## 2024-08-16 ENCOUNTER — SOCIAL WORK (OUTPATIENT)
Dept: CASE MANAGEMENT | Facility: HOSPITAL | Age: 64
End: 2024-08-16

## 2024-08-16 ENCOUNTER — INFUSION (OUTPATIENT)
Dept: HEMATOLOGY/ONCOLOGY | Facility: CLINIC | Age: 64
End: 2024-08-16
Payer: MEDICARE

## 2024-08-16 VITALS
OXYGEN SATURATION: 95 % | SYSTOLIC BLOOD PRESSURE: 147 MMHG | WEIGHT: 160.5 LBS | BODY MASS INDEX: 23.77 KG/M2 | HEIGHT: 69 IN | TEMPERATURE: 98.1 F | DIASTOLIC BLOOD PRESSURE: 75 MMHG | HEART RATE: 94 BPM | RESPIRATION RATE: 19 BRPM

## 2024-08-16 VITALS — BODY MASS INDEX: 23.77 KG/M2 | HEIGHT: 69 IN | WEIGHT: 160.5 LBS

## 2024-08-16 DIAGNOSIS — C34.91 NSCLC OF RIGHT LUNG (MULTI): ICD-10-CM

## 2024-08-16 DIAGNOSIS — G89.29 OTHER CHRONIC PAIN: ICD-10-CM

## 2024-08-16 DIAGNOSIS — C34.91 NSCLC OF RIGHT LUNG (MULTI): Primary | ICD-10-CM

## 2024-08-16 LAB
ALBUMIN SERPL BCP-MCNC: 3.9 G/DL (ref 3.4–5)
ALP SERPL-CCNC: 88 U/L (ref 33–136)
ALT SERPL W P-5'-P-CCNC: 7 U/L (ref 10–52)
ANION GAP SERPL CALC-SCNC: 13 MMOL/L (ref 10–20)
AST SERPL W P-5'-P-CCNC: 9 U/L (ref 9–39)
BASOPHILS # BLD AUTO: 0.01 X10*3/UL (ref 0–0.1)
BASOPHILS NFR BLD AUTO: 0.1 %
BILIRUB SERPL-MCNC: 0.3 MG/DL (ref 0–1.2)
BUN SERPL-MCNC: 16 MG/DL (ref 6–23)
CALCIUM SERPL-MCNC: 9.1 MG/DL (ref 8.6–10.3)
CHLORIDE SERPL-SCNC: 104 MMOL/L (ref 98–107)
CO2 SERPL-SCNC: 26 MMOL/L (ref 21–32)
CORTIS AM PEAK SERPL-MSCNC: 8.2 UG/DL (ref 5–20)
CREAT SERPL-MCNC: 1.04 MG/DL (ref 0.5–1.3)
EGFRCR SERPLBLD CKD-EPI 2021: 82 ML/MIN/1.73M*2
EOSINOPHIL # BLD AUTO: 0.07 X10*3/UL (ref 0–0.7)
EOSINOPHIL NFR BLD AUTO: 0.7 %
ERYTHROCYTE [DISTWIDTH] IN BLOOD BY AUTOMATED COUNT: 15.2 % (ref 11.5–14.5)
GLUCOSE SERPL-MCNC: 131 MG/DL (ref 74–99)
HBV CORE AB SER QL: NONREACTIVE
HBV SURFACE AB SER-ACNC: <3.1 MIU/ML
HBV SURFACE AG SERPL QL IA: NONREACTIVE
HCT VFR BLD AUTO: 39.5 % (ref 41–52)
HGB BLD-MCNC: 12.5 G/DL (ref 13.5–17.5)
IMM GRANULOCYTES # BLD AUTO: 0.01 X10*3/UL (ref 0–0.7)
IMM GRANULOCYTES NFR BLD AUTO: 0.1 % (ref 0–0.9)
LYMPHOCYTES # BLD AUTO: 1.07 X10*3/UL (ref 1.2–4.8)
LYMPHOCYTES NFR BLD AUTO: 10.8 %
MCH RBC QN AUTO: 28.3 PG (ref 26–34)
MCHC RBC AUTO-ENTMCNC: 31.6 G/DL (ref 32–36)
MCV RBC AUTO: 89 FL (ref 80–100)
MONOCYTES # BLD AUTO: 0.68 X10*3/UL (ref 0.1–1)
MONOCYTES NFR BLD AUTO: 6.9 %
NEUTROPHILS # BLD AUTO: 8.07 X10*3/UL (ref 1.2–7.7)
NEUTROPHILS NFR BLD AUTO: 81.4 %
NRBC BLD-RTO: ABNORMAL /100{WBCS}
PLATELET # BLD AUTO: 240 X10*3/UL (ref 150–450)
POTASSIUM SERPL-SCNC: 3.7 MMOL/L (ref 3.5–5.3)
PROT SERPL-MCNC: 7.2 G/DL (ref 6.4–8.2)
RBC # BLD AUTO: 4.42 X10*6/UL (ref 4.5–5.9)
SODIUM SERPL-SCNC: 139 MMOL/L (ref 136–145)
TSH SERPL-ACNC: 2.12 MIU/L (ref 0.44–3.98)
WBC # BLD AUTO: 9.9 X10*3/UL (ref 4.4–11.3)

## 2024-08-16 PROCEDURE — 84075 ASSAY ALKALINE PHOSPHATASE: CPT | Performed by: INTERNAL MEDICINE

## 2024-08-16 PROCEDURE — 85025 COMPLETE CBC W/AUTO DIFF WBC: CPT | Performed by: INTERNAL MEDICINE

## 2024-08-16 PROCEDURE — 96413 CHEMO IV INFUSION 1 HR: CPT

## 2024-08-16 PROCEDURE — 87340 HEPATITIS B SURFACE AG IA: CPT | Performed by: INTERNAL MEDICINE

## 2024-08-16 PROCEDURE — 2500000004 HC RX 250 GENERAL PHARMACY W/ HCPCS (ALT 636 FOR OP/ED): Performed by: INTERNAL MEDICINE

## 2024-08-16 PROCEDURE — 82024 ASSAY OF ACTH: CPT | Performed by: INTERNAL MEDICINE

## 2024-08-16 PROCEDURE — 84443 ASSAY THYROID STIM HORMONE: CPT | Performed by: INTERNAL MEDICINE

## 2024-08-16 PROCEDURE — 96417 CHEMO IV INFUS EACH ADDL SEQ: CPT

## 2024-08-16 PROCEDURE — 86704 HEP B CORE ANTIBODY TOTAL: CPT | Performed by: INTERNAL MEDICINE

## 2024-08-16 PROCEDURE — 96367 TX/PROPH/DG ADDL SEQ IV INF: CPT

## 2024-08-16 PROCEDURE — 96372 THER/PROPH/DIAG INJ SC/IM: CPT

## 2024-08-16 PROCEDURE — 86706 HEP B SURFACE ANTIBODY: CPT | Performed by: INTERNAL MEDICINE

## 2024-08-16 PROCEDURE — 82533 TOTAL CORTISOL: CPT | Performed by: INTERNAL MEDICINE

## 2024-08-16 PROCEDURE — 96411 CHEMO IV PUSH ADDL DRUG: CPT

## 2024-08-16 PROCEDURE — 96375 TX/PRO/DX INJ NEW DRUG ADDON: CPT | Mod: INF

## 2024-08-16 RX ORDER — HEPARIN SODIUM,PORCINE/PF 10 UNIT/ML
50 SYRINGE (ML) INTRAVENOUS AS NEEDED
OUTPATIENT
Start: 2024-08-16

## 2024-08-16 RX ORDER — HEPARIN 100 UNIT/ML
500 SYRINGE INTRAVENOUS AS NEEDED
OUTPATIENT
Start: 2024-08-16

## 2024-08-16 RX ORDER — CYANOCOBALAMIN 1000 UG/ML
1000 INJECTION, SOLUTION INTRAMUSCULAR; SUBCUTANEOUS ONCE
Status: COMPLETED | OUTPATIENT
Start: 2024-08-16 | End: 2024-08-16

## 2024-08-16 RX ORDER — FAMOTIDINE 10 MG/ML
20 INJECTION INTRAVENOUS ONCE AS NEEDED
Status: DISCONTINUED | OUTPATIENT
Start: 2024-08-16 | End: 2024-08-16 | Stop reason: HOSPADM

## 2024-08-16 RX ORDER — EPINEPHRINE 0.3 MG/.3ML
0.3 INJECTION SUBCUTANEOUS EVERY 5 MIN PRN
Status: DISCONTINUED | OUTPATIENT
Start: 2024-08-16 | End: 2024-08-16 | Stop reason: HOSPADM

## 2024-08-16 RX ORDER — PROCHLORPERAZINE EDISYLATE 5 MG/ML
10 INJECTION INTRAMUSCULAR; INTRAVENOUS EVERY 6 HOURS PRN
Status: DISCONTINUED | OUTPATIENT
Start: 2024-08-16 | End: 2024-08-16 | Stop reason: HOSPADM

## 2024-08-16 RX ORDER — DIPHENHYDRAMINE HYDROCHLORIDE 50 MG/ML
50 INJECTION INTRAMUSCULAR; INTRAVENOUS AS NEEDED
Status: DISCONTINUED | OUTPATIENT
Start: 2024-08-16 | End: 2024-08-16 | Stop reason: HOSPADM

## 2024-08-16 RX ORDER — PALONOSETRON 0.05 MG/ML
0.25 INJECTION, SOLUTION INTRAVENOUS ONCE
Status: COMPLETED | OUTPATIENT
Start: 2024-08-16 | End: 2024-08-16

## 2024-08-16 RX ORDER — PROCHLORPERAZINE MALEATE 10 MG
10 TABLET ORAL EVERY 6 HOURS PRN
Status: DISCONTINUED | OUTPATIENT
Start: 2024-08-16 | End: 2024-08-16 | Stop reason: HOSPADM

## 2024-08-16 RX ORDER — ALBUTEROL SULFATE 0.83 MG/ML
3 SOLUTION RESPIRATORY (INHALATION) AS NEEDED
Status: DISCONTINUED | OUTPATIENT
Start: 2024-08-16 | End: 2024-08-16 | Stop reason: HOSPADM

## 2024-08-16 ASSESSMENT — PAIN SCALES - GENERAL: PAINLEVEL: 7

## 2024-08-16 NOTE — PROGRESS NOTES
"NUTRITION Assessment NOTE    Nutrition Assessment     Reason for Visit:  Joe Magaña is a 63 y.o. male who presents for initial infusion for his diagnosis of NSCLC.  Met with patient today during his infusion of Carbo/pemetrexed/pembro.     Lab Results   Component Value Date/Time    GLUCOSE 131 (H) 08/16/2024 1048     08/16/2024 1048    K 3.7 08/16/2024 1048     08/16/2024 1048    CO2 26 08/16/2024 1048    ANIONGAP 13 08/16/2024 1048    BUN 16 08/16/2024 1048    CREATININE 1.04 08/16/2024 1048    EGFR 82 08/16/2024 1048    CALCIUM 9.1 08/16/2024 1048    ALBUMIN 3.9 08/16/2024 1048    ALKPHOS 88 08/16/2024 1048    PROT 7.2 08/16/2024 1048    AST 9 08/16/2024 1048    BILITOT 0.3 08/16/2024 1048    ALT 7 (L) 08/16/2024 1048     Lab Results   Component Value Date/Time    VITD25 13 (A) 07/20/2023 0928       Anthropometrics:  Anthropometrics  Height: 175.3 cm (5' 9.02\")  Weight: 72.8 kg (160 lb 7.9 oz)  BMI (Calculated): 23.69  IBW/kg (Dietitian Calculated): 72.72 kg  Percent of IBW: 100 %        Wt Readings from Last 10 Encounters:   08/16/24 72.8 kg (160 lb 7.9 oz)   08/16/24 72.8 kg (160 lb 7.9 oz)   08/01/24 71.5 kg (157 lb 11.2 oz)   07/15/24 72.6 kg (160 lb)   07/11/24 72.6 kg (160 lb)   06/21/24 70.8 kg (156 lb)   05/31/24 72.6 kg (160 lb)   05/25/24 72.6 kg (160 lb)   04/29/24 72.1 kg (158 lb 15.2 oz)   03/13/24 72.6 kg (160 lb)        Food And Nutrient Intake:  Food and Nutrient History  Food and Nutrient History: Patient reported typically eats 1 meal per day, which is at night when he is working.  He works nights he stated.  Will snack on saltine crackers at times.  Energy Intake: Good > 75 %  Fluid Intake: Drinks a lot of coffee, and will have water, but reported he probably needs to drink more.     Food Intake  Amount of Food: Typically eats 1 meal per day.                                                        Nutrition Focused Physical Exam Findings:      Subcutaneous Fat Loss  Orbital Fat " "Pads: Well nourished (slightly bulging fat pads)  Buccal Fat Pads: Well nourished (full, rounded cheeks)    Muscle Wasting  Temporalis: Well nourished (well-defined muscle)              Energy Needs  Calculated Energy Needs Using Equations  Height: 175.3 cm (5' 9.02\")  Estimated Energy Needs  Total Energy Estimated Needs (kCal): 2200 kCal  Total Estimated Energy Need per Day (kCal/kg): 30 kCal/kg  Estimated Fluid Needs  Total Fluid Estimated Needs (mL): 2200 mL  Total Fluid Estimated Needs (mL/kg): 30 mL/kg  Estimated Protein Needs  Total Protein Estimated Needs (g): 87 g  Total Protein Estimated Needs (g/kg): 1.2 g/kg        Nutrition Diagnosis        Nutrition Diagnosis  Patient has Nutrition Diagnosis: Yes  Diagnosis Status (1): New  Nutrition Diagnosis 1: Increased nutrient needs  Related to (1): increased demand for calories, protein and fluids  As Evidenced by (1): chemotherapy for diagnosis of NSCLC    Nutrition Interventions/Recommendations   Nutrition Prescription  Individualized Nutrition Prescription Provided for : Consume high calorie, high protein foods; smaller frequent meals as needed; at least 64 oz of fluids per day    Food and Nutrition Delivery  Food and Nutrition Delivery  Meals & Snacks: General Healthful Diet  Medical Food Supplement: Boost Plus  Goals: If appetite becomes poor and weight loss noted add Boost plus 1-2 per day between meals.    Nutrition Education  Nutrition Education  Nutrition Education Content: Content related nutrition education    Coordination of Care  Coordination of Nutrition Care by a Nutrition Professional  Collaboration and referral of nutrition care: Collaboration by nutrition professional with other providers    There are no Patient Instructions on file for this visit.    Nutrition Monitoring and Evaluation   Food/Nutrient Related History Monitoring  Monitoring and Evaluation Plan: Energy intake, Fluid intake, Protein intake  Energy Intake: Estimated energy " intake  Criteria: 3602-1480 calories per day  Fluid Intake: Estimated fluid intake  Criteria: 2200-2400ml fluids per day  Estimated protein intake: Estimated protein intake  Criteria: 72-88g protein per day  Body Composition/Growth/Weight History  Monitoring and Evaluation Plan: Weight  Weight: Weight change  Criteria: weight maintenance  Biochemical Data, Medical Tests and Procedures  Monitoring and Evaluation Plan: Electrolyte/renal panel  Criteria: labs WNL      Time Spent  Prep time on day of patient encounter: 10 minutes  Time spent directly with patient, family or caregiver: 15 minutes  Additional Time Spent on Patient Care Activities: 0 minutes  Documentation Time: 15 minutes  Other Time Spent: 0 minutes  Total: 40 minutes

## 2024-08-16 NOTE — PROGRESS NOTES
Social Work Note  8/16/2024 SW met with patient in infusion today.  Patient stated he contacted the point of access at the shelter.  There are not any openings at this time but patient's intake was completed.  Patient stated he could be looked at for a shelter or emergency housing at one of the senior apartments in [place].  Patient pleased that he found out he received social security back in April.  He had not gone through his emails.  He has contacted the 's office and left a message regarding this.  Patient relieved he received this information.  SW encouraged patient to let this writer know if he needs anything for shelter/housing or his job.  He was provided this writer's contact information to put in his wallet.  SW will remain available to assist patient.  Mary Kay Obrien, MSW, LSW

## 2024-08-17 ENCOUNTER — APPOINTMENT (OUTPATIENT)
Dept: RADIOLOGY | Facility: HOSPITAL | Age: 64
DRG: 190 | End: 2024-08-17
Payer: MEDICARE

## 2024-08-17 ENCOUNTER — HOSPITAL ENCOUNTER (INPATIENT)
Facility: HOSPITAL | Age: 64
LOS: 1 days | Discharge: HOME | DRG: 190 | End: 2024-08-18
Attending: EMERGENCY MEDICINE | Admitting: STUDENT IN AN ORGANIZED HEALTH CARE EDUCATION/TRAINING PROGRAM
Payer: MEDICARE

## 2024-08-17 ENCOUNTER — APPOINTMENT (OUTPATIENT)
Dept: CARDIOLOGY | Facility: HOSPITAL | Age: 64
DRG: 190 | End: 2024-08-17
Payer: MEDICARE

## 2024-08-17 DIAGNOSIS — F17.200 SMOKER UNMOTIVATED TO QUIT: ICD-10-CM

## 2024-08-17 DIAGNOSIS — F33.1 MDD (MAJOR DEPRESSIVE DISORDER), RECURRENT EPISODE, MODERATE (MULTI): ICD-10-CM

## 2024-08-17 DIAGNOSIS — M54.50 CHRONIC MIDLINE LOW BACK PAIN, UNSPECIFIED WHETHER SCIATICA PRESENT: ICD-10-CM

## 2024-08-17 DIAGNOSIS — R09.02 HYPOXEMIA: ICD-10-CM

## 2024-08-17 DIAGNOSIS — G89.29 CHRONIC MIDLINE LOW BACK PAIN, UNSPECIFIED WHETHER SCIATICA PRESENT: ICD-10-CM

## 2024-08-17 DIAGNOSIS — J44.9 CHRONIC OBSTRUCTIVE PULMONARY DISEASE, UNSPECIFIED COPD TYPE (MULTI): ICD-10-CM

## 2024-08-17 DIAGNOSIS — G93.41 ACUTE METABOLIC ENCEPHALOPATHY: Primary | ICD-10-CM

## 2024-08-17 DIAGNOSIS — J42 CHRONIC BRONCHITIS, UNSPECIFIED CHRONIC BRONCHITIS TYPE (MULTI): ICD-10-CM

## 2024-08-17 DIAGNOSIS — R06.89 HYPERCAPNIA: ICD-10-CM

## 2024-08-17 PROBLEM — F11.20 OPIOID DEPENDENCE (MULTI): Status: ACTIVE | Noted: 2024-02-29

## 2024-08-17 PROBLEM — M80.88XA: Status: ACTIVE | Noted: 2020-09-03

## 2024-08-17 PROBLEM — R39.198 SLOW URINARY STREAM: Status: ACTIVE | Noted: 2022-05-03

## 2024-08-17 PROBLEM — N39.0 URINARY TRACT INFECTIOUS DISEASE: Status: ACTIVE | Noted: 2024-08-17

## 2024-08-17 PROBLEM — N35.911 STRICTURE OF URETHRAL MEATUS IN MALE: Status: ACTIVE | Noted: 2022-05-03

## 2024-08-17 PROBLEM — N13.8 BPH WITH OBSTRUCTION/LOWER URINARY TRACT SYMPTOMS: Status: ACTIVE | Noted: 2022-05-03

## 2024-08-17 PROBLEM — R46.0 POOR HYGIENE: Status: ACTIVE | Noted: 2024-08-17

## 2024-08-17 PROBLEM — J96.00 ACUTE RESPIRATORY FAILURE (MULTI): Status: ACTIVE | Noted: 2024-08-17

## 2024-08-17 PROBLEM — R39.11 URINARY HESITANCY: Status: ACTIVE | Noted: 2022-05-03

## 2024-08-17 PROBLEM — B49 INFECTION DUE TO FUNGUS: Status: ACTIVE | Noted: 2024-08-17

## 2024-08-17 PROBLEM — N31.8 FREQUENCY-URGENCY SYNDROME: Status: ACTIVE | Noted: 2022-05-03

## 2024-08-17 PROBLEM — R31.9 HEMATURIA: Status: ACTIVE | Noted: 2024-08-17

## 2024-08-17 PROBLEM — I10 BENIGN ESSENTIAL HYPERTENSION: Status: ACTIVE | Noted: 2024-08-17

## 2024-08-17 PROBLEM — F41.9 ANXIETY: Status: ACTIVE | Noted: 2024-02-29

## 2024-08-17 PROBLEM — N40.1 BPH WITH OBSTRUCTION/LOWER URINARY TRACT SYMPTOMS: Status: ACTIVE | Noted: 2022-05-03

## 2024-08-17 PROBLEM — D64.9 ANEMIA: Status: ACTIVE | Noted: 2024-08-17

## 2024-08-17 PROBLEM — E87.6 HYPOKALEMIA: Status: ACTIVE | Noted: 2024-08-17

## 2024-08-17 LAB
ALBUMIN SERPL BCP-MCNC: 3.9 G/DL (ref 3.4–5)
ALP SERPL-CCNC: 80 U/L (ref 33–136)
ALT SERPL W P-5'-P-CCNC: 12 U/L (ref 10–52)
AMPHETAMINES UR QL SCN: ABNORMAL
ANION GAP BLDA CALCULATED.4IONS-SCNC: 10 MMO/L (ref 10–25)
ANION GAP BLDA CALCULATED.4IONS-SCNC: 9 MMO/L (ref 10–25)
ANION GAP SERPL CALC-SCNC: 12 MMOL/L (ref 10–20)
APAP SERPL-MCNC: <10 UG/ML
APPEARANCE UR: CLEAR
ARTERIAL PATENCY WRIST A: POSITIVE
ARTERIAL PATENCY WRIST A: POSITIVE
AST SERPL W P-5'-P-CCNC: 29 U/L (ref 9–39)
BARBITURATES UR QL SCN: ABNORMAL
BASE EXCESS BLDA CALC-SCNC: -0.8 MMOL/L (ref -2–3)
BASE EXCESS BLDA CALC-SCNC: 1 MMOL/L (ref -2–3)
BASOPHILS # BLD AUTO: 0.01 X10*3/UL (ref 0–0.1)
BASOPHILS NFR BLD AUTO: 0.1 %
BENZODIAZ UR QL SCN: ABNORMAL
BILIRUB SERPL-MCNC: 0.5 MG/DL (ref 0–1.2)
BILIRUB UR STRIP.AUTO-MCNC: NEGATIVE MG/DL
BNP SERPL-MCNC: 141 PG/ML (ref 0–99)
BODY TEMPERATURE: ABNORMAL
BODY TEMPERATURE: ABNORMAL
BUN SERPL-MCNC: 29 MG/DL (ref 6–23)
BZE UR QL SCN: ABNORMAL
CA-I BLDA-SCNC: 1.3 MMOL/L (ref 1.1–1.33)
CA-I BLDA-SCNC: 1.37 MMOL/L (ref 1.1–1.33)
CALCIUM SERPL-MCNC: 10.5 MG/DL (ref 8.6–10.3)
CANNABINOIDS UR QL SCN: ABNORMAL
CARDIAC TROPONIN I PNL SERPL HS: 24 NG/L (ref 0–20)
CARDIAC TROPONIN I PNL SERPL HS: 25 NG/L (ref 0–20)
CHLORIDE BLDA-SCNC: 104 MMOL/L (ref 98–107)
CHLORIDE BLDA-SCNC: 105 MMOL/L (ref 98–107)
CHLORIDE SERPL-SCNC: 105 MMOL/L (ref 98–107)
CO2 SERPL-SCNC: 26 MMOL/L (ref 21–32)
COLOR UR: YELLOW
CREAT SERPL-MCNC: 1.1 MG/DL (ref 0.5–1.3)
D DIMER PPP FEU-MCNC: 723 NG/ML FEU
EGFRCR SERPLBLD CKD-EPI 2021: 76 ML/MIN/1.73M*2
EOSINOPHIL # BLD AUTO: 0 X10*3/UL (ref 0–0.7)
EOSINOPHIL NFR BLD AUTO: 0 %
ERYTHROCYTE [DISTWIDTH] IN BLOOD BY AUTOMATED COUNT: 14.9 % (ref 11.5–14.5)
ETHANOL SERPL-MCNC: <10 MG/DL
FENTANYL+NORFENTANYL UR QL SCN: ABNORMAL
GLUCOSE BLDA-MCNC: 97 MG/DL (ref 74–99)
GLUCOSE BLDA-MCNC: 98 MG/DL (ref 74–99)
GLUCOSE SERPL-MCNC: 91 MG/DL (ref 74–99)
GLUCOSE UR STRIP.AUTO-MCNC: NORMAL MG/DL
HCO3 BLDA-SCNC: 25.8 MMOL/L (ref 22–26)
HCO3 BLDA-SCNC: 27.5 MMOL/L (ref 22–26)
HCT VFR BLD AUTO: 37.6 % (ref 41–52)
HCT VFR BLD EST: 35 % (ref 41–52)
HCT VFR BLD EST: 37 % (ref 41–52)
HGB BLD-MCNC: 12.1 G/DL (ref 13.5–17.5)
HGB BLDA-MCNC: 11.6 G/DL (ref 13.5–17.5)
HGB BLDA-MCNC: 12.3 G/DL (ref 13.5–17.5)
HYALINE CASTS #/AREA URNS AUTO: ABNORMAL /LPF
IMM GRANULOCYTES # BLD AUTO: 0.06 X10*3/UL (ref 0–0.7)
IMM GRANULOCYTES NFR BLD AUTO: 0.5 % (ref 0–0.9)
INHALED O2 CONCENTRATION: 21 %
INHALED O2 CONCENTRATION: 21 %
KETONES UR STRIP.AUTO-MCNC: NEGATIVE MG/DL
LACTATE BLDA-SCNC: 0.8 MMOL/L (ref 0.4–2)
LACTATE BLDA-SCNC: 1 MMOL/L (ref 0.4–2)
LEUKOCYTE ESTERASE UR QL STRIP.AUTO: NEGATIVE
LIPASE SERPL-CCNC: 4 U/L (ref 9–82)
LYMPHOCYTES # BLD AUTO: 0.93 X10*3/UL (ref 1.2–4.8)
LYMPHOCYTES NFR BLD AUTO: 7.4 %
MAGNESIUM SERPL-MCNC: 2.28 MG/DL (ref 1.6–2.4)
MCH RBC QN AUTO: 28.5 PG (ref 26–34)
MCHC RBC AUTO-ENTMCNC: 32.2 G/DL (ref 32–36)
MCV RBC AUTO: 89 FL (ref 80–100)
METHADONE UR QL SCN: ABNORMAL
MONOCYTES # BLD AUTO: 0.83 X10*3/UL (ref 0.1–1)
MONOCYTES NFR BLD AUTO: 6.6 %
MUCOUS THREADS #/AREA URNS AUTO: ABNORMAL /LPF
NEUTROPHILS # BLD AUTO: 10.76 X10*3/UL (ref 1.2–7.7)
NEUTROPHILS NFR BLD AUTO: 85.4 %
NITRITE UR QL STRIP.AUTO: NEGATIVE
NRBC BLD-RTO: 0 /100 WBCS (ref 0–0)
OPIATES UR QL SCN: ABNORMAL
OXYCODONE+OXYMORPHONE UR QL SCN: ABNORMAL
OXYHGB MFR BLDA: 88.3 % (ref 94–98)
OXYHGB MFR BLDA: 89.6 % (ref 94–98)
PCO2 BLDA: 50 MM HG (ref 38–42)
PCO2 BLDA: 51 MM HG (ref 38–42)
PCP UR QL SCN: ABNORMAL
PH BLDA: 7.32 PH (ref 7.38–7.42)
PH BLDA: 7.34 PH (ref 7.38–7.42)
PH UR STRIP.AUTO: 5.5 [PH]
PLATELET # BLD AUTO: 255 X10*3/UL (ref 150–450)
PO2 BLDA: 62 MM HG (ref 85–95)
PO2 BLDA: 64 MM HG (ref 85–95)
POTASSIUM BLDA-SCNC: 3.8 MMOL/L (ref 3.5–5.3)
POTASSIUM BLDA-SCNC: 4 MMOL/L (ref 3.5–5.3)
POTASSIUM SERPL-SCNC: 3.8 MMOL/L (ref 3.5–5.3)
PROT SERPL-MCNC: 7.2 G/DL (ref 6.4–8.2)
PROT UR STRIP.AUTO-MCNC: ABNORMAL MG/DL
RBC # BLD AUTO: 4.24 X10*6/UL (ref 4.5–5.9)
RBC # UR STRIP.AUTO: ABNORMAL /UL
RBC #/AREA URNS AUTO: ABNORMAL /HPF
SALICYLATES SERPL-MCNC: <3 MG/DL
SAO2 % BLDA: 92 % (ref 94–100)
SAO2 % BLDA: 93 % (ref 94–100)
SARS-COV-2 RNA RESP QL NAA+PROBE: NOT DETECTED
SODIUM BLDA-SCNC: 135 MMOL/L (ref 136–145)
SODIUM BLDA-SCNC: 138 MMOL/L (ref 136–145)
SODIUM SERPL-SCNC: 139 MMOL/L (ref 136–145)
SP GR UR STRIP.AUTO: >1.05
SPECIMEN DRAWN FROM PATIENT: ABNORMAL
SPECIMEN DRAWN FROM PATIENT: ABNORMAL
UROBILINOGEN UR STRIP.AUTO-MCNC: NORMAL MG/DL
WBC # BLD AUTO: 12.6 X10*3/UL (ref 4.4–11.3)
WBC #/AREA URNS AUTO: ABNORMAL /HPF

## 2024-08-17 PROCEDURE — 71045 X-RAY EXAM CHEST 1 VIEW: CPT | Performed by: STUDENT IN AN ORGANIZED HEALTH CARE EDUCATION/TRAINING PROGRAM

## 2024-08-17 PROCEDURE — 80307 DRUG TEST PRSMV CHEM ANLYZR: CPT

## 2024-08-17 PROCEDURE — 70450 CT HEAD/BRAIN W/O DYE: CPT | Performed by: RADIOLOGY

## 2024-08-17 PROCEDURE — 85379 FIBRIN DEGRADATION QUANT: CPT | Performed by: EMERGENCY MEDICINE

## 2024-08-17 PROCEDURE — 99291 CRITICAL CARE FIRST HOUR: CPT | Mod: CS | Performed by: EMERGENCY MEDICINE

## 2024-08-17 PROCEDURE — 83690 ASSAY OF LIPASE: CPT | Performed by: EMERGENCY MEDICINE

## 2024-08-17 PROCEDURE — S4991 NICOTINE PATCH NONLEGEND: HCPCS | Performed by: STUDENT IN AN ORGANIZED HEALTH CARE EDUCATION/TRAINING PROGRAM

## 2024-08-17 PROCEDURE — 93005 ELECTROCARDIOGRAM TRACING: CPT

## 2024-08-17 PROCEDURE — 84484 ASSAY OF TROPONIN QUANT: CPT | Performed by: EMERGENCY MEDICINE

## 2024-08-17 PROCEDURE — 36415 COLL VENOUS BLD VENIPUNCTURE: CPT | Performed by: EMERGENCY MEDICINE

## 2024-08-17 PROCEDURE — 81001 URINALYSIS AUTO W/SCOPE: CPT | Performed by: EMERGENCY MEDICINE

## 2024-08-17 PROCEDURE — 85025 COMPLETE CBC W/AUTO DIFF WBC: CPT | Performed by: EMERGENCY MEDICINE

## 2024-08-17 PROCEDURE — 99291 CRITICAL CARE FIRST HOUR: CPT | Performed by: STUDENT IN AN ORGANIZED HEALTH CARE EDUCATION/TRAINING PROGRAM

## 2024-08-17 PROCEDURE — 36600 WITHDRAWAL OF ARTERIAL BLOOD: CPT

## 2024-08-17 PROCEDURE — 84132 ASSAY OF SERUM POTASSIUM: CPT | Performed by: EMERGENCY MEDICINE

## 2024-08-17 PROCEDURE — 2500000004 HC RX 250 GENERAL PHARMACY W/ HCPCS (ALT 636 FOR OP/ED): Performed by: EMERGENCY MEDICINE

## 2024-08-17 PROCEDURE — 83735 ASSAY OF MAGNESIUM: CPT | Performed by: EMERGENCY MEDICINE

## 2024-08-17 PROCEDURE — 71275 CT ANGIOGRAPHY CHEST: CPT | Performed by: RADIOLOGY

## 2024-08-17 PROCEDURE — 2500000002 HC RX 250 W HCPCS SELF ADMINISTERED DRUGS (ALT 637 FOR MEDICARE OP, ALT 636 FOR OP/ED): Performed by: STUDENT IN AN ORGANIZED HEALTH CARE EDUCATION/TRAINING PROGRAM

## 2024-08-17 PROCEDURE — 87449 NOS EACH ORGANISM AG IA: CPT | Mod: ELYLAB

## 2024-08-17 PROCEDURE — 5A09357 ASSISTANCE WITH RESPIRATORY VENTILATION, LESS THAN 24 CONSECUTIVE HOURS, CONTINUOUS POSITIVE AIRWAY PRESSURE: ICD-10-PCS | Performed by: STUDENT IN AN ORGANIZED HEALTH CARE EDUCATION/TRAINING PROGRAM

## 2024-08-17 PROCEDURE — 2020000001 HC ICU ROOM DAILY

## 2024-08-17 PROCEDURE — 94640 AIRWAY INHALATION TREATMENT: CPT

## 2024-08-17 PROCEDURE — 71275 CT ANGIOGRAPHY CHEST: CPT

## 2024-08-17 PROCEDURE — 2550000001 HC RX 255 CONTRASTS: Performed by: EMERGENCY MEDICINE

## 2024-08-17 PROCEDURE — 87635 SARS-COV-2 COVID-19 AMP PRB: CPT | Performed by: EMERGENCY MEDICINE

## 2024-08-17 PROCEDURE — 80143 DRUG ASSAY ACETAMINOPHEN: CPT

## 2024-08-17 PROCEDURE — 87899 AGENT NOS ASSAY W/OPTIC: CPT | Mod: ELYLAB

## 2024-08-17 PROCEDURE — 71045 X-RAY EXAM CHEST 1 VIEW: CPT

## 2024-08-17 PROCEDURE — 70450 CT HEAD/BRAIN W/O DYE: CPT

## 2024-08-17 PROCEDURE — 2500000001 HC RX 250 WO HCPCS SELF ADMINISTERED DRUGS (ALT 637 FOR MEDICARE OP): Performed by: STUDENT IN AN ORGANIZED HEALTH CARE EDUCATION/TRAINING PROGRAM

## 2024-08-17 PROCEDURE — 96374 THER/PROPH/DIAG INJ IV PUSH: CPT

## 2024-08-17 PROCEDURE — 2500000002 HC RX 250 W HCPCS SELF ADMINISTERED DRUGS (ALT 637 FOR MEDICARE OP, ALT 636 FOR OP/ED): Performed by: EMERGENCY MEDICINE

## 2024-08-17 PROCEDURE — 83880 ASSAY OF NATRIURETIC PEPTIDE: CPT | Performed by: EMERGENCY MEDICINE

## 2024-08-17 RX ORDER — CHOLECALCIFEROL (VITAMIN D3) 25 MCG
2000 TABLET ORAL DAILY
Status: DISCONTINUED | OUTPATIENT
Start: 2024-08-18 | End: 2024-08-18 | Stop reason: HOSPADM

## 2024-08-17 RX ORDER — ALBUTEROL SULFATE 90 UG/1
2 INHALANT RESPIRATORY (INHALATION) EVERY 6 HOURS PRN
Status: DISCONTINUED | OUTPATIENT
Start: 2024-08-17 | End: 2024-08-18 | Stop reason: HOSPADM

## 2024-08-17 RX ORDER — PREDNISONE 20 MG/1
40 TABLET ORAL DAILY
Status: DISCONTINUED | OUTPATIENT
Start: 2024-08-18 | End: 2024-08-18 | Stop reason: HOSPADM

## 2024-08-17 RX ORDER — LIDOCAINE 560 MG/1
1 PATCH PERCUTANEOUS; TOPICAL; TRANSDERMAL DAILY PRN
Qty: 21 PATCH | Refills: 0 | Status: SHIPPED | OUTPATIENT
Start: 2024-08-17 | End: 2024-08-24

## 2024-08-17 RX ORDER — ALBUTEROL SULFATE 0.83 MG/ML
2.5 SOLUTION RESPIRATORY (INHALATION) ONCE
Status: COMPLETED | OUTPATIENT
Start: 2024-08-17 | End: 2024-08-17

## 2024-08-17 RX ORDER — GABAPENTIN 300 MG/1
300 CAPSULE ORAL 3 TIMES DAILY
Status: DISCONTINUED | OUTPATIENT
Start: 2024-08-17 | End: 2024-08-18 | Stop reason: HOSPADM

## 2024-08-17 RX ORDER — PROCHLORPERAZINE MALEATE 5 MG
10 TABLET ORAL EVERY 6 HOURS PRN
Status: DISCONTINUED | OUTPATIENT
Start: 2024-08-17 | End: 2024-08-18 | Stop reason: HOSPADM

## 2024-08-17 RX ORDER — QUETIAPINE FUMARATE 25 MG/1
25 TABLET, FILM COATED ORAL NIGHTLY
Qty: 90 TABLET | Refills: 0 | Status: SHIPPED | OUTPATIENT
Start: 2024-08-17 | End: 2024-11-15

## 2024-08-17 RX ORDER — LIDOCAINE 560 MG/1
3 PATCH PERCUTANEOUS; TOPICAL; TRANSDERMAL DAILY PRN
Status: DISCONTINUED | OUTPATIENT
Start: 2024-08-17 | End: 2024-08-18 | Stop reason: HOSPADM

## 2024-08-17 RX ORDER — FLUTICASONE FUROATE AND VILANTEROL 200; 25 UG/1; UG/1
1 POWDER RESPIRATORY (INHALATION) DAILY
Qty: 60 EACH | Refills: 29 | Status: SHIPPED | OUTPATIENT
Start: 2024-08-17 | End: 2024-09-16

## 2024-08-17 RX ORDER — GABAPENTIN 300 MG/1
300 CAPSULE ORAL 3 TIMES DAILY
Qty: 270 CAPSULE | Refills: 0 | Status: SHIPPED | OUTPATIENT
Start: 2024-08-17 | End: 2024-11-15

## 2024-08-17 RX ORDER — PREDNISONE 20 MG/1
40 TABLET ORAL DAILY
Qty: 8 TABLET | Refills: 0 | Status: SHIPPED | OUTPATIENT
Start: 2024-08-18 | End: 2024-08-22

## 2024-08-17 RX ORDER — DULOXETIN HYDROCHLORIDE 30 MG/1
60 CAPSULE, DELAYED RELEASE ORAL 2 TIMES DAILY
Status: DISCONTINUED | OUTPATIENT
Start: 2024-08-17 | End: 2024-08-18 | Stop reason: HOSPADM

## 2024-08-17 RX ORDER — IBUPROFEN 200 MG
1 TABLET ORAL DAILY
Status: DISCONTINUED | OUTPATIENT
Start: 2024-08-17 | End: 2024-08-18 | Stop reason: HOSPADM

## 2024-08-17 RX ORDER — ALBUTEROL SULFATE 90 UG/1
2 INHALANT RESPIRATORY (INHALATION) EVERY 6 HOURS PRN
Qty: 72 G | Refills: 11 | Status: SHIPPED | OUTPATIENT
Start: 2024-08-17

## 2024-08-17 RX ORDER — QUETIAPINE FUMARATE 25 MG/1
25 TABLET, FILM COATED ORAL NIGHTLY
Status: DISCONTINUED | OUTPATIENT
Start: 2024-08-17 | End: 2024-08-18 | Stop reason: HOSPADM

## 2024-08-17 RX ORDER — FLUTICASONE FUROATE AND VILANTEROL 100; 25 UG/1; UG/1
1 POWDER RESPIRATORY (INHALATION) DAILY
Status: DISCONTINUED | OUTPATIENT
Start: 2024-08-18 | End: 2024-08-18 | Stop reason: HOSPADM

## 2024-08-17 RX ORDER — ONDANSETRON 4 MG/1
8 TABLET, FILM COATED ORAL EVERY 8 HOURS PRN
Status: DISCONTINUED | OUTPATIENT
Start: 2024-08-17 | End: 2024-08-18 | Stop reason: HOSPADM

## 2024-08-17 RX ORDER — ENOXAPARIN SODIUM 100 MG/ML
40 INJECTION SUBCUTANEOUS DAILY
Status: DISCONTINUED | OUTPATIENT
Start: 2024-08-18 | End: 2024-08-18 | Stop reason: HOSPADM

## 2024-08-17 RX ORDER — IPRATROPIUM BROMIDE AND ALBUTEROL SULFATE 2.5; .5 MG/3ML; MG/3ML
3 SOLUTION RESPIRATORY (INHALATION) ONCE
Status: COMPLETED | OUTPATIENT
Start: 2024-08-17 | End: 2024-08-17

## 2024-08-17 RX ORDER — HEPARIN SODIUM 5000 [USP'U]/ML
5000 INJECTION, SOLUTION INTRAVENOUS; SUBCUTANEOUS EVERY 8 HOURS
Status: DISCONTINUED | OUTPATIENT
Start: 2024-08-17 | End: 2024-08-17

## 2024-08-17 RX ORDER — DULOXETIN HYDROCHLORIDE 60 MG/1
60 CAPSULE, DELAYED RELEASE ORAL 2 TIMES DAILY
Qty: 180 CAPSULE | Refills: 0 | Status: SHIPPED | OUTPATIENT
Start: 2024-08-17 | End: 2024-11-15

## 2024-08-17 RX ORDER — CLONIDINE HYDROCHLORIDE 0.1 MG/1
0.1 TABLET ORAL NIGHTLY
Status: DISCONTINUED | OUTPATIENT
Start: 2024-08-17 | End: 2024-08-18 | Stop reason: HOSPADM

## 2024-08-17 RX ORDER — FOLIC ACID 1 MG/1
1000 TABLET ORAL DAILY
Status: DISCONTINUED | OUTPATIENT
Start: 2024-08-18 | End: 2024-08-18 | Stop reason: HOSPADM

## 2024-08-17 RX ADMIN — CLONIDINE HYDROCHLORIDE 0.1 MG: 0.1 TABLET ORAL at 22:45

## 2024-08-17 RX ADMIN — NICOTINE 1 PATCH: 21 PATCH, EXTENDED RELEASE TRANSDERMAL at 23:07

## 2024-08-17 RX ADMIN — GABAPENTIN 300 MG: 300 CAPSULE ORAL at 22:45

## 2024-08-17 RX ADMIN — ALBUTEROL SULFATE 2.5 MG: 2.5 SOLUTION RESPIRATORY (INHALATION) at 17:49

## 2024-08-17 RX ADMIN — QUETIAPINE FUMARATE 25 MG: 25 TABLET, FILM COATED ORAL at 22:45

## 2024-08-17 RX ADMIN — METHYLPREDNISOLONE SODIUM SUCCINATE 125 MG: 125 INJECTION, POWDER, FOR SOLUTION INTRAMUSCULAR; INTRAVENOUS at 18:17

## 2024-08-17 RX ADMIN — DULOXETINE HYDROCHLORIDE 60 MG: 30 CAPSULE, DELAYED RELEASE ORAL at 22:45

## 2024-08-17 RX ADMIN — IPRATROPIUM BROMIDE AND ALBUTEROL SULFATE 3 ML: 2.5; .5 SOLUTION RESPIRATORY (INHALATION) at 17:48

## 2024-08-17 RX ADMIN — IOHEXOL 75 ML: 350 INJECTION, SOLUTION INTRAVENOUS at 18:57

## 2024-08-17 ASSESSMENT — ACTIVITIES OF DAILY LIVING (ADL)
ASSISTIVE_DEVICE: DENTURES LOWER;DENTURES UPPER;EYEGLASSES
JUDGMENT_ADEQUATE_SAFELY_COMPLETE_DAILY_ACTIVITIES: YES
BATHING: INDEPENDENT
GROOMING: INDEPENDENT
PATIENT'S MEMORY ADEQUATE TO SAFELY COMPLETE DAILY ACTIVITIES?: YES
WALKS IN HOME: INDEPENDENT
ADEQUATE_TO_COMPLETE_ADL: YES
HEARING - LEFT EAR: FUNCTIONAL
FEEDING YOURSELF: INDEPENDENT
HEARING - RIGHT EAR: FUNCTIONAL
TOILETING: INDEPENDENT
DRESSING YOURSELF: INDEPENDENT

## 2024-08-17 ASSESSMENT — COGNITIVE AND FUNCTIONAL STATUS - GENERAL
MOBILITY SCORE: 24
DAILY ACTIVITIY SCORE: 24
PATIENT BASELINE BEDBOUND: NO

## 2024-08-17 ASSESSMENT — LIFESTYLE VARIABLES
HAVE YOU EVER FELT YOU SHOULD CUT DOWN ON YOUR DRINKING: NO
TOTAL SCORE: 0
EVER FELT BAD OR GUILTY ABOUT YOUR DRINKING: NO
HAVE PEOPLE ANNOYED YOU BY CRITICIZING YOUR DRINKING: NO
EVER HAD A DRINK FIRST THING IN THE MORNING TO STEADY YOUR NERVES TO GET RID OF A HANGOVER: NO

## 2024-08-17 ASSESSMENT — PAIN - FUNCTIONAL ASSESSMENT: PAIN_FUNCTIONAL_ASSESSMENT: 0-10

## 2024-08-17 ASSESSMENT — PAIN DESCRIPTION - PROGRESSION: CLINICAL_PROGRESSION: NOT CHANGED

## 2024-08-17 ASSESSMENT — PAIN SCALES - GENERAL: PAINLEVEL_OUTOF10: 0 - NO PAIN

## 2024-08-17 NOTE — PROGRESS NOTES
This patient was signed out to me by colleague.  Patient was pending CT scan results, repeat ABG and repeat assessment.  Patient's ABG showed a persistent respiratory acidosis despite getting breathing treatments.  Given this we did place her on noninvasive positive pressure ventilation.    CT scans Did not show any acute pathology to explain her presentation.  Patient will be admitted to the ICU for further evaluation and care    EKG 1914  Per my interpretation:  Electrocardiogram ECG  RATE:  [Normal]  RHYTHM: [Sinus]  AXIS: [Normal]  INTERVALS: [Normal]  ST-T WAVE CHANGES: [Normal]  ABNORMALITIES/COMPARISON: No signs of occlusive MI right heart strain.

## 2024-08-17 NOTE — ED PROVIDER NOTES
63-year-old male presents emergency department multiple complaints.  Patient presents via EMS.  He reports that the friend he has been staying with called EMS because they were worried that he has been sleeping a lot more frequently.  Patient does report fatigue and malaise.  He also states he has been having some increased congestion, productive cough, shortness of breath, and chest tightness.  He denies any abdominal pain, nausea, or vomiting.  He does report decreased appetite.  Denies any dysuria, diarrhea, constipation, black or bloody stools.  He states he is not on any anticoagulants.  Patient also reports he has been feeling lightheaded which she describes as feeling like he may pass out.  Denies any room spinning dizziness.  Patient denies any recent falls or head injury. Chart review shows significant past medical history of depression, hypertension, COPD, polysubstance abuse, diverticulitis, gout, lung mass with history of non-small cell lung cancer, and mediastinal lymphadenopathy.  He is an every day smoker and admits occasional alcohol use.  Denies any other illicit drug use.      History provided by:  Patient and EMS personnel   used: No           ------------------------------------------------------------------------------------------------------------------------------------------    VS: As documented in the triage note and EMR flowsheet from this visit were reviewed.    Review of Systems  Constitutional: no fever, chills reports fatigue and malaise as well as decreased appetite  Eyes: no redness, discharge, pain  HENT: no sore throat, nose bleeds, admits to sinus congestion  Cardiovascular: Reports chest tightness/pressure no leg edema, palpitations  Respiratory: Admits to shortness of breath and productive cough  GI: no nausea, diarrhea, pain, vomiting, constipation, BRBPR, melena  : no dysuria, frequency, hematuria  Musculoskeletal: no neck pain, stiffness,  no joint  deformity, swelling  Skin: no rash, erythema, wounds  Neurological: no headache,  numbness, or tingling reports feeling lightheaded and generally weak  Psychiatric: no suicidal thoughts, confusion, agitation  Metabolic: no polyuria or polydipsia  Hematologic: no increased bleeding or bruising  Immunology: Patient has history of cancer    [facility]  Nursing notes reviewed and confirmed by me.  Chart review performed including medications, allergies, and medical, surgical, and family history  Visit Vitals  /55   Pulse 78   Temp 36.7 °C (98.1 °F) (Temporal)   Resp 18     Physical Exam  Vitals and nursing note reviewed.   Constitutional:       General: He is not in acute distress.     Appearance: He is ill-appearing.   HENT:      Head: Normocephalic and atraumatic.      Right Ear: External ear normal.      Left Ear: External ear normal.      Nose: Nose normal. No congestion or rhinorrhea.      Mouth/Throat:      Mouth: Mucous membranes are dry.      Pharynx: No oropharyngeal exudate or posterior oropharyngeal erythema.   Eyes:      Extraocular Movements: Extraocular movements intact.      Conjunctiva/sclera: Conjunctivae normal.      Pupils: Pupils are equal, round, and reactive to light.   Cardiovascular:      Rate and Rhythm: Normal rate and regular rhythm.      Pulses: Normal pulses.      Heart sounds: Normal heart sounds.   Pulmonary:      Effort: Pulmonary effort is normal. No respiratory distress.      Breath sounds: No stridor. Rhonchi present. No wheezing or rales.      Comments: Rhonchi in bilateral lung fields tight breath sounds.  Abdominal:      General: There is no distension.      Palpations: Abdomen is soft.      Tenderness: There is no abdominal tenderness. There is no guarding or rebound.   Musculoskeletal:         General: No swelling or deformity. Normal range of motion.      Cervical back: Normal range of motion and neck supple. No rigidity.      Right lower leg: No edema.      Left lower leg: No  edema.      Comments: No calf tenderness    Skin:     General: Skin is warm and dry.      Capillary Refill: Capillary refill takes less than 2 seconds.      Coloration: Skin is not jaundiced.      Findings: No rash.   Neurological:      General: No focal deficit present.      Mental Status: He is alert and oriented to person, place, and time.      Sensory: No sensory deficit.      Motor: Weakness (Patient is generally weak on my exam.) present.      Comments: Cranial nerves II through XII grossly intact.  NIH stroke scale is 0.   Psychiatric:         Mood and Affect: Mood normal.         Behavior: Behavior normal.        Past Medical History:   Diagnosis Date    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)     Arthritis     Chronic pain disorder     Degenerative disc disease, lumbar     Depression     Essential (primary) hypertension     Benign essential hypertension    Personal history of other diseases of the digestive system     History of diverticulosis    Personal history of other diseases of the musculoskeletal system and connective tissue     History of gout    Personal history of other diseases of the respiratory system     History of COPD    Psychiatric illness     Substance abuse (Multi)     Suicide attempt (Multi)       Past Surgical History:   Procedure Laterality Date    ELBOW SURGERY      OTHER SURGICAL HISTORY  05/06/2020    Flexor tendon repair    OTHER SURGICAL HISTORY  05/06/2020    Oral surgery    OTHER SURGICAL HISTORY  05/06/2020    Shoulder surgery      Social History     Socioeconomic History    Marital status: Single   Tobacco Use    Smoking status: Every Day     Current packs/day: 1.00     Average packs/day: 1 pack/day for 15.0 years (15.0 ttl pk-yrs)     Types: Cigarettes    Smokeless tobacco: Never   Vaping Use    Vaping status: Never Used   Substance and Sexual Activity    Alcohol use: Yes     Comment: occasional    Drug use: Not Currently    Sexual activity: Defer     Social Determinants of Health      Financial Resource Strain: High Risk (2/20/2024)    Overall Financial Resource Strain (CARDIA)     Difficulty of Paying Living Expenses: Very hard   Food Insecurity: Food Insecurity Present (2/20/2024)    Hunger Vital Sign     Worried About Running Out of Food in the Last Year: Often true     Ran Out of Food in the Last Year: Often true   Transportation Needs: Unmet Transportation Needs (2/20/2024)    PRAPARE - Transportation     Lack of Transportation (Medical): Yes     Lack of Transportation (Non-Medical): Yes   Physical Activity: Inactive (2/20/2024)    Exercise Vital Sign     Days of Exercise per Week: 0 days     Minutes of Exercise per Session: 0 min   Stress: Stress Concern Present (2/20/2024)    [employer] - Occupational Stress Questionnaire     Feeling of Stress : Rather much   Social Connections: Socially Isolated (2/20/2024)    Social Connection and Isolation Panel [NHANES]     Frequency of Communication with Friends and Family: Never     Frequency of Social Gatherings with Friends and Family: Never     Attends Mandaen Services: Never     Active Member of Clubs or Organizations: No     Attends Club or Organization Meetings: Never     Marital Status: Never    Intimate Partner Violence: Not At Risk (2/20/2024)    Humiliation, Afraid, Rape, and Kick questionnaire     Fear of Current or Ex-Partner: No     Emotionally Abused: No     Physically Abused: No     Sexually Abused: No   Housing Stability: High Risk (2/20/2024)    Housing Stability Vital Sign     Unable to Pay for Housing in the Last Year: Yes     Number of Places Lived in the Last Year: 1     Unstable Housing in the Last Year: No      ------------------------------------------------------------------------------------------------------------------------------------------  XR chest 1 view   Final Result   Compared to PET-CT 05/07/2024, there is a similar appearance of right   upper lobe pulmonary mass known to  represent malignancy.        Minimal linear opacities likely representing microatelectasis of left   lung base without evidence of pneumonia or pulmonary edema.        MACRO:   None.        Signed by: Oscar Palmer 8/17/2024 6:51 PM   Dictation workstation:   [id number]      CT head wo IV contrast    (Results Pending)   CT angio chest for pulmonary embolism    (Results Pending)      Labs Reviewed   CBC WITH AUTO DIFFERENTIAL - Abnormal       Result Value    WBC 12.6 (*)     nRBC 0.0      RBC 4.24 (*)     Hemoglobin 12.1 (*)     Hematocrit 37.6 (*)     MCV 89      MCH 28.5      MCHC 32.2      RDW 14.9 (*)     Platelets 255      Neutrophils % 85.4      Immature Granulocytes %, Automated 0.5      Lymphocytes % 7.4      Monocytes % 6.6      Eosinophils % 0.0      Basophils % 0.1      Neutrophils Absolute 10.76 (*)     Immature Granulocytes Absolute, Automated 0.06      Lymphocytes Absolute 0.93 (*)     Monocytes Absolute 0.83      Eosinophils Absolute 0.00      Basophils Absolute 0.01     COMPREHENSIVE METABOLIC PANEL - Abnormal    Glucose 91      Sodium 139      Potassium 3.8      Chloride 105      Bicarbonate 26      Anion Gap 12      Urea Nitrogen 29 (*)     Creatinine 1.10      eGFR 76      Calcium 10.5 (*)     Albumin 3.9      Alkaline Phosphatase 80      Total Protein 7.2      AST 29      Bilirubin, Total 0.5      ALT 12     B-TYPE NATRIURETIC PEPTIDE - Abnormal     (*)     Narrative:        <100 pg/mL - Heart failure unlikely  100-299 pg/mL - Intermediate probability of acute heart                  failure exacerbation. Correlate with clinical                  context and patient history.    >=300 pg/mL - Heart Failure likely. Correlate with clinical                  context and patient history.    BNP testing is performed using different testing methodology at [facility] than at other [facility] hospitals. Direct result comparisons should only be made within the same method.      LIPASE -  Abnormal    Lipase 4 (*)     Narrative:     Venipuncture immediately after or during the administration of Metamizole may lead to falsely low results. Testing should be performed immediately prior to Metamizole dosing.   D-DIMER, VTE EXCLUSION - Abnormal    D-Dimer, Quantitative VTE Exclusion 723 (*)     Narrative:     The VTE Exclusion D-Dimer assay is reported in ng/mL Fibrinogen Equivalent Units (FEU).    Per 's instructions for use, a value of less than 500 ng/mL (FEU) may help to exclude DVT or PE in outpatients when the assay is used with a clinical pretest probability assessment.(AE must utilize and document eCalc 'Wells Score Deep Vein Thrombosis Risk' for DVT exclusion only. Emergency Department should utilize  Guidelines for Emergency Department Use of the VTE Exclusion D-Dimer and Clinical Pretest probability assessment model for DVT or PE exclusion.)   BLOOD GAS ARTERIAL FULL PANEL - Abnormal    POCT pH, Arterial 7.34 (*)     POCT pCO2, Arterial 51 (*)     POCT pO2, Arterial 62 (*)     POCT SO2, Arterial 92 (*)     POCT Oxy Hemoglobin, Arterial 88.3 (*)     POCT Hematocrit Calculated, Arterial 37.0 (*)     POCT Sodium, Arterial 138      POCT Potassium, Arterial 4.0      POCT Chloride, Arterial 105      POCT Ionized Calcium, Arterial 1.30      POCT Glucose, Arterial 97      POCT Lactate, Arterial 1.0      POCT Base Excess, Arterial 1.0      POCT HCO3 Calculated, Arterial 27.5 (*)     POCT Hemoglobin, Arterial 12.3 (*)     POCT Anion Gap, Arterial 10      Patient Temperature        FiO2 21      Site of Arterial Puncture Radial Left      Darrell's Test Positive     SERIAL TROPONIN-INITIAL - Abnormal    Troponin I, High Sensitivity 24 (*)     Narrative:     Less than 99th percentile of normal range cutoff-  Female and children under 18 years old <14 ng/L; Male <21 ng/L: Negative  Repeat testing should be performed if clinically indicated.     Female and children under 18 years old 14-50 ng/L;  Male 21-50 ng/L:  Consistent with possible cardiac damage and possible increased clinical   risk. Serial measurements may help to assess extent of myocardial damage.     >50 ng/L: Consistent with cardiac damage, increased clinical risk and  myocardial infarction. Serial measurements may help assess extent of   myocardial damage.      NOTE: Children less than 1 year old may have higher baseline troponin   levels and results should be interpreted in conjunction with the overall   clinical context.     NOTE: Troponin I testing is performed using a different   testing methodology at [facility] than at other   [facility]. Direct result comparisons should only   be made within the same method.   BLOOD GAS ARTERIAL FULL PANEL - Abnormal    POCT pH, Arterial 7.32 (*)     POCT pCO2, Arterial 50 (*)     POCT pO2, Arterial 64 (*)     POCT SO2, Arterial 93 (*)     POCT Oxy Hemoglobin, Arterial 89.6 (*)     POCT Hematocrit Calculated, Arterial 35.0 (*)     POCT Sodium, Arterial 135 (*)     POCT Potassium, Arterial 3.8      POCT Chloride, Arterial 104      POCT Ionized Calcium, Arterial 1.37 (*)     POCT Glucose, Arterial 98      POCT Lactate, Arterial 0.8      POCT Base Excess, Arterial -0.8      POCT HCO3 Calculated, Arterial 25.8      POCT Hemoglobin, Arterial 11.6 (*)     POCT Anion Gap, Arterial 9 (*)     Patient Temperature        FiO2 21      Site of Arterial Puncture Radial Left      Darrell's Test Positive     MAGNESIUM - Normal    Magnesium 2.28     SARS-COV-2 PCR - Normal    Coronavirus 2019, PCR Not Detected      Narrative:     This assay has received FDA Emergency Use Authorization (EUA) and is only authorized for the duration of time that circumstances exist to justify the authorization of the emergency use of in vitro diagnostic tests for the detection of SARS-CoV-2 virus and/or diagnosis of COVID-19 infection under section 564(b)(1) of the Act, 21 U.S.C. 360bbb-3(b)(1). This assay is an in vitro  diagnostic nucleic acid amplification test for the qualitative detection of SARS-CoV-2 from nasopharyngeal specimens and has been validated for use at [facility]. Negative results do not preclude COVID-19 infections and should not be used as the sole basis for diagnosis, treatment, or other management decisions.     TROPONIN SERIES- (INITIAL, 1 HR)    Narrative:     The following orders were created for panel order Troponin I Series, High Sensitivity (0, 1 HR).  Procedure                               Abnormality         Status                     ---------                               -----------         ------                     Troponin I, High Sensiti...[[id number]]  Abnormal            Final result               Troponin, High Sensitivi...[[id number]]                                                   Please view results for these tests on the individual orders.   URINALYSIS WITH REFLEX CULTURE AND MICROSCOPIC    Narrative:     The following orders were created for panel order Urinalysis with Reflex Culture and Microscopic.  Procedure                               Abnormality         Status                     ---------                               -----------         ------                     Urinalysis with Reflex C...[[id number]]                                                 Extra Urine Gray Tube[[id number]]                                                         Please view results for these tests on the individual orders.   URINALYSIS WITH REFLEX CULTURE AND MICROSCOPIC   EXTRA URINE GRAY TUBE   SERIAL TROPONIN, 1 HOUR        Medical Decision Making  EKG interpreted by ED physician: Normal sinus rhythm rate of 72.  AK, QRS, QTc intervals all within normal limits.  Difficult ST elevations or depressions.  No significant Q waves.  Good R wave progression.  Normal axis.    63-year-old male presents emergency department with chief complaint of fatigue, malaise, increased sleepiness,  cough, and chest tightness.  Given his presenting complaints a thorough workup is obtained.  Patient is given aerosol treatments along with Solu-Medrol for presumed COPD exacerbation.  ABG does show some mild hypoxic and hypercapnic respiratory failure also consistent with COPD.  Repeat ABG is ordered and pending at the end of my shift. Given the patient's hypoxia and findings of COPD exacerbation I did discuss with him likely need for admission.  COVID testing is negative.  CBC does show significant leukocytosis and mild anemia.  Patient does not have findings of sepsis.  Cardiac enzyme x 1 is mildly elevated and EKG does not show acute ACS.  CMP does not show significant metabolic abnormalities.  D-dimer is significantly elevated and PE study is pending along with head CT at the end of my shift.  Chest x-ray shows similar appearance of known lung mass without findings of pneumonia or pulmonary edema.  At the end of my shift CT imaging, repeat blood gas, and UA are all pending.  I suspect patient will require admission for COPD exacerbation pending the rest of his workup today.  Patient is signed out to Dr. Duvall.       Diagnoses as of 08/17/24 1918   Chronic obstructive pulmonary disease, unspecified COPD type (Multi)   Hypoxemia   Hypercapnia      1. Chronic obstructive pulmonary disease, unspecified COPD type (Multi)        2. Hypoxemia        3. Hypercapnia           Critical Care    Performed by: Mac Hobson DO  Authorized by: Mac Hobson DO    Critical care provider statement:     Critical care time (minutes):  32    Critical care time was exclusive of:  Separately billable procedures and treating other patients    Critical care was necessary to treat or prevent imminent or life-threatening deterioration of the following conditions:  Respiratory failure    Critical care was time spent personally by me on the following activities:  Development of treatment plan with patient or surrogate, evaluation of  patient's response to treatment, examination of patient, re-evaluation of patient's condition, pulse oximetry, ordering and review of radiographic studies, ordering and review of laboratory studies and ordering and performing treatments and interventions    Care discussed with: admitting provider         This note was dictated using dragon software and may contain errors related to dictation interpretation errors.      Mac Hobson,   08/17/24 1914       Mac Hobson,   08/17/24 1918

## 2024-08-18 VITALS
RESPIRATION RATE: 13 BRPM | WEIGHT: 156.97 LBS | SYSTOLIC BLOOD PRESSURE: 119 MMHG | DIASTOLIC BLOOD PRESSURE: 66 MMHG | BODY MASS INDEX: 20.8 KG/M2 | TEMPERATURE: 97.9 F | HEART RATE: 54 BPM | HEIGHT: 73 IN | OXYGEN SATURATION: 93 %

## 2024-08-18 LAB
ATRIAL RATE: 72 BPM
ATRIAL RATE: 81 BPM
HOLD SPECIMEN: NORMAL
LEGIONELLA AG UR QL: NEGATIVE
P AXIS: 69 DEGREES
P AXIS: 75 DEGREES
P OFFSET: 191 MS
P OFFSET: 191 MS
P ONSET: 139 MS
P ONSET: 144 MS
PR INTERVAL: 148 MS
PR INTERVAL: 154 MS
Q ONSET: 216 MS
Q ONSET: 218 MS
QRS COUNT: 12 BEATS
QRS COUNT: 13 BEATS
QRS DURATION: 90 MS
QRS DURATION: 92 MS
QT INTERVAL: 380 MS
QT INTERVAL: 394 MS
QTC CALCULATION(BAZETT): 416 MS
QTC CALCULATION(BAZETT): 457 MS
QTC FREDERICIA: 404 MS
QTC FREDERICIA: 435 MS
R AXIS: 63 DEGREES
R AXIS: 71 DEGREES
S PNEUM AG UR QL: NEGATIVE
T AXIS: 48 DEGREES
T AXIS: 53 DEGREES
T OFFSET: 408 MS
T OFFSET: 413 MS
VENTRICULAR RATE: 72 BPM
VENTRICULAR RATE: 81 BPM

## 2024-08-18 PROCEDURE — 2500000004 HC RX 250 GENERAL PHARMACY W/ HCPCS (ALT 636 FOR OP/ED): Performed by: STUDENT IN AN ORGANIZED HEALTH CARE EDUCATION/TRAINING PROGRAM

## 2024-08-18 PROCEDURE — 2500000001 HC RX 250 WO HCPCS SELF ADMINISTERED DRUGS (ALT 637 FOR MEDICARE OP): Performed by: STUDENT IN AN ORGANIZED HEALTH CARE EDUCATION/TRAINING PROGRAM

## 2024-08-18 RX ORDER — IBUPROFEN 200 MG
1 TABLET ORAL DAILY
Qty: 30 PATCH | Refills: 1 | Status: SHIPPED | OUTPATIENT
Start: 2024-08-19 | End: 2024-09-29

## 2024-08-18 RX ADMIN — GABAPENTIN 300 MG: 300 CAPSULE ORAL at 10:06

## 2024-08-18 RX ADMIN — PREDNISONE 40 MG: 20 TABLET ORAL at 10:06

## 2024-08-18 RX ADMIN — DULOXETINE HYDROCHLORIDE 60 MG: 30 CAPSULE, DELAYED RELEASE ORAL at 10:06

## 2024-08-18 RX ADMIN — Medication 2000 UNITS: at 10:06

## 2024-08-18 RX ADMIN — FLUTICASONE FUROATE AND VILANTEROL TRIFENATATE 1 PUFF: 100; 25 POWDER RESPIRATORY (INHALATION) at 09:00

## 2024-08-18 RX ADMIN — FOLIC ACID 1000 MCG: 1 TABLET ORAL at 10:06

## 2024-08-18 SDOH — SOCIAL STABILITY: SOCIAL INSECURITY: ARE YOU OR HAVE YOU BEEN THREATENED OR ABUSED PHYSICALLY, EMOTIONALLY, OR SEXUALLY BY ANYONE?: NO

## 2024-08-18 SDOH — SOCIAL STABILITY: SOCIAL INSECURITY: DO YOU FEEL UNSAFE GOING BACK TO THE PLACE WHERE YOU ARE LIVING?: NO

## 2024-08-18 SDOH — SOCIAL STABILITY: SOCIAL INSECURITY: DO YOU FEEL ANYONE HAS EXPLOITED OR TAKEN ADVANTAGE OF YOU FINANCIALLY OR OF YOUR PERSONAL PROPERTY?: NO

## 2024-08-18 SDOH — SOCIAL STABILITY: SOCIAL INSECURITY: ABUSE: ADULT

## 2024-08-18 SDOH — SOCIAL STABILITY: SOCIAL INSECURITY: HAVE YOU HAD ANY THOUGHTS OF HARMING ANYONE ELSE?: NO

## 2024-08-18 SDOH — SOCIAL STABILITY: SOCIAL INSECURITY: DOES ANYONE TRY TO KEEP YOU FROM HAVING/CONTACTING OTHER FRIENDS OR DOING THINGS OUTSIDE YOUR HOME?: NO

## 2024-08-18 SDOH — SOCIAL STABILITY: SOCIAL INSECURITY: WERE YOU ABLE TO COMPLETE ALL THE BEHAVIORAL HEALTH SCREENINGS?: YES

## 2024-08-18 SDOH — SOCIAL STABILITY: SOCIAL INSECURITY: HAS ANYONE EVER THREATENED TO HURT YOUR FAMILY OR YOUR PETS?: NO

## 2024-08-18 SDOH — SOCIAL STABILITY: SOCIAL INSECURITY: HAVE YOU HAD THOUGHTS OF HARMING ANYONE ELSE?: NO

## 2024-08-18 ASSESSMENT — PATIENT HEALTH QUESTIONNAIRE - PHQ9
1. LITTLE INTEREST OR PLEASURE IN DOING THINGS: NOT AT ALL
2. FEELING DOWN, DEPRESSED OR HOPELESS: NOT AT ALL
SUM OF ALL RESPONSES TO PHQ9 QUESTIONS 1 & 2: 0

## 2024-08-18 ASSESSMENT — LIFESTYLE VARIABLES
HOW MANY STANDARD DRINKS CONTAINING ALCOHOL DO YOU HAVE ON A TYPICAL DAY: PATIENT DOES NOT DRINK
AUDIT-C TOTAL SCORE: 0
HOW OFTEN DO YOU HAVE A DRINK CONTAINING ALCOHOL: NEVER
AUDIT-C TOTAL SCORE: 0
HOW OFTEN DO YOU HAVE 6 OR MORE DRINKS ON ONE OCCASION: NEVER
SKIP TO QUESTIONS 9-10: 1

## 2024-08-18 ASSESSMENT — ACTIVITIES OF DAILY LIVING (ADL): LACK_OF_TRANSPORTATION: YES

## 2024-08-18 ASSESSMENT — PAIN SCALES - GENERAL: PAINLEVEL_OUTOF10: 0 - NO PAIN

## 2024-08-18 NOTE — DISCHARGE INSTRUCTIONS
You were seen at [facility] for sleepiness (lethargy). This was likely due to your chemotherapy and lack of sleep the day prior. You were admitted also for a possible COPD exacerbation and had a CT scan of your head and chest which was unremarkable other than findings that were consistent with your lung cancer.     You were given inhalers and steroids and observed in the ICU over night without issues.     You are to continue your inhalers as prescribed and take the albuterol inhaler every 6 hours for the next 2 days and then only as needed. You should take the Other inhaler prescribed daily for likely COPD.     You are to continue taking your steroids (40mg daily orally) with the last dose being Wednesday 8/21/24.     You are to call your pulmonologist in the next week to setup a follow-up appointment.     You should also continue your treatment for your cancer at your Oncologists office as previously discussed.     You are to return to the hospital for shortness of breath that is not relieved by your nebulizer or inhaler, persistent cough with sputum production with fever>100.4F for two days, for change in your mental status or cognition, or for any other concerns.     Below you can find information on COPD/Emphysema:    [email or web address]/[email or web address]-[email or web address]

## 2024-08-18 NOTE — CARE PLAN
The patient's goals for the shift include      The clinical goals for the shift include To be discharged home today    Problem: Pain - Adult  Goal: Verbalizes/displays adequate comfort level or baseline comfort level  8/18/2024 0724 by Russell Rowland RN  Outcome: Progressing  8/18/2024 0724 by Russell Rowland RN  Outcome: Progressing     Problem: Safety - Adult  Goal: Free from fall injury  8/18/2024 0724 by Russell Rowland RN  Outcome: Progressing  8/18/2024 0724 by Russell Rowland RN  Outcome: Progressing     Problem: Discharge Planning  Goal: Discharge to home or other facility with appropriate resources  8/18/2024 0724 by Russell Rowland RN  Outcome: Progressing  8/18/2024 0724 by Russell Rowland RN  Outcome: Progressing     Problem: Chronic Conditions and Co-morbidities  Goal: Patient's chronic conditions and co-morbidity symptoms are monitored and maintained or improved  8/18/2024 0724 by Russell Rowland RN  Outcome: Progressing  8/18/2024 0724 by Russell Rowland RN  Outcome: Progressing

## 2024-08-18 NOTE — NURSING NOTE
Pt Given discharge instructions medications to pharmacy Ride with round trip set up for pt IV removed no bleeding from site

## 2024-08-18 NOTE — DISCHARGE SUMMARY
Discharge Diagnosis  Acute metabolic encephalopathy    Issues Requiring Follow-Up  Continue your prednisone (Steroid) 40mg orally for 4 more days through 8/21/24  Continue with inhalers as prescribed  Stop smoking  Follow-up with your pulmonologist  Continue current chemotherapy as prescribed by your oncologist and continue follow-up with them    Test Results Pending At Discharge  Pending Labs       Order Current Status    Extra Urine Gray Tube In process    Legionella Antigen, Urine In process    Streptococcus pneumoniae Antigen, Urine In process    Urinalysis with Reflex Culture and Microscopic In process            Hospital Course   Joe Magaña is a 63 y.o. year old male patient with Past Medical History of smoking, Chronic pain on oxycodone, HTN, COPD, on MDI. MDD, opioid abuse (documentation of snorting oxycodone in the past), smoking history with recent  Bronchscopy and EBUS for RUL nodule with cavitation and hilar/mediastinal LAD found to have adenocarcinoma of the lung stage IVB and being evaluated by oncology currently and s/p chemo/immunotherapy with Carboplatin and Ketruda and dexamethasone yesterday on 8/16/24.      According to the patient he received chemo yesterday and took an extra dose of pain medication yesterday but none since. He then went to work all night and came home in the morning. The patient states he then came home and his friends noted that he was more sleepy than usual and called EMS.Pt states that he remembers everything and just wanted to sleep because he was tired from yesterday. Pt currently states that he has a chronic cough of clear sputum which has not worsened, no fevers,no abdominal pain, no nausea, no diarrhea, no shortness of breath or difficulty breathing or chest pain and no new lower extremity edema. Pt did relate that in April he had hemoptysis which led to his CT and then later EBUS and diagnosis of cancer but no hemoptysis since. The patient currently feels well but  does state that he still smokes 1pk per day.      In the ED the patient was hemodynamically stable on room air and normoxemic. Patient was treated for COPD with nebs and steroids and placed on BiPAP for apnea and hypercapnea. ICU was called for further management.     Pt was monitored over night and did well.Patient was discharged on 8/18/24 in stable condition and apeparing well. Pt was given follow-up instructions and red flag instructions for return tot  hospital.     Pertinent Physical Exam At Time of Discharge  Physical Exam  Constitutional:       General: He is not in acute distress.     Appearance: Normal appearance. He is not toxic-appearing.      Comments: Thin appearing male in no acute distress   HENT:      Mouth/Throat:      Mouth: Mucous membranes are moist.   Eyes:      General: No scleral icterus.     Extraocular Movements: Extraocular movements intact.      Pupils: Pupils are equal, round, and reactive to light.   Cardiovascular:      Rate and Rhythm: Normal rate and regular rhythm.      Pulses: Normal pulses.      Heart sounds: Normal heart sounds. No murmur heard.     No friction rub.   Pulmonary:      Effort: Pulmonary effort is normal.      Breath sounds: Wheezing and rhonchi present. No rales.      Comments: Rhonchi and bilateral bases and faint expiratory wheeze noted  Abdominal:      General: Abdomen is flat. Bowel sounds are normal.      Palpations: Abdomen is soft.      Tenderness: There is no abdominal tenderness.   Musculoskeletal:      Right lower leg: No edema.      Left lower leg: No edema.   Skin:     General: Skin is warm and dry.      Capillary Refill: Capillary refill takes less than 2 seconds.      Findings: No rash.   Neurological:      General: No focal deficit present.      Mental Status: He is alert and oriented to person, place, and time.      Cranial Nerves: No cranial nerve deficit.      Motor: No weakness.   Psychiatric:         Mood and Affect: Mood normal.          Behavior: Behavior normal.     Home Medications     Medication List      ASK your doctor about these medications     cholecalciferol 50 mcg (2,000 unit) capsule; Commonly known as: Vitamin   D3; Take 1 capsule (50 mcg) by mouth once daily.   cloNIDine 0.1 mg tablet; Commonly known as: Catapres; Take 1 tablet (0.1   mg) by mouth once daily at bedtime.   DULoxetine 60 mg DR capsule; Commonly known as: Cymbalta; Take 1 capsule   (60 mg) by mouth 2 times a day.   folic acid 1 mg tablet; Commonly known as: Folvite; Take 1 tablet (1,000   mcg) by mouth once daily.   gabapentin 300 mg capsule; Commonly known as: Neurontin   Lidocaine Pain Relief 4 % patch; Generic drug: lidocaine; Place 3   patches over 12 hours on the skin once daily. Remove & discard patch   within 12 hours or as directed by MD. Do not start before February 24, 2024.   ondansetron 8 mg tablet; Commonly known as: Zofran; Take 1 tablet (8 mg)   by mouth every 8 hours if needed for nausea or vomiting.   prochlorperazine 10 mg tablet; Commonly known as: Compazine; Take 1   tablet (10 mg) by mouth every 6 hours if needed for nausea or vomiting.   QUEtiapine 25 mg tablet; Commonly known as: SEROquel; Take 1 tablet (25   mg) by mouth once daily at bedtime.       Outpatient Follow-Up  Future Appointments   Date Time Provider Department Center   9/5/2024 10:00 AM Aminta Jha MD [id number] [place]   9/5/2024 12:00 PM INF 10 FLORIDALMA [id number] [place]   10/10/2024  1:30 PM Madelaine Kan APRN-[facility] [id number] [place]       Bhavik Martin MD

## 2024-08-18 NOTE — H&P
[facility] Critical Care Medicine History & Physical      Date:  8/17/2024  Patient:  Joe Magaña  YOB: 1960  MRN:  [id number]   Admit Date:  8/17/2024  ========================================================================================================    Chief Complaint   Patient presents with    Lethargy     Pt states started chemo yesterday, worked all night and was sleeping most of the day today. Pt is homeless and stays with friend, pt friend called 911 cause he was concerned for the patient.      History of Present Illness:  Joe Magaña is a 63 y.o. year old male patient with Past Medical History of smoking, Chronic pain on oxycodone, HTN, COPD, on MDI. MDD, opioid abuse (documentation of snorting oxycodone in the past), smoking history with recent  Bronchscopy and EBUS for RUL nodule with cavitation and hilar/mediastinal LAD found to have adenocarcinoma of the lung stage IVB and being evaluated by oncology currently and s/p chemo/immunotherapy with Carboplatin and Ketruda and dexamethasone yesterday on 8/16/24.     According to the patient he received chemo yesterday and took an extra dose of pain medication yesterday but none since. He then went to work all night and came home in the morning. The patient states he then came home and his friends noted that he was more sleepy than usual and called EMS.Pt states that he remembers everything and just wanted to sleep because he was tired from yesterday. Pt currently states that he has a chronic cough of clear sputum which has not worsened, no fevers,no abdominal pain, no nausea, no diarrhea, no shortness of breath or difficulty breathing or chest pain and no new lower extremity edema. Pt did relate that in April he had hemoptysis which led to his CT and then later EBUS and diagnosis of cancer but no hemoptysis since. The patient currently feels well but does state that he still smokes 1pk per day.     In the ED the patient was  "hemodynamically stable on room air and normoxemic. Patient was treated for COPD with nebs and steroids and placed on BiPAP for apnea and hypercapnea. ICU was called for further management.     Medical History:  Past Medical History:   Diagnosis Date    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)     Arthritis     Chronic pain disorder     Degenerative disc disease, lumbar     Depression     Essential (primary) hypertension     Benign essential hypertension    Personal history of other diseases of the digestive system     History of diverticulosis    Personal history of other diseases of the musculoskeletal system and connective tissue     History of gout    Personal history of other diseases of the respiratory system     History of COPD    Psychiatric illness     Substance abuse (Multi)     Suicide attempt (Multi)      Past Surgical History:   Procedure Laterality Date    ELBOW SURGERY      OTHER SURGICAL HISTORY  05/06/2020    Flexor tendon repair    OTHER SURGICAL HISTORY  05/06/2020    Oral surgery    OTHER SURGICAL HISTORY  05/06/2020    Shoulder surgery     (Not in a hospital admission)    Patient has no known allergies.  Social History     Tobacco Use    Smoking status: Every Day     Current packs/day: 1.00     Average packs/day: 1 pack/day for 15.0 years (15.0 ttl pk-yrs)     Types: Cigarettes    Smokeless tobacco: Never   Vaping Use    Vaping status: Never Used   Substance Use Topics    Alcohol use: Yes     Comment: occasional    Drug use: Not Currently     No family history on file.    Review of Systems:  14 point review of systems was completed and negative except for those specially mention in my HPI    Physical Exam:    Heart Rate:  [70-78]   Temperature:  [36.7 °C (98.1 °F)]   Respirations:  [13-18]   BP: (119-151)/(55-77)   Height:  [175.3 cm (5' 9.02\")]   Weight:  [72.6 kg (160 lb)]   Pulse Ox:  [95 %-97 %]     Physical Exam  Constitutional:       General: He is not in acute distress.     Appearance: Normal appearance. " He is not toxic-appearing.      Comments: Thin appearing male in no acute distress   HENT:      Mouth/Throat:      Mouth: Mucous membranes are moist.   Eyes:      General: No scleral icterus.     Extraocular Movements: Extraocular movements intact.      Pupils: Pupils are equal, round, and reactive to light.   Cardiovascular:      Rate and Rhythm: Normal rate and regular rhythm.      Pulses: Normal pulses.      Heart sounds: Normal heart sounds. No murmur heard.     No friction rub.   Pulmonary:      Effort: Pulmonary effort is normal.      Breath sounds: Wheezing and rhonchi present. No rales.      Comments: Rhonchi and bilateral bases and faint expiratory wheeze noted  Abdominal:      General: Abdomen is flat. Bowel sounds are normal.      Palpations: Abdomen is soft.      Tenderness: There is no abdominal tenderness.   Musculoskeletal:      Right lower leg: No edema.      Left lower leg: No edema.   Skin:     General: Skin is warm and dry.      Capillary Refill: Capillary refill takes less than 2 seconds.      Findings: No rash.   Neurological:      General: No focal deficit present.      Mental Status: He is alert and oriented to person, place, and time.      Cranial Nerves: No cranial nerve deficit.      Motor: No weakness.   Psychiatric:         Mood and Affect: Mood normal.         Behavior: Behavior normal.         Objective:    I have reviewed all medications, laboratory results, and imaging pertinent for today's encounter    Assessment/Plan:  Pt is a 62 y/o M w/ a PMHx of smoking, Chronic pain on oxycodone, HTN, COPD, on MDI. MDD, opioid abuse (documentation of snorting oxycodone in the past), smoking history with recent  Bronchscopy and EBUS for RUL nodule with cavitation and hilar/mediastinal LAD found to have adenocarcinoma of the lung stage IVB who presents with lethargy likely from fatigue and side effects from chemo and shift work. Pt with mild hypercapnia and with baseline PCO2 of 50-55 based on  euceda formula and with possible mild COPD exacerbation but not with significant hypercapnia that would explain his sleepiness. The patient also with leukocytosis likely due to decadron received yesterday and without any other current sepsis symptoms.     Neuro/Psych/Pain Ctrl/Sedation:  #Chronic Pain  #MDD  #Metabolic Encephalopathy - likely due to recent chemo and shift work and no current metabolic other cause to explain  -Will continue home Duloxetine 60mg BID and Seroquel 25mg at bedtime  - Will continue home Gabapentin 300mg TID for pain and lidocaine patches and tylenol as needed for pain  - Will use opioids as needed for pain (Last oxy script 5/25/24 of 5mg for 3 doses in  system)    Respiratory/ENT:  #Possible COPD Exacerbation  #Chronic hypercapnia without acute hypercapnia   #Current smoker  #Stage IVB Adenocarcinoma of the lung  - Will continue Albuterol MDIs and start Breo elipta  - Will continue PO steroids for 4 more days to complete treatment  -Does not need BiPAP and removed in ER  -Will need pulm follow-up and sleep study as patient with possible ROSE MARY  - Smoking cessation discussed    Cardiovascular:  #HTN  -Continue with Clonidine 0.1mg at night  - No other active issues  - Continue with tele monitoring    Renal/Volume Status (Intra & Extravascular):  - No current active issues  - Monitor UOP Q4H and Cr daily  - Avoid nephrotoxic drugs and renally dose all medications    GI:  - No active issues  - Low sodium diet  - No indication for PPI or GI Ppx at this time  - Start PRN bowel regimen    Infectious Disease:  #Leukocytosis - likely due to dexamethasone yesterday and no signs or symptoms of sepsis noted  - Will continue to monitor off Abx at this time and culture and start broad spectrum as clinically indicated    Heme/Onc:  #Stage IVB Adenocarcinoma of lung  - Continue current out pt chemotherapy  - Monitor Hgb Daily and transfuse for Hgb<8 or bleeding with hemodynamic instability  - Lovenox  for VTE PPx    Endocrine  - No current active issues  - Will monitor Bgs on BMPs and start finger sticks as needed  - Add ISS or dextrose in fluids as needed  - Finger sticks goals 100-180    Ethics/Code Status:  - Full code    :  DVT Prophylaxis: Lovenox  GI Prophylaxis: None indicated  Bowel Regimen: Miralax PRN  Diet: Low Sodium Diet  CVC: None  [place]: None  [place]: None  Restraints: None  Dispo: ICU monitoring over night and likely discharge tomorrow AM    Bhavik Martin MD

## 2024-08-19 ENCOUNTER — TELEPHONE (OUTPATIENT)
Dept: PALLIATIVE MEDICINE | Facility: HOSPITAL | Age: 64
End: 2024-08-19
Payer: MEDICARE

## 2024-08-19 LAB — ACTH PLAS-MCNC: 2.2 PG/ML (ref 7.2–63.3)

## 2024-08-19 NOTE — TELEPHONE ENCOUNTER
Patient contacted after referral was received from Samia Huynh NP to supportive oncology.  Patient has a history newly diagnosed lung cancer.  Current symptoms include diarrhea, shortness of breath, fatigue, neck/back pain, and appetite changes.  Medications the patient is currently taking to manage symptoms include Cymbalta 60 mg BID,  Gabapentin 300 mg TID, Seroquel 25 mg daily, lidocaine patches, Compazine 10 mg every 6 hours, and zofran 8 mg every 8 hours.  Patient gladly accepted a NPV with Chastity Valdivia NP at [facility] on 8/27 at 1:30 pm.

## 2024-08-21 ENCOUNTER — PATIENT OUTREACH (OUTPATIENT)
Dept: HEMATOLOGY/ONCOLOGY | Facility: HOSPITAL | Age: 64
End: 2024-08-21
Payer: MEDICARE

## 2024-08-27 ENCOUNTER — OFFICE VISIT (OUTPATIENT)
Dept: PALLIATIVE MEDICINE | Facility: CLINIC | Age: 64
End: 2024-08-27
Payer: MEDICARE

## 2024-08-27 ENCOUNTER — DOCUMENTATION (OUTPATIENT)
Dept: PALLIATIVE MEDICINE | Facility: HOSPITAL | Age: 64
End: 2024-08-27

## 2024-08-27 VITALS
TEMPERATURE: 97.9 F | WEIGHT: 157.19 LBS | OXYGEN SATURATION: 96 % | DIASTOLIC BLOOD PRESSURE: 90 MMHG | SYSTOLIC BLOOD PRESSURE: 157 MMHG | HEART RATE: 70 BPM | RESPIRATION RATE: 20 BRPM | BODY MASS INDEX: 20.74 KG/M2

## 2024-08-27 DIAGNOSIS — G89.29 OTHER CHRONIC PAIN: ICD-10-CM

## 2024-08-27 DIAGNOSIS — C34.91 NSCLC OF RIGHT LUNG (MULTI): ICD-10-CM

## 2024-08-27 DIAGNOSIS — Z79.891 ENCOUNTER FOR MONITORING OPIOID MAINTENANCE THERAPY: Primary | ICD-10-CM

## 2024-08-27 DIAGNOSIS — G89.3 CANCER RELATED PAIN: ICD-10-CM

## 2024-08-27 DIAGNOSIS — Z51.81 ENCOUNTER FOR MONITORING OPIOID MAINTENANCE THERAPY: Primary | ICD-10-CM

## 2024-08-27 LAB
AMPHETAMINES UR QL SCN: ABNORMAL
BARBITURATES UR QL SCN: ABNORMAL
BENZODIAZ UR QL SCN: ABNORMAL
BZE UR QL SCN: ABNORMAL
CANNABINOIDS UR QL SCN: ABNORMAL
FENTANYL+NORFENTANYL UR QL SCN: ABNORMAL
METHADONE UR QL SCN: ABNORMAL
OPIATES UR QL SCN: ABNORMAL
OXYCODONE+OXYMORPHONE UR QL SCN: ABNORMAL
PCP UR QL SCN: ABNORMAL

## 2024-08-27 PROCEDURE — 99215 OFFICE O/P EST HI 40 MIN: CPT

## 2024-08-27 PROCEDURE — 3080F DIAST BP >= 90 MM HG: CPT

## 2024-08-27 PROCEDURE — 80307 DRUG TEST PRSMV CHEM ANLYZR: CPT

## 2024-08-27 PROCEDURE — 3077F SYST BP >= 140 MM HG: CPT

## 2024-08-27 RX ORDER — METHADONE HYDROCHLORIDE 5 MG/1
5 TABLET ORAL EVERY 12 HOURS
Qty: 28 TABLET | Refills: 0 | Status: SHIPPED | OUTPATIENT
Start: 2024-08-27 | End: 2024-09-10

## 2024-08-27 RX ORDER — CYCLOBENZAPRINE HCL 5 MG
5 TABLET ORAL 3 TIMES DAILY PRN
Qty: 90 TABLET | Refills: 0 | Status: SHIPPED | OUTPATIENT
Start: 2024-08-27 | End: 2024-08-27 | Stop reason: SDUPTHER

## 2024-08-27 RX ORDER — CYCLOBENZAPRINE HCL 5 MG
5 TABLET ORAL 3 TIMES DAILY PRN
Qty: 90 TABLET | Refills: 0 | Status: SHIPPED | OUTPATIENT
Start: 2024-08-27 | End: 2024-09-26

## 2024-08-27 RX ORDER — METHADONE HYDROCHLORIDE 5 MG/1
5 TABLET ORAL EVERY 12 HOURS
Qty: 28 TABLET | Refills: 0 | Status: SHIPPED | OUTPATIENT
Start: 2024-08-27 | End: 2024-08-27 | Stop reason: SDUPTHER

## 2024-08-27 ASSESSMENT — PAIN SCALES - GENERAL: PAINLEVEL: 8

## 2024-08-27 NOTE — PROGRESS NOTES
Controlled Substance Agreement for Palliative Care reviewed and completed with patient. Education provided to outline both the expectations of the provider and the patient about the proper and safe use of controlled substances. Patient declined copy of signed agreement.

## 2024-08-27 NOTE — PROGRESS NOTES
SUPPORTIVE AND PALLIATIVE ONCOLOGY CONSULT - OUTPATIENT      SERVICE DATE: 8/27/2024    Referred by:  Samia Huynh  Medical Oncologist: Aminta Jha MD   Radiation Oncologist: No care team member to display  Primary Physician: Argelia Carter  [phone]    REASON FOR CONSULT/CHIEF CONSULT COMPLAINT: pain management and Introduction to Supportive and Palliative Oncology Services    Subjective   HISTORY OF PRESENT ILLNESS: Joe Magaña is a 63 y.o. male who presents with PMH of drug abuse, DDD, depression, suicide attempt, and newly diagnosed Stage IV NSCLC. PET scan 7/15/24 showed increase in right perihilar lesion, new right supraclavicular LN, and new hepatic hypodensities concerning for metastatic disease. He is s/p EBUS with biopsy that confirmed NSCLC. He was started on Carboplatin + pembrolizumab + Pemetrexed 8/16/24.     Pain Assessment:  Pain Score: 7/10  Location: right rib cage and bilateral legs    Symptom Assessment:  Pain:somewhat- sore, aching pain in his right rib cage that is constant. He does get intermittent sharp pain. He says that it feels like someone is punching him. He also has bilateral legs pain that is tight. Denies cramping in his legs. His pain is a 10/10 at its worst and improves to a 7/10. He does not feel like the gabapentin is helping his current pain.   Headache: none  Dizziness:none  Lack of energy: somewhat. Feels more fatigued. Continues to work night shift  Difficulty sleeping: a little due to pain and sleeping on a front porch  Worrying: none  Anxiety: none  Depression: none  Pain in mouth/swallowing: none  Dry mouth: none  Taste changes: none  Shortness of breath: a little  Lack of appetite: a little. Not eating as much recently. Reports drinking adequate amounts of water.   Nausea: none  Vomiting: none  Constipation: none  Diarrhea: none  Sore muscles: somewhat  Numbness or tingling in hands/feet/other: none  Weight loss: none  Other: none      Information  obtained from: chart review and interview of patient  ______________________________________________________________________     Oncology History   NSCLC of right lung (Multi)   8/8/2024 Initial Diagnosis    NSCLC of right lung (Multi)     8/16/2024 -  Chemotherapy    Pembrolizumab + PEMEtrexed / CARBOplatin, 21 Day Cycles         Past Medical History:   Diagnosis Date    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)     Arthritis     Chronic pain disorder     Degenerative disc disease, lumbar     Depression     Essential (primary) hypertension     Benign essential hypertension    Personal history of other diseases of the digestive system     History of diverticulosis    Personal history of other diseases of the musculoskeletal system and connective tissue     History of gout    Personal history of other diseases of the respiratory system     History of COPD    Psychiatric illness     Substance abuse (Multi)     Suicide attempt (Multi)      Past Surgical History:   Procedure Laterality Date    ELBOW SURGERY      OTHER SURGICAL HISTORY  05/06/2020    Flexor tendon repair    OTHER SURGICAL HISTORY  05/06/2020    Oral surgery    OTHER SURGICAL HISTORY  05/06/2020    Shoulder surgery     No family history on file.     SOCIAL HISTORY  Support system has a friend in [place]. Reports that he does not have good support in [place], Employment works night shift at a [employer]   Social History:  reports that he has been smoking cigarettes. He has a 15 pack-year smoking history. He has never used smokeless tobacco. He reports current alcohol use. He reports that he does not currently use drugs.  Smokes 1 PPD. Currently homeless. Living on the front porch of a friends house.     REVIEW OF SYSTEMS  Review of systems negative unless noted in HPI.       Objective     Current Outpatient Medications   Medication Instructions    albuterol 90 mcg/actuation inhaler 2 puffs, inhalation, Every 6 hours PRN    cholecalciferol (VITAMIN D3) 50 mcg, oral,  Daily    cloNIDine (CATAPRES) 0.1 mg, oral, Nightly    DULoxetine (CYMBALTA) 60 mg, oral, 2 times daily    fluticasone furoate-vilanteroL (Breo Ellipta) 200-25 mcg/dose inhaler 1 puff, inhalation, Daily    folic acid (FOLVITE) 1,000 mcg, oral, Daily    gabapentin (NEURONTIN) 300 mg, oral, 3 times daily    nicotine (Nicoderm CQ) 21 mg/24 hr patch 1 patch, transdermal, Daily    ondansetron (ZOFRAN) 8 mg, oral, Every 8 hours PRN    prochlorperazine (COMPAZINE) 10 mg, oral, Every 6 hours PRN    QUEtiapine (SEROQUEL) 25 mg, oral, Nightly       Allergies: No Known Allergies    Admission on 08/17/2024, Discharged on 08/18/2024   Component Date Value Ref Range Status    WBC 08/17/2024 12.6 (H)  4.4 - 11.3 x10*3/uL Final    nRBC 08/17/2024 0.0  0.0 - 0.0 /100 WBCs Final    RBC 08/17/2024 4.24 (L)  4.50 - 5.90 x10*6/uL Final    Hemoglobin 08/17/2024 12.1 (L)  13.5 - 17.5 g/dL Final    Hematocrit 08/17/2024 37.6 (L)  41.0 - 52.0 % Final    MCV 08/17/2024 89  80 - 100 fL Final    MCH 08/17/2024 28.5  26.0 - 34.0 pg Final    MCHC 08/17/2024 32.2  32.0 - 36.0 g/dL Final    RDW 08/17/2024 14.9 (H)  11.5 - 14.5 % Final    Platelets 08/17/2024 255  150 - 450 x10*3/uL Final    Neutrophils % 08/17/2024 85.4  40.0 - 80.0 % Final    Immature Granulocytes %, Automated 08/17/2024 0.5  0.0 - 0.9 % Final    Immature Granulocyte Count (IG) includes promyelocytes, myelocytes and metamyelocytes but does not include bands. Percent differential counts (%) should be interpreted in the context of the absolute cell counts (cells/UL).    Lymphocytes % 08/17/2024 7.4  13.0 - 44.0 % Final    Monocytes % 08/17/2024 6.6  2.0 - 10.0 % Final    Eosinophils % 08/17/2024 0.0  0.0 - 6.0 % Final    Basophils % 08/17/2024 0.1  0.0 - 2.0 % Final    Neutrophils Absolute 08/17/2024 10.76 (H)  1.20 - 7.70 x10*3/uL Final    Percent differential counts (%) should be interpreted in the context of the absolute cell counts (cells/uL).    Immature Granulocytes Absolute,  Au* 08/17/2024 0.06  0.00 - 0.70 x10*3/uL Final    Lymphocytes Absolute 08/17/2024 0.93 (L)  1.20 - 4.80 x10*3/uL Final    Monocytes Absolute 08/17/2024 0.83  0.10 - 1.00 x10*3/uL Final    Eosinophils Absolute 08/17/2024 0.00  0.00 - 0.70 x10*3/uL Final    Basophils Absolute 08/17/2024 0.01  0.00 - 0.10 x10*3/uL Final    Glucose 08/17/2024 91  74 - 99 mg/dL Final    Sodium 08/17/2024 139  136 - 145 mmol/L Final    Potassium 08/17/2024 3.8  3.5 - 5.3 mmol/L Final    Chloride 08/17/2024 105  98 - 107 mmol/L Final    Bicarbonate 08/17/2024 26  21 - 32 mmol/L Final    Anion Gap 08/17/2024 12  10 - 20 mmol/L Final    Urea Nitrogen 08/17/2024 29 (H)  6 - 23 mg/dL Final    Creatinine 08/17/2024 1.10  0.50 - 1.30 mg/dL Final    eGFR 08/17/2024 76  >60 mL/min/1.73m*2 Final    Calculations of estimated GFR are performed using the 2021 CKD-EPI Study Refit equation without the race variable for the IDMS-Traceable creatinine methods.  https://jasn.asnjournals.org/content/early/2021/09/22/ASN.3623561210    Calcium 08/17/2024 10.5 (H)  8.6 - 10.3 mg/dL Final    Albumin 08/17/2024 3.9  3.4 - 5.0 g/dL Final    Alkaline Phosphatase 08/17/2024 80  33 - 136 U/L Final    Total Protein 08/17/2024 7.2  6.4 - 8.2 g/dL Final    AST 08/17/2024 29  9 - 39 U/L Final    Bilirubin, Total 08/17/2024 0.5  0.0 - 1.2 mg/dL Final    ALT 08/17/2024 12  10 - 52 U/L Final    Patients treated with Sulfasalazine may generate falsely decreased results for ALT.    Magnesium 08/17/2024 2.28  1.60 - 2.40 mg/dL Final    Ventricular Rate 08/17/2024 72  BPM Final    Atrial Rate 08/17/2024 72  BPM Final    IA Interval 08/17/2024 148  ms Final    QRS Duration 08/17/2024 90  ms Final    QT Interval 08/17/2024 380  ms Final    QTC Calculation(Bazett) 08/17/2024 416  ms Final    P Axis 08/17/2024 69  degrees Final    R Axis 08/17/2024 63  degrees Final    T Axis 08/17/2024 48  degrees Final    QRS Count 08/17/2024 12  beats Final    Q Onset 08/17/2024 218  ms Final     P Onset 08/17/2024 144  ms Final    P Offset 08/17/2024 191  ms Final    T Offset 08/17/2024 408  ms Final    QTC Fredericia 08/17/2024 404  ms Final    Ventricular Rate 08/17/2024 81  BPM Final    Atrial Rate 08/17/2024 81  BPM Final    NH Interval 08/17/2024 154  ms Final    QRS Duration 08/17/2024 92  ms Final    QT Interval 08/17/2024 394  ms Final    QTC Calculation(Bazett) 08/17/2024 457  ms Final    P Axis 08/17/2024 75  degrees Final    R Axis 08/17/2024 71  degrees Final    T Axis 08/17/2024 53  degrees Final    QRS Count 08/17/2024 13  beats Final    Q Onset 08/17/2024 216  ms Final    P Onset 08/17/2024 139  ms Final    P Offset 08/17/2024 191  ms Final    T Offset 08/17/2024 413  ms Final    QTC Fredericia 08/17/2024 435  ms Final    BNP 08/17/2024 141 (H)  0 - 99 pg/mL Final    Lipase 08/17/2024 4 (L)  9 - 82 U/L Final    D-Dimer, Quantitative VTE Exclusion 08/17/2024 723 (H)  <=500 ng/mL FEU Final    Coronavirus 2019, PCR 08/17/2024 Not Detected  Not Detected Final    POCT pH, Arterial 08/17/2024 7.34 (L)  7.38 - 7.42 pH Final    POCT pCO2, Arterial 08/17/2024 51 (H)  38 - 42 mm Hg Final    POCT pO2, Arterial 08/17/2024 62 (L)  85 - 95 mm Hg Final    POCT SO2, Arterial 08/17/2024 92 (L)  94 - 100 % Final    POCT Oxy Hemoglobin, Arterial 08/17/2024 88.3 (L)  94.0 - 98.0 % Final    POCT Hematocrit Calculated, Arteri* 08/17/2024 37.0 (L)  41.0 - 52.0 % Final    POCT Sodium, Arterial 08/17/2024 138  136 - 145 mmol/L Final    POCT Potassium, Arterial 08/17/2024 4.0  3.5 - 5.3 mmol/L Final    POCT Chloride, Arterial 08/17/2024 105  98 - 107 mmol/L Final    POCT Ionized Calcium, Arterial 08/17/2024 1.30  1.10 - 1.33 mmol/L Final    POCT Glucose, Arterial 08/17/2024 97  74 - 99 mg/dL Final    POCT Lactate, Arterial 08/17/2024 1.0  0.4 - 2.0 mmol/L Final    POCT Base Excess, Arterial 08/17/2024 1.0  -2.0 - 3.0 mmol/L Final    POCT HCO3 Calculated, Arterial 08/17/2024 27.5 (H)  22.0 - 26.0 mmol/L Final     POCT Hemoglobin, Arterial 08/17/2024 12.3 (L)  13.5 - 17.5 g/dL Final    POCT Anion Gap, Arterial 08/17/2024 10  10 - 25 mmo/L Final    Patient Temperature 08/17/2024    Final    NOTE: Patient Results are Not Corrected for Temperature    FiO2 08/17/2024 21  % Final    Site of Arterial Puncture 08/17/2024 Radial Left   Final    Darrell's Test 08/17/2024 Positive   Final    Troponin I, High Sensitivity 08/17/2024 24 (H)  0 - 20 ng/L Final    Color, Urine 08/17/2024 Yellow  Light-Yellow, Yellow, Dark-Yellow Final    Appearance, Urine 08/17/2024 Clear  Clear Final    Specific Gravity, Urine 08/17/2024 >1.050 (N)  1.005 - 1.035 Final    Specific gravity of >1.050 may be falsely elevated due to interferences with measurement. If clinically indicated, repeat testing with an alternative method is available by contacting the laboratory within 24 hours.    pH, Urine 08/17/2024 5.5  5.0, 5.5, 6.0, 6.5, 7.0, 7.5, 8.0 Final    Protein, Urine 08/17/2024 20 (TRACE)  NEGATIVE, 10 (TRACE), 20 (TRACE) mg/dL Final    Glucose, Urine 08/17/2024 Normal  Normal mg/dL Final    Blood, Urine 08/17/2024 0.2 (2+) (A)  NEGATIVE Final    Ketones, Urine 08/17/2024 NEGATIVE  NEGATIVE mg/dL Final    Bilirubin, Urine 08/17/2024 NEGATIVE  NEGATIVE Final    Urobilinogen, Urine 08/17/2024 Normal  Normal mg/dL Final    Nitrite, Urine 08/17/2024 NEGATIVE  NEGATIVE Final    Leukocyte Esterase, Urine 08/17/2024 NEGATIVE  NEGATIVE Final    Extra Tube 08/17/2024 Hold for add-ons.   Final    Auto resulted.    Troponin I, High Sensitivity 08/17/2024 25 (H)  0 - 20 ng/L Final    POCT pH, Arterial 08/17/2024 7.32 (L)  7.38 - 7.42 pH Final    POCT pCO2, Arterial 08/17/2024 50 (H)  38 - 42 mm Hg Final    POCT pO2, Arterial 08/17/2024 64 (L)  85 - 95 mm Hg Final    POCT SO2, Arterial 08/17/2024 93 (L)  94 - 100 % Final    POCT Oxy Hemoglobin, Arterial 08/17/2024 89.6 (L)  94.0 - 98.0 % Final    POCT Hematocrit Calculated, Arteri* 08/17/2024 35.0 (L)  41.0 - 52.0 %  Final    POCT Sodium, Arterial 08/17/2024 135 (L)  136 - 145 mmol/L Final    POCT Potassium, Arterial 08/17/2024 3.8  3.5 - 5.3 mmol/L Final    POCT Chloride, Arterial 08/17/2024 104  98 - 107 mmol/L Final    POCT Ionized Calcium, Arterial 08/17/2024 1.37 (H)  1.10 - 1.33 mmol/L Final    POCT Glucose, Arterial 08/17/2024 98  74 - 99 mg/dL Final    POCT Lactate, Arterial 08/17/2024 0.8  0.4 - 2.0 mmol/L Final    POCT Base Excess, Arterial 08/17/2024 -0.8  -2.0 - 3.0 mmol/L Final    POCT HCO3 Calculated, Arterial 08/17/2024 25.8  22.0 - 26.0 mmol/L Final    POCT Hemoglobin, Arterial 08/17/2024 11.6 (L)  13.5 - 17.5 g/dL Final    POCT Anion Gap, Arterial 08/17/2024 9 (L)  10 - 25 mmo/L Final    Patient Temperature 08/17/2024    Final    NOTE: Patient Results are Not Corrected for Temperature    FiO2 08/17/2024 21  % Final    Site of Arterial Puncture 08/17/2024 Radial Left   Final    Darrell's Test 08/17/2024 Positive   Final    L. pneumophila Urine Ag 08/17/2024 Negative  Negative Final    Streptococcus pneumoniae Ag, Urine 08/17/2024 Negative  Negative Final    Amphetamine Screen, Urine 08/17/2024 Presumptive Negative  Presumptive Negative Final    CUTOFF LEVEL: 500 NG/ML   Cross-reactivity has been reported with high concentrations   of the following drugs: buproprion, chloroquine, chlorpromazine,   ephedrine, mephentermine, fenfluramine, phentermine,   phenylpropanolamine, pseudoephedrine, and propranolol.    Barbiturate Screen, Urine 08/17/2024 Presumptive Negative  Presumptive Negative Final    CUTOFF LEVEL: 200 NG/ML    Benzodiazepines Screen, Urine 08/17/2024 Presumptive Negative  Presumptive Negative Final    CUTOFF LEVEL: 200 NG/ML    Cannabinoid Screen, Urine 08/17/2024 Presumptive Negative  Presumptive Negative Final    CUTOFF LEVEL: 50 NG/ML    Cocaine Metabolite Screen, Urine 08/17/2024 Presumptive Negative  Presumptive Negative Final    CUTOFF LEVEL: 150 NG/ML    Fentanyl Screen, Urine 08/17/2024  Presumptive Positive (A)  Presumptive Negative Final    CUTOFF LEVEL: 5 NG/ML    Opiate Screen, Urine 08/17/2024 Presumptive Negative  Presumptive Negative Final    CUTOFF LEVEL: 300 NG/ML  The opiate screen does not detect fentanyl, meperidine, or   tramadol. Oxycodone is not consistently detected (refer to  Oxycodone Screen, Urine result).    Oxycodone Screen, Urine 08/17/2024 Presumptive Negative  Presumptive Negative Final    CUTOFF LEVEL: 100 NG/ML  This test will accurately detect both oxycodone and oxymorphone.    PCP Screen, Urine 08/17/2024 Presumptive Negative  Presumptive Negative Final    CUTOFF LEVEL:  25 NG/ML  Cross-reactivity has been reported with dextromethorphan.    Methadone Screen, Urine 08/17/2024 Presumptive Negative  Presumptive Negative Final    CUTOFF LEVEL: 150 NG/ML  The metabolite L-alpha-acetylmethadol (LAAM) is not  detected by this method in concentrations that would  be found in the urine of patients on LAAM therapy.    Acetaminophen 08/17/2024 <10.0  10.0 - 30.0 ug/mL Final    Salicylate  08/17/2024 <3  4 - 20 mg/dL Final    Alcohol 08/17/2024 <10  <=10 mg/dL Final    WBC, Urine 08/17/2024 1-5  1-5, NONE /HPF Final    RBC, Urine 08/17/2024 6-10 (A)  NONE, 1-2, 3-5 /HPF Final    Mucus, Urine 08/17/2024 2+  Reference range not established. /LPF Final    Hyaline Casts, Urine 08/17/2024 2+ (A)  NONE /LPF Final   Infusion on 08/16/2024   Component Date Value Ref Range Status    WBC 08/16/2024 9.9  4.4 - 11.3 x10*3/uL Final    nRBC 08/16/2024    Final    Not Measured    RBC 08/16/2024 4.42 (L)  4.50 - 5.90 x10*6/uL Final    Hemoglobin 08/16/2024 12.5 (L)  13.5 - 17.5 g/dL Final    Hematocrit 08/16/2024 39.5 (L)  41.0 - 52.0 % Final    MCV 08/16/2024 89  80 - 100 fL Final    MCH 08/16/2024 28.3  26.0 - 34.0 pg Final    MCHC 08/16/2024 31.6 (L)  32.0 - 36.0 g/dL Final    RDW 08/16/2024 15.2 (H)  11.5 - 14.5 % Final    Platelets 08/16/2024 240  150 - 450 x10*3/uL Final    Neutrophils %  08/16/2024 81.4  40.0 - 80.0 % Final    Immature Granulocytes %, Automated 08/16/2024 0.1  0.0 - 0.9 % Final    Immature Granulocyte Count (IG) includes promyelocytes, myelocytes and metamyelocytes but does not include bands. Percent differential counts (%) should be interpreted in the context of the absolute cell counts (cells/UL).    Lymphocytes % 08/16/2024 10.8  13.0 - 44.0 % Final    Monocytes % 08/16/2024 6.9  2.0 - 10.0 % Final    Eosinophils % 08/16/2024 0.7  0.0 - 6.0 % Final    Basophils % 08/16/2024 0.1  0.0 - 2.0 % Final    Neutrophils Absolute 08/16/2024 8.07 (H)  1.20 - 7.70 x10*3/uL Final    Percent differential counts (%) should be interpreted in the context of the absolute cell counts (cells/uL).    Immature Granulocytes Absolute, Au* 08/16/2024 0.01  0.00 - 0.70 x10*3/uL Final    Lymphocytes Absolute 08/16/2024 1.07 (L)  1.20 - 4.80 x10*3/uL Final    Monocytes Absolute 08/16/2024 0.68  0.10 - 1.00 x10*3/uL Final    Eosinophils Absolute 08/16/2024 0.07  0.00 - 0.70 x10*3/uL Final    Basophils Absolute 08/16/2024 0.01  0.00 - 0.10 x10*3/uL Final    Glucose 08/16/2024 131 (H)  74 - 99 mg/dL Final    Sodium 08/16/2024 139  136 - 145 mmol/L Final    Potassium 08/16/2024 3.7  3.5 - 5.3 mmol/L Final    Chloride 08/16/2024 104  98 - 107 mmol/L Final    Bicarbonate 08/16/2024 26  21 - 32 mmol/L Final    Anion Gap 08/16/2024 13  10 - 20 mmol/L Final    Urea Nitrogen 08/16/2024 16  6 - 23 mg/dL Final    Creatinine 08/16/2024 1.04  0.50 - 1.30 mg/dL Final    eGFR 08/16/2024 82  >60 mL/min/1.73m*2 Final    Calculations of estimated GFR are performed using the 2021 CKD-EPI Study Refit equation without the race variable for the IDMS-Traceable creatinine methods.  https://jasn.asnjournals.org/content/early/2021/09/22/ASN.7535220889    Calcium 08/16/2024 9.1  8.6 - 10.3 mg/dL Final    Albumin 08/16/2024 3.9  3.4 - 5.0 g/dL Final    Alkaline Phosphatase 08/16/2024 88  33 - 136 U/L Final    Total Protein 08/16/2024  7.2  6.4 - 8.2 g/dL Final    AST 08/16/2024 9  9 - 39 U/L Final    Bilirubin, Total 08/16/2024 0.3  0.0 - 1.2 mg/dL Final    ALT 08/16/2024 7 (L)  10 - 52 U/L Final    Patients treated with Sulfasalazine may generate falsely decreased results for ALT.    Hepatitis B Surface AG 08/16/2024 Nonreactive  Nonreactive Final    Biotin interference may cause falsely decreased results. Patients taking a Biotin dose of up to 5 mg/day should refrain from taking Biotin for 24 hours before sample collection. Providers may contact their local laboratory for  further information.    Hepatitis B Core AB- Total 08/16/2024 Nonreactive  Nonreactive Final    Hepatitis B Surface AB 08/16/2024 <3.1  <10.0 mIU/mL Final    Interpretive Criteria:                         <10 mIU/mL    Nonreactive                          >=10 mIU/mL    Reactive     Biotin interference may cause falsely decreased results. Patients taking a Biotin dose of up to 5 mg/day should refrain from taking Biotin for 24 hours before sample collection. Providers may contact their local laboratory for further information.    Adrenocorticotropic Hormone (ACTH) 08/16/2024 2.2 (L)  7.2 - 63.3 pg/mL Final    INTERPRETIVE INFORMATION: Adrenocorticotropic Hormone    Reference interval based on samples collected between 7 a.m. and   10 a.m.  No reference intervals established for p.m. collections.    Pediatric reference values are the same as adults (Acta Paediatr   Scand 1981;70:341-345).  This assay measures intact ACTH 1-39;   some types of synthetic ACTH and ACTH fragments are not detected   by this assay.  Performed By: [facility]  [address] [place] [id number]  : Kelvin Cruz MD, PhD  CLIA Number: [id number]    Cortisol  A.M. 08/16/2024 8.2  5.0 - 20.0 ug/dL Final    Thyroid Stimulating Hormone 08/16/2024 2.12  0.44 - 3.98 mIU/L Final     CT angio chest for pulmonary embolism    Result Date: 8/17/2024  Interpreted By:  Stew  Sal, STUDY: CT ANGIO CHEST FOR PULMONARY EMBOLISM;  8/17/2024 6:57 pm   INDICATION: Signs/Symptoms:elevated d dimer, chest pain, shortness of breath.   COMPARISON: PET-CT 08/05/2024.   ACCESSION NUMBER(S): [id number]   ORDERING CLINICIAN: JEAN ESCOBAR   TECHNIQUE: Contiguous axial images of the chest were obtained after the intravenous administration of 75 mL Omnipaque 350 contrast using angiographic PE protocol. Coronal and sagittal reformatted images were reconstructed from the axial data. MIP images were created on an independent workstation and reviewed.   FINDINGS: PULMONARY ARTERIES: Adequate opacification to the level of the proximal to mid segmental arteries. Assessment of the distal segmental and subsegmental arteries limited by respiratory motion and mixing artifact. No filling defect to suggest pulmonary embolus in the visualized pulmonary arteries. The main pulmonary artery is normal in size. Enlargement of the right atrium and right ventricle reflux of contrast into the hepatic venous circuit suggesting impaired right heart function.   HEART: Cardiomegaly with asymmetric enlargement of the right heart. Scattered coronary vascular calcifications. No significant pericardial effusion.   VESSELS: Normal caliber aorta without dissection. No significant aortic atherosclerosis.   MEDIASTINUM AND LYMPH NODES: Visualized thyroid is within normal limits. Enlarged mediastinal and right hilar lymph nodes. A right paratracheal node measures up to 1.2 cm and multiple hilar nodes measure up to 0.9 cm. Multiple enlarged right supraclavicular lymph nodes measuring up to 1.3 cm. These findings are concerning for metastatic disease and were noted to be FDG avid on recent PET-CT. No pneumomediastinum. The esophagus appears within normal limits.   LUNG, AIRWAYS, AND PLEURA: Trachea proximal mainstem bronchi are patent. There is central bronchial wall thickening in the right lower lobe with filling defects of the  segmental bronchi of the right lower lobe. Bandlike opacity with areas of nodularity measuring up to 2 cm within the right upper lobe as well as multiple additional right upper lobe nodules. The dominant area of nodular bandlike opacification has slightly increased since July 2024. These regions were noted to be FDG avid on recent PET-CT and concerning for pulmonary malignancy. Please see recent PET-CT for further detail. No pleural effusion or pneumothorax.   OSSEOUS STRUCTURES: Subtle lucency in the scapula at the site of area of FDG avidity on recent PET-CT, a metastatic lesion is not excluded.   CHEST WALL SOFT TISSUES: No discernible abnormality.   UPPER ABDOMEN/OTHER: Hepatic metastases better demonstrated on recent PET-CT.       1. No evidence of pulmonary embolism to the level of the proximal to mid segmental arteries. Small distal filling defects can not be entirely excluded. 2. Right upper lobe pulmonary nodule with adjacent bandlike opacities and multiple additional pulmonary nodules. These are noted to be FDG avid on recent PET-CT concerning for malignancy. These have overall increased in size since July 2024. 3. 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 4. Very subtle lucent focus in the right scapula which appears to correspond to site of increased FDG uptake on recent PET-CT. Osseous metastasis is not excluded. 5. Cardiomegaly with asymmetric prominence of the right heart and reflux of contrast into the hepatic venous circuit suggesting impaired right heart function. Consider echocardiographic correlation.   MACRO: None   Signed by: Sal Mathias 8/17/2024 7:57 PM Dictation workstation:   [id number]    CT head wo IV contrast    Result Date: 8/17/2024  Interpreted By:  Sal Mathias, STUDY: CT HEAD WO IV CONTRAST;  8/17/2024 7:00 pm   INDICATION: Signs/Symptoms:dizziness.   COMPARISON: CT head 05/25/2024. MRI brain 08/05/2024.   ACCESSION  NUMBER(S): [id number]   ORDERING CLINICIAN: JEAN ESCOBAR   TECHNIQUE: Noncontrast axial CT images of head were obtained with coronal and sagittal reconstructed images.   FINDINGS: BRAIN PARENCHYMA: Gray-white differentiation is preserved. No mass-effect, midline shift or effacement of cerebral sulci. Confluent periventricular and subcortical white matter hypodensities, nonspecific but often seen in the setting of chronic microangiopathic change.   HEMORRHAGE: No acute intracranial hemorrhage.   VENTRICLES and EXTRA-AXIAL SPACES: The ventricles and sulci are within normal limits for brain volume. No abnormal extra-axial fluid collection.   ORBITS: The visualized orbits and globes are within normal limits.   EXTRACRANIAL SOFT TISSUES: Soft tissue swelling of the left forehead.   PARANASAL SINUSES/MASTOIDS: The visualized paranasal sinuses and mastoid air cells are well aerated.   CALVARIUM: No depressed skull fracture.         1. No CT evidence of acute large territory infarction or transcortical edema at this time. No intracranial hemorrhage. 2. Extensive white matter changes similar to previous exams. These were characterized as probable changes of demyelination on recent MRI. Please see that report for further discussion. 3. Soft tissue swelling of the left forehead suggestive of a hematoma/contusion. Correlate for trauma.       MACRO: None   Signed by: Sal Mathias 8/17/2024 7:42 PM Dictation workstation:   [id number]    NM PET CT bone skull base to mid thigh    Result Date: 8/6/2024  Interpreted By:  Joey Wood and Maltbie Grace STUDY: NM PET CT SKULL BASE TO MID THIGH;  8/5/2024 2:19 pm   INDICATION: Signs/Symptoms:new lung cancer staging.   COMPARISON: PET-CT 06/07/2024 CT chest 07/15/2024 CT abdomen pelvis 01/25/2024   ACCESSION NUMBER(S): [id number]   ORDERING CLINICIAN: LEE VARMA   TECHNIQUE: TECHNIQUE DIVISION OF NUCLEAR MEDICINE POSITRON EMISSION TOMOGRAPHY (PET-CT)   The patient received an  intravenous dose of 11.6 mCi of Fluorine-18 fluorodeoxyglucose (FDG). Positron emission tomographic (PET) images from skull base to the mid-thighs were then acquired after a one hour delay. Also acquired was a contemporaneous low dose non-contrast CT scan performed for attenuation correction of PET images and anatomic localization. The PET and CT images were digitally fused for display. All images were acquired on a combined PET-CT scanner unit. Some areas of FDG accumulation may be described in standardized uptake value (SUV) units.   CODING: Subsequent Treatment Strategy (PS)   CALIBRATION: Dose Injection-to-Scan Interval (mins): 74 min Mediastinal bloodpool SUV (normal 1.5-2.5): 1.6 Blood glucose: 110 mg/dL   FINDINGS: HEAD AND NECK: *No evidence of focal hypermetabolic lesion in the brain parenchyma, noting that evaluation is limited because of the expected physiologic diffuse FDG uptake in the brain. *No evidence of paranasal sinus disease. *Thyroid glands are unremarkable. *Bilateral FDG avid supraclavicular nodes, right greater than left with (SUV max 9.6 on the right.).   CHEST: *FDG avid 1.9 cm lesion in the right upper lobe with surrounding satellite lesions with (SUV max 7.8). 1.3 cm perihilar lesion in the right upper lobe is also FDG avid with SUV max 6.4. Left lung is unremarkable. *Multiple FDG avid mediastinal and right hilar nodes, for example left prevascular node (SUV max 3.0), right paratracheal node (SUV max 6.5), left paratracheal node (SUV max 5.1) and subcarinal node (SUV max 7.0), and right hilar node (SUV max 5.8)   ABDOMEN AND PELVIS: Multiple FDG avid lesions scattered in the right hepatic lobe, for example: *Segment 4A (SUV max 8.5) *Segment 4A/8 (SUV max 5.4) *Segment 7 (SUV max 6.9) *Segment 5 (SUV max 9.9) *Segment 6 (SUV max 7.6)     Physiologic radiotracer uptake is present in the liver and spleen with excretion into the bowel loops and the genitourinary tract. Mild FDG-avid  bilateral adrenal nodules, left-greater-than-right, corresponding to the previously characterized adrenal adenomas. No FDG avid lymphadenopathy in the abdomen or pelvis.   MUSCULOSKELETAL/EXTREMITIES: *Mild FDG uptake along the cortex of the right scapula with (SUV max 1.9).       1. FDG avid 1.9 cm pulmonary nodule with surrounding satellite lesions as well as FDG avid 1.3 cm perihilar lesion in the right upper lobe, concerning for primary lung cancer. 2. Multiple FDG avid bilateral supraclavicular, mediastinal and right hilar nodes, consistent with randall metastases. 3. Multiple FDG avid lesions scattered in the right hepatic lobe, consistent with liver metastases 4. Mild FDG uptake along the cortex of the right scapula, bone metastasis can not be excluded, attention on follow-up study. 5. Bilateral adrenal gland adenomas.   I personally reviewed the images/study and I agree with the findings as stated by Nuclear Medicine fellow Jemima Camacho MD. This study was interpreted at [place], [place].   MACRO: None   Signed by: Joey Wood 8/6/2024 9:38 AM Dictation workstation:   [id number]    MR brain w and wo IV contrast    Result Date: 8/6/2024  Interpreted By:  Alexander Palacios and Hooper Grayson STUDY: MR BRAIN W AND WO IV CONTRAST;  8/5/2024 12:12 pm   INDICATION: Signs/Symptoms:new lung cancer staging.   COMPARISON: CT of the head obtained May 25, 2024   ACCESSION NUMBER(S): [id number]   ORDERING CLINICIAN: LEE VARMA   TECHNIQUE: MRI of the brain was obtained on a 1.5 sharlene magnet before and after the administration of 14-1/2 mL IV Dotarem gadolinium contrast agent. Axial diffusion-weighted imaging with ADC map, precontrast T1, T2 FLAIR, T2 gradient sequencing, T2 with fat saturation, and postcontrast multiplanar T1 sequences were provided.   FINDINGS: Multiple sequences are marred by patient motion artifact.   Midline brain structures are intact and  age-appropriate.   There is no territorial restricted diffusion to suggest acute intracranial infarct. There is a punctate focus of susceptibility artifact observed in the right inferior cerebellar hemisphere, which does not demonstrate enhancement. Otherwise, no suspicious susceptibility artifact noted on gradient sequencing. No pathologic postcontrast enhancement. No leptomeningeal or pachymeningeal enhancement.   Diffuse, bilateral, confluent white matter FLAIR signal hyperintensities are observed in the subcortical and periventricular white matter. Involvement of the midbrain and right cerebral peduncle also noted.   Left vertebral artery dominance is noted, otherwise T2 vascular flow voids are normal-appearing. No suspicious filling defects within the dural venous sinuses.   Polypoid mucosal thickening seen within the right maxillary sinus. Otherwise, the paranasal sinuses appear clear.   There is no mastoid effusion.       1. There is no evidence of acute hemorrhage, mass lesion or acute infarction. 2. Diffuse bilateral and symmetric white matter FLAIR signal hyperintensities, which demonstrate confluence. These are nonspecific findings consistent with demyelination due to previous or ongoing irradiation, post viral demyelination, metabolic demyelination as well as ischemic demyelination..   I personally reviewed the images/study and I agree with the findings as stated. This study was interpreted at [place], [place].   MACRO: None   Signed by: Alexander Palacios 8/6/2024 7:22 AM Dictation workstation:   [id number]      PHYSICAL EXAMINATION  Vital Signs:   Vitals:    08/27/24 1328   BP: 157/90   Pulse: 70   Resp: 20   Temp: 36.6 °C (97.9 °F)   SpO2: 96%   Vital signs reviewed       Physical Exam  HENT:      Head: Normocephalic.      Nose: Nose normal.   Eyes:      Pupils: Pupils are equal, round, and reactive to light.   Pulmonary:      Effort: Pulmonary effort is  normal.   Musculoskeletal:         General: Normal range of motion.   Neurological:      Mental Status: He is alert and oriented to person, place, and time.   Psychiatric:         Mood and Affect: Mood normal.         Behavior: Behavior normal.         ASSESSMENT/PLAN    Pain  Pain is: cancer related pain and chronic  Type: somatic and neuropathic  Pain control: sub-optimally controlled  Home regimen:   - Continue Gabapentin 300 mg TID  - States that he is no longer taking Cymbalta  - Start Methadone 5 mg BID. QTC 8/17/24 457  - Start Flexeril 5 mg TID as needed  - Lidocaine patches were not effective   - reports chronic back pain. He used to follow with pain management and was prescribed percocet and MSContin.     Opioid Use  Medication Management:   - OARRS report reviewed with no aberrant behavior; consistent with  prescriptions/records and patient history  - MED 00.  Overdose Risk Score 270.   This has been discussed with patient.   - We will continue to closely monitor the patient for signs of prescription misuse including UDS, OARRS review and subjective reports at each visit.  - No concurrent benzodiazepine use   - I am a provider who either is or has consulted and collaborated with a provider certified in Hospice and Palliative Medicine and have conducted a face-face visit and examination for this patient.  - Routine Urine Drug Screen completed 8/27/24 patient has been positive for fentanyl for the last 3 years on his urine drug screens. He has not been prescribed fentanyl and states he does not use fentanyl. Discussed with patient that I will not prescribe short acting opioids if he is positive for illicit drugs/non-prescribed drugs  - Controlled Substance Agreement completed 8/27/24  - Specifically discussed that controlled substance prescriptions will only be provided by our group as outlined in the completed agreement  - Prescribed naloxone on 10/12/23  - Red Flags: Fentanyl positive on urine drug  screens for the last 3 years.      Nausea   At risk for nausea without vomiting related to chemotherapy   - Reports that he did not  ondansetron or prochlorperazine. Will resend if he starts to have nausea    Constipation   At risk for constipation related to opioids,  currently not constipated   Usual bowel pattern: daily, soft BMs   Current regimen: not on a bowel regimen   LBM today  - If constipation develops, start Miralax 17 g daily  - May start Senna-S 2 tabs BID   - If no BM within 48-72 hours, start MOM 8% every 6 hours     Altered Mood  Chronic anxiety and depression - history of suicide attempt  controlled with home regimen  Current regimen:   - States that he is no longer taking Duloxetine or Seroquel as prescribed by his PCP    Sleeping Difficulty:  Impaired sleep related to pain  Current regimen:    - Start pain regimen as described above    Decreased appetite  Related to malignancy  Nutrition following  Current regimen:    - encouraged smaller, more frequent meals  - Follows with Betty Barnes, discussed boost as she had suggested    Advance Directives  Existence of Advance Directives:No - not interested  Decision maker: Surrogate decision maker is - discuss next visit  Code Status: Full code    Next Follow-Up Visit:  Return to clinic in 2 weeks     Signature and billing  Thank you for allowing us to participate in the care of this patient. Recommendations will be communicated back to the consulting service by way of shared electronic medical record or face-to-face.    Medical complexity was high level due to due to complexity of problems, extensive data review, and high risk of management/treatment.  Time was spent on the following: Prep Time, Time Directly with Patient/Family/Caregiver, Documentation Time. Total time spent: 60      DATA   Diagnostic tests and information reviewed for today's visit:  Most recent labs, Most recent imaging, Most recent EKG, Medications         Plan of Care  discussed with: Patient      SIGNATURE: UMBERTO Crane-CNP    Contact information:  [facility]ive [facility] Palliative Oncology  Monday-Friday 8 AM-5 PM  Phone:  [phone], press option #5, then option #1.   Or Epic Secure Chat

## 2024-08-30 ENCOUNTER — DOCUMENTATION (OUTPATIENT)
Dept: PALLIATIVE MEDICINE | Facility: HOSPITAL | Age: 64
End: 2024-08-30
Payer: MEDICARE

## 2024-08-30 NOTE — PROGRESS NOTES
[facility] requested prior authorization for future fills of Methadone. Request sent to [facility]

## 2024-09-03 NOTE — PROGRESS NOTES
PA approved for Methadone 5 mg by [facility] pharmacy from 8/16/24 until further notice.  [facility] pharmacy called and updated.

## 2024-09-04 LAB
FENTANYL UR CFM-MCNC: 16.4 NG/ML
NORFENTANYL UR CFM-MCNC: 99.3 NG/ML

## 2024-09-05 ENCOUNTER — LAB (OUTPATIENT)
Dept: LAB | Facility: CLINIC | Age: 64
End: 2024-09-05
Payer: MEDICARE

## 2024-09-05 ENCOUNTER — INFUSION (OUTPATIENT)
Dept: HEMATOLOGY/ONCOLOGY | Facility: CLINIC | Age: 64
End: 2024-09-05
Payer: MEDICARE

## 2024-09-05 ENCOUNTER — OFFICE VISIT (OUTPATIENT)
Dept: HEMATOLOGY/ONCOLOGY | Facility: CLINIC | Age: 64
End: 2024-09-05
Payer: MEDICARE

## 2024-09-05 ENCOUNTER — APPOINTMENT (OUTPATIENT)
Dept: GASTROENTEROLOGY | Facility: CLINIC | Age: 64
End: 2024-09-05
Payer: MEDICARE

## 2024-09-05 VITALS
HEART RATE: 71 BPM | SYSTOLIC BLOOD PRESSURE: 158 MMHG | BODY MASS INDEX: 21.31 KG/M2 | TEMPERATURE: 98.8 F | OXYGEN SATURATION: 98 % | DIASTOLIC BLOOD PRESSURE: 80 MMHG | WEIGHT: 161.5 LBS | RESPIRATION RATE: 18 BRPM

## 2024-09-05 DIAGNOSIS — C34.91 NSCLC OF RIGHT LUNG (MULTI): ICD-10-CM

## 2024-09-05 DIAGNOSIS — C34.91 NSCLC OF RIGHT LUNG (MULTI): Primary | ICD-10-CM

## 2024-09-05 LAB
ALBUMIN SERPL BCP-MCNC: 3.7 G/DL (ref 3.4–5)
ALP SERPL-CCNC: 80 U/L (ref 33–136)
ALT SERPL W P-5'-P-CCNC: 10 U/L (ref 10–52)
ANION GAP SERPL CALC-SCNC: 12 MMOL/L (ref 10–20)
AST SERPL W P-5'-P-CCNC: 13 U/L (ref 9–39)
BASOPHILS # BLD AUTO: 0.02 X10*3/UL (ref 0–0.1)
BASOPHILS NFR BLD AUTO: 0.6 %
BILIRUB SERPL-MCNC: 0.3 MG/DL (ref 0–1.2)
BUN SERPL-MCNC: 19 MG/DL (ref 6–23)
CALCIUM SERPL-MCNC: 8.6 MG/DL (ref 8.6–10.3)
CHLORIDE SERPL-SCNC: 104 MMOL/L (ref 98–107)
CO2 SERPL-SCNC: 26 MMOL/L (ref 21–32)
CREAT SERPL-MCNC: 0.89 MG/DL (ref 0.5–1.3)
EGFRCR SERPLBLD CKD-EPI 2021: >90 ML/MIN/1.73M*2
EOSINOPHIL # BLD AUTO: 0.07 X10*3/UL (ref 0–0.7)
EOSINOPHIL NFR BLD AUTO: 2.1 %
ERYTHROCYTE [DISTWIDTH] IN BLOOD BY AUTOMATED COUNT: 15.1 % (ref 11.5–14.5)
GLUCOSE SERPL-MCNC: 110 MG/DL (ref 74–99)
HCT VFR BLD AUTO: 34.3 % (ref 41–52)
HGB BLD-MCNC: 11 G/DL (ref 13.5–17.5)
IMM GRANULOCYTES # BLD AUTO: 0.01 X10*3/UL (ref 0–0.7)
IMM GRANULOCYTES NFR BLD AUTO: 0.3 % (ref 0–0.9)
LYMPHOCYTES # BLD AUTO: 0.74 X10*3/UL (ref 1.2–4.8)
LYMPHOCYTES NFR BLD AUTO: 22.6 %
MCH RBC QN AUTO: 28.5 PG (ref 26–34)
MCHC RBC AUTO-ENTMCNC: 32.1 G/DL (ref 32–36)
MCV RBC AUTO: 89 FL (ref 80–100)
MONOCYTES # BLD AUTO: 0.66 X10*3/UL (ref 0.1–1)
MONOCYTES NFR BLD AUTO: 20.1 %
NEUTROPHILS # BLD AUTO: 1.78 X10*3/UL (ref 1.2–7.7)
NEUTROPHILS NFR BLD AUTO: 54.3 %
NRBC BLD-RTO: ABNORMAL /100{WBCS}
PLATELET # BLD AUTO: 321 X10*3/UL (ref 150–450)
POTASSIUM SERPL-SCNC: 3.3 MMOL/L (ref 3.5–5.3)
PROT SERPL-MCNC: 6.6 G/DL (ref 6.4–8.2)
RBC # BLD AUTO: 3.86 X10*6/UL (ref 4.5–5.9)
SODIUM SERPL-SCNC: 139 MMOL/L (ref 136–145)
WBC # BLD AUTO: 3.3 X10*3/UL (ref 4.4–11.3)

## 2024-09-05 PROCEDURE — 96367 TX/PROPH/DG ADDL SEQ IV INF: CPT

## 2024-09-05 PROCEDURE — 96375 TX/PRO/DX INJ NEW DRUG ADDON: CPT | Mod: INF

## 2024-09-05 PROCEDURE — 96417 CHEMO IV INFUS EACH ADDL SEQ: CPT

## 2024-09-05 PROCEDURE — 2500000004 HC RX 250 GENERAL PHARMACY W/ HCPCS (ALT 636 FOR OP/ED): Performed by: INTERNAL MEDICINE

## 2024-09-05 PROCEDURE — 96411 CHEMO IV PUSH ADDL DRUG: CPT

## 2024-09-05 PROCEDURE — 2500000004 HC RX 250 GENERAL PHARMACY W/ HCPCS (ALT 636 FOR OP/ED): Mod: JZ,JG | Performed by: INTERNAL MEDICINE

## 2024-09-05 PROCEDURE — 99215 OFFICE O/P EST HI 40 MIN: CPT | Performed by: INTERNAL MEDICINE

## 2024-09-05 PROCEDURE — 3079F DIAST BP 80-89 MM HG: CPT | Performed by: INTERNAL MEDICINE

## 2024-09-05 PROCEDURE — 3077F SYST BP >= 140 MM HG: CPT | Performed by: INTERNAL MEDICINE

## 2024-09-05 PROCEDURE — 2500000002 HC RX 250 W HCPCS SELF ADMINISTERED DRUGS (ALT 637 FOR MEDICARE OP, ALT 636 FOR OP/ED): Performed by: INTERNAL MEDICINE

## 2024-09-05 PROCEDURE — 80053 COMPREHEN METABOLIC PANEL: CPT

## 2024-09-05 PROCEDURE — 36415 COLL VENOUS BLD VENIPUNCTURE: CPT

## 2024-09-05 PROCEDURE — 96413 CHEMO IV INFUSION 1 HR: CPT

## 2024-09-05 PROCEDURE — 85025 COMPLETE CBC W/AUTO DIFF WBC: CPT

## 2024-09-05 RX ORDER — PROCHLORPERAZINE MALEATE 10 MG
10 TABLET ORAL EVERY 6 HOURS PRN
Status: CANCELLED | OUTPATIENT
Start: 2024-09-06

## 2024-09-05 RX ORDER — ALBUTEROL SULFATE 0.83 MG/ML
3 SOLUTION RESPIRATORY (INHALATION) AS NEEDED
Status: ACTIVE | OUTPATIENT
Start: 2024-09-05

## 2024-09-05 RX ORDER — EPINEPHRINE 0.3 MG/.3ML
0.3 INJECTION SUBCUTANEOUS EVERY 5 MIN PRN
Status: CANCELLED | OUTPATIENT
Start: 2024-09-06

## 2024-09-05 RX ORDER — FAMOTIDINE 10 MG/ML
20 INJECTION INTRAVENOUS ONCE AS NEEDED
Status: CANCELLED | OUTPATIENT
Start: 2024-09-06

## 2024-09-05 RX ORDER — POTASSIUM CHLORIDE 20 MEQ/1
20 TABLET, EXTENDED RELEASE ORAL ONCE
Status: COMPLETED | OUTPATIENT
Start: 2024-09-05 | End: 2024-09-05

## 2024-09-05 RX ORDER — POTASSIUM CHLORIDE 20 MEQ/1
20 TABLET, EXTENDED RELEASE ORAL ONCE
Status: CANCELLED | OUTPATIENT
Start: 2024-09-05 | End: 2024-09-05

## 2024-09-05 RX ORDER — HEPARIN SODIUM,PORCINE/PF 10 UNIT/ML
50 SYRINGE (ML) INTRAVENOUS AS NEEDED
OUTPATIENT
Start: 2024-09-05

## 2024-09-05 RX ORDER — ALBUTEROL SULFATE 0.83 MG/ML
3 SOLUTION RESPIRATORY (INHALATION) AS NEEDED
OUTPATIENT
Start: 2024-09-26

## 2024-09-05 RX ORDER — PROCHLORPERAZINE MALEATE 10 MG
10 TABLET ORAL EVERY 6 HOURS PRN
OUTPATIENT
Start: 2024-09-26

## 2024-09-05 RX ORDER — DIPHENHYDRAMINE HYDROCHLORIDE 50 MG/ML
50 INJECTION INTRAMUSCULAR; INTRAVENOUS AS NEEDED
Status: CANCELLED | OUTPATIENT
Start: 2024-09-06

## 2024-09-05 RX ORDER — EPINEPHRINE 0.3 MG/.3ML
0.3 INJECTION SUBCUTANEOUS EVERY 5 MIN PRN
OUTPATIENT
Start: 2024-09-26

## 2024-09-05 RX ORDER — DIPHENHYDRAMINE HYDROCHLORIDE 50 MG/ML
50 INJECTION INTRAMUSCULAR; INTRAVENOUS AS NEEDED
Status: ACTIVE | OUTPATIENT
Start: 2024-09-05

## 2024-09-05 RX ORDER — POLYETHYLENE GLYCOL 3350 17 G/17G
17 POWDER, FOR SOLUTION ORAL DAILY
Qty: 30 PACKET | Refills: 2 | Status: SHIPPED | OUTPATIENT
Start: 2024-09-05 | End: 2024-12-04

## 2024-09-05 RX ORDER — EPINEPHRINE 0.3 MG/.3ML
0.3 INJECTION SUBCUTANEOUS EVERY 5 MIN PRN
Status: ACTIVE | OUTPATIENT
Start: 2024-09-05

## 2024-09-05 RX ORDER — PALONOSETRON 0.05 MG/ML
0.25 INJECTION, SOLUTION INTRAVENOUS ONCE
Status: COMPLETED | OUTPATIENT
Start: 2024-09-05 | End: 2024-09-05

## 2024-09-05 RX ORDER — GUAIFENESIN 600 MG/1
600 TABLET, EXTENDED RELEASE ORAL NIGHTLY
Qty: 30 TABLET | Refills: 1 | Status: SHIPPED | OUTPATIENT
Start: 2024-09-05 | End: 2024-11-04

## 2024-09-05 RX ORDER — PROCHLORPERAZINE EDISYLATE 5 MG/ML
10 INJECTION INTRAMUSCULAR; INTRAVENOUS EVERY 6 HOURS PRN
OUTPATIENT
Start: 2024-09-26

## 2024-09-05 RX ORDER — DIPHENHYDRAMINE HYDROCHLORIDE 50 MG/ML
50 INJECTION INTRAMUSCULAR; INTRAVENOUS AS NEEDED
OUTPATIENT
Start: 2024-09-26

## 2024-09-05 RX ORDER — HEPARIN 100 UNIT/ML
500 SYRINGE INTRAVENOUS AS NEEDED
OUTPATIENT
Start: 2024-09-05

## 2024-09-05 RX ORDER — PROCHLORPERAZINE EDISYLATE 5 MG/ML
10 INJECTION INTRAMUSCULAR; INTRAVENOUS EVERY 6 HOURS PRN
Status: CANCELLED | OUTPATIENT
Start: 2024-09-06

## 2024-09-05 RX ORDER — PROCHLORPERAZINE EDISYLATE 5 MG/ML
10 INJECTION INTRAMUSCULAR; INTRAVENOUS EVERY 6 HOURS PRN
Status: ACTIVE | OUTPATIENT
Start: 2024-09-05

## 2024-09-05 RX ORDER — PALONOSETRON 0.05 MG/ML
0.25 INJECTION, SOLUTION INTRAVENOUS ONCE
OUTPATIENT
Start: 2024-09-26

## 2024-09-05 RX ORDER — PALONOSETRON 0.05 MG/ML
0.25 INJECTION, SOLUTION INTRAVENOUS ONCE
Status: CANCELLED | OUTPATIENT
Start: 2024-09-06

## 2024-09-05 RX ORDER — ALBUTEROL SULFATE 0.83 MG/ML
3 SOLUTION RESPIRATORY (INHALATION) AS NEEDED
Status: CANCELLED | OUTPATIENT
Start: 2024-09-06

## 2024-09-05 RX ORDER — FAMOTIDINE 10 MG/ML
20 INJECTION INTRAVENOUS ONCE AS NEEDED
OUTPATIENT
Start: 2024-09-26

## 2024-09-05 RX ORDER — FAMOTIDINE 10 MG/ML
20 INJECTION INTRAVENOUS ONCE AS NEEDED
Status: ACTIVE | OUTPATIENT
Start: 2024-09-05

## 2024-09-05 RX ORDER — PROCHLORPERAZINE MALEATE 10 MG
10 TABLET ORAL EVERY 6 HOURS PRN
Status: ACTIVE | OUTPATIENT
Start: 2024-09-05

## 2024-09-05 ASSESSMENT — ENCOUNTER SYMPTOMS
OCCASIONAL FEELINGS OF UNSTEADINESS: 0
DEPRESSION: 0
LOSS OF SENSATION IN FEET: 0

## 2024-09-05 ASSESSMENT — PAIN SCALES - GENERAL: PAINLEVEL: 7

## 2024-09-06 ENCOUNTER — SOCIAL WORK (OUTPATIENT)
Dept: CASE MANAGEMENT | Facility: HOSPITAL | Age: 64
End: 2024-09-06
Payer: MEDICARE

## 2024-09-06 NOTE — PROGRESS NOTES
Social Work Note  9/6/2024 SW met with patient yesterday to go over paperwork that the financial navigator had for him.  SW also talked with him about a shelter he wants to be close to his work and he said he can't do that.  He was going to go to a hotel for a few days since he isn't feeling well.  SW will remain available to assist patient.  Mary Kay Obrien, MSW, LSW

## 2024-09-11 ASSESSMENT — ENCOUNTER SYMPTOMS
BACK PAIN: 1
EYE PROBLEMS: 0
LEG SWELLING: 0
ARTHRALGIAS: 0
HEADACHES: 0
NECK PAIN: 1
ABDOMINAL PAIN: 0
DIZZINESS: 0
DIARRHEA: 0
NAUSEA: 0
SHORTNESS OF BREATH: 1
ADENOPATHY: 0
UNEXPECTED WEIGHT CHANGE: 0
CONSTIPATION: 1
DIFFICULTY URINATING: 0
FATIGUE: 1
APPETITE CHANGE: 1
VOMITING: 0
COUGH: 1

## 2024-09-12 NOTE — PROGRESS NOTES
[facility] - Medical Oncology Follow-Up Visit    Patient ID: Joe Magaña is a 63 y.o. male.  Referring Physician: Aminta Jha MD  [id number] [place] [address]  Primary Care Provider: Argelia Carter, UMBERTO-CNP      Chief Concern: Patient is here for follow-up and readiness to treat visit    HPI Patient reports overall doing about the same, some days better some days worse. Hospitalized briefly after C1 related to what sounds like opioid use/fatigue? Fatigue was most noticeable symptom with the chemo but he doesn't notice much other symptoms. Cough and dyspnea are about the same, appetite is still not great. Had some constipation (was previously having diarrhea). Pain is overall about the same, worsened due to sleeping on the porch and the cold weather. His social security did get approved so he is hopeful he can find a place    Diagnosis: Lung adenocarcinoma  Stage:  Cancer Staging   No matching staging information was found for the patient.   TBD suspect [id number] - IVB  Current sites of disease: R lung, mediastinal LNs, possible liver  Molecular and ancillary testing:     PD-L1 <1%  NGS without actionable mutations    Oncologic History:  3/13/24 - Presented to ER with hemoptysis, CT with RUL mass suspicious for neoplasm  4/29/24 - CT guided R lung biopsy with no evidence of malignancy  6/7/24 - PET non-diagnostic  7/15/24 - CT with decrease in RUL nodule, increase in R perihilar lesion and new R supraclavicular LN, new hepatic hypodensities concerning for metastatic disease  7/15/24 - EBUS with biopsy of RUL with NSCLC (adenocarcinoma), including bronchial brush RUL and 4L lymph node. Level 11L negative.  8/5/24 - MRI brain negative for metastatic disease, PET with RUL nodule, bilateral lymph nodes, R hepatic lobe lesions, R scapula lesion  8/16/24 - C1 carboplatin/pemetrexed/pembrolizumab (carbo AUC 4)    Past Medical History:  No date: Addiction to drug  (Multi)  No date: Arthritis  No date: Chronic pain disorder  No date: Degenerative disc disease, lumbar  No date: Depression  No date: Essential (primary) hypertension      Comment:  Benign essential hypertension  No date: Personal history of other diseases of the digestive system      Comment:  History of diverticulosis  No date: Personal history of other diseases of the musculoskeletal   system and connective tissue      Comment:  History of gout  No date: Personal history of other diseases of the respiratory system      Comment:  History of COPD  No date: Psychiatric illness  No date: Substance abuse (Multi)  No date: Suicide attempt (Multi)     Past Surgical History:  No date: ELBOW SURGERY  05/06/2020: OTHER SURGICAL HISTORY      Comment:  Flexor tendon repair  05/06/2020: OTHER SURGICAL HISTORY      Comment:  Oral surgery  05/06/2020: OTHER SURGICAL HISTORY      Comment:  Shoulder surgery     Social Hx:  Chronic pain  Opioid overdose noted 9/2023 - fentanyl/oxycodone  ER visit 2/2024 noted SI (1 suicide attempt in past)  Undomiciled, unstable housing for a while  Currently smokes about 1ppd x many years, quit for about 7 years in his 20s  Works at [employer]    Family Hx  Father passed away this year from dementia  Mother passed away at 99 from lung cancer, she did go through chemo  Sister passed away in 2023 from cancer (lung/liver)     Meds (Current):  Current Outpatient Medications   Medication Instructions    albuterol 90 mcg/actuation inhaler 2 puffs, inhalation, Every 6 hours PRN    cholecalciferol (VITAMIN D3) 50 mcg, oral, Daily    cloNIDine (CATAPRES) 0.1 mg, oral, Nightly    cyclobenzaprine (FLEXERIL) 5 mg, oral, 3 times daily PRN    DULoxetine (CYMBALTA) 60 mg, oral, 2 times daily    fluticasone furoate-vilanteroL (Breo Ellipta) 200-25 mcg/dose inhaler 1 puff, inhalation, Daily    folic acid (FOLVITE) 1,000 mcg, oral, Daily    gabapentin (NEURONTIN) 300 mg, oral, 3 times daily     guaiFENesin (MUCINEX) 600 mg, oral, Nightly, Do not crush, chew, or split.    nicotine (Nicoderm CQ) 21 mg/24 hr patch 1 patch, transdermal, Daily    ondansetron (ZOFRAN) 8 mg, oral, Every 8 hours PRN    polyethylene glycol (GLYCOLAX, MIRALAX) 17 g, oral, Daily    prochlorperazine (COMPAZINE) 10 mg, oral, Every 6 hours PRN    QUEtiapine (SEROQUEL) 25 mg, oral, Nightly      No Known Allergies    Review of Systems   Constitutional:  Positive for appetite change and fatigue. Negative for unexpected weight change.   HENT:   Negative for hearing loss.    Eyes:  Negative for eye problems.   Respiratory:  Positive for cough and shortness of breath.    Cardiovascular:  Negative for chest pain and leg swelling.   Gastrointestinal:  Positive for constipation. Negative for abdominal pain, diarrhea, nausea and vomiting.   Genitourinary:  Negative for difficulty urinating.    Musculoskeletal:  Positive for back pain and neck pain. Negative for arthralgias.   Skin:  Negative for rash.   Neurological:  Negative for dizziness and headaches.   Hematological:  Negative for adenopathy.      Objective   BSA: 1.94 meters squared  /80 (BP Location: Left arm, Patient Position: Sitting, BP Cuff Size: Adult)   Pulse 71   Temp 37.1 °C (98.8 °F) (Temporal)   Resp 18   Wt 73.3 kg (161 lb 8 oz)   SpO2 98%   BMI 21.31 kg/m²   Performance Status:  (1) Restricted in physically strenuous activity, ambulatory and able to do work of light nature     Physical Exam  Vitals and nursing note reviewed.   Constitutional:       General: He is not in acute distress.  HENT:      Head: Normocephalic and atraumatic.   Eyes:      Pupils: Pupils are equal, round, and reactive to light.   Cardiovascular:      Rate and Rhythm: Normal rate and regular rhythm.      Heart sounds: Normal heart sounds.   Pulmonary:      Effort: Pulmonary effort is normal.      Breath sounds: Normal breath sounds.   Abdominal:      General: There is no distension.    Musculoskeletal:         General: No swelling.   Skin:     General: Skin is warm and dry.      Findings: No rash.   Neurological:      General: No focal deficit present.      Mental Status: He is alert and oriented to person, place, and time.   Psychiatric:         Mood and Affect: Mood normal.         Behavior: Behavior normal.        Results:  Labs:  Lab Results   Component Value Date    WBC 3.3 (L) 09/05/2024    HGB 11.0 (L) 09/05/2024    HCT 34.3 (L) 09/05/2024    MCV 89 09/05/2024     09/05/2024      Lab Results   Component Value Date    NEUTROABS 1.78 09/05/2024      Lab Results   Component Value Date    GLUCOSE 110 (H) 09/05/2024    CALCIUM 8.6 09/05/2024     09/05/2024    K 3.3 (L) 09/05/2024    CO2 26 09/05/2024     09/05/2024    BUN 19 09/05/2024    CREATININE 0.89 09/05/2024     Lab Results   Component Value Date    ALT 10 09/05/2024    AST 13 09/05/2024    ALKPHOS 80 09/05/2024    BILITOT 0.3 09/05/2024      Imaging:  I have personally reviewed the below imaging and concur with the reported findings unless otherwise stated:    === Results for orders placed during the hospital encounter of 08/17/24 ===    CT angio chest for pulmonary embolism [[id number]] 08/17/2024    Status: Normal  1. No evidence of pulmonary embolism to the level of the proximal to  mid segmental arteries. Small distal filling defects can not be  entirely excluded.  2. Right upper lobe pulmonary nodule with adjacent bandlike opacities  and multiple additional pulmonary nodules. These are noted to be FDG  avid on recent PET-CT concerning for malignancy. These have overall  increased in size since July 2024.  3. Right supraclavicular, mediastinal, and hilar lymphadenopathy  likely reflecting randall metastases.  4. Very subtle lucent focus in the right scapula which appears to  correspond to site of increased FDG uptake on recent PET-CT. Osseous  metastasis is not excluded.  5. Cardiomegaly with asymmetric prominence of  the right heart and  reflux of contrast into the hepatic venous circuit suggesting  impaired right heart function. Consider echocardiographic correlation.    MACRO:  None    Signed by: Sal Mathias 8/17/2024 7:57 PM  Dictation workstation:   [id number]      Pathology:    No new pathology      Assessment/Plan      Joe Magaña is a 63 y.o. male here for recommendations and to establish care for newly diagnosed stage IV lung adenocarcinoma with PD-L1 <1% and no actionable mutations on NGS.    Reviewed overall work-up to date with patient previously including implications of stage IV disease and overall palliative goals of therapy. Discussed that typically standard treatment involves a combination of chemotherapy (carboplatin/pemetrexed) and immunotherapy (typically pembrolizumab) given low PD-L1. He is very worried about chemotherapy and not sure he wants to go through it - he watched his mother go through it for lung cancer in the past. We did discuss that we have come a long way in terms of supporting patients and chemo in general.     We also discussed that although not standard, we could consider a dual checkpoint inhibitor approach to treatment. This is only approved for PD-L1 >1% though data from checkmate 227 trial did suggest benefit in patients with PD-L1 negative disease. Again we discussed this would not be standard but if he is absolutely against a chemotherapy approach this would be my recommendation    We also discussed that in order to consider these treatments I think we need to find a more stable housing situation so that he can be safe while receiving treatment. Social determinants of health including housing/food instability and history of opioid abuse are barriers to him receiving optimal care.    He started carboplatin/pemetrexed/pembrolizumab and tolerated C1 relatively well    #Stage IV lung adenocarcinoma  - Cont folic acid daily  - B12 with C1 and C4 of treatment  - PRN zofran/compazine  for nausea  - Plan repeat CT C/A/P after C2, ordered today    #Constipation - likely related to chemo  - Start miralax daily    #Cough - related to cancer  - Trial guafenesin    #Pain related to malignancy, chronic pain  - Following with supportive onc  - Urine tox is positive for fentanyl (not prescribed), it has been discussed that we will not be able to provide opioids if this continues to be positive    RTC in 3 weeks      Aminta Jha MD  [facility]

## 2024-09-23 ENCOUNTER — HOSPITAL ENCOUNTER (OUTPATIENT)
Dept: RADIOLOGY | Facility: HOSPITAL | Age: 64
Discharge: HOME | End: 2024-09-23
Payer: MEDICARE

## 2024-09-23 ENCOUNTER — LAB (OUTPATIENT)
Dept: LAB | Facility: LAB | Age: 64
End: 2024-09-23
Payer: COMMERCIAL

## 2024-09-23 DIAGNOSIS — C34.91 NSCLC OF RIGHT LUNG (MULTI): ICD-10-CM

## 2024-09-23 LAB
ALBUMIN SERPL BCP-MCNC: 3.9 G/DL (ref 3.4–5)
ALP SERPL-CCNC: 84 U/L (ref 33–136)
ALT SERPL W P-5'-P-CCNC: 33 U/L (ref 10–52)
ANION GAP SERPL CALC-SCNC: 11 MMOL/L (ref 10–20)
AST SERPL W P-5'-P-CCNC: 25 U/L (ref 9–39)
BASOPHILS # BLD AUTO: 0.01 X10*3/UL (ref 0–0.1)
BASOPHILS NFR BLD AUTO: 0.5 %
BILIRUB SERPL-MCNC: 0.3 MG/DL (ref 0–1.2)
BUN SERPL-MCNC: 17 MG/DL (ref 6–23)
CALCIUM SERPL-MCNC: 9 MG/DL (ref 8.6–10.3)
CHLORIDE SERPL-SCNC: 100 MMOL/L (ref 98–107)
CO2 SERPL-SCNC: 31 MMOL/L (ref 21–32)
CORTIS AM PEAK SERPL-MSCNC: 4.5 UG/DL (ref 5–20)
CREAT SERPL-MCNC: 1.15 MG/DL (ref 0.5–1.3)
EGFRCR SERPLBLD CKD-EPI 2021: 72 ML/MIN/1.73M*2
EOSINOPHIL # BLD AUTO: 0.16 X10*3/UL (ref 0–0.7)
EOSINOPHIL NFR BLD AUTO: 7.4 %
ERYTHROCYTE [DISTWIDTH] IN BLOOD BY AUTOMATED COUNT: 15.9 % (ref 11.5–14.5)
GLUCOSE SERPL-MCNC: 67 MG/DL (ref 74–99)
HCT VFR BLD AUTO: 33.9 % (ref 41–52)
HGB BLD-MCNC: 10.7 G/DL (ref 13.5–17.5)
IMM GRANULOCYTES # BLD AUTO: 0 X10*3/UL (ref 0–0.7)
IMM GRANULOCYTES NFR BLD AUTO: 0 % (ref 0–0.9)
LYMPHOCYTES # BLD AUTO: 0.89 X10*3/UL (ref 1.2–4.8)
LYMPHOCYTES NFR BLD AUTO: 41 %
MCH RBC QN AUTO: 28.5 PG (ref 26–34)
MCHC RBC AUTO-ENTMCNC: 31.6 G/DL (ref 32–36)
MCV RBC AUTO: 90 FL (ref 80–100)
MONOCYTES # BLD AUTO: 0.44 X10*3/UL (ref 0.1–1)
MONOCYTES NFR BLD AUTO: 20.3 %
NEUTROPHILS # BLD AUTO: 0.67 X10*3/UL (ref 1.2–7.7)
NEUTROPHILS NFR BLD AUTO: 30.8 %
NRBC BLD-RTO: 0 /100 WBCS (ref 0–0)
PLATELET # BLD AUTO: 220 X10*3/UL (ref 150–450)
POTASSIUM SERPL-SCNC: 3.6 MMOL/L (ref 3.5–5.3)
PROT SERPL-MCNC: 6.7 G/DL (ref 6.4–8.2)
RBC # BLD AUTO: 3.75 X10*6/UL (ref 4.5–5.9)
SODIUM SERPL-SCNC: 138 MMOL/L (ref 136–145)
TSH SERPL-ACNC: 2.77 MIU/L (ref 0.44–3.98)
WBC # BLD AUTO: 2.2 X10*3/UL (ref 4.4–11.3)

## 2024-09-23 PROCEDURE — 36415 COLL VENOUS BLD VENIPUNCTURE: CPT

## 2024-09-23 PROCEDURE — 2550000001 HC RX 255 CONTRASTS: Performed by: INTERNAL MEDICINE

## 2024-09-23 PROCEDURE — 82533 TOTAL CORTISOL: CPT

## 2024-09-23 PROCEDURE — 74177 CT ABD & PELVIS W/CONTRAST: CPT

## 2024-09-23 PROCEDURE — 71260 CT THORAX DX C+: CPT | Performed by: RADIOLOGY

## 2024-09-23 PROCEDURE — 74177 CT ABD & PELVIS W/CONTRAST: CPT | Performed by: RADIOLOGY

## 2024-09-24 ENCOUNTER — TELEPHONE (OUTPATIENT)
Dept: HEMATOLOGY/ONCOLOGY | Facility: HOSPITAL | Age: 64
End: 2024-09-24
Payer: COMMERCIAL

## 2024-09-24 NOTE — TELEPHONE ENCOUNTER
RN called and left message for Joe informing him that his treatment is going to be rescheduled by 1 week per Dr. Jha's request. RN scheduled him for 10/4 at 8am in [place] and informed him that he will still have his clinic appointment this Thursday 9/26. Clinic contact information was given for any questions or concerns.

## 2024-09-26 ENCOUNTER — APPOINTMENT (OUTPATIENT)
Dept: HEMATOLOGY/ONCOLOGY | Facility: CLINIC | Age: 64
End: 2024-09-26
Payer: MEDICARE

## 2024-09-26 ENCOUNTER — SOCIAL WORK (OUTPATIENT)
Dept: CASE MANAGEMENT | Facility: HOSPITAL | Age: 64
End: 2024-09-26
Payer: MEDICARE

## 2024-09-26 ENCOUNTER — TELEPHONE (OUTPATIENT)
Dept: HEMATOLOGY/ONCOLOGY | Facility: HOSPITAL | Age: 64
End: 2024-09-26
Payer: MEDICARE

## 2024-09-26 ENCOUNTER — OFFICE VISIT (OUTPATIENT)
Dept: HEMATOLOGY/ONCOLOGY | Facility: CLINIC | Age: 64
End: 2024-09-26
Payer: MEDICARE

## 2024-09-26 VITALS
WEIGHT: 165.57 LBS | RESPIRATION RATE: 15 BRPM | BODY MASS INDEX: 21.94 KG/M2 | HEART RATE: 71 BPM | SYSTOLIC BLOOD PRESSURE: 155 MMHG | TEMPERATURE: 97.9 F | OXYGEN SATURATION: 91 % | DIASTOLIC BLOOD PRESSURE: 82 MMHG | HEIGHT: 73 IN

## 2024-09-26 DIAGNOSIS — C34.91 NSCLC OF RIGHT LUNG (MULTI): ICD-10-CM

## 2024-09-26 DIAGNOSIS — R29.898 LEFT ARM WEAKNESS: Primary | ICD-10-CM

## 2024-09-26 DIAGNOSIS — E55.9 VITAMIN D DEFICIENCY: ICD-10-CM

## 2024-09-26 PROCEDURE — 3077F SYST BP >= 140 MM HG: CPT | Performed by: INTERNAL MEDICINE

## 2024-09-26 PROCEDURE — 99215 OFFICE O/P EST HI 40 MIN: CPT | Performed by: INTERNAL MEDICINE

## 2024-09-26 PROCEDURE — 3079F DIAST BP 80-89 MM HG: CPT | Performed by: INTERNAL MEDICINE

## 2024-09-26 PROCEDURE — 3008F BODY MASS INDEX DOCD: CPT | Performed by: INTERNAL MEDICINE

## 2024-09-26 RX ORDER — DIPHENHYDRAMINE HYDROCHLORIDE 50 MG/ML
50 INJECTION INTRAMUSCULAR; INTRAVENOUS AS NEEDED
OUTPATIENT
Start: 2024-10-25

## 2024-09-26 RX ORDER — POLYETHYLENE GLYCOL 3350 17 G/17G
17 POWDER, FOR SOLUTION ORAL DAILY
Qty: 30 PACKET | Refills: 2 | Status: SHIPPED | OUTPATIENT
Start: 2024-09-26 | End: 2024-12-25

## 2024-09-26 RX ORDER — ALBUTEROL SULFATE 0.83 MG/ML
3 SOLUTION RESPIRATORY (INHALATION) AS NEEDED
OUTPATIENT
Start: 2024-10-25

## 2024-09-26 RX ORDER — GUAIFENESIN 600 MG/1
600 TABLET, EXTENDED RELEASE ORAL NIGHTLY
Qty: 30 TABLET | Refills: 1 | Status: SHIPPED | OUTPATIENT
Start: 2024-09-26 | End: 2024-11-25

## 2024-09-26 RX ORDER — PROCHLORPERAZINE MALEATE 5 MG
10 TABLET ORAL EVERY 6 HOURS PRN
OUTPATIENT
Start: 2024-10-25

## 2024-09-26 RX ORDER — ACETAMINOPHEN 500 MG
2000 TABLET ORAL DAILY
Qty: 30 CAPSULE | Refills: 11 | Status: SHIPPED | OUTPATIENT
Start: 2024-09-26 | End: 2025-09-21

## 2024-09-26 RX ORDER — PALONOSETRON 0.05 MG/ML
0.25 INJECTION, SOLUTION INTRAVENOUS ONCE
OUTPATIENT
Start: 2024-10-25

## 2024-09-26 RX ORDER — FAMOTIDINE 10 MG/ML
20 INJECTION INTRAVENOUS ONCE AS NEEDED
OUTPATIENT
Start: 2024-10-25

## 2024-09-26 RX ORDER — LANOLIN ALCOHOL/MO/W.PET/CERES
1000 CREAM (GRAM) TOPICAL DAILY
Qty: 30 TABLET | Refills: 11 | Status: SHIPPED | OUTPATIENT
Start: 2024-09-26 | End: 2025-09-26

## 2024-09-26 RX ORDER — CYANOCOBALAMIN 1000 UG/ML
1000 INJECTION, SOLUTION INTRAMUSCULAR; SUBCUTANEOUS ONCE
OUTPATIENT
Start: 2024-10-25

## 2024-09-26 RX ORDER — EPINEPHRINE 0.3 MG/.3ML
0.3 INJECTION SUBCUTANEOUS EVERY 5 MIN PRN
OUTPATIENT
Start: 2024-10-25

## 2024-09-26 RX ORDER — PROCHLORPERAZINE EDISYLATE 5 MG/ML
10 INJECTION INTRAMUSCULAR; INTRAVENOUS EVERY 6 HOURS PRN
OUTPATIENT
Start: 2024-10-25

## 2024-09-26 RX ORDER — OMEPRAZOLE 20 MG/1
20 CAPSULE, DELAYED RELEASE ORAL
Qty: 30 CAPSULE | Refills: 11 | Status: SHIPPED | OUTPATIENT
Start: 2024-09-26 | End: 2025-09-26

## 2024-09-26 ASSESSMENT — ENCOUNTER SYMPTOMS
LEG SWELLING: 0
CONSTIPATION: 1
DIZZINESS: 0
FATIGUE: 1
ABDOMINAL PAIN: 0
COUGH: 1
UNEXPECTED WEIGHT CHANGE: 0
EYE PROBLEMS: 0
DIARRHEA: 0
DIFFICULTY URINATING: 0
APPETITE CHANGE: 1
NAUSEA: 0
ADENOPATHY: 0
ARTHRALGIAS: 0
NECK PAIN: 1
SHORTNESS OF BREATH: 1
HEADACHES: 0
BACK PAIN: 1
VOMITING: 0

## 2024-09-26 ASSESSMENT — PAIN SCALES - GENERAL: PAINLEVEL: 7

## 2024-09-26 NOTE — PROGRESS NOTES
Pt here for follow up appointment. Pt aware to get labs around next Wednesday to see if counts are good for treatment on 10/4. Pt aware of treatment appointment and will call  to get a ride for that day. Vivien SCHAFFER also saw pt today and pt aware to reach out with any issues regarding transportation. Pt educated on taking Omeprazole for gastric reflux. Pt verbalizes understanding and will call with any other questions or concerns.

## 2024-09-26 NOTE — PROGRESS NOTES
Social Work Note  9/26/2024 SW received a message that patient needs transportation today for his appointment.  He was unable to reach his .  SW had to set up transportation through [employer].   Patient missed the return trip home so this writer set up another transport.  SW was able to talk with patient when he got to this facility.  He stated he may need assistance with transportation as the  at [facility] are being changed. SW encouraged patient to let this writer know if he doesn't hear back from the  in the future, we can use [employer] so he doesn't miss any appointments.  Patient does not have transportation through insurance or anyone who can drive him.  SW will remain available to assist patient.  Mary Kay Obrien, MSW, LSW

## 2024-09-26 NOTE — TELEPHONE ENCOUNTER
Pt called regarding follow up with Dr. Jha. Pt stated he could not get a ride today. Vivien SCHAFFER reached out to see if she can assist.

## 2024-09-26 NOTE — PROGRESS NOTES
[facility] - Medical Oncology Follow-Up Visit    Patient ID: Joe Magaña is a 63 y.o. male.  Referring Physician: Aminta Jha MD  [id number]  [place] [id number]  Primary Care Provider: Argelia Carter, UMBERTO-CNP      Chief Concern: Patient is here for follow-up and scan review    HPI Patient reports overall not feeling great. More nasal congestion and headaches with the weather change. Also woke up one day recently with his left arm feeling numb and being weak. This happened to him previously on the right and he had to have surgery on radial artery. He did not sleep on his arm. No other weakness/numbness. His pain is ongoing. Sometimes has chills. Acid reflux and heartburn symptoms are pretty bad, doesn't want to eat because of that.     Diagnosis: Lung adenocarcinoma  Stage:  Cancer Staging   No matching staging information was found for the patient.   TBD suspect [id number] - IVB  Current sites of disease: R lung, mediastinal LNs, possible liver  Molecular and ancillary testing:     PD-L1 <1%  NGS without actionable mutations    Oncologic History:  3/13/24 - Presented to ER with hemoptysis, CT with RUL mass suspicious for neoplasm  4/29/24 - CT guided R lung biopsy with no evidence of malignancy  6/7/24 - PET non-diagnostic  7/15/24 - CT with decrease in RUL nodule, increase in R perihilar lesion and new R supraclavicular LN, new hepatic hypodensities concerning for metastatic disease  7/15/24 - EBUS with biopsy of RUL with NSCLC (adenocarcinoma), including bronchial brush RUL and 4L lymph node. Level 11L negative.  8/5/24 - MRI brain negative for metastatic disease, PET with RUL nodule, bilateral lymph nodes, R hepatic lobe lesions, R scapula lesion  8/16/24 - C1 carboplatin/pemetrexed/pembrolizumab (carbo AUC 4)    Past Medical History:  No date: 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)  No date: Arthritis  No date: Chronic pain disorder  No date: Degenerative disc disease,  lumbar  No date: Depression  No date: Essential (primary) hypertension      Comment:  Benign essential hypertension  No date: Personal history of other diseases of the digestive system      Comment:  History of diverticulosis  No date: Personal history of other diseases of the musculoskeletal   system and connective tissue      Comment:  History of gout  No date: Personal history of other diseases of the respiratory system      Comment:  History of COPD  No date: Psychiatric illness  No date: Substance abuse (Multi)  No date: Suicide attempt (Multi)     Past Surgical History:  No date: ELBOW SURGERY  05/06/2020: OTHER SURGICAL HISTORY      Comment:  Flexor tendon repair  05/06/2020: OTHER SURGICAL HISTORY      Comment:  Oral surgery  05/06/2020: OTHER SURGICAL HISTORY      Comment:  Shoulder surgery     Social Hx:  Chronic pain  Opioid overdose noted 9/2023 - fentanyl/oxycodone  ER visit 2/2024 noted SI (1 suicide attempt in past)  Undomiciled, unstable housing for a while  Currently smokes about 1ppd x many years, quit for about 7 years in his 20s  Works at [employer]    Family Hx  Father passed away this year from dementia  Mother passed away at 99 from lung cancer, she did go through chemo  Sister passed away in 2023 from cancer (lung/liver)     Meds (Current):  Current Outpatient Medications   Medication Instructions    albuterol 90 mcg/actuation inhaler 2 puffs, inhalation, Every 6 hours PRN    cholecalciferol (VITAMIN D3) 50 mcg, oral, Daily    cloNIDine (CATAPRES) 0.1 mg, oral, Nightly    cyanocobalamin (VITAMIN B-12) 1,000 mcg, oral, Daily    cyclobenzaprine (FLEXERIL) 5 mg, oral, 3 times daily PRN    DULoxetine (CYMBALTA) 60 mg, oral, 2 times daily    fluticasone furoate-vilanteroL (Breo Ellipta) 200-25 mcg/dose inhaler 1 puff, inhalation, Daily    folic acid (FOLVITE) 1,000 mcg, oral, Daily    gabapentin (NEURONTIN) 300 mg, oral, 3 times daily    guaiFENesin (MUCINEX) 600 mg, oral,  "Nightly, Do not crush, chew, or split.    nicotine (Nicoderm CQ) 21 mg/24 hr patch 1 patch, transdermal, Daily    omeprazole (PRILOSEC) 20 mg, oral, Daily before breakfast, Do not crush or chew.    ondansetron (ZOFRAN) 8 mg, oral, Every 8 hours PRN    polyethylene glycol (GLYCOLAX, MIRALAX) 17 g, oral, Daily    prochlorperazine (COMPAZINE) 10 mg, oral, Every 6 hours PRN    QUEtiapine (SEROQUEL) 25 mg, oral, Nightly      No Known Allergies    Review of Systems   Constitutional:  Positive for appetite change and fatigue. Negative for unexpected weight change.   HENT:   Negative for hearing loss.    Eyes:  Negative for eye problems.   Respiratory:  Positive for cough and shortness of breath.    Cardiovascular:  Negative for chest pain and leg swelling.   Gastrointestinal:  Positive for constipation. Negative for abdominal pain, diarrhea, nausea and vomiting.   Genitourinary:  Negative for difficulty urinating.    Musculoskeletal:  Positive for back pain and neck pain. Negative for arthralgias.   Skin:  Negative for rash.   Neurological:  Negative for dizziness and headaches.   Hematological:  Negative for adenopathy.      Objective   BSA: 1.97 meters squared  /82 (BP Location: Right arm, Patient Position: Sitting)   Pulse 71   Temp 36.6 °C (97.9 °F) (Temporal)   Resp 15   Ht 1.854 m (6' 0.99\")   Wt 75.1 kg (165 lb 9.1 oz)   SpO2 91%   BMI 21.85 kg/m²   Performance Status:  (1) Restricted in physically strenuous activity, ambulatory and able to do work of light nature     Physical Exam  Vitals and nursing note reviewed.   Constitutional:       General: He is not in acute distress.  HENT:      Head: Normocephalic and atraumatic.   Eyes:      Pupils: Pupils are equal, round, and reactive to light.   Cardiovascular:      Rate and Rhythm: Normal rate and regular rhythm.      Heart sounds: Normal heart sounds.   Pulmonary:      Effort: Pulmonary effort is normal.      Breath sounds: Normal breath sounds. "   Abdominal:      General: There is no distension.   Musculoskeletal:         General: No swelling.   Skin:     General: Skin is warm and dry.      Findings: No rash.   Neurological:      General: No focal deficit present.      Mental Status: He is alert and oriented to person, place, and time.   Psychiatric:         Mood and Affect: Mood normal.         Behavior: Behavior normal.        Results:  Labs:  Lab Results   Component Value Date    WBC 2.2 (L) 09/23/2024    HGB 10.7 (L) 09/23/2024    HCT 33.9 (L) 09/23/2024    MCV 90 09/23/2024     09/23/2024      Lab Results   Component Value Date    NEUTROABS 0.67 (L) 09/23/2024      Lab Results   Component Value Date    GLUCOSE 67 (L) 09/23/2024    CALCIUM 9.0 09/23/2024     09/23/2024    K 3.6 09/23/2024    CO2 31 09/23/2024     09/23/2024    BUN 17 09/23/2024    CREATININE 1.15 09/23/2024     Lab Results   Component Value Date    ALT 33 09/23/2024    AST 25 09/23/2024    ALKPHOS 84 09/23/2024    BILITOT 0.3 09/23/2024      Imaging:  I have personally reviewed the below imaging and concur with the reported findings unless otherwise stated:    === Results for orders placed during the hospital encounter of 09/23/24 ===    CT chest abdomen pelvis w IV contrast [[id number]] 09/23/2024    Status: Normal  1. The previously described ill-defined right upper lobe/perihilar  nodular densities have somehow improved in size since the prior exam,  probably due to response to treatment. Small fissural nodule now  present along the right major fissure.  2. Ill-defined solid lesions in the liver as above, consistent with  known metastatic disease, overall similar to prior exam.  3. Stable small right adrenal lesion and interval slight enlargement  of additional left adrenal lesion, most likely metastatic, however  none of the lesions displayed FDG uptake on the recent PET-CT scan.  4. Stable sclerotic lesion in the right 8th rib. This was also  displaying no FDG  uptake on the recent PET-CT scan and is of  indeterminate significance.  5..    Signed by: Hemal Resendiz 9/24/2024 10:19 AM  Dictation workstation:   [id number]      Pathology:    No new pathology      Assessment/Plan      Joe Magaña is a 63 y.o. male here for recommendations and to establish care for newly diagnosed stage IV lung adenocarcinoma with PD-L1 <1% and no actionable mutations on NGS.    Reviewed overall work-up to date with patient previously including implications of stage IV disease and overall palliative goals of therapy. Discussed that typically standard treatment involves a combination of chemotherapy (carboplatin/pemetrexed) and immunotherapy (typically pembrolizumab) given low PD-L1. He is very worried about chemotherapy and not sure he wants to go through it - he watched his mother go through it for lung cancer in the past. We did discuss that we have come a long way in terms of supporting patients and chemo in general.    We also discussed that in order to consider these treatments I think we need to find a more stable housing situation so that he can be safe while receiving treatment. Social determinants of health including housing/food instability and history of opioid abuse are barriers to him receiving optimal care.    He started carboplatin/pemetrexed/pembrolizumab and is tolerating relatively well    Reviewed scans today which I think show a mixed response to treatment, though scans are early.    #Stage IV lung adenocarcinoma  - Cont folic acid daily  - B12 with C1 and C4 of treatment  - PRN zofran/compazine for nausea  - Proceed with cycle 3 of chemotherapy and immunotherapy next week, postponed today due to neutropenia  - Plan repeat CT C/A/P after C4     #L arm weakness   - Started in last week or so, simliar to something he had on the R in the past  - Ordered brain Mri to exclude  metastatic disease  - No evidence of vertebral lesion to suggest cord compression on prior PET or  on recent CT but if continues and MRI brain negative will consider additional imaging  - He states previously he was told this was related to vitamin deficiency, refilled his vitamin D and B12    #GERD  - Severe symptoms limiting Po  - Start omeprazole daily    #Constipation - likely related to chemo  - Start miralax daily, he reports he did not receive so I resent today    #Cough - related to cancer  #Sinus congestion  - Trial guafenesin, resent today    #Pain related to malignancy, chronic pain  - Following with supportive onc  - Urine tox is positive for fentanyl (not prescribed), it has been discussed that we will not be able to provide opioids if this continues to be positive    RTC for treatment next week    Aminta Jha MD  [facility] [[email or web address]]

## 2024-09-27 ENCOUNTER — HOSPITAL ENCOUNTER (OUTPATIENT)
Dept: RADIOLOGY | Facility: HOSPITAL | Age: 64
End: 2024-09-27
Payer: MEDICARE

## 2024-10-02 ENCOUNTER — TELEPHONE (OUTPATIENT)
Dept: HEMATOLOGY/ONCOLOGY | Facility: CLINIC | Age: 64
End: 2024-10-02
Payer: COMMERCIAL

## 2024-10-02 NOTE — TELEPHONE ENCOUNTER
Patients needs a call back about the mri he was suppose to schedule        Patient called back. He needed to schedule the scan. Patient forwarded to scheduling. No further needs.

## 2024-10-04 ENCOUNTER — INFUSION (OUTPATIENT)
Dept: HEMATOLOGY/ONCOLOGY | Facility: CLINIC | Age: 64
End: 2024-10-04
Payer: MEDICARE

## 2024-10-04 VITALS
HEIGHT: 69 IN | HEART RATE: 62 BPM | TEMPERATURE: 97.5 F | RESPIRATION RATE: 18 BRPM | SYSTOLIC BLOOD PRESSURE: 157 MMHG | DIASTOLIC BLOOD PRESSURE: 86 MMHG | WEIGHT: 160.83 LBS | BODY MASS INDEX: 23.82 KG/M2 | OXYGEN SATURATION: 94 %

## 2024-10-04 DIAGNOSIS — C34.91 NSCLC OF RIGHT LUNG (MULTI): ICD-10-CM

## 2024-10-04 LAB
ALBUMIN SERPL BCP-MCNC: 3.7 G/DL (ref 3.4–5)
ALP SERPL-CCNC: 82 U/L (ref 33–136)
ALT SERPL W P-5'-P-CCNC: 21 U/L (ref 10–52)
ANION GAP SERPL CALC-SCNC: 10 MMOL/L (ref 10–20)
AST SERPL W P-5'-P-CCNC: 26 U/L (ref 9–39)
BASOPHILS # BLD AUTO: 0.01 X10*3/UL (ref 0–0.1)
BASOPHILS NFR BLD AUTO: 0.3 %
BILIRUB SERPL-MCNC: 0.4 MG/DL (ref 0–1.2)
BUN SERPL-MCNC: 14 MG/DL (ref 6–23)
CALCIUM SERPL-MCNC: 8.7 MG/DL (ref 8.6–10.3)
CHLORIDE SERPL-SCNC: 101 MMOL/L (ref 98–107)
CO2 SERPL-SCNC: 32 MMOL/L (ref 21–32)
CORTIS AM PEAK SERPL-MSCNC: 4.3 UG/DL (ref 5–20)
CREAT SERPL-MCNC: 1.02 MG/DL (ref 0.5–1.3)
EGFRCR SERPLBLD CKD-EPI 2021: 84 ML/MIN/1.73M*2
EOSINOPHIL # BLD AUTO: 0.07 X10*3/UL (ref 0–0.7)
EOSINOPHIL NFR BLD AUTO: 1.9 %
ERYTHROCYTE [DISTWIDTH] IN BLOOD BY AUTOMATED COUNT: 17.1 % (ref 11.5–14.5)
GLUCOSE SERPL-MCNC: 98 MG/DL (ref 74–99)
HCT VFR BLD AUTO: 32.7 % (ref 41–52)
HGB BLD-MCNC: 10.2 G/DL (ref 13.5–17.5)
IMM GRANULOCYTES # BLD AUTO: 0 X10*3/UL (ref 0–0.7)
IMM GRANULOCYTES NFR BLD AUTO: 0 % (ref 0–0.9)
LYMPHOCYTES # BLD AUTO: 0.91 X10*3/UL (ref 1.2–4.8)
LYMPHOCYTES NFR BLD AUTO: 24.1 %
MCH RBC QN AUTO: 28.3 PG (ref 26–34)
MCHC RBC AUTO-ENTMCNC: 31.2 G/DL (ref 32–36)
MCV RBC AUTO: 91 FL (ref 80–100)
MONOCYTES # BLD AUTO: 0.5 X10*3/UL (ref 0.1–1)
MONOCYTES NFR BLD AUTO: 13.2 %
NEUTROPHILS # BLD AUTO: 2.29 X10*3/UL (ref 1.2–7.7)
NEUTROPHILS NFR BLD AUTO: 60.5 %
NRBC BLD-RTO: ABNORMAL /100{WBCS}
PLATELET # BLD AUTO: 193 X10*3/UL (ref 150–450)
POTASSIUM SERPL-SCNC: 3.4 MMOL/L (ref 3.5–5.3)
PROT SERPL-MCNC: 6.9 G/DL (ref 6.4–8.2)
RBC # BLD AUTO: 3.6 X10*6/UL (ref 4.5–5.9)
SODIUM SERPL-SCNC: 140 MMOL/L (ref 136–145)
TSH SERPL-ACNC: 1.68 MIU/L (ref 0.44–3.98)
WBC # BLD AUTO: 3.8 X10*3/UL (ref 4.4–11.3)

## 2024-10-04 PROCEDURE — 96367 TX/PROPH/DG ADDL SEQ IV INF: CPT

## 2024-10-04 PROCEDURE — 85025 COMPLETE CBC W/AUTO DIFF WBC: CPT

## 2024-10-04 PROCEDURE — 82533 TOTAL CORTISOL: CPT

## 2024-10-04 PROCEDURE — 84075 ASSAY ALKALINE PHOSPHATASE: CPT

## 2024-10-04 PROCEDURE — 84443 ASSAY THYROID STIM HORMONE: CPT

## 2024-10-04 PROCEDURE — 96375 TX/PRO/DX INJ NEW DRUG ADDON: CPT | Mod: INF

## 2024-10-04 PROCEDURE — 2500000004 HC RX 250 GENERAL PHARMACY W/ HCPCS (ALT 636 FOR OP/ED): Mod: SE | Performed by: INTERNAL MEDICINE

## 2024-10-04 PROCEDURE — 96411 CHEMO IV PUSH ADDL DRUG: CPT

## 2024-10-04 PROCEDURE — 96413 CHEMO IV INFUSION 1 HR: CPT

## 2024-10-04 PROCEDURE — 2500000004 HC RX 250 GENERAL PHARMACY W/ HCPCS (ALT 636 FOR OP/ED): Mod: JZ,JG,SE | Performed by: INTERNAL MEDICINE

## 2024-10-04 PROCEDURE — 96417 CHEMO IV INFUS EACH ADDL SEQ: CPT

## 2024-10-04 RX ORDER — FAMOTIDINE 10 MG/ML
20 INJECTION INTRAVENOUS ONCE AS NEEDED
Status: DISCONTINUED | OUTPATIENT
Start: 2024-10-04 | End: 2024-10-04 | Stop reason: HOSPADM

## 2024-10-04 RX ORDER — ALBUTEROL SULFATE 0.83 MG/ML
3 SOLUTION RESPIRATORY (INHALATION) AS NEEDED
Status: DISCONTINUED | OUTPATIENT
Start: 2024-10-04 | End: 2024-10-04 | Stop reason: HOSPADM

## 2024-10-04 RX ORDER — PROCHLORPERAZINE EDISYLATE 5 MG/ML
10 INJECTION INTRAMUSCULAR; INTRAVENOUS EVERY 6 HOURS PRN
Status: DISCONTINUED | OUTPATIENT
Start: 2024-10-04 | End: 2024-10-04 | Stop reason: HOSPADM

## 2024-10-04 RX ORDER — PROCHLORPERAZINE MALEATE 10 MG
10 TABLET ORAL EVERY 6 HOURS PRN
Status: DISCONTINUED | OUTPATIENT
Start: 2024-10-04 | End: 2024-10-04 | Stop reason: HOSPADM

## 2024-10-04 RX ORDER — PALONOSETRON 0.05 MG/ML
0.25 INJECTION, SOLUTION INTRAVENOUS ONCE
Status: COMPLETED | OUTPATIENT
Start: 2024-10-04 | End: 2024-10-04

## 2024-10-04 RX ORDER — HEPARIN 100 UNIT/ML
500 SYRINGE INTRAVENOUS AS NEEDED
OUTPATIENT
Start: 2024-10-04

## 2024-10-04 RX ORDER — DIPHENHYDRAMINE HYDROCHLORIDE 50 MG/ML
50 INJECTION INTRAMUSCULAR; INTRAVENOUS AS NEEDED
Status: DISCONTINUED | OUTPATIENT
Start: 2024-10-04 | End: 2024-10-04 | Stop reason: HOSPADM

## 2024-10-04 RX ORDER — HEPARIN SODIUM,PORCINE/PF 10 UNIT/ML
50 SYRINGE (ML) INTRAVENOUS AS NEEDED
OUTPATIENT
Start: 2024-10-04

## 2024-10-04 RX ORDER — EPINEPHRINE 0.3 MG/.3ML
0.3 INJECTION SUBCUTANEOUS EVERY 5 MIN PRN
Status: DISCONTINUED | OUTPATIENT
Start: 2024-10-04 | End: 2024-10-04 | Stop reason: HOSPADM

## 2024-10-04 ASSESSMENT — PAIN SCALES - GENERAL: PAINLEVEL: 7

## 2024-10-04 NOTE — SIGNIFICANT EVENT

## 2024-10-07 ENCOUNTER — APPOINTMENT (OUTPATIENT)
Dept: CARDIOLOGY | Facility: HOSPITAL | Age: 64
End: 2024-10-07
Payer: MEDICARE

## 2024-10-07 ENCOUNTER — APPOINTMENT (OUTPATIENT)
Dept: RADIOLOGY | Facility: HOSPITAL | Age: 64
End: 2024-10-07
Payer: MEDICARE

## 2024-10-07 ENCOUNTER — HOSPITAL ENCOUNTER (EMERGENCY)
Facility: HOSPITAL | Age: 64
Discharge: HOME | End: 2024-10-07
Attending: EMERGENCY MEDICINE
Payer: MEDICARE

## 2024-10-07 VITALS
HEIGHT: 73 IN | DIASTOLIC BLOOD PRESSURE: 62 MMHG | SYSTOLIC BLOOD PRESSURE: 126 MMHG | RESPIRATION RATE: 18 BRPM | BODY MASS INDEX: 21.2 KG/M2 | HEART RATE: 62 BPM | TEMPERATURE: 97.7 F | WEIGHT: 160 LBS | OXYGEN SATURATION: 94 %

## 2024-10-07 DIAGNOSIS — Z85.118 HISTORY OF LUNG CANCER: ICD-10-CM

## 2024-10-07 DIAGNOSIS — Z85.89 HISTORY OF KNOWN METASTASIS TO LIVER: ICD-10-CM

## 2024-10-07 DIAGNOSIS — R10.9 ACUTE RIGHT FLANK PAIN: Primary | ICD-10-CM

## 2024-10-07 LAB
ALBUMIN SERPL BCP-MCNC: 3.7 G/DL (ref 3.4–5)
ALP SERPL-CCNC: 88 U/L (ref 33–136)
ALT SERPL W P-5'-P-CCNC: 44 U/L (ref 10–52)
ANION GAP SERPL CALC-SCNC: 10 MMOL/L (ref 10–20)
AST SERPL W P-5'-P-CCNC: 60 U/L (ref 9–39)
ATRIAL RATE: 63 BPM
ATRIAL RATE: 68 BPM
BASOPHILS # BLD AUTO: 0.04 X10*3/UL (ref 0–0.1)
BASOPHILS NFR BLD AUTO: 0.8 %
BILIRUB SERPL-MCNC: 0.5 MG/DL (ref 0–1.2)
BNP SERPL-MCNC: 177 PG/ML (ref 0–99)
BUN SERPL-MCNC: 14 MG/DL (ref 6–23)
CALCIUM SERPL-MCNC: 8.5 MG/DL (ref 8.6–10.3)
CARDIAC TROPONIN I PNL SERPL HS: 10 NG/L (ref 0–20)
CARDIAC TROPONIN I PNL SERPL HS: 12 NG/L (ref 0–20)
CHLORIDE SERPL-SCNC: 101 MMOL/L (ref 98–107)
CO2 SERPL-SCNC: 32 MMOL/L (ref 21–32)
CREAT SERPL-MCNC: 0.94 MG/DL (ref 0.5–1.3)
EGFRCR SERPLBLD CKD-EPI 2021: >90 ML/MIN/1.73M*2
EOSINOPHIL # BLD AUTO: 0.01 X10*3/UL (ref 0–0.7)
EOSINOPHIL NFR BLD AUTO: 0.2 %
ERYTHROCYTE [DISTWIDTH] IN BLOOD BY AUTOMATED COUNT: 17.4 % (ref 11.5–14.5)
GLUCOSE SERPL-MCNC: 88 MG/DL (ref 74–99)
HCT VFR BLD AUTO: 31.7 % (ref 41–52)
HGB BLD-MCNC: 10.2 G/DL (ref 13.5–17.5)
IMM GRANULOCYTES # BLD AUTO: 0.01 X10*3/UL (ref 0–0.7)
IMM GRANULOCYTES NFR BLD AUTO: 0.2 % (ref 0–0.9)
INR PPP: 1 (ref 0.9–1.1)
LACTATE SERPL-SCNC: 1.1 MMOL/L (ref 0.4–2)
LYMPHOCYTES # BLD AUTO: 0.92 X10*3/UL (ref 1.2–4.8)
LYMPHOCYTES NFR BLD AUTO: 17.4 %
MAGNESIUM SERPL-MCNC: 2.13 MG/DL (ref 1.6–2.4)
MCH RBC QN AUTO: 28.7 PG (ref 26–34)
MCHC RBC AUTO-ENTMCNC: 32.2 G/DL (ref 32–36)
MCV RBC AUTO: 89 FL (ref 80–100)
MONOCYTES # BLD AUTO: 0.07 X10*3/UL (ref 0.1–1)
MONOCYTES NFR BLD AUTO: 1.3 %
NEUTROPHILS # BLD AUTO: 4.23 X10*3/UL (ref 1.2–7.7)
NEUTROPHILS NFR BLD AUTO: 80.1 %
NRBC BLD-RTO: 0 /100 WBCS (ref 0–0)
P AXIS: 56 DEGREES
P AXIS: 62 DEGREES
P OFFSET: 192 MS
P OFFSET: 196 MS
P ONSET: 141 MS
P ONSET: 143 MS
PLATELET # BLD AUTO: 190 X10*3/UL (ref 150–450)
POTASSIUM SERPL-SCNC: 3.8 MMOL/L (ref 3.5–5.3)
PR INTERVAL: 150 MS
PR INTERVAL: 154 MS
PROT SERPL-MCNC: 6.7 G/DL (ref 6.4–8.2)
PROTHROMBIN TIME: 11.2 SECONDS (ref 9.8–12.8)
Q ONSET: 218 MS
Q ONSET: 218 MS
QRS COUNT: 11 BEATS
QRS COUNT: 11 BEATS
QRS DURATION: 88 MS
QRS DURATION: 90 MS
QT INTERVAL: 432 MS
QT INTERVAL: 446 MS
QTC CALCULATION(BAZETT): 456 MS
QTC CALCULATION(BAZETT): 459 MS
QTC FREDERICIA: 450 MS
QTC FREDERICIA: 453 MS
R AXIS: 56 DEGREES
R AXIS: 66 DEGREES
RBC # BLD AUTO: 3.55 X10*6/UL (ref 4.5–5.9)
SARS-COV-2 RNA RESP QL NAA+PROBE: NOT DETECTED
SODIUM SERPL-SCNC: 139 MMOL/L (ref 136–145)
T AXIS: 44 DEGREES
T AXIS: 54 DEGREES
T OFFSET: 434 MS
T OFFSET: 441 MS
VENTRICULAR RATE: 63 BPM
VENTRICULAR RATE: 68 BPM
WBC # BLD AUTO: 5.3 X10*3/UL (ref 4.4–11.3)

## 2024-10-07 PROCEDURE — 99285 EMERGENCY DEPT VISIT HI MDM: CPT | Mod: 25,CS

## 2024-10-07 PROCEDURE — 83880 ASSAY OF NATRIURETIC PEPTIDE: CPT | Performed by: EMERGENCY MEDICINE

## 2024-10-07 PROCEDURE — 85025 COMPLETE CBC W/AUTO DIFF WBC: CPT | Performed by: EMERGENCY MEDICINE

## 2024-10-07 PROCEDURE — 83605 ASSAY OF LACTIC ACID: CPT | Performed by: EMERGENCY MEDICINE

## 2024-10-07 PROCEDURE — 71045 X-RAY EXAM CHEST 1 VIEW: CPT | Mod: FOREIGN READ | Performed by: RADIOLOGY

## 2024-10-07 PROCEDURE — 93971 EXTREMITY STUDY: CPT | Mod: FOREIGN READ | Performed by: RADIOLOGY

## 2024-10-07 PROCEDURE — 96374 THER/PROPH/DIAG INJ IV PUSH: CPT | Mod: 59

## 2024-10-07 PROCEDURE — 87635 SARS-COV-2 COVID-19 AMP PRB: CPT | Performed by: EMERGENCY MEDICINE

## 2024-10-07 PROCEDURE — 85610 PROTHROMBIN TIME: CPT | Performed by: EMERGENCY MEDICINE

## 2024-10-07 PROCEDURE — 84484 ASSAY OF TROPONIN QUANT: CPT | Performed by: EMERGENCY MEDICINE

## 2024-10-07 PROCEDURE — 2550000001 HC RX 255 CONTRASTS: Performed by: EMERGENCY MEDICINE

## 2024-10-07 PROCEDURE — 93005 ELECTROCARDIOGRAM TRACING: CPT

## 2024-10-07 PROCEDURE — 93970 EXTREMITY STUDY: CPT

## 2024-10-07 PROCEDURE — 2500000004 HC RX 250 GENERAL PHARMACY W/ HCPCS (ALT 636 FOR OP/ED): Performed by: EMERGENCY MEDICINE

## 2024-10-07 PROCEDURE — 71275 CT ANGIOGRAPHY CHEST: CPT

## 2024-10-07 PROCEDURE — 36415 COLL VENOUS BLD VENIPUNCTURE: CPT | Performed by: EMERGENCY MEDICINE

## 2024-10-07 PROCEDURE — 80053 COMPREHEN METABOLIC PANEL: CPT | Performed by: EMERGENCY MEDICINE

## 2024-10-07 PROCEDURE — 71275 CT ANGIOGRAPHY CHEST: CPT | Mod: FOREIGN READ | Performed by: RADIOLOGY

## 2024-10-07 PROCEDURE — 74177 CT ABD & PELVIS W/CONTRAST: CPT

## 2024-10-07 PROCEDURE — 71045 X-RAY EXAM CHEST 1 VIEW: CPT

## 2024-10-07 PROCEDURE — 74177 CT ABD & PELVIS W/CONTRAST: CPT | Mod: FOREIGN READ | Performed by: RADIOLOGY

## 2024-10-07 PROCEDURE — 83735 ASSAY OF MAGNESIUM: CPT | Performed by: EMERGENCY MEDICINE

## 2024-10-07 RX ORDER — DEXAMETHASONE SODIUM PHOSPHATE 10 MG/ML
10 INJECTION INTRAMUSCULAR; INTRAVENOUS ONCE
Status: COMPLETED | OUTPATIENT
Start: 2024-10-07 | End: 2024-10-07

## 2024-10-07 RX ORDER — DEXAMETHASONE 4 MG/1
4 TABLET ORAL DAILY
Qty: 10 TABLET | Refills: 0 | Status: SHIPPED | OUTPATIENT
Start: 2024-10-07 | End: 2025-08-08 | Stop reason: HOSPADM

## 2024-10-07 RX ADMIN — IOHEXOL 75 ML: 350 INJECTION, SOLUTION INTRAVENOUS at 09:58

## 2024-10-07 RX ADMIN — DEXAMETHASONE SODIUM PHOSPHATE 10 MG: 10 INJECTION, SOLUTION INTRAMUSCULAR; INTRAVENOUS at 12:14

## 2024-10-07 ASSESSMENT — LIFESTYLE VARIABLES
EVER FELT BAD OR GUILTY ABOUT YOUR DRINKING: NO
HAVE PEOPLE ANNOYED YOU BY CRITICIZING YOUR DRINKING: NO
EVER HAD A DRINK FIRST THING IN THE MORNING TO STEADY YOUR NERVES TO GET RID OF A HANGOVER: NO
HAVE YOU EVER FELT YOU SHOULD CUT DOWN ON YOUR DRINKING: NO
TOTAL SCORE: 0

## 2024-10-07 ASSESSMENT — PAIN SCALES - GENERAL: PAINLEVEL_OUTOF10: 10 - WORST POSSIBLE PAIN

## 2024-10-07 ASSESSMENT — PAIN - FUNCTIONAL ASSESSMENT: PAIN_FUNCTIONAL_ASSESSMENT: 0-10

## 2024-10-07 NOTE — ED PROVIDER NOTES
"HPI   Chief Complaint   Patient presents with    Leg Swelling     \"Here for bilateral leg swelling for a week with pain.  My sides also hurt.  They have been hurting for a couple of weeks.  I just cannot deal with the pain anymore.\"         History provided by:  Patient  History of Present Illness:  63-year-old male with known history of COPD and lung cancer, currently undergoing chemotherapy with his last treatment on Friday, presents with 2-week history of bilateral leg swelling.  He also complains of right flank pain.  He also has some shortness of breath with exertion.  No fever, chills or cough.  No loss of motor or sensory function.  No loss of bladder or bowel function.  No trauma or travel.  No sick contacts.  No treatment prior to arrival.      PMFSH:   As per HPI, otherwise nurses notes reviewed in EMR.    Past Medical History:   Active Ambulatory Problems     Diagnosis Date Noted    Moderate episode of recurrent major depressive disorder 02/20/2024    HTN (hypertension) 05/13/2024    COPD (chronic obstructive pulmonary disease) (Multi) 05/13/2024    Polysubstance abuse (Multi) 05/13/2024    Diverticulitis 05/13/2024    Gout 05/13/2024    Suicide attempt (Multi) 05/13/2024    Lung mass 05/31/2024    Mediastinal lymphadenopathy 06/21/2024    NSCLC of right lung (Multi) 08/08/2024    Urinary tract infectious disease 08/17/2024    Urinary hesitancy 05/03/2022    Stricture of urethral meatus in male 05/03/2022    Slow urinary stream 05/03/2022    Poor hygiene 08/17/2024    Pathological fracture of sacral vertebra due to secondary osteoporosis (Multi) 09/03/2020    Opioid dependence 02/29/2024    Infection due to fungus 08/17/2024    Hypokalemia 08/17/2024    Frequency-urgency syndrome 05/03/2022    Hematuria 08/17/2024    BPH with obstruction/lower urinary tract symptoms 05/03/2022    Benign essential hypertension 08/17/2024    Anxiety 02/29/2024    Anemia 08/17/2024    Acute respiratory failure 08/17/2024    " Acute metabolic encephalopathy 08/17/2024     Resolved Ambulatory Problems     Diagnosis Date Noted    No Resolved Ambulatory Problems     Past Medical History:   Diagnosis Date    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)     Arthritis     Chronic pain disorder     Degenerative disc disease, lumbar     Depression     Essential (primary) hypertension     Personal history of other diseases of the digestive system     Personal history of other diseases of the musculoskeletal system and connective tissue     Personal history of other diseases of the respiratory system     Psychiatric illness     Substance abuse (Multi)       Past Surgical History:   Past Surgical History:   Procedure Laterality Date    ELBOW SURGERY      OTHER SURGICAL HISTORY  05/06/2020    Flexor tendon repair    OTHER SURGICAL HISTORY  05/06/2020    Oral surgery    OTHER SURGICAL HISTORY  05/06/2020    Shoulder surgery      Family History: No family history on file.   Social History:    Social History     Socioeconomic History    Marital status: Single     Spouse name: Not on file    Number of children: Not on file    Years of education: Not on file    Highest education level: Not on file   Occupational History    Not on file   Tobacco Use    Smoking status: Every Day     Current packs/day: 1.00     Average packs/day: 1 pack/day for 15.0 years (15.0 ttl pk-yrs)     Types: Cigarettes    Smokeless tobacco: Never   Vaping Use    Vaping status: Never Used   Substance and Sexual Activity    Alcohol use: Yes     Comment: occasional    Drug use: Not Currently    Sexual activity: Defer   Other Topics Concern    Not on file   Social History Narrative    Not on file     Social Determinants of Health     Financial Resource Strain: High Risk (8/18/2024)    Overall Financial Resource Strain (CARDIA)     Difficulty of Paying Living Expenses: Very hard   Food Insecurity: Food Insecurity Present (2/20/2024)    Hunger Vital Sign     Worried About Running Out of Food in the Last  Year: Often true     Ran Out of Food in the Last Year: Often true   Transportation Needs: Unmet Transportation Needs (8/18/2024)    PRAPARE - Transportation     Lack of Transportation (Medical): Yes     Lack of Transportation (Non-Medical): Yes   Physical Activity: Inactive (2/20/2024)    Exercise Vital Sign     Days of Exercise per Week: 0 days     Minutes of Exercise per Session: 0 min   Stress: Stress Concern Present (2/20/2024)    [employer] - Occupational Stress Questionnaire     Feeling of Stress : Rather much   Social Connections: Socially Isolated (2/20/2024)    Social Connection and Isolation Panel [NHANES]     Frequency of Communication with Friends and Family: Never     Frequency of Social Gatherings with Friends and Family: Never     Attends Baptism Services: Never     Active Member of Clubs or Organizations: No     Attends Club or Organization Meetings: Never     Marital Status: Never    Intimate Partner Violence: Not At Risk (2/20/2024)    Humiliation, Afraid, Rape, and Kick questionnaire     Fear of Current or Ex-Partner: No     Emotionally Abused: No     Physically Abused: No     Sexually Abused: No   Housing Stability: High Risk (8/18/2024)    Housing Stability Vital Sign     Unable to Pay for Housing in the Last Year: Yes     Number of Times Moved in the Last Year: 2     Homeless in the Last Year: Yes              Patient History   Past Medical History:   Diagnosis Date    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)     Arthritis     Chronic pain disorder     COPD (chronic obstructive pulmonary disease) (Multi)     Degenerative disc disease, lumbar     Depression     Essential (primary) hypertension     Benign essential hypertension    Personal history of other diseases of the digestive system     History of diverticulosis    Personal history of other diseases of the musculoskeletal system and connective tissue     History of gout    Personal history of other diseases of the  respiratory system     History of COPD    Psychiatric illness     Substance abuse (Multi)     Suicide attempt (Multi)      Past Surgical History:   Procedure Laterality Date    ELBOW SURGERY      OTHER SURGICAL HISTORY  05/06/2020    Flexor tendon repair    OTHER SURGICAL HISTORY  05/06/2020    Oral surgery    OTHER SURGICAL HISTORY  05/06/2020    Shoulder surgery     No family history on file.  Social History     Tobacco Use    Smoking status: Every Day     Current packs/day: 1.00     Average packs/day: 1 pack/day for 15.0 years (15.0 ttl pk-yrs)     Types: Cigarettes    Smokeless tobacco: Never   Vaping Use    Vaping status: Never Used   Substance Use Topics    Alcohol use: Yes     Comment: occasional    Drug use: Not Currently       Physical Exam   ED Triage Vitals [10/07/24 0735]   Temperature Heart Rate Respirations BP   36.5 °C (97.7 °F) 70 18 114/65      Pulse Ox Temp Source Heart Rate Source Patient Position   (!) 91 % Temporal Monitor Sitting      BP Location FiO2 (%)     Right arm --       Physical Exam  Physical Exam:    ED Triage Vitals [10/07/24 0735]   Temperature Heart Rate Respirations BP   36.5 °C (97.7 °F) 70 18 114/65      Pulse Ox Temp Source Heart Rate Source Patient Position   (!) 91 % Temporal Monitor Sitting      BP Location FiO2 (%)     Right arm --         Constitutional: Vital signs per nursing notes.  Well developed, well nourished.  Mild acute distress.    Psychiatric: alert and oriented to person, place, and time; no abnormalities of mood or affect; memory intact  Eyes: PERRL; conjunctivae and lids normal; EOMI  ENT: otoscopic exam of external canal and TM´s normal; nasal mucosa, turbinates, and septum normal; mouth, tongue, and pharynx normal; pharynx without edema, exudate, or injection  Respiratory: normal respiratory effort and excursion; no rales, rhonchi, or wheezes; equal but diminished air entry  Cardiovascular: regular rate and rhythm; no murmurs, rubs or gallops; symmetric  pulses; no edema; normal capillary refill; distal pulses present  Neurological: normal speech; CN II-XII grossly intact; normal motor and sensory function; no nystagmus; no pronator drift  GI: no masses, tenderness, rebound or guarding; no palpable, pulsatile mass; no organomegaly; no hernia; normal bowel sounds; (-) Nash´s sign; (-) McBurney´s sign; (-) CVA tenderness  Lymphatic: no adenopathy of neck  Musculoskeletal: normal digits and nails; no gross tendon or ligament injury; normal to palpation; normal strength/tone; neurovascular status intact; (-) Soraya´s sign; (-) straight leg raise  Skin: normal to inspection; normal to palpation; no rash  GCS: 15      ED Course & MDM   Diagnoses as of 10/07/24 1148   Acute right flank pain   History of lung cancer   History of known metastasis to liver                 No data recorded                                 Medical Decision Making  Medical Decision Making:    Differential Diagnoses Considered: COPD exacerbation, ACS, arrhythmia, CHF, PE, DVT, metastasis     EKG: My interpretation of EKG is normal sinus rhythm at 63 bpm with nonspecific ST-T changes             My interpretation of second EKG is normal sinus rhythm at 68 bpm with nonspecific ST-T changes.  There are no significant changes from the initial EKG.    Labs Reviewed   CBC WITH AUTO DIFFERENTIAL - Abnormal       Result Value    WBC 5.3      nRBC 0.0      RBC 3.55 (*)     Hemoglobin 10.2 (*)     Hematocrit 31.7 (*)     MCV 89      MCH 28.7      MCHC 32.2      RDW 17.4 (*)     Platelets 190      Neutrophils % 80.1      Immature Granulocytes %, Automated 0.2      Lymphocytes % 17.4      Monocytes % 1.3      Eosinophils % 0.2      Basophils % 0.8      Neutrophils Absolute 4.23      Immature Granulocytes Absolute, Automated 0.01      Lymphocytes Absolute 0.92 (*)     Monocytes Absolute 0.07 (*)     Eosinophils Absolute 0.01      Basophils Absolute 0.04     COMPREHENSIVE METABOLIC PANEL - Abnormal    Glucose  88      Sodium 139      Potassium 3.8      Chloride 101      Bicarbonate 32      Anion Gap 10      Urea Nitrogen 14      Creatinine 0.94      eGFR >90      Calcium 8.5 (*)     Albumin 3.7      Alkaline Phosphatase 88      Total Protein 6.7      AST 60 (*)     Bilirubin, Total 0.5      ALT 44     B-TYPE NATRIURETIC PEPTIDE - Abnormal     (*)     Narrative:        <100 pg/mL - Heart failure unlikely  100-299 pg/mL - Intermediate probability of acute heart                  failure exacerbation. Correlate with clinical                  context and patient history.    >=300 pg/mL - Heart Failure likely. Correlate with clinical                  context and patient history.    BNP testing is performed using different testing methodology at [facility] than at [facility]. Direct result comparisons should only be made within the same method.      MAGNESIUM - Normal    Magnesium 2.13     PROTIME-INR - Normal    Protime 11.2      INR 1.0     LACTATE - Normal    Lactate 1.1      Narrative:     Venipuncture immediately after or during the administration of Metamizole may lead to falsely low results. Testing should be performed immediately prior to Metamizole dosing.   SARS-COV-2 PCR - Normal    Coronavirus 2019, PCR Not Detected      Narrative:     This assay has received FDA Emergency Use Authorization (EUA) and is only authorized for the duration of time that circumstances exist to justify the authorization of the emergency use of in vitro diagnostic tests for the detection of SARS-CoV-2 virus and/or diagnosis of COVID-19 infection under section 564(b)(1) of the Act, 21 U.S.C. 360bbb-3(b)(1). This assay is an in vitro diagnostic nucleic acid amplification test for the qualitative detection of SARS-CoV-2 from nasopharyngeal specimens and has been validated for use at [facility]. Negative results do not preclude COVID-19 infections and should not be used as the sole basis  for diagnosis, treatment, or other management decisions.     SERIAL TROPONIN-INITIAL - Normal    Troponin I, High Sensitivity 12      Narrative:     Less than 99th percentile of normal range cutoff-  Female and children under 18 years old <14 ng/L; Male <21 ng/L: Negative  Repeat testing should be performed if clinically indicated.     Female and children under 18 years old 14-50 ng/L; Male 21-50 ng/L:  Consistent with possible cardiac damage and possible increased clinical   risk. Serial measurements may help to assess extent of myocardial damage.     >50 ng/L: Consistent with cardiac damage, increased clinical risk and  myocardial infarction. Serial measurements may help assess extent of   myocardial damage.      NOTE: Children less than 1 year old may have higher baseline troponin   levels and results should be interpreted in conjunction with the overall   clinical context.     NOTE: Troponin I testing is performed using a different   testing methodology at [facility] than at other   [facility]. Direct result comparisons should only   be made within the same method.   SERIAL TROPONIN, 1 HOUR - Normal    Troponin I, High Sensitivity 10      Narrative:     Less than 99th percentile of normal range cutoff-  Female and children under 18 years old <14 ng/L; Male <21 ng/L: Negative  Repeat testing should be performed if clinically indicated.     Female and children under 18 years old 14-50 ng/L; Male 21-50 ng/L:  Consistent with possible cardiac damage and possible increased clinical   risk. Serial measurements may help to assess extent of myocardial damage.     >50 ng/L: Consistent with cardiac damage, increased clinical risk and  myocardial infarction. Serial measurements may help assess extent of   myocardial damage.      NOTE: Children less than 1 year old may have higher baseline troponin   levels and results should be interpreted in conjunction with the overall   clinical context.     NOTE:  Troponin I testing is performed using a different   testing methodology at [facility] than at other   [facility]. Direct result comparisons should only   be made within the same method.   TROPONIN SERIES- (INITIAL, 1 HR)    Narrative:     The following orders were created for panel order Troponin I Series, High Sensitivity (0, 1 HR).  Procedure                               Abnormality         Status                     ---------                               -----------         ------                     Troponin I, High Sensiti...[[id number]]  Normal              Final result               Troponin, High Sensitivi...[[id number]]  Normal              Final result                 Please view results for these tests on the individual orders.       CT angio chest for pulmonary embolism   Final Result   CHEST:   1.  No evidence of a pulmonary embolus.   2.  Nodular masses in the right upper lobe of lung, suspicious for   carcinoma.   3.  Coronary artery calcifications.   4.  Aspirated mucus in the upper trachea and right mainstem bronchus.   ABDOMEN/PELVIS:   1.  Left-sided nephrolithiasis.  No evidence of hydronephrosis.   2.  Multiple hepatic lesions, suspicious for metastatic disease.   3.  Bilateral adrenal nodules, suspicious for metastatic disease.   4.  Bilateral inguinal adenopathy.   Signed by Mario Mercado MD      CT abdomen pelvis w IV contrast   Final Result   CHEST:   1.  No evidence of a pulmonary embolus.   2.  Nodular masses in the right upper lobe of lung, suspicious for   carcinoma.   3.  Coronary artery calcifications.   4.  Aspirated mucus in the upper trachea and right mainstem bronchus.   ABDOMEN/PELVIS:   1.  Left-sided nephrolithiasis.  No evidence of hydronephrosis.   2.  Multiple hepatic lesions, suspicious for metastatic disease.   3.  Bilateral adrenal nodules, suspicious for metastatic disease.   4.  Bilateral inguinal adenopathy.   Signed by Mario Mercado MD      Vascular  US lower extremity venous duplex bilateral   Final Result   No evidence for DVT within the lower extremities bilaterally.     Signed by Mario Mercado MD      XR chest 1 view   Final Result   No active pulmonary disease. The right apex is obscured by the   overlying mandible.   Signed by Benedicto Valdez MD          Diagnostic testing considered:         Review of recent and relevant records:    ED Medication Administration:   ED Medication Administration from 10/07/2024 0733 to 10/07/2024 1148         Date/Time Order Dose Route Action Action by     10/07/2024 0958 EDT iohexol (OMNIPaque) 350 mg iodine/mL solution 75 mL 75 mL intravenous Given ELAINE [place]            Prescription Medication Consideration/Given:     Social Determinants of Health Significantly Affecting Care:      Summary:    BP      Temp      Pulse     Resp      SpO2        Abnormal Labs Reviewed   CBC WITH AUTO DIFFERENTIAL - Abnormal; Notable for the following components:       Result Value    RBC 3.55 (*)     Hemoglobin 10.2 (*)     Hematocrit 31.7 (*)     RDW 17.4 (*)     Lymphocytes Absolute 0.92 (*)     Monocytes Absolute 0.07 (*)     All other components within normal limits   COMPREHENSIVE METABOLIC PANEL - Abnormal; Notable for the following components:    Calcium 8.5 (*)     AST 60 (*)     All other components within normal limits   B-TYPE NATRIURETIC PEPTIDE - Abnormal; Notable for the following components:     (*)     All other components within normal limits    Narrative:        <100 pg/mL - Heart failure unlikely  100-299 pg/mL - Intermediate probability of acute heart                  failure exacerbation. Correlate with clinical                  context and patient history.    >=300 pg/mL - Heart Failure likely. Correlate with clinical                  context and patient history.    BNP testing is performed using different testing methodology at [facility] than at other [facility] hospitals. Direct result comparisons should  "only be made within the same method.        Diagnostic evaluation was completed.  2 sets of high-sensitivity troponin are negative.  COVID is negative.  BNP is slightly elevated at 177.  Lactate is in the normal range.  Metabolic panel shows a normal glucose.  Sodium potassium are normal.  Renal function is in the normal range.  There is a slight elevation in the AST of 60.  ALT is in the normal range.  INR is 1.0.  CBC is a normal white blood cell count.  There is mild anemia with a hemoglobin of 10.2.  Platelets are in the normal range.  Chest x-ray shows no active pulmonary disease.  The right apex is obscured by the overlying mandible.  Ultrasound of the bilateral lower extremities shows no DVT.  CT of the chest abdomen pelvis shows no evidence of PE.  Nodular masses in the right upper lobe of lung, suspicious for carcinoma.  Coronary artery calcifications.  Aspirated mucus in the upper trachea and right mainstem bronchus.  Left-sided nephrolithiasis.  No evidence of hydronephrosis.  Multiple hepatic lesions, suspicious for metastatic disease.  Bilateral adrenal nodules, suspicious for metastatic disease.  Bilateral inguinal adenopathy.    I connected with Dr. Jha from oncology who is familiar with the patient.  She was able to review the patient's results.  She shared \"if bowels are moving and not a constipation issue we could try steroids (dex 4mg daily with IV dose there) which can help liver capsule pain if that's it. He has had pain for the past few months but we have been limited in being able to treat because of underlying opioid use disorder and positive utox for fentanyl.\"    Therefore, the patient will be discharged home with short-term follow-up with his care providers.    I discussed the results and discharge plan with the patient and/or family/friend if present.  I emphasized the importance of follow up with the physician I referred them to in the timeframe recommended.  I explained reasons for " the patient to return to the Emergency Department.  Questions were addressed.  The patient and/or family/friend expressed understanding.            Diagnoses as of 10/07/24 1148   Acute right flank pain   History of lung cancer   History of known metastasis to liver         Procedure  Procedures     Inderjit SAEZ MD  10/07/24 1144

## 2024-10-08 ENCOUNTER — HOSPITAL ENCOUNTER (OUTPATIENT)
Dept: RADIOLOGY | Facility: HOSPITAL | Age: 64
End: 2024-10-08
Payer: MEDICARE

## 2024-10-08 ENCOUNTER — TELEPHONE (OUTPATIENT)
Dept: GASTROENTEROLOGY | Facility: CLINIC | Age: 64
End: 2024-10-08
Payer: COMMERCIAL

## 2024-10-08 NOTE — TELEPHONE ENCOUNTER
Tried to call patient to discuss r/s appointment or making virtual. Phone number was incorrect.

## 2024-10-10 ENCOUNTER — APPOINTMENT (OUTPATIENT)
Dept: GASTROENTEROLOGY | Facility: CLINIC | Age: 64
End: 2024-10-10
Payer: MEDICARE

## 2024-10-21 RX ORDER — DEXAMETHASONE 6 MG/1
12 TABLET ORAL ONCE
OUTPATIENT
Start: 2024-11-01

## 2024-10-24 ENCOUNTER — SOCIAL WORK (OUTPATIENT)
Dept: CASE MANAGEMENT | Facility: HOSPITAL | Age: 64
End: 2024-10-24

## 2024-10-24 ENCOUNTER — INFUSION (OUTPATIENT)
Dept: HEMATOLOGY/ONCOLOGY | Facility: CLINIC | Age: 64
End: 2024-10-24
Payer: MEDICARE

## 2024-10-24 ENCOUNTER — APPOINTMENT (OUTPATIENT)
Dept: HEMATOLOGY/ONCOLOGY | Facility: CLINIC | Age: 64
End: 2024-10-24
Payer: MEDICARE

## 2024-10-24 ENCOUNTER — OFFICE VISIT (OUTPATIENT)
Dept: HEMATOLOGY/ONCOLOGY | Facility: CLINIC | Age: 64
End: 2024-10-24
Payer: MEDICARE

## 2024-10-24 VITALS
HEART RATE: 64 BPM | SYSTOLIC BLOOD PRESSURE: 149 MMHG | RESPIRATION RATE: 18 BRPM | WEIGHT: 152.23 LBS | OXYGEN SATURATION: 98 % | BODY MASS INDEX: 20.08 KG/M2 | DIASTOLIC BLOOD PRESSURE: 87 MMHG | TEMPERATURE: 97.9 F

## 2024-10-24 DIAGNOSIS — C34.91 NSCLC OF RIGHT LUNG (MULTI): ICD-10-CM

## 2024-10-24 DIAGNOSIS — R10.9 ACUTE RIGHT FLANK PAIN: ICD-10-CM

## 2024-10-24 DIAGNOSIS — Z85.118 HISTORY OF LUNG CANCER: ICD-10-CM

## 2024-10-24 DIAGNOSIS — Z85.89 HISTORY OF KNOWN METASTASIS TO LIVER: ICD-10-CM

## 2024-10-24 DIAGNOSIS — C34.91 NSCLC OF RIGHT LUNG (MULTI): Primary | ICD-10-CM

## 2024-10-24 LAB
ALBUMIN SERPL BCP-MCNC: 3.6 G/DL (ref 3.4–5)
ALP SERPL-CCNC: 88 U/L (ref 33–136)
ALT SERPL W P-5'-P-CCNC: 11 U/L (ref 10–52)
ANION GAP SERPL CALC-SCNC: 10 MMOL/L (ref 10–20)
AST SERPL W P-5'-P-CCNC: 15 U/L (ref 9–39)
BASOPHILS # BLD AUTO: 0.01 X10*3/UL (ref 0–0.1)
BASOPHILS NFR BLD AUTO: 0.5 %
BILIRUB SERPL-MCNC: 0.4 MG/DL (ref 0–1.2)
BUN SERPL-MCNC: 12 MG/DL (ref 6–23)
CALCIUM SERPL-MCNC: 9 MG/DL (ref 8.6–10.3)
CHLORIDE SERPL-SCNC: 103 MMOL/L (ref 98–107)
CO2 SERPL-SCNC: 27 MMOL/L (ref 21–32)
CREAT SERPL-MCNC: 0.86 MG/DL (ref 0.5–1.3)
EGFRCR SERPLBLD CKD-EPI 2021: >90 ML/MIN/1.73M*2
EOSINOPHIL # BLD AUTO: 0.07 X10*3/UL (ref 0–0.7)
EOSINOPHIL NFR BLD AUTO: 3.8 %
ERYTHROCYTE [DISTWIDTH] IN BLOOD BY AUTOMATED COUNT: 18.3 % (ref 11.5–14.5)
GLUCOSE SERPL-MCNC: 103 MG/DL (ref 74–99)
HCT VFR BLD AUTO: 29 % (ref 41–52)
HGB BLD-MCNC: 9 G/DL (ref 13.5–17.5)
IMM GRANULOCYTES # BLD AUTO: 0.01 X10*3/UL (ref 0–0.7)
IMM GRANULOCYTES NFR BLD AUTO: 0.5 % (ref 0–0.9)
LYMPHOCYTES # BLD AUTO: 0.68 X10*3/UL (ref 1.2–4.8)
LYMPHOCYTES NFR BLD AUTO: 37.4 %
MCH RBC QN AUTO: 29 PG (ref 26–34)
MCHC RBC AUTO-ENTMCNC: 31 G/DL (ref 32–36)
MCV RBC AUTO: 94 FL (ref 80–100)
MONOCYTES # BLD AUTO: 0.38 X10*3/UL (ref 0.1–1)
MONOCYTES NFR BLD AUTO: 20.9 %
NEUTROPHILS # BLD AUTO: 0.67 X10*3/UL (ref 1.2–7.7)
NEUTROPHILS NFR BLD AUTO: 36.9 %
NRBC BLD-RTO: ABNORMAL /100{WBCS}
PLATELET # BLD AUTO: 218 X10*3/UL (ref 150–450)
POTASSIUM SERPL-SCNC: 3.8 MMOL/L (ref 3.5–5.3)
PROT SERPL-MCNC: 7.1 G/DL (ref 6.4–8.2)
RBC # BLD AUTO: 3.1 X10*6/UL (ref 4.5–5.9)
SODIUM SERPL-SCNC: 136 MMOL/L (ref 136–145)
WBC # BLD AUTO: 1.8 X10*3/UL (ref 4.4–11.3)

## 2024-10-24 PROCEDURE — G2211 COMPLEX E/M VISIT ADD ON: HCPCS | Performed by: INTERNAL MEDICINE

## 2024-10-24 PROCEDURE — 85025 COMPLETE CBC W/AUTO DIFF WBC: CPT

## 2024-10-24 PROCEDURE — 3079F DIAST BP 80-89 MM HG: CPT | Performed by: INTERNAL MEDICINE

## 2024-10-24 PROCEDURE — 3077F SYST BP >= 140 MM HG: CPT | Performed by: INTERNAL MEDICINE

## 2024-10-24 PROCEDURE — 99215 OFFICE O/P EST HI 40 MIN: CPT | Performed by: INTERNAL MEDICINE

## 2024-10-24 PROCEDURE — 80053 COMPREHEN METABOLIC PANEL: CPT

## 2024-10-24 PROCEDURE — 36415 COLL VENOUS BLD VENIPUNCTURE: CPT

## 2024-10-24 RX ORDER — HEPARIN SODIUM,PORCINE/PF 10 UNIT/ML
50 SYRINGE (ML) INTRAVENOUS AS NEEDED
OUTPATIENT
Start: 2024-10-24

## 2024-10-24 RX ORDER — HEPARIN 100 UNIT/ML
500 SYRINGE INTRAVENOUS AS NEEDED
OUTPATIENT
Start: 2024-10-24

## 2024-10-24 RX ORDER — FOLIC ACID 1 MG/1
1000 TABLET ORAL DAILY
Qty: 30 TABLET | Refills: 11 | Status: SHIPPED | OUTPATIENT
Start: 2024-10-24

## 2024-10-24 ASSESSMENT — PAIN SCALES - GENERAL: PAINLEVEL_OUTOF10: 6

## 2024-10-24 NOTE — PROGRESS NOTES
[facility] - Medical Oncology Follow-Up Visit    Patient ID: Joe Magaña is a 63 y.o. male.  Referring Physician: Aminta Jha MD  [id number] [place] [address]  Primary Care Provider: UMBERTO Roland-CNP      Chief Concern: Patient is here for follow-up and treatment readiness visit    HPI   Overall patient does not feel great. He has lost weight since last visit - states appetite is good but having trouble with his dentures, they are making his teeth/gums sore so he isn't able to eat. Feels weaker in general. Breathing feels ok - on/off issues. Nose running constantly but doesn't feel really congested otherwise. Acid reflux is improved since last visit. Does not recall if he is taking folic acid, thinks he never received. His L arm numbness/pain has resolved. Has a little nausea from not eating. Was in the ER due to leg swelling, improved but still present. Legs were blue and cold. L knee was killing him. No major intervention seemed to help, just time.     Diagnosis: Lung adenocarcinoma  Stage:  Cancer Staging   NSCLC of right lung (Multi)  Staging form: Lung, AJCC 8th Edition  - Clinical stage from 8/5/2024: Stage IVB (cT1c, cN3, cM1c) - Signed by Aminta Jha MD on 10/28/2024    Current sites of disease: R lung, mediastinal LNs, possible liver  Molecular and ancillary testing:     PD-L1 <1%  NGS without actionable mutations    Oncologic History:  3/13/24 - Presented to ER with hemoptysis, CT with RUL mass suspicious for neoplasm  4/29/24 - CT guided R lung biopsy with no evidence of malignancy  6/7/24 - PET non-diagnostic  7/15/24 - CT with decrease in RUL nodule, increase in R perihilar lesion and new R supraclavicular LN, new hepatic hypodensities concerning for metastatic disease  7/15/24 - EBUS with biopsy of RUL with NSCLC (adenocarcinoma), including bronchial brush RUL and 4L lymph node. Level 11L negative.  8/5/24 - MRI brain negative for  metastatic disease, PET with RUL nodule, bilateral lymph nodes, R hepatic lobe lesions, R scapula lesion  8/16/24 - C1 carboplatin/pemetrexed/pembrolizumab (carbo AUC 4)    Past Medical History:  No date: 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)  No date: Arthritis  No date: Chronic pain disorder  No date: COPD (chronic obstructive pulmonary disease) (Multi)  No date: Degenerative disc disease, lumbar  No date: Depression  No date: Essential (primary) hypertension      Comment:  Benign essential hypertension  No date: Personal history of other diseases of the digestive system      Comment:  History of diverticulosis  No date: Personal history of other diseases of the musculoskeletal   system and connective tissue      Comment:  History of gout  No date: Personal history of other diseases of the respiratory system      Comment:  History of COPD  No date: Psychiatric illness  No date: Substance abuse (Multi)  No date: Suicide attempt (Multi)     Past Surgical History:  No date: ELBOW SURGERY  05/06/2020: OTHER SURGICAL HISTORY      Comment:  Flexor tendon repair  05/06/2020: OTHER SURGICAL HISTORY      Comment:  Oral surgery  05/06/2020: OTHER SURGICAL HISTORY      Comment:  Shoulder surgery     Social Hx:  Chronic pain  Opioid overdose noted 9/2023 - fentanyl/oxycodone  ER visit 2/2024 noted SI (1 suicide attempt in past)  Undomiciled, unstable housing for a while  Currently smokes about 1ppd x many years, quit for about 7 years in his 20s  Works at [employer]    Family Hx  Father passed away this year from dementia  Mother passed away at 99 from lung cancer, she did go through chemo  Sister passed away in 2023 from cancer (lung/liver)     Meds (Current):  Current Outpatient Medications   Medication Instructions    albuterol 90 mcg/actuation inhaler 2 puffs, inhalation, Every 6 hours PRN    cholecalciferol (VITAMIN D3) 50 mcg, oral, Daily    cloNIDine (CATAPRES) 0.1 mg, oral, Nightly    cyanocobalamin (VITAMIN  B-12) 1,000 mcg, oral, Daily    dexAMETHasone (DECADRON) 4 mg, oral, Daily    DULoxetine (CYMBALTA) 60 mg, oral, 2 times daily    fluticasone furoate-vilanteroL (Breo Ellipta) 200-25 mcg/dose inhaler 1 puff, inhalation, Daily    folic acid (FOLVITE) 1,000 mcg, oral, Daily    gabapentin (NEURONTIN) 300 mg, oral, 3 times daily    guaiFENesin (MUCINEX) 600 mg, oral, Nightly, Do not crush, chew, or split.    nicotine (Nicoderm CQ) 21 mg/24 hr patch 1 patch, transdermal, Daily    omeprazole (PRILOSEC) 20 mg, oral, Daily before breakfast, Do not crush or chew.    ondansetron (ZOFRAN) 8 mg, oral, Every 8 hours PRN    polyethylene glycol (GLYCOLAX, MIRALAX) 17 g, oral, Daily    prochlorperazine (COMPAZINE) 10 mg, oral, Every 6 hours PRN    QUEtiapine (SEROQUEL) 25 mg, oral, Nightly      No Known Allergies    Review of Systems   Constitutional:  Positive for appetite change and fatigue. Negative for unexpected weight change.   HENT:   Negative for hearing loss.    Eyes:  Negative for eye problems.   Respiratory:  Positive for cough and shortness of breath.    Cardiovascular:  Negative for chest pain and leg swelling.   Gastrointestinal:  Positive for constipation. Negative for abdominal pain, diarrhea, nausea and vomiting.   Genitourinary:  Negative for difficulty urinating.    Musculoskeletal:  Positive for back pain and neck pain. Negative for arthralgias.   Skin:  Negative for rash.   Neurological:  Negative for dizziness and headaches.   Hematological:  Negative for adenopathy.      Objective   BSA: 1.89 meters squared  /87 (BP Location: Right arm, Patient Position: Sitting)   Pulse 64   Temp 36.6 °C (97.9 °F) (Temporal)   Resp 18   Wt 69 kg (152 lb 3.6 oz)   SpO2 98%   BMI 20.08 kg/m²   Performance Status:  (1) Restricted in physically strenuous activity, ambulatory and able to do work of light nature     Physical Exam  Vitals and nursing note reviewed.   Constitutional:       General: He is not in acute  distress.  HENT:      Head: Normocephalic and atraumatic.   Eyes:      Pupils: Pupils are equal, round, and reactive to light.   Cardiovascular:      Rate and Rhythm: Normal rate and regular rhythm.      Heart sounds: Normal heart sounds.   Pulmonary:      Effort: Pulmonary effort is normal.      Breath sounds: Normal breath sounds.   Abdominal:      General: There is no distension.   Musculoskeletal:         General: No swelling.   Skin:     General: Skin is warm and dry.      Findings: No rash.   Neurological:      General: No focal deficit present.      Mental Status: He is alert and oriented to person, place, and time.   Psychiatric:         Mood and Affect: Mood normal.         Behavior: Behavior normal.        Results:  Labs:  Lab Results   Component Value Date    WBC 1.8 (L) 10/24/2024    HGB 9.0 (L) 10/24/2024    HCT 29.0 (L) 10/24/2024    MCV 94 10/24/2024     10/24/2024      Lab Results   Component Value Date    NEUTROABS 0.67 (L) 10/24/2024      Lab Results   Component Value Date    GLUCOSE 103 (H) 10/24/2024    CALCIUM 9.0 10/24/2024     10/24/2024    K 3.8 10/24/2024    CO2 27 10/24/2024     10/24/2024    BUN 12 10/24/2024    CREATININE 0.86 10/24/2024     Lab Results   Component Value Date    ALT 11 10/24/2024    AST 15 10/24/2024    ALKPHOS 88 10/24/2024    BILITOT 0.4 10/24/2024      Imaging:  I have personally reviewed the below imaging and concur with the reported findings unless otherwise stated:    === Results for orders placed during the hospital encounter of 10/07/24 ===    CT abdomen pelvis w IV contrast [[id number]] 10/07/2024    Status: Normal  CHEST:  1.  No evidence of a pulmonary embolus.  2.  Nodular masses in the right upper lobe of lung, suspicious for  carcinoma.  3.  Coronary artery calcifications.  4.  Aspirated mucus in the upper trachea and right mainstem bronchus.  ABDOMEN/PELVIS:  1.  Left-sided nephrolithiasis.  No evidence of hydronephrosis.  2.  Multiple  hepatic lesions, suspicious for metastatic disease.  3.  Bilateral adrenal nodules, suspicious for metastatic disease.  4.  Bilateral inguinal adenopathy.  Signed by Mario Mercado MD      Pathology:    No new pathology      Assessment/Plan      Joe Magaña is a 63 y.o. male here for follow-up of stage IV lung adenocarcinoma with PD-L1 <1% and no actionable mutations on NGS.    Reviewed overall work-up to date with patient previously including implications of stage IV disease and overall palliative goals of therapy. Discussed that typically standard treatment involves a combination of chemotherapy (carboplatin/pemetrexed) and immunotherapy (typically pembrolizumab) given low PD-L1. He is very worried about chemotherapy and not sure he wants to go through it - he watched his mother go through it for lung cancer in the past. We did discuss that we have come a long way in terms of supporting patients and chemo in general.    We also discussed that in order to consider these treatments I think we need to find a more stable housing situation so that he can be safe while receiving treatment. Social determinants of health including housing/food instability and history of opioid abuse are barriers to him receiving optimal care.    He started carboplatin/pemetrexed/pembrolizumab with dose reduction and is tolerating relatively well    Reviewed scans from recent hospitalization which appear to show stable disease.    #Stage IV lung adenocarcinoma  - Cont folic acid daily, reminded again today of importance of taking this  - B12 with C1 and C4 of treatment  - PRN zofran/compazine for nausea  - Hold C4 chemoimmunotherapy, postponed today due to neutropenia  - Plan repeat CT C/A/P after 2-3 additional cycles    #L arm weakness   - largely resolved now, will monitor    #GERD  - Severe symptoms limiting Po  - Cont omeprazole daily  - Improved    #Constipation - likely related to chemo  - Miralax daily    #Cough - related to  cancer  #Sinus congestion  - Trial guafenesin    #Pain related to malignancy, chronic pain  - Following with supportive onc  - Urine tox was positive for fentanyl (not prescribed), it has been discussed that we will not be able to provide opioids if this continues to be positive    RTC for treatment next week if labs improved    Aminta Jha MD  [facility]

## 2024-10-24 NOTE — PROGRESS NOTES
Social Work Note  10/24/2024 SW received a call from patient this morning.  His  did not set up his transportation so he let this writer know so he could get to [facility] today.  SW set up transport through [employer].  Patient did not have any other means of transportation.  [employer] also brought patient home.  SW checked on patient while he was in infusion.  This writer gave him the information on [facility] to look into dentures.  Patient stated he needed new glasses and SW encouraged him to check with them on that as well.    SW will remain available to assist patient.  Mary Kay Obrien, MSW, LSW

## 2024-10-28 ASSESSMENT — ENCOUNTER SYMPTOMS
COUGH: 1
ADENOPATHY: 0
FATIGUE: 1
LEG SWELLING: 0
APPETITE CHANGE: 1
SHORTNESS OF BREATH: 1
NAUSEA: 0
VOMITING: 0
HEADACHES: 0
BACK PAIN: 1
CONSTIPATION: 1
DIARRHEA: 0
NECK PAIN: 1
DIZZINESS: 0
ARTHRALGIAS: 0
ABDOMINAL PAIN: 0
EYE PROBLEMS: 0
DIFFICULTY URINATING: 0
UNEXPECTED WEIGHT CHANGE: 0

## 2024-10-30 ENCOUNTER — LAB (OUTPATIENT)
Dept: LAB | Facility: CLINIC | Age: 64
End: 2024-10-30
Payer: MEDICARE

## 2024-10-30 ENCOUNTER — INFUSION (OUTPATIENT)
Dept: HEMATOLOGY/ONCOLOGY | Facility: CLINIC | Age: 64
End: 2024-10-30
Payer: MEDICARE

## 2024-10-30 VITALS
TEMPERATURE: 97.2 F | BODY MASS INDEX: 19.71 KG/M2 | HEART RATE: 53 BPM | RESPIRATION RATE: 18 BRPM | OXYGEN SATURATION: 98 % | SYSTOLIC BLOOD PRESSURE: 154 MMHG | WEIGHT: 149.36 LBS | DIASTOLIC BLOOD PRESSURE: 81 MMHG

## 2024-10-30 DIAGNOSIS — C34.91 NSCLC OF RIGHT LUNG (MULTI): ICD-10-CM

## 2024-10-30 LAB
ALBUMIN SERPL BCP-MCNC: 3.6 G/DL (ref 3.4–5)
ALP SERPL-CCNC: 99 U/L (ref 33–136)
ALT SERPL W P-5'-P-CCNC: 13 U/L (ref 10–52)
ANION GAP SERPL CALC-SCNC: 10 MMOL/L (ref 10–20)
AST SERPL W P-5'-P-CCNC: 18 U/L (ref 9–39)
BASOPHILS # BLD AUTO: 0.02 X10*3/UL (ref 0–0.1)
BASOPHILS NFR BLD AUTO: 0.6 %
BILIRUB SERPL-MCNC: 0.3 MG/DL (ref 0–1.2)
BUN SERPL-MCNC: 12 MG/DL (ref 6–23)
CALCIUM SERPL-MCNC: 9 MG/DL (ref 8.6–10.3)
CHLORIDE SERPL-SCNC: 104 MMOL/L (ref 98–107)
CO2 SERPL-SCNC: 28 MMOL/L (ref 21–32)
CREAT SERPL-MCNC: 0.96 MG/DL (ref 0.5–1.3)
EGFRCR SERPLBLD CKD-EPI 2021: 90 ML/MIN/1.73M*2
EOSINOPHIL # BLD AUTO: 0.03 X10*3/UL (ref 0–0.7)
EOSINOPHIL NFR BLD AUTO: 0.8 %
ERYTHROCYTE [DISTWIDTH] IN BLOOD BY AUTOMATED COUNT: 19.6 % (ref 11.5–14.5)
GLUCOSE SERPL-MCNC: 93 MG/DL (ref 74–99)
HCT VFR BLD AUTO: 29 % (ref 41–52)
HGB BLD-MCNC: 9.2 G/DL (ref 13.5–17.5)
IMM GRANULOCYTES # BLD AUTO: 0 X10*3/UL (ref 0–0.7)
IMM GRANULOCYTES NFR BLD AUTO: 0 % (ref 0–0.9)
LYMPHOCYTES # BLD AUTO: 1.07 X10*3/UL (ref 1.2–4.8)
LYMPHOCYTES NFR BLD AUTO: 29.5 %
MCH RBC QN AUTO: 29.6 PG (ref 26–34)
MCHC RBC AUTO-ENTMCNC: 31.7 G/DL (ref 32–36)
MCV RBC AUTO: 93 FL (ref 80–100)
MONOCYTES # BLD AUTO: 0.53 X10*3/UL (ref 0.1–1)
MONOCYTES NFR BLD AUTO: 14.6 %
NEUTROPHILS # BLD AUTO: 1.98 X10*3/UL (ref 1.2–7.7)
NEUTROPHILS NFR BLD AUTO: 54.5 %
NRBC BLD-RTO: ABNORMAL /100{WBCS}
PLATELET # BLD AUTO: 218 X10*3/UL (ref 150–450)
POTASSIUM SERPL-SCNC: 3.9 MMOL/L (ref 3.5–5.3)
PROT SERPL-MCNC: 7.2 G/DL (ref 6.4–8.2)
RBC # BLD AUTO: 3.11 X10*6/UL (ref 4.5–5.9)
SODIUM SERPL-SCNC: 138 MMOL/L (ref 136–145)
WBC # BLD AUTO: 3.6 X10*3/UL (ref 4.4–11.3)

## 2024-10-30 PROCEDURE — 2500000004 HC RX 250 GENERAL PHARMACY W/ HCPCS (ALT 636 FOR OP/ED): Mod: SE | Performed by: INTERNAL MEDICINE

## 2024-10-30 PROCEDURE — 96417 CHEMO IV INFUS EACH ADDL SEQ: CPT

## 2024-10-30 PROCEDURE — 36415 COLL VENOUS BLD VENIPUNCTURE: CPT

## 2024-10-30 PROCEDURE — 96375 TX/PRO/DX INJ NEW DRUG ADDON: CPT | Mod: INF

## 2024-10-30 PROCEDURE — 96411 CHEMO IV PUSH ADDL DRUG: CPT

## 2024-10-30 PROCEDURE — 2500000004 HC RX 250 GENERAL PHARMACY W/ HCPCS (ALT 636 FOR OP/ED): Mod: JZ,JG,SE | Performed by: INTERNAL MEDICINE

## 2024-10-30 PROCEDURE — 96367 TX/PROPH/DG ADDL SEQ IV INF: CPT

## 2024-10-30 PROCEDURE — 96372 THER/PROPH/DIAG INJ SC/IM: CPT

## 2024-10-30 PROCEDURE — 84075 ASSAY ALKALINE PHOSPHATASE: CPT

## 2024-10-30 PROCEDURE — 85025 COMPLETE CBC W/AUTO DIFF WBC: CPT

## 2024-10-30 PROCEDURE — 96413 CHEMO IV INFUSION 1 HR: CPT

## 2024-10-30 RX ORDER — PROCHLORPERAZINE MALEATE 10 MG
10 TABLET ORAL EVERY 6 HOURS PRN
Status: DISCONTINUED | OUTPATIENT
Start: 2024-10-30 | End: 2024-10-30 | Stop reason: HOSPADM

## 2024-10-30 RX ORDER — CYANOCOBALAMIN 1000 UG/ML
1000 INJECTION, SOLUTION INTRAMUSCULAR; SUBCUTANEOUS ONCE
Status: COMPLETED | OUTPATIENT
Start: 2024-10-30 | End: 2024-10-30

## 2024-10-30 RX ORDER — PROCHLORPERAZINE EDISYLATE 5 MG/ML
10 INJECTION INTRAMUSCULAR; INTRAVENOUS EVERY 6 HOURS PRN
Status: DISCONTINUED | OUTPATIENT
Start: 2024-10-30 | End: 2024-10-30 | Stop reason: HOSPADM

## 2024-10-30 RX ORDER — HEPARIN 100 UNIT/ML
500 SYRINGE INTRAVENOUS AS NEEDED
OUTPATIENT
Start: 2024-10-30

## 2024-10-30 RX ORDER — EPINEPHRINE 0.3 MG/.3ML
0.3 INJECTION SUBCUTANEOUS EVERY 5 MIN PRN
Status: DISCONTINUED | OUTPATIENT
Start: 2024-10-30 | End: 2024-10-30 | Stop reason: HOSPADM

## 2024-10-30 RX ORDER — PALONOSETRON 0.05 MG/ML
0.25 INJECTION, SOLUTION INTRAVENOUS ONCE
Status: COMPLETED | OUTPATIENT
Start: 2024-10-30 | End: 2024-10-30

## 2024-10-30 RX ORDER — DIPHENHYDRAMINE HYDROCHLORIDE 50 MG/ML
50 INJECTION INTRAMUSCULAR; INTRAVENOUS AS NEEDED
Status: DISCONTINUED | OUTPATIENT
Start: 2024-10-30 | End: 2024-10-30 | Stop reason: HOSPADM

## 2024-10-30 RX ORDER — HEPARIN SODIUM,PORCINE/PF 10 UNIT/ML
50 SYRINGE (ML) INTRAVENOUS AS NEEDED
OUTPATIENT
Start: 2024-10-30

## 2024-10-30 RX ORDER — ALBUTEROL SULFATE 0.83 MG/ML
3 SOLUTION RESPIRATORY (INHALATION) AS NEEDED
Status: DISCONTINUED | OUTPATIENT
Start: 2024-10-30 | End: 2024-10-30 | Stop reason: HOSPADM

## 2024-10-30 RX ORDER — DEXAMETHASONE 6 MG/1
12 TABLET ORAL ONCE
Status: COMPLETED | OUTPATIENT
Start: 2024-10-30 | End: 2024-10-30

## 2024-10-30 RX ORDER — FAMOTIDINE 10 MG/ML
20 INJECTION INTRAVENOUS ONCE AS NEEDED
Status: DISCONTINUED | OUTPATIENT
Start: 2024-10-30 | End: 2024-10-30 | Stop reason: HOSPADM

## 2024-10-30 ASSESSMENT — PAIN SCALES - GENERAL: PAINLEVEL_OUTOF10: 7

## 2024-10-31 ENCOUNTER — SOCIAL WORK (OUTPATIENT)
Dept: CASE MANAGEMENT | Facility: HOSPITAL | Age: 64
End: 2024-10-31
Payer: COMMERCIAL

## 2024-11-01 ENCOUNTER — APPOINTMENT (OUTPATIENT)
Dept: HEMATOLOGY/ONCOLOGY | Facility: CLINIC | Age: 64
End: 2024-11-01
Payer: MEDICARE

## 2024-11-05 RX ORDER — ALBUTEROL SULFATE 0.83 MG/ML
3 SOLUTION RESPIRATORY (INHALATION) AS NEEDED
OUTPATIENT
Start: 2024-11-21

## 2024-11-05 RX ORDER — DIPHENHYDRAMINE HYDROCHLORIDE 50 MG/ML
50 INJECTION INTRAMUSCULAR; INTRAVENOUS AS NEEDED
OUTPATIENT
Start: 2024-11-21

## 2024-11-05 RX ORDER — PROCHLORPERAZINE EDISYLATE 5 MG/ML
10 INJECTION INTRAMUSCULAR; INTRAVENOUS EVERY 6 HOURS PRN
OUTPATIENT
Start: 2024-11-21

## 2024-11-05 RX ORDER — EPINEPHRINE 0.3 MG/.3ML
0.3 INJECTION SUBCUTANEOUS EVERY 5 MIN PRN
OUTPATIENT
Start: 2024-11-21

## 2024-11-05 RX ORDER — FAMOTIDINE 10 MG/ML
20 INJECTION INTRAVENOUS ONCE AS NEEDED
OUTPATIENT
Start: 2024-11-21

## 2024-11-05 RX ORDER — PROCHLORPERAZINE MALEATE 10 MG
10 TABLET ORAL EVERY 6 HOURS PRN
OUTPATIENT
Start: 2024-11-21

## 2024-11-05 RX ORDER — CYANOCOBALAMIN 1000 UG/ML
1000 INJECTION, SOLUTION INTRAMUSCULAR; SUBCUTANEOUS ONCE
OUTPATIENT
Start: 2024-11-21

## 2024-11-14 ENCOUNTER — APPOINTMENT (OUTPATIENT)
Dept: HEMATOLOGY/ONCOLOGY | Facility: CLINIC | Age: 64
End: 2024-11-14
Payer: MEDICARE

## 2024-11-21 ENCOUNTER — APPOINTMENT (OUTPATIENT)
Dept: HEMATOLOGY/ONCOLOGY | Facility: CLINIC | Age: 64
End: 2024-11-21
Payer: MEDICARE

## 2024-11-21 ENCOUNTER — OFFICE VISIT (OUTPATIENT)
Dept: HEMATOLOGY/ONCOLOGY | Facility: CLINIC | Age: 64
End: 2024-11-21
Payer: MEDICARE

## 2024-11-21 ENCOUNTER — LAB (OUTPATIENT)
Dept: LAB | Facility: CLINIC | Age: 64
End: 2024-11-21
Payer: MEDICARE

## 2024-11-21 VITALS
TEMPERATURE: 97.3 F | HEART RATE: 54 BPM | SYSTOLIC BLOOD PRESSURE: 146 MMHG | DIASTOLIC BLOOD PRESSURE: 81 MMHG | BODY MASS INDEX: 19.37 KG/M2 | RESPIRATION RATE: 16 BRPM | WEIGHT: 146.8 LBS | OXYGEN SATURATION: 97 %

## 2024-11-21 DIAGNOSIS — T45.1X5A CHEMOTHERAPY-INDUCED NEUTROPENIA (CMS-HCC): ICD-10-CM

## 2024-11-21 DIAGNOSIS — C34.91 NSCLC OF RIGHT LUNG (MULTI): Primary | ICD-10-CM

## 2024-11-21 DIAGNOSIS — D70.1 CHEMOTHERAPY-INDUCED NEUTROPENIA (CMS-HCC): ICD-10-CM

## 2024-11-21 DIAGNOSIS — C34.91 NSCLC OF RIGHT LUNG (MULTI): ICD-10-CM

## 2024-11-21 LAB
ALBUMIN SERPL BCP-MCNC: 3.8 G/DL (ref 3.4–5)
ALP SERPL-CCNC: 81 U/L (ref 33–136)
ALT SERPL W P-5'-P-CCNC: 13 U/L (ref 10–52)
ANION GAP SERPL CALC-SCNC: 11 MMOL/L (ref 10–20)
AST SERPL W P-5'-P-CCNC: 13 U/L (ref 9–39)
BASOPHILS # BLD AUTO: 0.01 X10*3/UL (ref 0–0.1)
BASOPHILS NFR BLD AUTO: 0.6 %
BILIRUB SERPL-MCNC: 0.3 MG/DL (ref 0–1.2)
BUN SERPL-MCNC: 17 MG/DL (ref 6–23)
CALCIUM SERPL-MCNC: 9.1 MG/DL (ref 8.6–10.3)
CHLORIDE SERPL-SCNC: 108 MMOL/L (ref 98–107)
CO2 SERPL-SCNC: 27 MMOL/L (ref 21–32)
CORTIS AM PEAK SERPL-MSCNC: 3.7 UG/DL (ref 5–20)
CREAT SERPL-MCNC: 0.85 MG/DL (ref 0.5–1.3)
EGFRCR SERPLBLD CKD-EPI 2021: >90 ML/MIN/1.73M*2
EOSINOPHIL # BLD AUTO: 0.04 X10*3/UL (ref 0–0.7)
EOSINOPHIL NFR BLD AUTO: 2.5 %
ERYTHROCYTE [DISTWIDTH] IN BLOOD BY AUTOMATED COUNT: 20.9 % (ref 11.5–14.5)
GLUCOSE SERPL-MCNC: 80 MG/DL (ref 74–99)
HCT VFR BLD AUTO: 25.5 % (ref 41–52)
HGB BLD-MCNC: 8 G/DL (ref 13.5–17.5)
IMM GRANULOCYTES # BLD AUTO: 0.01 X10*3/UL (ref 0–0.7)
IMM GRANULOCYTES NFR BLD AUTO: 0.6 % (ref 0–0.9)
LYMPHOCYTES # BLD AUTO: 0.63 X10*3/UL (ref 1.2–4.8)
LYMPHOCYTES NFR BLD AUTO: 39.4 %
MCH RBC QN AUTO: 30.5 PG (ref 26–34)
MCHC RBC AUTO-ENTMCNC: 31.4 G/DL (ref 32–36)
MCV RBC AUTO: 97 FL (ref 80–100)
MONOCYTES # BLD AUTO: 0.37 X10*3/UL (ref 0.1–1)
MONOCYTES NFR BLD AUTO: 23.1 %
NEUTROPHILS # BLD AUTO: 0.54 X10*3/UL (ref 1.2–7.7)
NEUTROPHILS NFR BLD AUTO: 33.8 %
NRBC BLD-RTO: ABNORMAL /100{WBCS}
OVALOCYTES BLD QL SMEAR: NORMAL
PLATELET # BLD AUTO: 213 X10*3/UL (ref 150–450)
POLYCHROMASIA BLD QL SMEAR: NORMAL
POTASSIUM SERPL-SCNC: 3.8 MMOL/L (ref 3.5–5.3)
PROT SERPL-MCNC: 6.7 G/DL (ref 6.4–8.2)
RBC # BLD AUTO: 2.62 X10*6/UL (ref 4.5–5.9)
RBC MORPH BLD: NORMAL
SCHISTOCYTES BLD QL SMEAR: NORMAL
SODIUM SERPL-SCNC: 142 MMOL/L (ref 136–145)
TSH SERPL-ACNC: 0.72 MIU/L (ref 0.44–3.98)
WBC # BLD AUTO: 1.6 X10*3/UL (ref 4.4–11.3)

## 2024-11-21 PROCEDURE — 99214 OFFICE O/P EST MOD 30 MIN: CPT | Performed by: NURSE PRACTITIONER

## 2024-11-21 PROCEDURE — 82533 TOTAL CORTISOL: CPT

## 2024-11-21 PROCEDURE — 85025 COMPLETE CBC W/AUTO DIFF WBC: CPT

## 2024-11-21 PROCEDURE — 36415 COLL VENOUS BLD VENIPUNCTURE: CPT

## 2024-11-21 PROCEDURE — 82024 ASSAY OF ACTH: CPT

## 2024-11-21 PROCEDURE — 99417 PROLNG OP E/M EACH 15 MIN: CPT | Performed by: NURSE PRACTITIONER

## 2024-11-21 PROCEDURE — 3077F SYST BP >= 140 MM HG: CPT | Performed by: NURSE PRACTITIONER

## 2024-11-21 PROCEDURE — 84520 ASSAY OF UREA NITROGEN: CPT

## 2024-11-21 PROCEDURE — 84443 ASSAY THYROID STIM HORMONE: CPT

## 2024-11-21 PROCEDURE — 3079F DIAST BP 80-89 MM HG: CPT | Performed by: NURSE PRACTITIONER

## 2024-11-21 RX ORDER — FOLIC ACID 1 MG/1
1000 TABLET ORAL DAILY
Qty: 30 TABLET | Refills: 11 | Status: SHIPPED | OUTPATIENT
Start: 2024-11-21

## 2024-11-21 ASSESSMENT — PAIN SCALES - GENERAL: PAINLEVEL_OUTOF10: 7

## 2024-11-21 ASSESSMENT — ENCOUNTER SYMPTOMS
BACK PAIN: 0
UNEXPECTED WEIGHT CHANGE: 0
DIZZINESS: 0
LEG SWELLING: 0
COUGH: 0
NECK PAIN: 1
DIARRHEA: 0
HEADACHES: 0
NAUSEA: 0
ARTHRALGIAS: 0
FATIGUE: 1
VOMITING: 0
DIFFICULTY URINATING: 0
ABDOMINAL PAIN: 0
CONSTIPATION: 1
SHORTNESS OF BREATH: 1
EYE PROBLEMS: 0
ADENOPATHY: 0
APPETITE CHANGE: 1

## 2024-11-21 NOTE — PROGRESS NOTES
[facility] [place] - Medical Oncology Follow-Up Visit    Patient ID: Joe Magaña is a 63 y.o. male.  Referring Physician: Aminta Jha MD  [id number] [id number]  Primary Care Provider: UMBERTO Roland-CNP      Chief Concern: Patient is here for follow-up and treatment readiness visit    HPI   Present for C1 pem/pem readiness.  Chest pressure on Tues and Wed.  He attributes to sinus pressure.  Denies pain today.  Previously occurred.  +Epigastric area.  Denies radiating chest pain.  Breathing - not bad, slightly labored.  Plans to pick-up inhaler this afternoon.  A little tired today.  Otherwise feels good.  Last four days, feeling good.   No fevers.  Following last tx - had n/d, malaise for approx 1 week.  Since resolved.  Last BM two days ago.  Appetite is good.  Eats on the run.  Working part-time, can shower at work.  Poor housing situation.  On waiting list for [employer].   Counts low today.  Will reschedule tx to 12/2/24, pending labs.      Diagnosis: Lung adenocarcinoma  Stage:  Cancer Staging   NSCLC of right lung (Multi)  Staging form: Lung, AJCC 8th Edition  - Clinical stage from 8/5/2024: Stage IVB (cT1c, cN3, cM1c) - Signed by Aminta Jha MD on 10/28/2024    Current sites of disease: R lung, mediastinal LNs, possible liver  Molecular and ancillary testing:     PD-L1 <1%  NGS without actionable mutations    Oncologic History:  3/13/24 - Presented to ER with hemoptysis, CT with RUL mass suspicious for neoplasm  4/29/24 - CT guided R lung biopsy with no evidence of malignancy  6/7/24 - PET non-diagnostic  7/15/24 - CT with decrease in RUL nodule, increase in R perihilar lesion and new R supraclavicular LN, new hepatic hypodensities concerning for metastatic disease  7/15/24 - EBUS with biopsy of RUL with NSCLC (adenocarcinoma), including bronchial brush RUL and 4L lymph node. Level 11L negative.  8/5/24 - MRI brain negative for metastatic  disease, PET with RUL nodule, bilateral lymph nodes, R hepatic lobe lesions, R scapula lesion  8/16/24 - C1 carboplatin/pemetrexed/pembrolizumab (carbo AUC 4)    Past Medical History:  No date: 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)  No date: Arthritis  No date: Chronic pain disorder  No date: COPD (chronic obstructive pulmonary disease) (Multi)  No date: Degenerative disc disease, lumbar  No date: Depression  No date: Essential (primary) hypertension      Comment:  Benign essential hypertension  No date: Personal history of other diseases of the digestive system      Comment:  History of diverticulosis  No date: Personal history of other diseases of the musculoskeletal   system and connective tissue      Comment:  History of gout  No date: Personal history of other diseases of the respiratory system      Comment:  History of COPD  No date: Psychiatric illness  No date: Substance abuse (Multi)  No date: Suicide attempt (Multi)     Past Surgical History:  No date: ELBOW SURGERY  05/06/2020: OTHER SURGICAL HISTORY      Comment:  Flexor tendon repair  05/06/2020: OTHER SURGICAL HISTORY      Comment:  Oral surgery  05/06/2020: OTHER SURGICAL HISTORY      Comment:  Shoulder surgery     Social Hx:  Chronic pain  Opioid overdose noted 9/2023 - fentanyl/oxycodone  ER visit 2/2024 noted SI (1 suicide attempt in past)  Undomiciled, unstable housing for a while  Currently smokes about 1ppd x many years, quit for about 7 years in his 20s  Works at [employer]    Family Hx  Father passed away this year from dementia  Mother passed away at 99 from lung cancer, she did go through chemo  Sister passed away in 2023 from cancer (lung/liver)     Meds (Current):  Current Outpatient Medications   Medication Instructions    albuterol 90 mcg/actuation inhaler 2 puffs, inhalation, Every 6 hours PRN    cholecalciferol (VITAMIN D3) 50 mcg, oral, Daily    cloNIDine (CATAPRES) 0.1 mg, oral, Nightly    cyanocobalamin (VITAMIN B-12) 1,000  mcg, oral, Daily    dexAMETHasone (DECADRON) 4 mg, oral, Daily    DULoxetine (CYMBALTA) 60 mg, oral, 2 times daily    fluticasone furoate-vilanteroL (Breo Ellipta) 200-25 mcg/dose inhaler 1 puff, inhalation, Daily    folic acid (FOLVITE) 1,000 mcg, oral, Daily    gabapentin (NEURONTIN) 300 mg, oral, 3 times daily    guaiFENesin (MUCINEX) 600 mg, oral, Nightly, Do not crush, chew, or split.    nicotine (Nicoderm CQ) 21 mg/24 hr patch 1 patch, transdermal, Daily    omeprazole (PRILOSEC) 20 mg, oral, Daily before breakfast, Do not crush or chew.    ondansetron (ZOFRAN) 8 mg, oral, Every 8 hours PRN    polyethylene glycol (GLYCOLAX, MIRALAX) 17 g, oral, Daily    prochlorperazine (COMPAZINE) 10 mg, oral, Every 6 hours PRN    QUEtiapine (SEROQUEL) 25 mg, oral, Nightly      No Known Allergies    Review of Systems   Constitutional:  Positive for appetite change and fatigue. Negative for unexpected weight change.   HENT:   Negative for hearing loss.    Eyes:  Negative for eye problems.   Respiratory:  Positive for shortness of breath. Negative for cough.    Cardiovascular:  Negative for chest pain and leg swelling.   Gastrointestinal:  Positive for constipation. Negative for abdominal pain, diarrhea, nausea and vomiting.   Genitourinary:  Negative for difficulty urinating.    Musculoskeletal:  Positive for neck pain. Negative for arthralgias and back pain.   Skin:  Negative for rash.   Neurological:  Negative for dizziness and headaches.   Hematological:  Negative for adenopathy.      Objective   BSA: 1.85 meters squared  /81 (BP Location: Left arm, Patient Position: Sitting)   Pulse 54   Temp 36.3 °C (97.3 °F) (Temporal)   Resp 16   Wt 66.6 kg (146 lb 12.8 oz)   SpO2 97%   BMI 19.37 kg/m²   Wt Readings from Last 5 Encounters:   11/21/24 66.6 kg (146 lb 12.8 oz)   10/30/24 67.8 kg (149 lb 5.8 oz)   10/24/24 69 kg (152 lb 3.6 oz)   10/07/24 72.6 kg (160 lb)   10/04/24 73 kg (160 lb 13.2 oz)      Performance  Status:  (1) Restricted in physically strenuous activity, ambulatory and able to do work of light nature     Physical Exam  Vitals reviewed.   Constitutional:       General: He is not in acute distress.  HENT:      Head: Normocephalic and atraumatic.   Eyes:      Pupils: Pupils are equal, round, and reactive to light.   Cardiovascular:      Rate and Rhythm: Normal rate and regular rhythm.      Heart sounds: Normal heart sounds.   Pulmonary:      Effort: Pulmonary effort is normal.      Breath sounds: Normal breath sounds.   Abdominal:      General: Bowel sounds are normal. There is no distension.      Palpations: Abdomen is soft.   Musculoskeletal:         General: No swelling. Normal range of motion.      Cervical back: Normal range of motion.   Skin:     General: Skin is warm and dry.      Findings: No rash.      Comments: BLE edema resolved, faint erythema, dry skin to RLE.   Neurological:      General: No focal deficit present.      Mental Status: He is alert and oriented to person, place, and time.   Psychiatric:         Mood and Affect: Mood normal.         Behavior: Behavior normal.        Results:  Labs:  Lab Results   Component Value Date    WBC 1.6 (L) 11/21/2024    HGB 8.0 (L) 11/21/2024    HCT 25.5 (L) 11/21/2024    MCV 97 11/21/2024     11/21/2024      Lab Results   Component Value Date    NEUTROABS 0.54 (L) 11/21/2024      Lab Results   Component Value Date    GLUCOSE 80 11/21/2024    CALCIUM 9.1 11/21/2024     11/21/2024    K 3.8 11/21/2024    CO2 27 11/21/2024     (H) 11/21/2024    BUN 17 11/21/2024    CREATININE 0.85 11/21/2024     Lab Results   Component Value Date    ALT 13 11/21/2024    AST 13 11/21/2024    ALKPHOS 81 11/21/2024    BILITOT 0.3 11/21/2024      Imaging:  No new imaging.      Pathology:  No new pathology      Assessment/Plan      Joe Magaña is a 63 y.o. male here for follow-up of stage IV lung adenocarcinoma with PD-L1 <1% and no actionable mutations on  NGS.    Reviewed overall work-up to date with patient previously including implications of stage IV disease and overall palliative goals of therapy. Discussed that typically standard treatment involves a combination of chemotherapy (carboplatin/pemetrexed) and immunotherapy (typically pembrolizumab) given low PD-L1. He is very worried about chemotherapy and not sure he wants to go through it - he watched his mother go through it for lung cancer in the past. We did discuss that we have come a long way in terms of supporting patients and chemo in general.    We also discussed that in order to consider these treatments I think we need to find a more stable housing situation so that he can be safe while receiving treatment. Social determinants of health including housing/food instability and history of opioid abuse are barriers to him receiving optimal care.    He started carboplatin/pemetrexed/pembrolizumab with dose reduction and is tolerating relatively well    Reviewed scans from recent hospitalization which appear to show stable disease.    #Stage IV lung adenocarcinoma  - Cont folic acid daily, reminded again today of importance of taking this  - B12 with C1 and C4 of treatment  - PRN zofran/compazine for nausea  - Hold C4 chemoimmunotherapy, postponed due to neutropenia  - Plan repeat CT C/A/P after 2-3 additional cycles - Will schedule after C2.    - Folic Rx sent 11/21/24     #L arm weakness   - largely resolved now, will monitor    #GERD  - Severe symptoms limiting Po  - Cont omeprazole daily  - Improved    #Constipation - likely related to chemo  - Miralax daily    #Cough - related to cancer  #Sinus congestion  - Trial guafenesin    #Pain related to malignancy, chronic pain  - Following with supportive onc  - Urine tox was positive for fentanyl (not prescribed), it has been discussed that we will not be able to provide opioids if this continues to be positive    # Neutropenia 0.54 11/21/24  - C1 pem/pem  rescheduled to 12/2/24, pending labs  - Pt misplaced folic acid Rx, refill sent      # Poor housing  - pt on wait-list for [employer] 8.  - CM in place  - Eats on the run, works part-time, able to shower at work     RTC 12/2/24 for rescheduled C1 pem/pem, pending labs.

## 2024-11-23 LAB — ACTH PLAS-MCNC: 2.3 PG/ML (ref 7.2–63.3)

## 2024-11-26 DIAGNOSIS — E27.40 ADRENAL INSUFFICIENCY (MULTI): Primary | ICD-10-CM

## 2024-11-26 DIAGNOSIS — C34.91 NSCLC OF RIGHT LUNG (MULTI): Primary | ICD-10-CM

## 2024-11-26 PROBLEM — E03.9 HYPOTHYROIDISM (ACQUIRED): Status: ACTIVE | Noted: 2024-11-26

## 2024-11-26 RX ORDER — PROCHLORPERAZINE EDISYLATE 5 MG/ML
10 INJECTION INTRAMUSCULAR; INTRAVENOUS EVERY 6 HOURS PRN
OUTPATIENT
Start: 2024-12-02

## 2024-11-26 RX ORDER — PROCHLORPERAZINE MALEATE 10 MG
10 TABLET ORAL EVERY 6 HOURS PRN
OUTPATIENT
Start: 2024-12-02

## 2024-11-26 RX ORDER — ALBUTEROL SULFATE 0.83 MG/ML
3 SOLUTION RESPIRATORY (INHALATION) AS NEEDED
OUTPATIENT
Start: 2024-12-02

## 2024-11-26 RX ORDER — DIPHENHYDRAMINE HYDROCHLORIDE 50 MG/ML
50 INJECTION INTRAMUSCULAR; INTRAVENOUS AS NEEDED
OUTPATIENT
Start: 2024-12-02

## 2024-11-26 RX ORDER — FAMOTIDINE 10 MG/ML
20 INJECTION INTRAVENOUS ONCE AS NEEDED
OUTPATIENT
Start: 2024-12-02

## 2024-11-26 RX ORDER — CYANOCOBALAMIN 1000 UG/ML
1000 INJECTION, SOLUTION INTRAMUSCULAR; SUBCUTANEOUS ONCE
OUTPATIENT
Start: 2024-12-02

## 2024-11-26 RX ORDER — EPINEPHRINE 0.3 MG/.3ML
0.3 INJECTION SUBCUTANEOUS EVERY 5 MIN PRN
OUTPATIENT
Start: 2024-12-02

## 2024-12-02 ENCOUNTER — INFUSION (OUTPATIENT)
Dept: HEMATOLOGY/ONCOLOGY | Facility: CLINIC | Age: 64
End: 2024-12-02
Payer: MEDICARE

## 2024-12-02 ENCOUNTER — SOCIAL WORK (OUTPATIENT)
Dept: CASE MANAGEMENT | Facility: HOSPITAL | Age: 64
End: 2024-12-02
Payer: MEDICARE

## 2024-12-02 VITALS
WEIGHT: 146.83 LBS | TEMPERATURE: 98.4 F | SYSTOLIC BLOOD PRESSURE: 123 MMHG | RESPIRATION RATE: 18 BRPM | OXYGEN SATURATION: 95 % | HEIGHT: 69 IN | BODY MASS INDEX: 21.75 KG/M2 | DIASTOLIC BLOOD PRESSURE: 56 MMHG | HEART RATE: 74 BPM

## 2024-12-02 DIAGNOSIS — D70.1 CHEMOTHERAPY-INDUCED NEUTROPENIA (CMS-HCC): ICD-10-CM

## 2024-12-02 DIAGNOSIS — E03.9 HYPOTHYROIDISM (ACQUIRED): ICD-10-CM

## 2024-12-02 DIAGNOSIS — T45.1X5A CHEMOTHERAPY-INDUCED NEUTROPENIA (CMS-HCC): ICD-10-CM

## 2024-12-02 DIAGNOSIS — C34.91 NSCLC OF RIGHT LUNG (MULTI): ICD-10-CM

## 2024-12-02 LAB
ALBUMIN SERPL BCP-MCNC: 3.7 G/DL (ref 3.4–5)
ALP SERPL-CCNC: 82 U/L (ref 33–136)
ALT SERPL W P-5'-P-CCNC: 9 U/L (ref 10–52)
ANION GAP SERPL CALC-SCNC: 11 MMOL/L (ref 10–20)
AST SERPL W P-5'-P-CCNC: 14 U/L (ref 9–39)
BASOPHILS # BLD AUTO: 0.02 X10*3/UL (ref 0–0.1)
BASOPHILS NFR BLD AUTO: 0.5 %
BILIRUB SERPL-MCNC: 0.4 MG/DL (ref 0–1.2)
BUN SERPL-MCNC: 16 MG/DL (ref 6–23)
CALCIUM SERPL-MCNC: 8.9 MG/DL (ref 8.6–10.3)
CHLORIDE SERPL-SCNC: 103 MMOL/L (ref 98–107)
CO2 SERPL-SCNC: 28 MMOL/L (ref 21–32)
CREAT SERPL-MCNC: 0.91 MG/DL (ref 0.5–1.3)
EGFRCR SERPLBLD CKD-EPI 2021: >90 ML/MIN/1.73M*2
EOSINOPHIL # BLD AUTO: 0.04 X10*3/UL (ref 0–0.7)
EOSINOPHIL NFR BLD AUTO: 0.9 %
ERYTHROCYTE [DISTWIDTH] IN BLOOD BY AUTOMATED COUNT: 19.7 % (ref 11.5–14.5)
GLUCOSE SERPL-MCNC: 102 MG/DL (ref 74–99)
HCT VFR BLD AUTO: 24.7 % (ref 41–52)
HGB BLD-MCNC: 7.9 G/DL (ref 13.5–17.5)
IMM GRANULOCYTES # BLD AUTO: 0 X10*3/UL (ref 0–0.7)
IMM GRANULOCYTES NFR BLD AUTO: 0 % (ref 0–0.9)
LYMPHOCYTES # BLD AUTO: 0.65 X10*3/UL (ref 1.2–4.8)
LYMPHOCYTES NFR BLD AUTO: 14.9 %
MCH RBC QN AUTO: 31.3 PG (ref 26–34)
MCHC RBC AUTO-ENTMCNC: 32 G/DL (ref 32–36)
MCV RBC AUTO: 98 FL (ref 80–100)
MONOCYTES # BLD AUTO: 0.72 X10*3/UL (ref 0.1–1)
MONOCYTES NFR BLD AUTO: 16.5 %
NEUTROPHILS # BLD AUTO: 2.94 X10*3/UL (ref 1.2–7.7)
NEUTROPHILS NFR BLD AUTO: 67.2 %
NRBC BLD-RTO: ABNORMAL /100{WBCS}
PLATELET # BLD AUTO: 164 X10*3/UL (ref 150–450)
POTASSIUM SERPL-SCNC: 3.9 MMOL/L (ref 3.5–5.3)
PROT SERPL-MCNC: 6.8 G/DL (ref 6.4–8.2)
RBC # BLD AUTO: 2.52 X10*6/UL (ref 4.5–5.9)
SODIUM SERPL-SCNC: 138 MMOL/L (ref 136–145)
WBC # BLD AUTO: 4.4 X10*3/UL (ref 4.4–11.3)

## 2024-12-02 PROCEDURE — 96413 CHEMO IV INFUSION 1 HR: CPT

## 2024-12-02 PROCEDURE — 84075 ASSAY ALKALINE PHOSPHATASE: CPT

## 2024-12-02 PROCEDURE — 2500000004 HC RX 250 GENERAL PHARMACY W/ HCPCS (ALT 636 FOR OP/ED): Performed by: INTERNAL MEDICINE

## 2024-12-02 PROCEDURE — 96372 THER/PROPH/DIAG INJ SC/IM: CPT | Mod: 59

## 2024-12-02 PROCEDURE — 96375 TX/PRO/DX INJ NEW DRUG ADDON: CPT | Mod: INF

## 2024-12-02 PROCEDURE — 96411 CHEMO IV PUSH ADDL DRUG: CPT

## 2024-12-02 PROCEDURE — 85025 COMPLETE CBC W/AUTO DIFF WBC: CPT

## 2024-12-02 RX ORDER — CYANOCOBALAMIN 1000 UG/ML
1000 INJECTION, SOLUTION INTRAMUSCULAR; SUBCUTANEOUS ONCE
Status: COMPLETED | OUTPATIENT
Start: 2024-12-02 | End: 2024-12-02

## 2024-12-02 RX ORDER — ALBUTEROL SULFATE 0.83 MG/ML
3 SOLUTION RESPIRATORY (INHALATION) AS NEEDED
Status: DISCONTINUED | OUTPATIENT
Start: 2024-12-02 | End: 2024-12-02 | Stop reason: HOSPADM

## 2024-12-02 RX ORDER — PROCHLORPERAZINE EDISYLATE 5 MG/ML
10 INJECTION INTRAMUSCULAR; INTRAVENOUS EVERY 6 HOURS PRN
Status: DISCONTINUED | OUTPATIENT
Start: 2024-12-02 | End: 2024-12-02 | Stop reason: HOSPADM

## 2024-12-02 RX ORDER — DIPHENHYDRAMINE HYDROCHLORIDE 50 MG/ML
50 INJECTION INTRAMUSCULAR; INTRAVENOUS AS NEEDED
Status: DISCONTINUED | OUTPATIENT
Start: 2024-12-02 | End: 2024-12-02 | Stop reason: HOSPADM

## 2024-12-02 RX ORDER — EPINEPHRINE 0.3 MG/.3ML
0.3 INJECTION SUBCUTANEOUS EVERY 5 MIN PRN
Status: DISCONTINUED | OUTPATIENT
Start: 2024-12-02 | End: 2024-12-02 | Stop reason: HOSPADM

## 2024-12-02 RX ORDER — DEXAMETHASONE SODIUM PHOSPHATE 10 MG/ML
10 INJECTION INTRAMUSCULAR; INTRAVENOUS ONCE
Status: COMPLETED | OUTPATIENT
Start: 2024-12-02 | End: 2024-12-02

## 2024-12-02 RX ORDER — PROCHLORPERAZINE MALEATE 10 MG
10 TABLET ORAL EVERY 6 HOURS PRN
Status: DISCONTINUED | OUTPATIENT
Start: 2024-12-02 | End: 2024-12-02 | Stop reason: HOSPADM

## 2024-12-02 RX ORDER — FAMOTIDINE 10 MG/ML
20 INJECTION INTRAVENOUS ONCE AS NEEDED
Status: DISCONTINUED | OUTPATIENT
Start: 2024-12-02 | End: 2024-12-02 | Stop reason: HOSPADM

## 2024-12-02 ASSESSMENT — PAIN SCALES - GENERAL: PAINLEVEL_OUTOF10: 7

## 2024-12-02 NOTE — PROGRESS NOTES
Social Work Note  12/2/2024 ANNIKA checked on patient while he was in infusion today.  He said he did get a hotel recently.  He said he has a cold and didn't feel like staying at his friend's porch.  He continues to work but stated he likely will call off today with having treatment and cold symptoms.  SW again commended him for all he is able to do with his treatment and working.  He was encouraged to call with needs/questions.  SW will remain available to assist patient.  EFRA Poon, W [phone]

## 2024-12-02 NOTE — SIGNIFICANT EVENT

## 2024-12-06 ENCOUNTER — PHARMACY VISIT (OUTPATIENT)
Dept: PHARMACY | Facility: CLINIC | Age: 64
End: 2024-12-06
Payer: COMMERCIAL

## 2024-12-06 ENCOUNTER — APPOINTMENT (OUTPATIENT)
Dept: RADIOLOGY | Facility: HOSPITAL | Age: 64
End: 2024-12-06
Payer: MEDICARE

## 2024-12-06 ENCOUNTER — HOSPITAL ENCOUNTER (EMERGENCY)
Facility: HOSPITAL | Age: 64
Discharge: HOME | End: 2024-12-06
Payer: MEDICARE

## 2024-12-06 VITALS
HEART RATE: 83 BPM | HEIGHT: 73 IN | TEMPERATURE: 98.1 F | OXYGEN SATURATION: 98 % | BODY MASS INDEX: 19.88 KG/M2 | WEIGHT: 150 LBS | DIASTOLIC BLOOD PRESSURE: 70 MMHG | RESPIRATION RATE: 17 BRPM | SYSTOLIC BLOOD PRESSURE: 165 MMHG

## 2024-12-06 DIAGNOSIS — J21.9 ACUTE BRONCHIOLITIS DUE TO UNSPECIFIED ORGANISM: Primary | ICD-10-CM

## 2024-12-06 LAB
FLUAV RNA RESP QL NAA+PROBE: NOT DETECTED
FLUBV RNA RESP QL NAA+PROBE: NOT DETECTED
SARS-COV-2 RNA RESP QL NAA+PROBE: NOT DETECTED

## 2024-12-06 PROCEDURE — 71046 X-RAY EXAM CHEST 2 VIEWS: CPT | Performed by: RADIOLOGY

## 2024-12-06 PROCEDURE — 71046 X-RAY EXAM CHEST 2 VIEWS: CPT

## 2024-12-06 PROCEDURE — 87636 SARSCOV2 & INF A&B AMP PRB: CPT | Performed by: REGISTERED NURSE

## 2024-12-06 PROCEDURE — 99284 EMERGENCY DEPT VISIT MOD MDM: CPT | Mod: 25

## 2024-12-06 PROCEDURE — RXMED WILLOW AMBULATORY MEDICATION CHARGE

## 2024-12-06 RX ORDER — GUAIFENESIN 600 MG/1
1200 TABLET, EXTENDED RELEASE ORAL 2 TIMES DAILY
Qty: 20 TABLET | Refills: 0 | Status: SHIPPED | OUTPATIENT
Start: 2024-12-06 | End: 2024-12-11

## 2024-12-06 RX ORDER — SULFAMETHOXAZOLE AND TRIMETHOPRIM 800; 160 MG/1; MG/1
1 TABLET ORAL 2 TIMES DAILY
Qty: 14 TABLET | Refills: 0 | Status: SHIPPED | OUTPATIENT
Start: 2024-12-06 | End: 2024-12-13

## 2024-12-06 RX ORDER — ALBUTEROL SULFATE 90 UG/1
1-2 INHALANT RESPIRATORY (INHALATION) EVERY 6 HOURS PRN
Qty: 18 G | Refills: 0 | Status: SHIPPED | OUTPATIENT
Start: 2024-12-06 | End: 2025-01-05

## 2024-12-06 ASSESSMENT — PAIN DESCRIPTION - DESCRIPTORS: DESCRIPTORS: ACHING

## 2024-12-06 ASSESSMENT — PAIN SCALES - GENERAL: PAINLEVEL_OUTOF10: 8

## 2024-12-06 ASSESSMENT — PAIN - FUNCTIONAL ASSESSMENT: PAIN_FUNCTIONAL_ASSESSMENT: 0-10

## 2024-12-06 ASSESSMENT — PAIN DESCRIPTION - PAIN TYPE: TYPE: ACUTE PAIN

## 2024-12-06 ASSESSMENT — PAIN DESCRIPTION - ORIENTATION: ORIENTATION: RIGHT

## 2024-12-06 ASSESSMENT — PAIN DESCRIPTION - LOCATION: LOCATION: RIB CAGE

## 2024-12-06 NOTE — ED PROVIDER NOTES
HPI   Chief Complaint   Patient presents with    Flu Symptoms     Cough, congestion for four days     63-year-old male with past medical history significant for COPD not on oxygen, degenerative disc disease, depression, hypertension, non-Hodgkin's lymphoma currently getting chemotherapy presents emergency department today for evaluation of cough.  Patient tells me he has been experiencing cough for approximately 4 days.  Patient tells me initially his cough was productive however has recently started to be unproductive.  Patient denies fever chills.  Patient denies chest pain shortness of breath.  Patient denies abdominal pain, nausea vomiting or diarrhea.  Patient denies headache, dizziness or lightheadedness.  Patient has no other complaints on arrival.      History provided by:  Medical records and patient   used: No            Patient History   Past Medical History:   Diagnosis Date    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)     Arthritis     Chronic pain disorder     COPD (chronic obstructive pulmonary disease) (Multi)     Degenerative disc disease, lumbar     Depression     Essential (primary) hypertension     Benign essential hypertension    Personal history of other diseases of the digestive system     History of diverticulosis    Personal history of other diseases of the musculoskeletal system and connective tissue     History of gout    Personal history of other diseases of the respiratory system     History of COPD    Psychiatric illness     Substance abuse (Multi)     Suicide attempt (Multi)      Past Surgical History:   Procedure Laterality Date    ELBOW SURGERY      OTHER SURGICAL HISTORY  05/06/2020    Flexor tendon repair    OTHER SURGICAL HISTORY  05/06/2020    Oral surgery    OTHER SURGICAL HISTORY  05/06/2020    Shoulder surgery     No family history on file.  Social History     Tobacco Use    Smoking status: Every Day     Current packs/day: 1.00     Average packs/day: 1 pack/day for 15.0  years (15.0 ttl pk-yrs)     Types: Cigarettes    Smokeless tobacco: Never   Vaping Use    Vaping status: Never Used   Substance Use Topics    Alcohol use: Yes     Comment: occasional    Drug use: Not Currently       Physical Exam   ED Triage Vitals [12/06/24 1240]   Temperature Heart Rate Respirations BP   36.7 °C (98.1 °F) 83 17 165/70      Pulse Ox Temp Source Heart Rate Source Patient Position   95 % Temporal Monitor --      BP Location FiO2 (%)     -- --       Physical Exam  Vitals and nursing note reviewed.   Constitutional:       Appearance: Normal appearance.   HENT:      Head: Normocephalic and atraumatic.   Cardiovascular:      Rate and Rhythm: Normal rate and regular rhythm.      Pulses: Normal pulses.      Heart sounds: Normal heart sounds.   Pulmonary:      Effort: Pulmonary effort is normal. No respiratory distress.      Breath sounds: Normal breath sounds. No wheezing.   Musculoskeletal:         General: Normal range of motion.   Skin:     General: Skin is warm.      Capillary Refill: Capillary refill takes less than 2 seconds.   Neurological:      General: No focal deficit present.      Mental Status: He is alert and oriented to person, place, and time.   Psychiatric:         Mood and Affect: Mood normal.         Behavior: Behavior normal.           ED Course & MDM   Diagnoses as of 12/06/24 1357   Acute bronchiolitis due to unspecified organism                 No data recorded     Tere Coma Scale Score: 15 (12/06/24 1243 : Ashley Rivera RN)                           Medical Decision Making  Patient seen examined emergency department; patient is healthy and nontoxic in appearance and does not appear in acute distress.  Patient's lung sounds are clear to auscultation without any adventitious noises.  Heart regular rate and rhythm without a murmur appreciated.  Skin is warm and dry no diaphoresis noted.  Patient is neurologically intact without any focal deficits.  Patient does have a moist  nonproductive cough during examination.  Will order chest x-ray evaluate for pneumonia.  As well as micro swabs     Patient's viral swabs are negative.  Chest x-ray does show possible small effusions and posterior atelectasis but without evidence of pneumonia.    Patient I discussed his results.  Patient will be treated for bronchitis.  Patient discharged home with prescription for Mucinex, albuterol inhaler, and Bactrim.  Patient and I discussed return parameters which she verbalized understanding of.  All patient's questions and concerns were addressed prior to discharge.  Patient discharged home in stable condition.    Procedure  Procedures     Sonam Toledo, UMBERTO-CNP  12/06/24 1522

## 2024-12-09 ENCOUNTER — APPOINTMENT (OUTPATIENT)
Dept: ENDOCRINOLOGY | Facility: HOSPITAL | Age: 64
End: 2024-12-09
Payer: MEDICARE

## 2024-12-21 RX ORDER — PROCHLORPERAZINE MALEATE 10 MG
10 TABLET ORAL EVERY 6 HOURS PRN
OUTPATIENT
Start: 2024-12-26

## 2024-12-21 RX ORDER — EPINEPHRINE 0.3 MG/.3ML
0.3 INJECTION SUBCUTANEOUS EVERY 5 MIN PRN
OUTPATIENT
Start: 2024-12-26

## 2024-12-21 RX ORDER — PROCHLORPERAZINE EDISYLATE 5 MG/ML
10 INJECTION INTRAMUSCULAR; INTRAVENOUS EVERY 6 HOURS PRN
OUTPATIENT
Start: 2024-12-26

## 2024-12-21 RX ORDER — ALBUTEROL SULFATE 0.83 MG/ML
3 SOLUTION RESPIRATORY (INHALATION) AS NEEDED
OUTPATIENT
Start: 2024-12-26

## 2024-12-21 RX ORDER — DIPHENHYDRAMINE HYDROCHLORIDE 50 MG/ML
50 INJECTION INTRAMUSCULAR; INTRAVENOUS AS NEEDED
OUTPATIENT
Start: 2024-12-26

## 2024-12-21 RX ORDER — FAMOTIDINE 10 MG/ML
20 INJECTION INTRAVENOUS ONCE AS NEEDED
OUTPATIENT
Start: 2024-12-26

## 2024-12-23 ENCOUNTER — APPOINTMENT (OUTPATIENT)
Dept: RADIOLOGY | Facility: HOSPITAL | Age: 64
End: 2024-12-23
Payer: MEDICARE

## 2024-12-26 ENCOUNTER — SOCIAL WORK (OUTPATIENT)
Dept: CASE MANAGEMENT | Facility: HOSPITAL | Age: 64
End: 2024-12-26
Payer: MEDICARE

## 2024-12-26 ENCOUNTER — LAB (OUTPATIENT)
Dept: LAB | Facility: CLINIC | Age: 64
End: 2024-12-26
Payer: MEDICARE

## 2024-12-26 ENCOUNTER — OFFICE VISIT (OUTPATIENT)
Dept: HEMATOLOGY/ONCOLOGY | Facility: CLINIC | Age: 64
End: 2024-12-26
Payer: MEDICARE

## 2024-12-26 ENCOUNTER — HOSPITAL ENCOUNTER (OUTPATIENT)
Dept: RADIOLOGY | Facility: HOSPITAL | Age: 64
Discharge: HOME | End: 2024-12-26
Payer: MEDICARE

## 2024-12-26 ENCOUNTER — APPOINTMENT (OUTPATIENT)
Dept: HEMATOLOGY/ONCOLOGY | Facility: CLINIC | Age: 64
End: 2024-12-26
Payer: MEDICARE

## 2024-12-26 VITALS
RESPIRATION RATE: 14 BRPM | BODY MASS INDEX: 19.66 KG/M2 | HEART RATE: 66 BPM | SYSTOLIC BLOOD PRESSURE: 143 MMHG | TEMPERATURE: 99.3 F | DIASTOLIC BLOOD PRESSURE: 79 MMHG | OXYGEN SATURATION: 95 % | WEIGHT: 149 LBS

## 2024-12-26 DIAGNOSIS — C34.91 NSCLC OF RIGHT LUNG (MULTI): ICD-10-CM

## 2024-12-26 DIAGNOSIS — R06.02 SOB (SHORTNESS OF BREATH): Primary | ICD-10-CM

## 2024-12-26 DIAGNOSIS — E03.9 HYPOTHYROIDISM (ACQUIRED): ICD-10-CM

## 2024-12-26 DIAGNOSIS — R06.02 SOB (SHORTNESS OF BREATH): ICD-10-CM

## 2024-12-26 LAB
ALBUMIN SERPL BCP-MCNC: 3.6 G/DL (ref 3.4–5)
ALP SERPL-CCNC: 119 U/L (ref 33–136)
ALT SERPL W P-5'-P-CCNC: 13 U/L (ref 10–52)
ANION GAP SERPL CALC-SCNC: 11 MMOL/L (ref 10–20)
AST SERPL W P-5'-P-CCNC: 17 U/L (ref 9–39)
BASOPHILS # BLD AUTO: 0.01 X10*3/UL (ref 0–0.1)
BASOPHILS NFR BLD AUTO: 0.2 %
BILIRUB SERPL-MCNC: 0.4 MG/DL (ref 0–1.2)
BUN SERPL-MCNC: 18 MG/DL (ref 6–23)
CALCIUM SERPL-MCNC: 8.7 MG/DL (ref 8.6–10.3)
CHLORIDE SERPL-SCNC: 104 MMOL/L (ref 98–107)
CO2 SERPL-SCNC: 28 MMOL/L (ref 21–32)
CREAT SERPL-MCNC: 1.02 MG/DL (ref 0.5–1.3)
EGFRCR SERPLBLD CKD-EPI 2021: 83 ML/MIN/1.73M*2
EOSINOPHIL # BLD AUTO: 0.13 X10*3/UL (ref 0–0.7)
EOSINOPHIL NFR BLD AUTO: 3 %
ERYTHROCYTE [DISTWIDTH] IN BLOOD BY AUTOMATED COUNT: 19.5 % (ref 11.5–14.5)
GLUCOSE SERPL-MCNC: 96 MG/DL (ref 74–99)
HCT VFR BLD AUTO: 25.2 % (ref 41–52)
HGB BLD-MCNC: 7.8 G/DL (ref 13.5–17.5)
IMM GRANULOCYTES # BLD AUTO: 0 X10*3/UL (ref 0–0.7)
IMM GRANULOCYTES NFR BLD AUTO: 0 % (ref 0–0.9)
LYMPHOCYTES # BLD AUTO: 0.91 X10*3/UL (ref 1.2–4.8)
LYMPHOCYTES NFR BLD AUTO: 21 %
MCH RBC QN AUTO: 32 PG (ref 26–34)
MCHC RBC AUTO-ENTMCNC: 31 G/DL (ref 32–36)
MCV RBC AUTO: 103 FL (ref 80–100)
MONOCYTES # BLD AUTO: 0.5 X10*3/UL (ref 0.1–1)
MONOCYTES NFR BLD AUTO: 11.5 %
NEUTROPHILS # BLD AUTO: 2.78 X10*3/UL (ref 1.2–7.7)
NEUTROPHILS NFR BLD AUTO: 64.3 %
NRBC BLD-RTO: ABNORMAL /100{WBCS}
PLATELET # BLD AUTO: 164 X10*3/UL (ref 150–450)
POTASSIUM SERPL-SCNC: 4 MMOL/L (ref 3.5–5.3)
PROT SERPL-MCNC: 7.1 G/DL (ref 6.4–8.2)
RBC # BLD AUTO: 2.44 X10*6/UL (ref 4.5–5.9)
SODIUM SERPL-SCNC: 139 MMOL/L (ref 136–145)
WBC # BLD AUTO: 4.3 X10*3/UL (ref 4.4–11.3)

## 2024-12-26 PROCEDURE — 2550000001 HC RX 255 CONTRASTS: Performed by: NURSE PRACTITIONER

## 2024-12-26 PROCEDURE — 74177 CT ABD & PELVIS W/CONTRAST: CPT | Performed by: RADIOLOGY

## 2024-12-26 PROCEDURE — 36415 COLL VENOUS BLD VENIPUNCTURE: CPT

## 2024-12-26 PROCEDURE — 85025 COMPLETE CBC W/AUTO DIFF WBC: CPT

## 2024-12-26 PROCEDURE — 71260 CT THORAX DX C+: CPT | Performed by: RADIOLOGY

## 2024-12-26 PROCEDURE — 74177 CT ABD & PELVIS W/CONTRAST: CPT

## 2024-12-26 PROCEDURE — 80053 COMPREHEN METABOLIC PANEL: CPT

## 2024-12-26 PROCEDURE — 99214 OFFICE O/P EST MOD 30 MIN: CPT | Performed by: NURSE PRACTITIONER

## 2024-12-26 ASSESSMENT — ENCOUNTER SYMPTOMS
LEG SWELLING: 0
ARTHRALGIAS: 0
DIZZINESS: 0
FATIGUE: 1
ADENOPATHY: 0
NAUSEA: 0
SHORTNESS OF BREATH: 1
VOMITING: 0
DIARRHEA: 0
BACK PAIN: 0
ABDOMINAL PAIN: 0
HEADACHES: 0
UNEXPECTED WEIGHT CHANGE: 0
APPETITE CHANGE: 1
DIFFICULTY URINATING: 0
EYE PROBLEMS: 0

## 2024-12-26 ASSESSMENT — PAIN SCALES - GENERAL: PAINLEVEL_OUTOF10: 0-NO PAIN

## 2024-12-26 NOTE — PROGRESS NOTES
[facility] - Medical Oncology Follow-Up Visit    Patient ID: Joe Magaña is a 64 y.o. male.  Referring Physician: Aminta Jha MD  [id number] [place] [address]  Primary Care Provider: No Assigned PCP Generic Provider, MD      Chief Concern: Patient is here for follow-up and treatment readiness visit    HPI   Present for C2 pem/pem readiness.        ED visit on 12/6 acute bronchitis.  Completed ATB's.  Three days ago, felt like a brick in his chest. If he lays down, states he drowns in his mucous.   Yesterday took a bunch of robitussin.  Just not feel good.  Symptoms returning - chest pleuritic pain.  Taking ibuprofen 20mg - taking 5 tabs, once/day.   Pain behind his lungs. Rates pain 7/10.  Denies fevers.  Appetite - no appetite, forcing himself to eat.  Denies n/v/c/d.  No recent diarrhea.   Staying at a motel now for housing.  Has been there a couple of weeks.  Able to get rest.   Discussed worsening symptoms.  Holding tx, will order STAT CT scan.      Diagnosis: Lung adenocarcinoma  Stage:  Cancer Staging   NSCLC of right lung (Multi)  Staging form: Lung, AJCC 8th Edition  - Clinical stage from 8/5/2024: Stage IVB (cT1c, cN3, cM1c) - Signed by Aminta Jha MD on 10/28/2024    Current sites of disease: R lung, mediastinal LNs, possible liver  Molecular and ancillary testing:     PD-L1 <1%  NGS without actionable mutations    Oncologic History:  3/13/24 - Presented to ER with hemoptysis, CT with RUL mass suspicious for neoplasm  4/29/24 - CT guided R lung biopsy with no evidence of malignancy  6/7/24 - PET non-diagnostic  7/15/24 - CT with decrease in RUL nodule, increase in R perihilar lesion and new R supraclavicular LN, new hepatic hypodensities concerning for metastatic disease  7/15/24 - EBUS with biopsy of RUL with NSCLC (adenocarcinoma), including bronchial brush RUL and 4L lymph node. Level 11L negative.  8/5/24 - MRI brain negative for metastatic disease,  PET with RUL nodule, bilateral lymph nodes, R hepatic lobe lesions, R scapula lesion  8/16/24 - C1 carboplatin/pemetrexed/pembrolizumab (carbo AUC 4)    Past Medical History:  No date: 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)  No date: Arthritis  No date: Chronic pain disorder  No date: COPD (chronic obstructive pulmonary disease) (Multi)  No date: Degenerative disc disease, lumbar  No date: Depression  No date: Essential (primary) hypertension      Comment:  Benign essential hypertension  No date: Personal history of other diseases of the digestive system      Comment:  History of diverticulosis  No date: Personal history of other diseases of the musculoskeletal   system and connective tissue      Comment:  History of gout  No date: Personal history of other diseases of the respiratory system      Comment:  History of COPD  No date: Psychiatric illness  No date: Substance abuse (Multi)  No date: Suicide attempt (Multi)     Past Surgical History:  No date: ELBOW SURGERY  05/06/2020: OTHER SURGICAL HISTORY      Comment:  Flexor tendon repair  05/06/2020: OTHER SURGICAL HISTORY      Comment:  Oral surgery  05/06/2020: OTHER SURGICAL HISTORY      Comment:  Shoulder surgery     Social Hx:  Chronic pain  Opioid overdose noted 9/2023 - fentanyl/oxycodone  ER visit 2/2024 noted SI (1 suicide attempt in past)  Undomiciled, unstable housing for a while  Currently smokes about 1ppd x many years, quit for about 7 years in his 20s  Works at [employer]    Family Hx  Father passed away this year from dementia  Mother passed away at 99 from lung cancer, she did go through chemo  Sister passed away in 2023 from cancer (lung/liver)     Meds (Current):  Current Outpatient Medications   Medication Instructions    albuterol 90 mcg/actuation inhaler 1-2 puffs, inhalation, Every 6 hours PRN    cholecalciferol (VITAMIN D3) 50 mcg, oral, Daily    cloNIDine (CATAPRES) 0.1 mg, oral, Nightly    cyanocobalamin (VITAMIN B-12) 1,000 mcg,  oral, Daily    dexAMETHasone (DECADRON) 4 mg, oral, Daily    DULoxetine (CYMBALTA) 60 mg, oral, 2 times daily    fluticasone furoate-vilanteroL (Breo Ellipta) 200-25 mcg/dose inhaler 1 puff, inhalation, Daily    folic acid (FOLVITE) 1,000 mcg, oral, Daily    gabapentin (NEURONTIN) 300 mg, oral, 3 times daily    nicotine (Nicoderm CQ) 21 mg/24 hr patch 1 patch, transdermal, Daily    omeprazole (PRILOSEC) 20 mg, oral, Daily before breakfast, Do not crush or chew.    ondansetron (ZOFRAN) 8 mg, oral, Every 8 hours PRN    prochlorperazine (COMPAZINE) 10 mg, oral, Every 6 hours PRN    QUEtiapine (SEROQUEL) 25 mg, oral, Nightly      No Known Allergies    Review of Systems   Constitutional:  Positive for appetite change and fatigue. Negative for unexpected weight change.   HENT:   Negative for hearing loss.    Eyes:  Negative for eye problems.   Respiratory:  Positive for chest tightness, cough and shortness of breath.    Cardiovascular:  Negative for chest pain and leg swelling.   Gastrointestinal:  Negative for abdominal pain, constipation, diarrhea, nausea and vomiting.   Genitourinary:  Negative for difficulty urinating.    Musculoskeletal:  Negative for arthralgias, back pain and neck pain.   Skin:  Negative for rash.   Neurological:  Negative for dizziness and headaches.   Hematological:  Negative for adenopathy.      Objective   BSA: 1.87 meters squared  /79 (BP Location: Left arm, Patient Position: Sitting)   Pulse 66   Temp 37.4 °C (99.3 °F) (Temporal)   Resp 14   Wt 67.6 kg (149 lb)   SpO2 95%   BMI 19.66 kg/m²     Wt Readings from Last 5 Encounters:   12/26/24 67.6 kg (149 lb)   12/06/24 68 kg (150 lb)   12/02/24 66.6 kg (146 lb 13.2 oz)   11/21/24 66.6 kg (146 lb 12.8 oz)   10/30/24 67.8 kg (149 lb 5.8 oz)      Performance Status:  (1) Restricted in physically strenuous activity, ambulatory and able to do work of light nature     Physical Exam  Vitals reviewed.   Constitutional:       General: He is  not in acute distress.  HENT:      Head: Normocephalic and atraumatic.   Eyes:      Pupils: Pupils are equal, round, and reactive to light.   Cardiovascular:      Rate and Rhythm: Normal rate and regular rhythm.      Heart sounds: Normal heart sounds.   Pulmonary:      Effort: Pulmonary effort is normal.      Breath sounds: Wheezing present.   Abdominal:      General: Bowel sounds are normal. There is no distension.      Palpations: Abdomen is soft.   Musculoskeletal:         General: No swelling. Normal range of motion.      Cervical back: Normal range of motion.   Skin:     General: Skin is warm and dry.      Findings: No rash.   Neurological:      General: No focal deficit present.      Mental Status: He is alert and oriented to person, place, and time.   Psychiatric:         Mood and Affect: Mood normal.         Behavior: Behavior normal.        Results:  Labs:  Lab Results   Component Value Date    WBC 4.3 (L) 12/26/2024    HGB 7.8 (L) 12/26/2024    HCT 25.2 (L) 12/26/2024     (H) 12/26/2024     12/26/2024      Lab Results   Component Value Date    NEUTROABS 2.78 12/26/2024      Lab Results   Component Value Date    GLUCOSE 96 12/26/2024    CALCIUM 8.7 12/26/2024     12/26/2024    K 4.0 12/26/2024    CO2 28 12/26/2024     12/26/2024    BUN 18 12/26/2024    CREATININE 1.02 12/26/2024     Lab Results   Component Value Date    ALT 13 12/26/2024    AST 17 12/26/2024    ALKPHOS 119 12/26/2024    BILITOT 0.4 12/26/2024      Imaging:  STAT CT CAP(+) 12/26/24:  CT for lung cancer restaging demonstrates possible minimal interval increased size of primary right upper lobe malignant lesion with similar chest and right lower neck adenopathy and measurable progression of multifocal targetoid metastatic liver lesions.  Minor newly seen tubular branching change right upper lobe could be inflammatory or neoplastic. Attention recommended on follow-up assessment.  Newly seen tiny bilateral pleural  effusions and body wall edema.  Similar probably benign adrenal nodules favoring adenomas.    Pathology:  No new pathology      Assessment/Plan      Joe Magaña is a 64 y.o. male here for follow-up of stage IV lung adenocarcinoma with PD-L1 <1% and no actionable mutations on NGS.    Reviewed overall work-up to date with patient previously including implications of stage IV disease and overall palliative goals of therapy. Discussed that typically standard treatment involves a combination of chemotherapy (carboplatin/pemetrexed) and immunotherapy (typically pembrolizumab) given low PD-L1. He is very worried about chemotherapy and not sure he wants to go through it - he watched his mother go through it for lung cancer in the past. We did discuss that we have come a long way in terms of supporting patients and chemo in general.    We also discussed that in order to consider these treatments I think we need to find a more stable housing situation so that he can be safe while receiving treatment. Social determinants of health including housing/food instability and history of opioid abuse are barriers to him receiving optimal care.    He started carboplatin/pemetrexed/pembrolizumab with dose reduction and is tolerating relatively well    Reviewed scans from recent hospitalization which appear to show stable disease.    #Stage IV lung adenocarcinoma  - Cont folic acid daily, reminded again of importance of taking this  - B12 with C1 and C4 of treatment  - PRN zofran/compazine for nausea  - Hold C4 chemoimmunotherapy, postponed due to neutropenia  - Plan repeat CT C/A/P after 2-3 additional cycles - Will schedule after C2.  CT completed early.    - Folic Rx sent 11/21/24   - Holding 12/26/24 C2 pem/pem d/t worsening SOB and pleuritic pain.  STAT CT ordered.     #L arm weakness   - largely resolved now, will monitor    #GERD  - Severe symptoms limiting Po  - Cont omeprazole daily  - Improved    #Constipation - likely  related to chemo  - Miralax daily    #Cough - related to cancer  #Sinus congestion  - Trial guafenesin    #Pain related to malignancy, chronic pain  - Following with supportive onc  - Urine tox was positive for fentanyl (not prescribed), it has been discussed that we will not be able to provide opioids if this continues to be positive    # Neutropenia 0.54 11/21/24 - resolved  - C1 pem/pem rescheduled to 12/2/24, completed   - Pt misplaced folic acid Rx, refill sent    - ANC 2.78 12/26/24     # Poor housing  - pt on wait-list for [facility].  - CM in place  - Eats on the run, works part-time, able to shower at work   - states he is currently staying at a motel.      # [facility]  - New supportive onc referral placed for [facility] conversation 2/2 ds. progression    RTC 1/2/25 for visit with Dr. Jha.

## 2024-12-26 NOTE — PROGRESS NOTES
Social Work Note  12/26/2024 SW received a call from patient this am that his CM did not [facility]  SW set up Roundtrip so he would not miss his appointment.  SW also used Roundtrip to have patient receive a CT at [facility] which was just ordered today.  Insurance can not be used at this late notice.  Roundtrip was set up for will call and did pick patient up to take him home.  SW will remain available to assist patient.  EFRA Poon, [phone]

## 2024-12-27 PROBLEM — R06.02 SOB (SHORTNESS OF BREATH): Status: ACTIVE | Noted: 2024-12-27

## 2024-12-27 ASSESSMENT — ENCOUNTER SYMPTOMS
CONSTIPATION: 0
COUGH: 1
NECK PAIN: 0
CHEST TIGHTNESS: 1

## 2025-01-01 ENCOUNTER — TELEPHONE (OUTPATIENT)
Dept: HEMATOLOGY/ONCOLOGY | Facility: CLINIC | Age: 65
End: 2025-01-01

## 2025-01-01 ENCOUNTER — APPOINTMENT (OUTPATIENT)
Dept: RADIOLOGY | Facility: HOSPITAL | Age: 65
End: 2025-01-01
Payer: MEDICARE

## 2025-01-01 ENCOUNTER — HOSPITAL ENCOUNTER (INPATIENT)
Facility: HOSPITAL | Age: 65
LOS: 1 days | End: 2025-08-12
Attending: STUDENT IN AN ORGANIZED HEALTH CARE EDUCATION/TRAINING PROGRAM | Admitting: STUDENT IN AN ORGANIZED HEALTH CARE EDUCATION/TRAINING PROGRAM
Payer: MEDICARE

## 2025-01-01 ENCOUNTER — APPOINTMENT (OUTPATIENT)
Dept: CARDIOLOGY | Facility: HOSPITAL | Age: 65
End: 2025-01-01
Payer: MEDICARE

## 2025-01-01 VITALS
DIASTOLIC BLOOD PRESSURE: 82 MMHG | HEART RATE: 92 BPM | OXYGEN SATURATION: 92 % | HEIGHT: 73 IN | RESPIRATION RATE: 34 BRPM | TEMPERATURE: 97 F | SYSTOLIC BLOOD PRESSURE: 145 MMHG | WEIGHT: 140 LBS | BODY MASS INDEX: 18.55 KG/M2

## 2025-01-01 DIAGNOSIS — K55.9 ISCHEMIC COLITIS (MULTI): ICD-10-CM

## 2025-01-01 DIAGNOSIS — J18.9 PNEUMONIA DUE TO INFECTIOUS ORGANISM, UNSPECIFIED LATERALITY, UNSPECIFIED PART OF LUNG: ICD-10-CM

## 2025-01-01 DIAGNOSIS — E87.20 LACTIC ACIDOSIS: Primary | ICD-10-CM

## 2025-01-01 DIAGNOSIS — N17.9 ACUTE RENAL FAILURE, UNSPECIFIED ACUTE RENAL FAILURE TYPE: ICD-10-CM

## 2025-01-01 DIAGNOSIS — D73.5 SPLENIC INFARCTION: ICD-10-CM

## 2025-01-01 DIAGNOSIS — N28.0 RENAL INFARCTION (MULTI): ICD-10-CM

## 2025-01-01 LAB
ALBUMIN SERPL BCP-MCNC: 2.9 G/DL (ref 3.4–5)
ALP SERPL-CCNC: 89 U/L (ref 33–136)
ALT SERPL W P-5'-P-CCNC: 533 U/L (ref 10–52)
ANION GAP BLDA CALCULATED.4IONS-SCNC: 32 MMO/L (ref 10–25)
ANION GAP SERPL CALC-SCNC: 38 MMOL/L (ref 10–20)
ARTERIAL PATENCY WRIST A: ABNORMAL
AST SERPL W P-5'-P-CCNC: 527 U/L (ref 9–39)
ATRIAL RATE: 85 BPM
BASE EXCESS BLDA CALC-SCNC: -18.3 MMOL/L (ref -2–3)
BASOPHILS # BLD MANUAL: 0 X10*3/UL (ref 0–0.1)
BASOPHILS NFR BLD MANUAL: 0 %
BILIRUB SERPL-MCNC: 1 MG/DL (ref 0–1.2)
BNP SERPL-MCNC: 465 PG/ML (ref 0–99)
BODY TEMPERATURE: ABNORMAL
BUN SERPL-MCNC: 111 MG/DL (ref 6–23)
BURR CELLS BLD QL SMEAR: ABNORMAL
CA-I BLDA-SCNC: 0.9 MMOL/L (ref 1.1–1.33)
CALCIUM SERPL-MCNC: 7.4 MG/DL (ref 8.6–10.3)
CARDIAC TROPONIN I PNL SERPL HS: 564 NG/L (ref 0–20)
CARDIAC TROPONIN I PNL SERPL HS: 721 NG/L (ref 0–20)
CHLORIDE BLDA-SCNC: 98 MMOL/L (ref 98–107)
CHLORIDE SERPL-SCNC: 94 MMOL/L (ref 98–107)
CO2 SERPL-SCNC: 13 MMOL/L (ref 21–32)
CREAT SERPL-MCNC: 5.36 MG/DL (ref 0.5–1.3)
CRITICAL CALL TIME: 216
CRITICAL CALLED BY: ABNORMAL
CRITICAL CALLED TO: ABNORMAL
CRITICAL READ BACK: ABNORMAL
EGFRCR SERPLBLD CKD-EPI 2021: 11 ML/MIN/1.73M*2
EOSINOPHIL # BLD MANUAL: 0 X10*3/UL (ref 0–0.7)
EOSINOPHIL NFR BLD MANUAL: 0 %
ERYTHROCYTE [DISTWIDTH] IN BLOOD BY AUTOMATED COUNT: 16.2 % (ref 11.5–14.5)
FLUAV RNA RESP QL NAA+PROBE: NOT DETECTED
FLUBV RNA RESP QL NAA+PROBE: NOT DETECTED
GLUCOSE BLDA-MCNC: 108 MG/DL (ref 74–99)
GLUCOSE SERPL-MCNC: 81 MG/DL (ref 74–99)
HCO3 BLDA-SCNC: 8 MMOL/L (ref 22–26)
HCT VFR BLD AUTO: 31.4 % (ref 41–52)
HCT VFR BLD EST: 39 % (ref 41–52)
HGB BLD-MCNC: 9.9 G/DL (ref 13.5–17.5)
HGB BLDA-MCNC: 13.1 G/DL (ref 13.5–17.5)
IMM GRANULOCYTES # BLD AUTO: 0.11 X10*3/UL (ref 0–0.7)
IMM GRANULOCYTES NFR BLD AUTO: 0.8 % (ref 0–0.9)
INHALED O2 CONCENTRATION: 36 %
INR PPP: 1.5 (ref 0.9–1.1)
LACTATE BLDA-SCNC: 14.2 MMOL/L (ref 0.4–2)
LACTATE SERPL-SCNC: 10.6 MMOL/L (ref 0.4–2)
LYMPHOCYTES # BLD MANUAL: 0.95 X10*3/UL (ref 1.2–4.8)
LYMPHOCYTES NFR BLD MANUAL: 7 %
MAGNESIUM SERPL-MCNC: 4.53 MG/DL (ref 1.6–2.4)
MCH RBC QN AUTO: 31.8 PG (ref 26–34)
MCHC RBC AUTO-ENTMCNC: 31.5 G/DL (ref 32–36)
MCV RBC AUTO: 101 FL (ref 80–100)
METAMYELOCYTES # BLD MANUAL: 0.68 X10*3/UL
METAMYELOCYTES NFR BLD MANUAL: 5 %
MONOCYTES # BLD MANUAL: 0.95 X10*3/UL (ref 0.1–1)
MONOCYTES NFR BLD MANUAL: 7 %
NEUTROPHILS # BLD MANUAL: 10.94 X10*3/UL (ref 1.2–7.7)
NEUTS BAND # BLD MANUAL: 3.38 X10*3/UL (ref 0–0.7)
NEUTS BAND NFR BLD MANUAL: 25 %
NEUTS SEG # BLD MANUAL: 7.56 X10*3/UL (ref 1.2–7)
NEUTS SEG NFR BLD MANUAL: 56 %
NRBC BLD-RTO: 3 /100 WBCS (ref 0–0)
OVALOCYTES BLD QL SMEAR: ABNORMAL
OXYHGB MFR BLDA: 91.7 % (ref 94–98)
P AXIS: 46 DEGREES
P OFFSET: 201 MS
P ONSET: 152 MS
PCO2 BLDA: 21 MM HG (ref 38–42)
PH BLDA: 7.19 PH (ref 7.38–7.42)
PLATELET # BLD AUTO: 242 X10*3/UL (ref 150–450)
PO2 BLDA: 119 MM HG (ref 85–95)
POLYCHROMASIA BLD QL SMEAR: ABNORMAL
POTASSIUM BLDA-SCNC: 5.9 MMOL/L (ref 3.5–5.3)
POTASSIUM SERPL-SCNC: 5.9 MMOL/L (ref 3.5–5.3)
PR INTERVAL: 140 MS
PROT SERPL-MCNC: 5.7 G/DL (ref 6.4–8.2)
PROTHROMBIN TIME: 16.7 SECONDS (ref 9.8–12.4)
Q ONSET: 222 MS
QRS COUNT: 14 BEATS
QRS DURATION: 82 MS
QT INTERVAL: 406 MS
QTC CALCULATION(BAZETT): 483 MS
QTC FREDERICIA: 456 MS
R AXIS: 41 DEGREES
RBC # BLD AUTO: 3.11 X10*6/UL (ref 4.5–5.9)
RBC MORPH BLD: ABNORMAL
RSV RNA RESP QL NAA+PROBE: NOT DETECTED
SAO2 % BLDA: 96 % (ref 94–100)
SARS-COV-2 RNA RESP QL NAA+PROBE: NOT DETECTED
SITE OF ARTERIAL PUNCTURE: ABNORMAL
SODIUM BLDA-SCNC: 132 MMOL/L (ref 136–145)
SODIUM SERPL-SCNC: 139 MMOL/L (ref 136–145)
T AXIS: 34 DEGREES
T OFFSET: 425 MS
TOTAL CELLS COUNTED BLD: 100
VENTRICULAR RATE: 85 BPM
WBC # BLD AUTO: 13.5 X10*3/UL (ref 4.4–11.3)

## 2025-01-01 PROCEDURE — 87040 BLOOD CULTURE FOR BACTERIA: CPT | Mod: ELYLAB | Performed by: PHYSICIAN ASSISTANT

## 2025-01-01 PROCEDURE — 36415 COLL VENOUS BLD VENIPUNCTURE: CPT | Performed by: PHYSICIAN ASSISTANT

## 2025-01-01 PROCEDURE — 96365 THER/PROPH/DIAG IV INF INIT: CPT

## 2025-01-01 PROCEDURE — 99232 SBSQ HOSP IP/OBS MODERATE 35: CPT | Performed by: INTERNAL MEDICINE

## 2025-01-01 PROCEDURE — 2500000005 HC RX 250 GENERAL PHARMACY W/O HCPCS: Performed by: PHYSICIAN ASSISTANT

## 2025-01-01 PROCEDURE — 2550000001 HC RX 255 CONTRASTS: Performed by: PHYSICIAN ASSISTANT

## 2025-01-01 PROCEDURE — 74174 CTA ABD&PLVS W/CONTRAST: CPT

## 2025-01-01 PROCEDURE — 1210000001 HC SEMI-PRIVATE ROOM DAILY

## 2025-01-01 PROCEDURE — 93005 ELECTROCARDIOGRAM TRACING: CPT

## 2025-01-01 PROCEDURE — 2500000002 HC RX 250 W HCPCS SELF ADMINISTERED DRUGS (ALT 637 FOR MEDICARE OP, ALT 636 FOR OP/ED)

## 2025-01-01 PROCEDURE — 99291 CRITICAL CARE FIRST HOUR: CPT | Mod: 25 | Performed by: PHYSICIAN ASSISTANT

## 2025-01-01 PROCEDURE — 84132 ASSAY OF SERUM POTASSIUM: CPT | Performed by: PHYSICIAN ASSISTANT

## 2025-01-01 PROCEDURE — 85610 PROTHROMBIN TIME: CPT | Performed by: PHYSICIAN ASSISTANT

## 2025-01-01 PROCEDURE — 87637 SARSCOV2&INF A&B&RSV AMP PRB: CPT | Performed by: PHYSICIAN ASSISTANT

## 2025-01-01 PROCEDURE — 84484 ASSAY OF TROPONIN QUANT: CPT | Performed by: PHYSICIAN ASSISTANT

## 2025-01-01 PROCEDURE — 99222 1ST HOSP IP/OBS MODERATE 55: CPT | Performed by: NURSE PRACTITIONER

## 2025-01-01 PROCEDURE — 85007 BL SMEAR W/DIFF WBC COUNT: CPT | Performed by: PHYSICIAN ASSISTANT

## 2025-01-01 PROCEDURE — 2500000002 HC RX 250 W HCPCS SELF ADMINISTERED DRUGS (ALT 637 FOR MEDICARE OP, ALT 636 FOR OP/ED): Performed by: PHYSICIAN ASSISTANT

## 2025-01-01 PROCEDURE — 96375 TX/PRO/DX INJ NEW DRUG ADDON: CPT

## 2025-01-01 PROCEDURE — 96366 THER/PROPH/DIAG IV INF ADDON: CPT

## 2025-01-01 PROCEDURE — 74174 CTA ABD&PLVS W/CONTRAST: CPT | Performed by: RADIOLOGY

## 2025-01-01 PROCEDURE — 85027 COMPLETE CBC AUTOMATED: CPT | Performed by: PHYSICIAN ASSISTANT

## 2025-01-01 PROCEDURE — 83880 ASSAY OF NATRIURETIC PEPTIDE: CPT | Performed by: PHYSICIAN ASSISTANT

## 2025-01-01 PROCEDURE — 2500000004 HC RX 250 GENERAL PHARMACY W/ HCPCS (ALT 636 FOR OP/ED): Performed by: NURSE PRACTITIONER

## 2025-01-01 PROCEDURE — 94640 AIRWAY INHALATION TREATMENT: CPT

## 2025-01-01 PROCEDURE — 83735 ASSAY OF MAGNESIUM: CPT | Performed by: PHYSICIAN ASSISTANT

## 2025-01-01 PROCEDURE — 2500000004 HC RX 250 GENERAL PHARMACY W/ HCPCS (ALT 636 FOR OP/ED): Performed by: PHYSICIAN ASSISTANT

## 2025-01-01 PROCEDURE — 36600 WITHDRAWAL OF ARTERIAL BLOOD: CPT

## 2025-01-01 PROCEDURE — 71045 X-RAY EXAM CHEST 1 VIEW: CPT | Performed by: RADIOLOGY

## 2025-01-01 PROCEDURE — 96367 TX/PROPH/DG ADDL SEQ IV INF: CPT

## 2025-01-01 PROCEDURE — 71045 X-RAY EXAM CHEST 1 VIEW: CPT

## 2025-01-01 PROCEDURE — 83605 ASSAY OF LACTIC ACID: CPT | Performed by: PHYSICIAN ASSISTANT

## 2025-01-01 RX ORDER — DIAZEPAM 5 MG/ML
10 INJECTION, SOLUTION INTRAMUSCULAR; INTRAVENOUS EVERY 4 HOURS PRN
Status: DISCONTINUED | OUTPATIENT
Start: 2025-01-01 | End: 2025-01-01 | Stop reason: HOSPADM

## 2025-01-01 RX ORDER — ACETAMINOPHEN 500 MG
10 TABLET ORAL NIGHTLY PRN
Status: DISCONTINUED | OUTPATIENT
Start: 2025-01-01 | End: 2025-01-01 | Stop reason: HOSPADM

## 2025-01-01 RX ORDER — PROPOFOL 10 MG/ML
INJECTION, EMULSION INTRAVENOUS
Status: DISCONTINUED
Start: 2025-01-01 | End: 2025-01-01 | Stop reason: WASHOUT

## 2025-01-01 RX ORDER — FENTANYL CITRATE 50 UG/ML
50 INJECTION, SOLUTION INTRAMUSCULAR; INTRAVENOUS ONCE
Status: COMPLETED | OUTPATIENT
Start: 2025-01-01 | End: 2025-01-01

## 2025-01-01 RX ORDER — NOREPINEPHRINE BITARTRATE/D5W 8 MG/250ML
PLASTIC BAG, INJECTION (ML) INTRAVENOUS
Status: DISCONTINUED
Start: 2025-01-01 | End: 2025-01-01 | Stop reason: HOSPADM

## 2025-01-01 RX ORDER — ETOMIDATE 2 MG/ML
INJECTION INTRAVENOUS
Status: DISCONTINUED
Start: 2025-01-01 | End: 2025-01-01 | Stop reason: HOSPADM

## 2025-01-01 RX ORDER — ROCURONIUM BROMIDE 10 MG/ML
INJECTION, SOLUTION INTRAVENOUS
Status: DISCONTINUED
Start: 2025-01-01 | End: 2025-01-01 | Stop reason: HOSPADM

## 2025-01-01 RX ORDER — MAGNESIUM SULFATE HEPTAHYDRATE 40 MG/ML
2 INJECTION, SOLUTION INTRAVENOUS ONCE
Status: COMPLETED | OUTPATIENT
Start: 2025-01-01 | End: 2025-01-01

## 2025-01-01 RX ORDER — MICONAZOLE NITRATE 2 %
2 CREAM (GRAM) TOPICAL
Status: DISCONTINUED | OUTPATIENT
Start: 2025-01-01 | End: 2025-01-01 | Stop reason: HOSPADM

## 2025-01-01 RX ORDER — IPRATROPIUM BROMIDE AND ALBUTEROL SULFATE 2.5; .5 MG/3ML; MG/3ML
3 SOLUTION RESPIRATORY (INHALATION) EVERY 20 MIN
Status: COMPLETED | OUTPATIENT
Start: 2025-01-01 | End: 2025-01-01

## 2025-01-01 RX ORDER — POLYETHYLENE GLYCOL 3350 17 G/17G
17 POWDER, FOR SOLUTION ORAL DAILY PRN
Status: DISCONTINUED | OUTPATIENT
Start: 2025-01-01 | End: 2025-01-01 | Stop reason: HOSPADM

## 2025-01-01 RX ORDER — ALBUTEROL SULFATE 0.83 MG/ML
2.5 SOLUTION RESPIRATORY (INHALATION) EVERY 4 HOURS PRN
Status: DISCONTINUED | OUTPATIENT
Start: 2025-01-01 | End: 2025-01-01 | Stop reason: HOSPADM

## 2025-01-01 RX ORDER — ACETAMINOPHEN 650 MG/1
650 SUPPOSITORY RECTAL EVERY 4 HOURS PRN
Status: DISCONTINUED | OUTPATIENT
Start: 2025-01-01 | End: 2025-01-01 | Stop reason: HOSPADM

## 2025-01-01 RX ORDER — ACETAMINOPHEN 160 MG/5ML
650 SOLUTION ORAL EVERY 4 HOURS PRN
Status: DISCONTINUED | OUTPATIENT
Start: 2025-01-01 | End: 2025-01-01 | Stop reason: HOSPADM

## 2025-01-01 RX ORDER — MORPHINE SULFATE 2 MG/ML
1 INJECTION, SOLUTION INTRAMUSCULAR; INTRAVENOUS
Status: DISCONTINUED | OUTPATIENT
Start: 2025-01-01 | End: 2025-01-01 | Stop reason: HOSPADM

## 2025-01-01 RX ORDER — ACETAMINOPHEN 325 MG/1
650 TABLET ORAL EVERY 4 HOURS PRN
Status: DISCONTINUED | OUTPATIENT
Start: 2025-01-01 | End: 2025-01-01 | Stop reason: HOSPADM

## 2025-01-01 RX ORDER — VANCOMYCIN/0.9 % SOD CHLORIDE 1.5G/250ML
1.5 PLASTIC BAG, INJECTION (ML) INTRAVENOUS ONCE
Status: COMPLETED | OUTPATIENT
Start: 2025-01-01 | End: 2025-01-01

## 2025-01-01 RX ORDER — IPRATROPIUM BROMIDE AND ALBUTEROL SULFATE 2.5; .5 MG/3ML; MG/3ML
SOLUTION RESPIRATORY (INHALATION)
Status: COMPLETED
Start: 2025-01-01 | End: 2025-01-01

## 2025-01-01 RX ADMIN — FENTANYL CITRATE 50 MCG: 50 INJECTION INTRAMUSCULAR; INTRAVENOUS at 04:28

## 2025-01-01 RX ADMIN — SODIUM CHLORIDE 1905 ML: 0.9 INJECTION, SOLUTION INTRAVENOUS at 03:37

## 2025-01-01 RX ADMIN — MORPHINE SULFATE 1 MG: 2 INJECTION, SOLUTION INTRAMUSCULAR; INTRAVENOUS at 06:15

## 2025-01-01 RX ADMIN — Medication 1 DOSE: at 02:00

## 2025-01-01 RX ADMIN — IPRATROPIUM BROMIDE AND ALBUTEROL SULFATE 3 ML: .5; 3 SOLUTION RESPIRATORY (INHALATION) at 02:01

## 2025-01-01 RX ADMIN — MAGNESIUM SULFATE HEPTAHYDRATE 2 G: 40 INJECTION, SOLUTION INTRAVENOUS at 02:18

## 2025-01-01 RX ADMIN — IPRATROPIUM BROMIDE AND ALBUTEROL SULFATE 3 ML: .5; 3 SOLUTION RESPIRATORY (INHALATION) at 02:00

## 2025-01-01 RX ADMIN — IOHEXOL 75 ML: 350 INJECTION, SOLUTION INTRAVENOUS at 03:03

## 2025-01-01 RX ADMIN — IPRATROPIUM BROMIDE AND ALBUTEROL SULFATE 3 ML: .5; 3 SOLUTION RESPIRATORY (INHALATION) at 02:02

## 2025-01-01 RX ADMIN — Medication 1.5 G: at 04:11

## 2025-01-01 RX ADMIN — PIPERACILLIN AND TAZOBACTAM 4.5 G: 4; .5 INJECTION, POWDER, FOR SOLUTION INTRAVENOUS at 03:37

## 2025-01-01 RX ADMIN — METHYLPREDNISOLONE SODIUM SUCCINATE 125 MG: 125 INJECTION, POWDER, FOR SOLUTION INTRAMUSCULAR; INTRAVENOUS at 02:18

## 2025-01-01 RX ADMIN — MORPHINE SULFATE 1 MG: 2 INJECTION, SOLUTION INTRAMUSCULAR; INTRAVENOUS at 09:26

## 2025-01-01 ASSESSMENT — PAIN SCALES - GENERAL
PAINLEVEL_OUTOF10: 3
PAINLEVEL_OUTOF10: 10 - WORST POSSIBLE PAIN
PAINLEVEL_OUTOF10: 10 - WORST POSSIBLE PAIN
PAINLEVEL_OUTOF10: 5 - MODERATE PAIN

## 2025-01-01 ASSESSMENT — LIFESTYLE VARIABLES
TOTAL SCORE: 0
HAVE YOU EVER FELT YOU SHOULD CUT DOWN ON YOUR DRINKING: NO
EVER HAD A DRINK FIRST THING IN THE MORNING TO STEADY YOUR NERVES TO GET RID OF A HANGOVER: NO
EVER FELT BAD OR GUILTY ABOUT YOUR DRINKING: NO
HAVE PEOPLE ANNOYED YOU BY CRITICIZING YOUR DRINKING: NO

## 2025-01-01 ASSESSMENT — ENCOUNTER SYMPTOMS
ABDOMINAL PAIN: 1
CHILLS: 0
FEVER: 0
DYSURIA: 0
NAUSEA: 0
DIARRHEA: 0
SHORTNESS OF BREATH: 1
VOMITING: 0
FREQUENCY: 0
PALPITATIONS: 0
FLANK PAIN: 0
HEMATURIA: 0
CONSTIPATION: 0

## 2025-01-01 ASSESSMENT — COGNITIVE AND FUNCTIONAL STATUS - GENERAL
DAILY ACTIVITIY SCORE: 12
DRESSING REGULAR UPPER BODY CLOTHING: A LOT
TURNING FROM BACK TO SIDE WHILE IN FLAT BAD: A LOT
EATING MEALS: A LOT
WALKING IN HOSPITAL ROOM: A LOT
MOVING FROM LYING ON BACK TO SITTING ON SIDE OF FLAT BED WITH BEDRAILS: A LOT
STANDING UP FROM CHAIR USING ARMS: A LOT
MOVING TO AND FROM BED TO CHAIR: A LOT
HELP NEEDED FOR BATHING: A LOT
MOBILITY SCORE: 11
DRESSING REGULAR LOWER BODY CLOTHING: A LOT
TOILETING: A LOT
PERSONAL GROOMING: A LOT
CLIMB 3 TO 5 STEPS WITH RAILING: TOTAL

## 2025-01-01 ASSESSMENT — PAIN - FUNCTIONAL ASSESSMENT
PAIN_FUNCTIONAL_ASSESSMENT: 0-10

## 2025-01-01 ASSESSMENT — PAIN DESCRIPTION - PROGRESSION: CLINICAL_PROGRESSION: NOT CHANGED

## 2025-01-01 ASSESSMENT — PAIN DESCRIPTION - DESCRIPTORS: DESCRIPTORS: SHARP

## 2025-01-02 ENCOUNTER — OFFICE VISIT (OUTPATIENT)
Dept: HEMATOLOGY/ONCOLOGY | Facility: CLINIC | Age: 65
End: 2025-01-02
Payer: MEDICARE

## 2025-01-02 ENCOUNTER — TELEPHONE (OUTPATIENT)
Dept: ADMISSION | Facility: HOSPITAL | Age: 65
End: 2025-01-02
Payer: COMMERCIAL

## 2025-01-02 VITALS
OXYGEN SATURATION: 96 % | DIASTOLIC BLOOD PRESSURE: 69 MMHG | SYSTOLIC BLOOD PRESSURE: 128 MMHG | WEIGHT: 152.7 LBS | RESPIRATION RATE: 14 BRPM | TEMPERATURE: 99.5 F | HEART RATE: 61 BPM | BODY MASS INDEX: 20.15 KG/M2

## 2025-01-02 DIAGNOSIS — C34.91 NSCLC OF RIGHT LUNG (MULTI): ICD-10-CM

## 2025-01-02 PROCEDURE — 3078F DIAST BP <80 MM HG: CPT | Performed by: INTERNAL MEDICINE

## 2025-01-02 PROCEDURE — 99215 OFFICE O/P EST HI 40 MIN: CPT | Performed by: INTERNAL MEDICINE

## 2025-01-02 PROCEDURE — 3074F SYST BP LT 130 MM HG: CPT | Performed by: INTERNAL MEDICINE

## 2025-01-02 RX ORDER — NALOXONE HYDROCHLORIDE 4 MG/.1ML
1 SPRAY NASAL AS NEEDED
Qty: 2 EACH | Refills: 0 | Status: SHIPPED | OUTPATIENT
Start: 2025-01-02

## 2025-01-02 RX ORDER — POLYETHYLENE GLYCOL 3350 17 G/17G
17 POWDER, FOR SOLUTION ORAL DAILY
Qty: 510 G | Refills: 0 | Status: SHIPPED | OUTPATIENT
Start: 2025-01-02 | End: 2025-02-01

## 2025-01-02 RX ORDER — OXYCODONE AND ACETAMINOPHEN 10; 325 MG/1; MG/1
1 TABLET ORAL EVERY 6 HOURS PRN
Qty: 28 TABLET | Refills: 0 | Status: SHIPPED | OUTPATIENT
Start: 2025-01-02 | End: 2025-01-09

## 2025-01-02 RX ORDER — SENNOSIDES 8.6 MG/1
1 TABLET ORAL 2 TIMES DAILY
Qty: 60 TABLET | Refills: 0 | Status: SHIPPED | OUTPATIENT
Start: 2025-01-02 | End: 2025-02-01

## 2025-01-02 ASSESSMENT — PAIN SCALES - GENERAL: PAINLEVEL_OUTOF10: 7

## 2025-01-02 NOTE — TELEPHONE ENCOUNTER
Libby from [facility] called to advise they received hospice referral today.   She is asking if pt is his own decision maker, for family contact info, and for most recent medical records   Pt had [facility]UV today.

## 2025-01-03 ENCOUNTER — SOCIAL WORK (OUTPATIENT)
Dept: CASE MANAGEMENT | Facility: HOSPITAL | Age: 65
End: 2025-01-03
Payer: COMMERCIAL

## 2025-01-03 ASSESSMENT — ENCOUNTER SYMPTOMS
DIARRHEA: 0
SHORTNESS OF BREATH: 1
DIFFICULTY URINATING: 0
COUGH: 1
NAUSEA: 0
ARTHRALGIAS: 0
NECK PAIN: 0
APPETITE CHANGE: 1
DIZZINESS: 0
CHEST TIGHTNESS: 1
LEG SWELLING: 0
EYE PROBLEMS: 0
FATIGUE: 1
CONSTIPATION: 0
BACK PAIN: 0
ABDOMINAL PAIN: 0
ADENOPATHY: 0
VOMITING: 0
UNEXPECTED WEIGHT CHANGE: 0
HEADACHES: 0

## 2025-01-03 NOTE — PROGRESS NOTES
[facility] - Medical Oncology Follow-Up Visit    Patient ID: Joe Magaña is a 64 y.o. male.  Referring Physician: Samia Huynh, APRN-CNP  [address] [place] [address]  Primary Care Provider: No Assigned PCP Generic MD Gena      Chief Concern: Patient is here for follow-up and scan review    HPI   Patient here for follow-up. Overall has been feeling worse, more pain in his back and around the side. More congestion with his breathing, using inhaler about 4x per day. Sleeping more    Diagnosis: Lung adenocarcinoma  Stage:  Cancer Staging   NSCLC of right lung (Multi)  Staging form: Lung, AJCC 8th Edition  - Clinical stage from 8/5/2024: Stage IVB (cT1c, cN3, cM1c) - Signed by Amnita Jha MD on 10/28/2024    Current sites of disease: R lung, mediastinal LNs, possible liver  Molecular and ancillary testing:     PD-L1 <1%  NGS without actionable mutations    Oncologic History:  3/13/24 - Presented to ER with hemoptysis, CT with RUL mass suspicious for neoplasm  4/29/24 - CT guided R lung biopsy with no evidence of malignancy  6/7/24 - PET non-diagnostic  7/15/24 - CT with decrease in RUL nodule, increase in R perihilar lesion and new R supraclavicular LN, new hepatic hypodensities concerning for metastatic disease  7/15/24 - EBUS with biopsy of RUL with NSCLC (adenocarcinoma), including bronchial brush RUL and 4L lymph node. Level 11L negative.  8/5/24 - MRI brain negative for metastatic disease, PET with RUL nodule, bilateral lymph nodes, R hepatic lobe lesions, R scapula lesion  8/16/24 - C1 carboplatin/pemetrexed/pembrolizumab (carbo AUC 4)    Past Medical History:  No date: 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)  No date: Arthritis  No date: Chronic pain disorder  No date: COPD (chronic obstructive pulmonary disease) (Multi)  No date: Degenerative disc disease, lumbar  No date: Depression  No date: Essential (primary) hypertension      Comment:  Benign essential  hypertension  No date: Personal history of other diseases of the digestive system      Comment:  History of diverticulosis  No date: Personal history of other diseases of the musculoskeletal   system and connective tissue      Comment:  History of gout  No date: Personal history of other diseases of the respiratory system      Comment:  History of COPD  No date: Psychiatric illness  No date: Substance abuse (Multi)  No date: Suicide attempt (Multi)     Past Surgical History:  No date: ELBOW SURGERY  05/06/2020: OTHER SURGICAL HISTORY      Comment:  Flexor tendon repair  05/06/2020: OTHER SURGICAL HISTORY      Comment:  Oral surgery  05/06/2020: OTHER SURGICAL HISTORY      Comment:  Shoulder surgery     Social Hx:  Chronic pain  Opioid overdose noted 9/2023 - fentanyl/oxycodone  ER visit 2/2024 noted SI (1 suicide attempt in past)  Undomiciled, unstable housing for a while  Currently smokes about 1ppd x many years, quit for about 7 years in his 20s  Works at [employer]    Family Hx  Father passed away this year from dementia  Mother passed away at 99 from lung cancer, she did go through chemo  Sister passed away in 2023 from cancer (lung/liver)     Meds (Current):  Current Outpatient Medications   Medication Instructions    albuterol 90 mcg/actuation inhaler 1-2 puffs, inhalation, Every 6 hours PRN    cholecalciferol (VITAMIN D3) 50 mcg, oral, Daily    cloNIDine (CATAPRES) 0.1 mg, oral, Nightly    cyanocobalamin (VITAMIN B-12) 1,000 mcg, oral, Daily    dexAMETHasone (DECADRON) 4 mg, oral, Daily    DULoxetine (CYMBALTA) 60 mg, oral, 2 times daily    fluticasone furoate-vilanteroL (Breo Ellipta) 200-25 mcg/dose inhaler 1 puff, inhalation, Daily    folic acid (FOLVITE) 1,000 mcg, oral, Daily    gabapentin (NEURONTIN) 300 mg, oral, 3 times daily    naloxone (NARCAN) 4 mg, nasal, As needed, May repeat every 2-3 minutes if needed, alternating nostrils, until medical assistance becomes available.     nicotine (Nicoderm CQ) 21 mg/24 hr patch 1 patch, transdermal, Daily    omeprazole (PRILOSEC) 20 mg, oral, Daily before breakfast, Do not crush or chew.    ondansetron (ZOFRAN) 8 mg, oral, Every 8 hours PRN    oxyCODONE-acetaminophen (Percocet)  mg tablet 1 tablet, oral, Every 6 hours PRN    polyethylene glycol (MIRALAX) 17 g, oral, Daily    prochlorperazine (COMPAZINE) 10 mg, oral, Every 6 hours PRN    QUEtiapine (SEROQUEL) 25 mg, oral, Nightly    sennosides (SENNA) 8.6 mg, oral, 2 times daily      No Known Allergies    Review of Systems   Constitutional:  Positive for appetite change and fatigue. Negative for unexpected weight change.   HENT:   Negative for hearing loss.    Eyes:  Negative for eye problems.   Respiratory:  Positive for chest tightness, cough and shortness of breath.    Cardiovascular:  Negative for chest pain and leg swelling.   Gastrointestinal:  Negative for abdominal pain, constipation, diarrhea, nausea and vomiting.   Genitourinary:  Negative for difficulty urinating.    Musculoskeletal:  Negative for arthralgias, back pain and neck pain.   Skin:  Negative for rash.   Neurological:  Negative for dizziness and headaches.   Hematological:  Negative for adenopathy.      Objective   BSA: 1.89 meters squared  /69 (BP Location: Left arm, Patient Position: Sitting)   Pulse 61   Temp 37.5 °C (99.5 °F) (Temporal)   Resp 14   Wt 69.3 kg (152 lb 11.2 oz)   SpO2 96%   BMI 20.15 kg/m²     Wt Readings from Last 5 Encounters:   01/02/25 69.3 kg (152 lb 11.2 oz)   12/26/24 67.6 kg (149 lb)   12/06/24 68 kg (150 lb)   12/02/24 66.6 kg (146 lb 13.2 oz)   11/21/24 66.6 kg (146 lb 12.8 oz)      Performance Status:  (1) Restricted in physically strenuous activity, ambulatory and able to do work of light nature     Physical Exam  Vitals reviewed.   Constitutional:       General: He is not in acute distress.  HENT:      Head: Normocephalic and atraumatic.   Eyes:      Pupils: Pupils are equal,  round, and reactive to light.   Cardiovascular:      Rate and Rhythm: Normal rate and regular rhythm.      Heart sounds: Normal heart sounds.   Pulmonary:      Effort: Pulmonary effort is normal.      Breath sounds: Wheezing present.   Abdominal:      General: Bowel sounds are normal. There is no distension.      Palpations: Abdomen is soft.   Musculoskeletal:         General: No swelling. Normal range of motion.      Cervical back: Normal range of motion.   Skin:     General: Skin is warm and dry.      Findings: No rash.   Neurological:      General: No focal deficit present.      Mental Status: He is alert and oriented to person, place, and time.   Psychiatric:         Mood and Affect: Mood normal.         Behavior: Behavior normal.        Results:  Labs:  Lab Results   Component Value Date    WBC 4.3 (L) 12/26/2024    HGB 7.8 (L) 12/26/2024    HCT 25.2 (L) 12/26/2024     (H) 12/26/2024     12/26/2024      Lab Results   Component Value Date    NEUTROABS 2.78 12/26/2024      Lab Results   Component Value Date    GLUCOSE 96 12/26/2024    CALCIUM 8.7 12/26/2024     12/26/2024    K 4.0 12/26/2024    CO2 28 12/26/2024     12/26/2024    BUN 18 12/26/2024    CREATININE 1.02 12/26/2024     Lab Results   Component Value Date    ALT 13 12/26/2024    AST 17 12/26/2024    ALKPHOS 119 12/26/2024    BILITOT 0.4 12/26/2024      Imaging:  I personally reviewed the below imaging and concur with the findings as reported unless otherwise stated    === Results for orders placed during the hospital encounter of 12/26/24 ===    CT chest abdomen pelvis w IV contrast [[id number]] 12/26/2024    Status: Normal  CT for lung cancer restaging demonstrates possible minimal interval  increased size of primary right upper lobe malignant lesion with  similar chest and right lower neck adenopathy and measurable  progression of multifocal targetoid metastatic liver lesions.    Minor newly seen tubular branching change right  upper lobe could be  inflammatory or neoplastic. Attention recommended on follow-up  assessment.    Newly seen tiny bilateral pleural effusions and body wall edema.    Similar probably benign adrenal nodules favoring adenomas.    MACRO:  None    Signed by: Armen Romeo 12/26/2024 5:39 PM  Dictation workstation:   [id number]      Pathology:  No new pathology      Assessment/Plan      Joe Magaña is a 64 y.o. male here for follow-up of stage IV lung adenocarcinoma with PD-L1 <1% and no actionable mutations on NGS.    Reviewed overall work-up to date with patient previously including implications of stage IV disease and overall palliative goals of therapy. Discussed that typically standard treatment involves a combination of chemotherapy (carboplatin/pemetrexed) and immunotherapy (typically pembrolizumab) given low PD-L1. He is very worried about chemotherapy and not sure he wants to go through it - he watched his mother go through it for lung cancer in the past. We did discuss that we have come a long way in terms of supporting patients and chemo in general.    We also discussed that in order to consider these treatments I think we need to find a more stable housing situation so that he can be safe while receiving treatment. Social determinants of health including housing/food instability and history of opioid abuse are barriers to him receiving optimal care.    He started carboplatin/pemetrexed/pembrolizumab with dose reduction and tolerated relatively well    Unfortunately today we reviewed scans with show progressive disease, in particular throughout his liver. Discussed options including 2L therapy with chemotherapy (docetaxel/ramucirumab) and anticipated response rate/side effects. Discussed prognosis with and without treatment. His main concern is planning for his death/burial and he's not sure he would want to go through side effects of more chemo for the possible slight benefit.    We discussed the  philosophy of hospice care - to support patient and family when cancer-directed therapy is not being pursued, to maximize quality of life and to avoid additional hospitalization and aggressive care at the end of life. Discussed the support provided by hospice in terms of nursing, aide/volunteers, SW, , equipment, meds and 24/7 phone support with inpatient hospice if needed for uncontrolled symptoms.      #Stage IV lung adenocarcinoma  - Will stop treatment due to progression  - Discsused 2L chemotherapy vs hospice, patient is interested in meeting with hospice team and enrolling  - Will refer to hospice    #GERD  - Severe symptoms limiting Po  - Cont omeprazole daily  - Improved    #Constipation - likely related to chemo  - Miralax daily    #Cough - related to cancer  #Sinus congestion  -Cont guafenisin and albuterol    #Pain related to malignancy, chronic pain  - Following with supportive onc  - Urine tox was positive for fentanyl (not prescribed), it has been discussed that we will not be able to provide opioids if this continues to be positive  - Today given transition in care to comfort focus, will prescribe percocet 10mg/325 q6h Prn. He states this has worked well for him the past, will start at 5mg and increase if no issues. Start senna, cont miralax. Discussed hospice team may recommend alternative med, will give 7d supply. Discussed not to use anythiing other than what is prescribed due to danger of overdose. He states he has not used fentanyl or any illicits for many  months. Narcan also prescribed. OARRS reviewed 1/2    # Neutropenia 0.54 11/21/24 - resolved  - C1 pem/pem rescheduled to 12/2/24, completed   - Pt misplaced folic acid Rx, refill sent    - ANC 2.78 12/26/24     # Poor housing  - pt on wait-list for [facility].  - CM in place  - Eats on the run, works part-time, able to shower at work   - states he is currently staying at a motel.      RTC as needed

## 2025-01-03 NOTE — PROGRESS NOTES
Social Work Note  1/3/2025 Yesterday SW received a referral for hospice services for patient.  SW talked with him, he didn't have any questions, so this writer gave him some general information.  He did want to meet and wanted [facility].  Referral faxed today. SW encouraged him to call with questions.  Yesterday SW had to set up a [facility] as  was not able to drive patient to his appointment here.  It was too late to get insurance to pick him up since the request would be the same day as the appointment.  SW will remain available to assist patient.  EFRA Poon, W [phone]

## 2025-01-06 ENCOUNTER — SOCIAL WORK (OUTPATIENT)
Dept: CASE MANAGEMENT | Facility: HOSPITAL | Age: 65
End: 2025-01-06
Payer: MEDICARE

## 2025-01-06 NOTE — PROGRESS NOTES
Social Work Note  1/6/2025 SW checked with [facility] to be sure they received the referral from 1/3/25.  They had.  SW checked with patient and hospice had not scheduled a time to meet with patient.  He was encouraged to contact this writer if the meeting was not set up  in the next few days.  He stated he was waiting for someone to call back today and schedule the meeting.  SW will remain available to assist patient.  EFRA Poon, [facility] [phone]

## 2025-01-09 ENCOUNTER — APPOINTMENT (OUTPATIENT)
Dept: HEMATOLOGY/ONCOLOGY | Facility: CLINIC | Age: 65
End: 2025-01-09
Payer: MEDICARE

## 2025-01-13 ENCOUNTER — HOSPITAL ENCOUNTER (OUTPATIENT)
Dept: RADIOLOGY | Facility: HOSPITAL | Age: 65
End: 2025-01-13
Payer: MEDICARE

## 2025-04-03 ENCOUNTER — APPOINTMENT (OUTPATIENT)
Facility: CLINIC | Age: 65
End: 2025-04-03
Payer: MEDICARE

## 2025-06-22 ENCOUNTER — APPOINTMENT (OUTPATIENT)
Dept: RADIOLOGY | Facility: HOSPITAL | Age: 65
End: 2025-06-22
Payer: MEDICARE

## 2025-06-22 ENCOUNTER — HOSPITAL ENCOUNTER (EMERGENCY)
Facility: HOSPITAL | Age: 65
Discharge: HOME | End: 2025-06-23
Attending: EMERGENCY MEDICINE
Payer: MEDICARE

## 2025-06-22 ASSESSMENT — PAIN - FUNCTIONAL ASSESSMENT
PAIN_FUNCTIONAL_ASSESSMENT: 0-10

## 2025-06-22 ASSESSMENT — PAIN SCALES - GENERAL
PAINLEVEL_OUTOF10: 7
PAINLEVEL_OUTOF10: 7
PAINLEVEL_OUTOF10: 10 - WORST POSSIBLE PAIN
PAINLEVEL_OUTOF10: 10 - WORST POSSIBLE PAIN

## 2025-06-22 ASSESSMENT — PAIN DESCRIPTION - PAIN TYPE
TYPE: ACUTE PAIN;CHRONIC PAIN
TYPE: CHRONIC PAIN;ACUTE PAIN
TYPE: ACUTE PAIN;CHRONIC PAIN

## 2025-06-22 ASSESSMENT — PAIN DESCRIPTION - DESCRIPTORS
DESCRIPTORS: DULL;SHARP
DESCRIPTORS: DULL;STABBING
DESCRIPTORS: SPASM
DESCRIPTORS: DULL;SHARP

## 2025-06-22 ASSESSMENT — PAIN DESCRIPTION - FREQUENCY: FREQUENCY: CONSTANT/CONTINUOUS

## 2025-06-22 ASSESSMENT — PAIN DESCRIPTION - LOCATION
LOCATION: BACK

## 2025-06-22 ASSESSMENT — PAIN DESCRIPTION - ORIENTATION
ORIENTATION: LOWER;MID;UPPER

## 2025-06-23 NOTE — ED PROVIDER NOTES
Emergency Medicine Transition of Care Note.    I received Joe Magaña in signout from Dr. Red.  Please see the previous ED provider note for all HPI, PE and MDM up to the time of signout. This is in addition to the primary record.    In brief Joe Magaña is an 64 y.o. male presenting for   Chief Complaint   Patient presents with    Back Pain     Extreme back pain, the hospice keep giving me Methadone pills but they don't do anything     At the time of signout we were awaiting: CT results    Diagnoses as of 06/23/25 0100   Back pain, unspecified back location, unspecified back pain laterality, unspecified chronicity   Lytic bone lesions on xray       Medical Decision Making  Patient is signed out to me by the outgoing physician.  At the time of signout he has received some pain medication and we are awaiting CT of the thoracic and lumbar spine.    CT does appear to show a lytic lesions that are new in T1 and T9 respectively.  I discussed these findings with the patient at bedside.  He does feel improved after the pain medication.  We discussed the plan of discharge with a prescription for Percocet for breakthrough pain and we discussed need for close outpatient follow-up.  Patient states understanding and agreement with this plan was discharged in stable condition.        Final diagnoses:   [M54.9] Back pain, unspecified back location, unspecified back pain laterality, unspecified chronicity   [M89.8X9] Lytic bone lesions on xray           Procedure  Procedures    Juvenal Alex, DO Juvenal Alex DO  06/23/25 0101

## 2025-06-23 NOTE — ED PROVIDER NOTES
HPI   Chief Complaint   Patient presents with    Back Pain     Extreme back pain, the hospice keep giving me Methadone pills but they don't do anything         History provided by:  Patient    Chief Complaint   Patient presents with    Back Pain     Extreme back pain, the hospice keep giving me Methadone pills but they don't do anything       History of Present Illness:  Joe Magaña is a 64 y.o. male presents with back pain.  The patient is a known cancer patient on hospice.  He receives methadone but it does not seem to be helping him.  As far as he is aware he does not have metastasis to the spine.  No loss of motor or sensory function.  No loss of bladder or bowel function.  No urinary retention or incontinence.  No stool retention or incontinence.  No radiation.  No chest pain or shortness of breath.  No abdominal pain.  No nausea, vomiting or diarrhea.  No hematemesis, hematochezia or melena.  Worse with movement and palpation.      PMFSH:   As per HPI, otherwise nurses notes reviewed in EMR.    Past Medical History: Medical History[1]   Past Surgical History: Surgical History[2]   Family History: Family History[3]   Social History:  Social History[4]  Allergies: Allergies[5]  Current Outpatient Medications   Medication Instructions    albuterol 90 mcg/actuation inhaler 1-2 puffs, inhalation, Every 6 hours PRN    cholecalciferol (VITAMIN D3) 50 mcg, oral, Daily    cloNIDine (CATAPRES) 0.1 mg, oral, Nightly    cyanocobalamin (VITAMIN B-12) 1,000 mcg, oral, Daily    dexAMETHasone (DECADRON) 4 mg, oral, Daily    DULoxetine (CYMBALTA) 60 mg, oral, 2 times daily    fluticasone furoate-vilanteroL (Breo Ellipta) 200-25 mcg/dose inhaler 1 puff, inhalation, Daily    folic acid (FOLVITE) 1,000 mcg, oral, Daily    gabapentin (NEURONTIN) 300 mg, oral, 3 times daily    naloxone (NARCAN) 4 mg, nasal, As needed, May repeat every 2-3 minutes if needed, alternating nostrils, until medical assistance becomes available.     "nicotine (Nicoderm CQ) 21 mg/24 hr patch 1 patch, transdermal, Daily    omeprazole (PRILOSEC) 20 mg, oral, Daily before breakfast, Do not crush or chew.    ondansetron (ZOFRAN) 8 mg, oral, Every 8 hours PRN    prochlorperazine (COMPAZINE) 10 mg, oral, Every 6 hours PRN    QUEtiapine (SEROQUEL) 25 mg, oral, Nightly              Patient History   Medical History[6]  Surgical History[7]  Family History[8]  Social History[9]    Physical Exam   ED Triage Vitals [06/22/25 2045]   Temperature Heart Rate Respirations BP   36.4 °C (97.5 °F) 75 16 (!) 184/82      Pulse Ox Temp Source Heart Rate Source Patient Position   98 % Temporal Monitor Sitting      BP Location FiO2 (%)     Right arm --       Physical Exam  Physical Exam:    ED Triage Vitals [06/22/25 2045]   Temperature Heart Rate Respirations BP   36.4 °C (97.5 °F) 75 16 (!) 184/82      Pulse Ox Temp Source Heart Rate Source Patient Position   98 % Temporal Monitor Sitting      BP Location FiO2 (%)     Right arm --         Patient Vitals for the past 24 hrs:   BP Temp Temp src Pulse Resp SpO2 Height Weight   06/22/25 2242 168/86 -- -- 63 16 97 % -- --   06/22/25 2045 (!) 184/82 36.4 °C (97.5 °F) Temporal 75 16 98 % 1.854 m (6' 1\") 67.1 kg (148 lb)       Constitutional: Vital signs per nursing notes.  Well developed, well nourished.  Mild acute distress.    Psychiatric: alert and oriented to person, place, and time; no abnormalities of mood or affect; memory intact  Eyes: PERRL; conjunctivae and lids normal; EOMI  Respiratory: normal respiratory effort and excursion; no rales, rhonchi, or wheezes; equal air entry  Cardiovascular: regular rate and rhythm; no murmurs, rubs or gallops; symmetric pulses; no edema; normal capillary refill; distal pulses present  Neurological: normal speech; CN II-XII grossly intact; normal motor and sensory function; no nystagmus  GI: no masses, tenderness, rebound or guarding; no palpable, pulsatile mass; no organomegaly; no hernia; normal " bowel sounds; (-) Nash´s sign; (-) McBurney´s sign; (-) CVA tenderness  Lymphatic: no adenopathy of neck  Musculoskeletal: normal digits and nails; no gross tendon or ligament injury; normal to palpation; normal strength/tone; neurovascular status intact; (-) Soraya´s sign; (-) straight leg raise; no palpable cords; calf circumference equal bilaterally; except tenderness to palpation to the back with no step-offs or deformities  Skin: normal to inspection; normal to palpation; no rash  GCS: 15      ED Course & MDM   Diagnoses as of 06/22/25 2249   Back pain, unspecified back location, unspecified back pain laterality, unspecified chronicity                 No data recorded     [place] Coma Scale Score: 15 (06/22/25 2045 : Janet Chavarria RN)                           Medical Decision Making  Medical Decision Making:    EKG:    Labs: Labs Reviewed - No data to display    Diagnostic Imaging:   CT thoracic spine wo IV contrast    (Results Pending)   CT lumbar spine wo IV contrast    (Results Pending)       ED Medication Administration:   Medications   diazePAM (Valium) injection 5 mg (has no administration in time range)   HYDROmorphone (Dilaudid) injection 1 mg (1 mg intravenous Given 6/22/25 2119)   ketorolac (Toradol) injection 15 mg (15 mg intravenous Given 6/22/25 2119)   acetaminophen (Tylenol) tablet 975 mg (975 mg oral Given 6/22/25 2119)       ED Course:     Joe Magaña is a 64 y.o. male presents with back pain   .    Differential Diagnoses Considered:  Back pain, herniated disc, metastasis, cord compression    Patient presents with acute back pain, normal neuro exam, no red flags, and is well-appearing.    Patient has no red flags for cauda equina syndrome or epidural abscess. Patient's vital signs within normal limits. Patient does not have a urinary retention, urinary or bowel incontinence, weakness of the lower extremity, paresthesias, and/or difficulty walking. Patient is not an  IV drug user, is  not immunocompromised, and has not had recent intra-vertebral injections.    Considered bloodwork (including CBC, CMP), but no bloodwork indicated as I considered but do not suspect severe anemia or significant electrolyte abnormality.    CT of the thoracic and lumbar spine to evaluate for possible spine metastases.    Considered emergent MRI spine, but no emergent MRI spine indicated as I considered but do not suspect epidural abscess/cauda equina/acute fracture/acute spinal emergency.           Diagnoses as of 06/22/25 2249   Back pain, unspecified back location, unspecified back pain laterality, unspecified chronicity       Abnormal Labs Reviewed - No data to display    /86 (06/22/25 2242)    Temp      Pulse 63 (06/22/25 2242)   Resp 16 (06/22/25 2242)    SpO2 97 % (06/22/25 2242)          Patient was treated with IV ketorolac, IV Dilaudid, oral acetaminophen and IV Valium.    Medications administered improved the patient's condition.    Shared decision making made with patient, and/or family, who agrees with plan.    Patient care signed out to Dr. Alex at 2300 .  Transfer of care shared with patient and/or family.     Initial history, physical exam, initiated diagnostic evaluation and treatment plan have been discussed.  Results available at the time of sign out have been reviewed by me and treatment initiated as appropriate.       The following is pending: CT, re-eval     Final disposition will be based on accepting physician’s interpretation of results and re-evaluation of the patient.     Inderjit JORGENSEN MD, am the primary provider of record.          Diagnosis:   1. Back pain, unspecified back location, unspecified back pain laterality, unspecified chronicity                  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)     Arthritis     Chronic pain disorder     COPD (chronic obstructive pulmonary disease) (Multi)     Degenerative disc  disease, lumbar     Depression     Essential (primary) hypertension     Benign essential hypertension    Personal history of other diseases of the digestive system     History of diverticulosis    Personal history of other diseases of the musculoskeletal system and connective tissue     History of gout    Personal history of other diseases of the respiratory system     History of COPD    Psychiatric illness     Substance abuse (Multi)     Suicide attempt (Multi)    [2]   Past Surgical History:  Procedure Laterality Date    ELBOW SURGERY      OTHER SURGICAL HISTORY  05/06/2020    Flexor tendon repair    OTHER SURGICAL HISTORY  05/06/2020    Oral surgery    OTHER SURGICAL HISTORY  05/06/2020    Shoulder surgery   [3] No family history on file.  [4]   Social History  Tobacco Use    Smoking status: Every Day     Current packs/day: 1.00     Average packs/day: 1 pack/day for 15.0 years (15.0 ttl pk-yrs)     Types: Cigarettes    Smokeless tobacco: Never   Vaping Use    Vaping status: Never Used   Substance Use Topics    Alcohol use: Yes     Comment: occasional    Drug use: Not Currently   [5] No Known Allergies  [6]   Past Medical History:  Diagnosis Date    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)     Arthritis     Chronic pain disorder     COPD (chronic obstructive pulmonary disease) (Multi)     Degenerative disc disease, lumbar     Depression     Essential (primary) hypertension     Benign essential hypertension    Personal history of other diseases of the digestive system     History of diverticulosis    Personal history of other diseases of the musculoskeletal system and connective tissue     History of gout    Personal history of other diseases of the respiratory system     History of COPD    Psychiatric illness     Substance abuse (Multi)     Suicide attempt (Multi)    [7]   Past Surgical History:  Procedure Laterality Date    ELBOW SURGERY      OTHER SURGICAL HISTORY  05/06/2020    Flexor tendon repair    OTHER SURGICAL HISTORY   05/06/2020    Oral surgery    OTHER SURGICAL HISTORY  05/06/2020    Shoulder surgery   [8] No family history on file.  [9]   Social History  Tobacco Use    Smoking status: Every Day     Current packs/day: 1.00     Average packs/day: 1 pack/day for 15.0 years (15.0 ttl pk-yrs)     Types: Cigarettes    Smokeless tobacco: Never   Vaping Use    Vaping status: Never Used   Substance Use Topics    Alcohol use: Yes     Comment: occasional    Drug use: Not Currently        Inderjit SAEZ MD  06/22/25 4313

## 2025-06-27 ENCOUNTER — HOSPITAL ENCOUNTER (EMERGENCY)
Facility: HOSPITAL | Age: 65
Discharge: HOME | End: 2025-06-27
Payer: MEDICARE

## 2025-06-27 VITALS
OXYGEN SATURATION: 98 % | DIASTOLIC BLOOD PRESSURE: 68 MMHG | HEIGHT: 73 IN | RESPIRATION RATE: 18 BRPM | SYSTOLIC BLOOD PRESSURE: 141 MMHG | TEMPERATURE: 98.2 F | WEIGHT: 150 LBS | BODY MASS INDEX: 19.88 KG/M2 | HEART RATE: 67 BPM

## 2025-06-27 DIAGNOSIS — C34.90 PRIMARY MALIGNANT NEOPLASM OF LUNG METASTATIC TO OTHER SITE, UNSPECIFIED LATERALITY (MULTI): Primary | ICD-10-CM

## 2025-06-27 DIAGNOSIS — C79.51 OSTEOLYTIC LESION DUE TO METASTASIS (MULTI): ICD-10-CM

## 2025-06-27 PROCEDURE — 96374 THER/PROPH/DIAG INJ IV PUSH: CPT

## 2025-06-27 PROCEDURE — 99284 EMERGENCY DEPT VISIT MOD MDM: CPT | Mod: 25

## 2025-06-27 PROCEDURE — 96375 TX/PRO/DX INJ NEW DRUG ADDON: CPT

## 2025-06-27 PROCEDURE — 2500000004 HC RX 250 GENERAL PHARMACY W/ HCPCS (ALT 636 FOR OP/ED): Mod: JZ | Performed by: PHYSICIAN ASSISTANT

## 2025-06-27 PROCEDURE — 96376 TX/PRO/DX INJ SAME DRUG ADON: CPT

## 2025-06-27 RX ORDER — KETOROLAC TROMETHAMINE 30 MG/ML
30 INJECTION, SOLUTION INTRAMUSCULAR; INTRAVENOUS ONCE
Status: COMPLETED | OUTPATIENT
Start: 2025-06-27 | End: 2025-06-27

## 2025-06-27 RX ORDER — ONDANSETRON HYDROCHLORIDE 2 MG/ML
4 INJECTION, SOLUTION INTRAVENOUS ONCE
Status: COMPLETED | OUTPATIENT
Start: 2025-06-27 | End: 2025-06-27

## 2025-06-27 RX ORDER — OXYCODONE AND ACETAMINOPHEN 10; 325 MG/1; MG/1
1 TABLET ORAL EVERY 8 HOURS PRN
Qty: 5 TABLET | Refills: 0 | Status: SHIPPED | OUTPATIENT
Start: 2025-06-27 | End: 2025-06-30

## 2025-06-27 RX ORDER — DIAZEPAM 5 MG/ML
5 INJECTION, SOLUTION INTRAMUSCULAR; INTRAVENOUS ONCE
Status: COMPLETED | OUTPATIENT
Start: 2025-06-27 | End: 2025-06-27

## 2025-06-27 RX ORDER — KETOROLAC TROMETHAMINE 30 MG/ML
30 INJECTION, SOLUTION INTRAMUSCULAR; INTRAVENOUS ONCE
Status: DISCONTINUED | OUTPATIENT
Start: 2025-06-27 | End: 2025-06-27

## 2025-06-27 RX ORDER — HYDROMORPHONE HYDROCHLORIDE 1 MG/ML
1 INJECTION, SOLUTION INTRAMUSCULAR; INTRAVENOUS; SUBCUTANEOUS ONCE
Status: COMPLETED | OUTPATIENT
Start: 2025-06-27 | End: 2025-06-27

## 2025-06-27 RX ADMIN — KETOROLAC TROMETHAMINE 30 MG: 30 INJECTION, SOLUTION INTRAMUSCULAR at 16:50

## 2025-06-27 RX ADMIN — ONDANSETRON 4 MG: 2 INJECTION INTRAMUSCULAR; INTRAVENOUS at 15:33

## 2025-06-27 RX ADMIN — DIAZEPAM 5 MG: 10 INJECTION, SOLUTION INTRAMUSCULAR; INTRAVENOUS at 15:33

## 2025-06-27 RX ADMIN — HYDROMORPHONE HYDROCHLORIDE 1 MG: 1 INJECTION, SOLUTION INTRAMUSCULAR; INTRAVENOUS; SUBCUTANEOUS at 15:34

## 2025-06-27 RX ADMIN — HYDROMORPHONE HYDROCHLORIDE 1 MG: 1 INJECTION, SOLUTION INTRAMUSCULAR; INTRAVENOUS; SUBCUTANEOUS at 16:50

## 2025-06-27 ASSESSMENT — PAIN DESCRIPTION - DESCRIPTORS: DESCRIPTORS: ACHING;STABBING

## 2025-06-27 ASSESSMENT — PAIN DESCRIPTION - PAIN TYPE
TYPE: ACUTE PAIN;CHRONIC PAIN
TYPE: ACUTE PAIN;CHRONIC PAIN

## 2025-06-27 ASSESSMENT — LIFESTYLE VARIABLES
EVER FELT BAD OR GUILTY ABOUT YOUR DRINKING: NO
TOTAL SCORE: 0
HAVE PEOPLE ANNOYED YOU BY CRITICIZING YOUR DRINKING: NO
EVER HAD A DRINK FIRST THING IN THE MORNING TO STEADY YOUR NERVES TO GET RID OF A HANGOVER: NO
HAVE YOU EVER FELT YOU SHOULD CUT DOWN ON YOUR DRINKING: NO

## 2025-06-27 ASSESSMENT — PAIN SCALES - GENERAL
PAINLEVEL_OUTOF10: 10 - WORST POSSIBLE PAIN
PAINLEVEL_OUTOF10: 9
PAINLEVEL_OUTOF10: 7
PAINLEVEL_OUTOF10: 10 - WORST POSSIBLE PAIN

## 2025-06-27 ASSESSMENT — PAIN - FUNCTIONAL ASSESSMENT
PAIN_FUNCTIONAL_ASSESSMENT: 0-10

## 2025-06-27 ASSESSMENT — PAIN DESCRIPTION - LOCATION
LOCATION: BACK

## 2025-06-27 ASSESSMENT — PAIN DESCRIPTION - PROGRESSION
CLINICAL_PROGRESSION: GRADUALLY IMPROVING
CLINICAL_PROGRESSION: NOT CHANGED

## 2025-06-27 ASSESSMENT — PAIN DESCRIPTION - FREQUENCY: FREQUENCY: CONSTANT/CONTINUOUS

## 2025-06-27 ASSESSMENT — PAIN DESCRIPTION - ORIENTATION: ORIENTATION: UPPER;LOWER;MID

## 2025-06-27 ASSESSMENT — PAIN DESCRIPTION - ONSET: ONSET: ONGOING

## 2025-06-27 NOTE — ED PROVIDER NOTES
"HPI   Chief Complaint   Patient presents with   • Back Pain     Pt seen a few days ago for back pain states he has lung cancer but pain in back is so bad he can't stand        64-year-old male with metastatic lung cancer on hospice presents to the emergency department for back pain.  He has known lytic lesions to the spine and possibly the pelvis.  He states he wants to get rid of his hospice team because he is not getting along with them.  They have been giving him methadone which he states \"is like aspirin.  Is not doing anything for my pain.  \"He says he has not taken it in a week because he ran out of his prescription and is not following up with his hospice team.  He was previously receiving treatments at [facility] but is not currently receiving treatments because of complications from chemotherapy.  He was seen here 5 days ago for back pain and had CT scans.  He was prescribed Percocet and states he has taken it all and now his pain is out of control. Patient denies incontinence, sensory motor changes, urinary retention, saddle anesthesia, gait disturbance, radiation into the legs/abdomen/groin.                Patient History   Medical History[1]  Surgical History[2]  Family History[3]  Social History[4]    Physical Exam   ED Triage Vitals [06/27/25 1402]   Temperature Heart Rate Respirations BP   37 °C (98.6 °F) 75 18 134/72      Pulse Ox Temp Source Heart Rate Source Patient Position   97 % Temporal Monitor --      BP Location FiO2 (%)     -- --       Physical Exam  Vitals and nursing note reviewed.   Constitutional:       General: He is not in acute distress.     Comments: Cachectic   HENT:      Head: Atraumatic.      Mouth/Throat:      Mouth: Mucous membranes are moist.      Pharynx: Oropharynx is clear.   Eyes:      Extraocular Movements: Extraocular movements intact.      Conjunctiva/sclera: Conjunctivae normal.      Pupils: Pupils are equal, round, and reactive to light.   Cardiovascular:    "   Rate and Rhythm: Normal rate and regular rhythm.      Pulses: Normal pulses.   Pulmonary:      Effort: Pulmonary effort is normal. No respiratory distress.      Breath sounds: Normal breath sounds.   Abdominal:      General: There is no distension.      Palpations: Abdomen is soft.      Tenderness: There is no abdominal tenderness. There is no guarding or rebound.   Musculoskeletal:         General: No deformity.      Cervical back: Neck supple.      Comments: Generalized tenderness to palpation of the entire spine but there are no step-offs or deformities.  Full range of motion of arms and legs intact with equal sensation bilaterally.  He is able to walk but has an antalgic gait.  He can perform heel toe raises.  No foot drop.   Skin:     General: Skin is warm and dry.   Neurological:      Mental Status: He is alert and oriented to person, place, and time. Mental status is at baseline.      Cranial Nerves: No cranial nerve deficit.      Sensory: No sensory deficit.      Motor: No weakness.   Psychiatric:         Mood and Affect: Mood normal.         Behavior: Behavior normal.           ED Course & MDM   Diagnoses as of 06/27/25 1649   Primary malignant neoplasm of lung metastatic to other site, unspecified laterality (Multi)   Osteolytic lesion due to metastasis (Multi)                 No data recorded     [place] Coma Scale Score: 15 (06/27/25 1406 : Janet Estrella RN)                           Medical Decision Making  64-year-old male presents emergency department for acute back pain with known metastatic lesions from lung cancer.  He currently has no medications at home for his pain because he is not following up with his hospice team.  He states he would like to be referred to a new hospice team.  He does not have any physical exam findings or history concerning for cauda equina, spinal epidural abscess or other spinal cord impingement syndrome.  He is neurovascularly intact on my exam.  He has an  antalgic gait and diffuse tenderness of his entire back.  Patient treated with IV Dilaudid, Valium, Zofran  I reach out to our  to discuss options for the patient for hospice.  Patient did not have much improvement of his pain with the treatments.  He states he needs to go to work at 7.  Asked if he can stay home for the evening and I can give him something additional if someone can pick him up.  He agrees with this plan.    discussed hospice care options with the patient and will be in touch with him this week.  I will prescribe him Percocet but discussed if he wants stronger medications, he will need to go through hospice.  He agrees with this plan.  Discussed results with patient and/or family/friend and recommended close follow up with primary care or specialist.  Reviewed return precautions at length.  I answered all questions.           Procedure  Procedures           [1]  Past Medical History:  Diagnosis Date   • 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)    • Arthritis    • Chronic pain disorder    • COPD (chronic obstructive pulmonary disease) (Multi)    • Degenerative disc disease, lumbar    • Depression    • Essential (primary) hypertension     Benign essential hypertension   • Personal history of other diseases of the digestive system     History of diverticulosis   • Personal history of other diseases of the musculoskeletal system and connective tissue     History of gout   • Personal history of other diseases of the respiratory system     History of COPD   • Psychiatric illness    • Substance abuse    • Suicide attempt (Multi)    [2]  Past Surgical History:  Procedure Laterality Date   • ELBOW SURGERY     • OTHER SURGICAL HISTORY  05/06/2020    Flexor tendon repair   • OTHER SURGICAL HISTORY  05/06/2020    Oral surgery   • OTHER SURGICAL HISTORY  05/06/2020    Shoulder surgery   [3]  No family history on file.  [4]  Social History  Tobacco Use   • Smoking status: Every Day     Current  packs/day: 1.00     Average packs/day: 1 pack/day for 15.0 years (15.0 ttl pk-yrs)     Types: Cigarettes   • Smokeless tobacco: Never   Vaping Use   • Vaping status: Never Used   Substance Use Topics   • Alcohol use: Yes     Comment: occasional   • Drug use: Not Currently      Urmila Fulton PA-C  06/27/25 4592

## 2025-06-27 NOTE — Clinical Note
Joe Magaña was seen and treated in our emergency department on 6/27/2025.  He may return to work on 06/30/2025.       If you have any questions or concerns, please don't hesitate to call.      Urmila Fulton PA-C

## 2025-06-27 NOTE — CONSULTS
"Social Work Note  The LSW received a referral from the [facility] ER PA re: the pt who is voicing concerns re: his hospice agency and pain management. The LSW met with the pt in [facility] ER this date. The pt verified his home address and states that he resides with a friend Alma and Alma's mom. The pt states that he works part time as a . The The pt's medical record indicates that the [facility] LSWMary Kay [phone] assisted in arranging [facility] 1/2025. The pt's experience with [facility] was positive until recently when the R MD is ordering methadone for his pain management and pt states that methadone has not been alleviating his pain. The LSW called and spoke with the [facility] LSWMary Kay who will F/U with the pt in a week after this LSW notifies [facility] of the pt's pain management concerns.  The LSW has contacted [facility] and left a message with a return call received at [phone] stating that the pt was discharged from [facility] on 6/26/25.     Update 1728:  The LSW met with the pt to provide freedom of choice in selecting a \"New\" hospice agency. The pt has choiced with [facility] and the referral and Hospice order have been sent in [facility] this date for review, insurance verification, acceptance and hospice meeting arrangements. The pt states that he uses [facility] as his PCP an had been seen within the last week. The LSW has also called and notified the [facility] LiaisonRosemarie at [phone] of the referral.   "

## 2025-06-30 ENCOUNTER — APPOINTMENT (OUTPATIENT)
Dept: RADIOLOGY | Facility: HOSPITAL | Age: 65
End: 2025-06-30
Payer: MEDICARE

## 2025-06-30 ENCOUNTER — HOSPITAL ENCOUNTER (EMERGENCY)
Facility: HOSPITAL | Age: 65
Discharge: HOME | End: 2025-06-30
Payer: MEDICARE

## 2025-06-30 ENCOUNTER — APPOINTMENT (OUTPATIENT)
Dept: CARDIOLOGY | Facility: HOSPITAL | Age: 65
End: 2025-06-30
Payer: MEDICARE

## 2025-06-30 ENCOUNTER — SOCIAL WORK (OUTPATIENT)
Dept: CASE MANAGEMENT | Facility: HOSPITAL | Age: 65
End: 2025-06-30
Payer: MEDICARE

## 2025-06-30 VITALS
BODY MASS INDEX: 19.88 KG/M2 | DIASTOLIC BLOOD PRESSURE: 82 MMHG | OXYGEN SATURATION: 96 % | TEMPERATURE: 97.9 F | SYSTOLIC BLOOD PRESSURE: 161 MMHG | WEIGHT: 150 LBS | HEIGHT: 73 IN | HEART RATE: 58 BPM | RESPIRATION RATE: 16 BRPM

## 2025-06-30 DIAGNOSIS — R16.0 LIVER MASSES: ICD-10-CM

## 2025-06-30 DIAGNOSIS — M84.48XA PATHOLOGICAL FRACTURE OF VERTEBRA, UNSPECIFIED PATHOLOGICAL CAUSE, INITIAL ENCOUNTER: Primary | ICD-10-CM

## 2025-06-30 LAB
ALBUMIN SERPL BCP-MCNC: 3.8 G/DL (ref 3.4–5)
ALP SERPL-CCNC: 93 U/L (ref 33–136)
ALT SERPL W P-5'-P-CCNC: 5 U/L (ref 10–52)
ANION GAP SERPL CALC-SCNC: 14 MMOL/L (ref 10–20)
AST SERPL W P-5'-P-CCNC: 9 U/L (ref 9–39)
BASOPHILS # BLD AUTO: 0.03 X10*3/UL (ref 0–0.1)
BASOPHILS NFR BLD AUTO: 0.5 %
BILIRUB SERPL-MCNC: 0.3 MG/DL (ref 0–1.2)
BUN SERPL-MCNC: 21 MG/DL (ref 6–23)
CALCIUM SERPL-MCNC: 9.3 MG/DL (ref 8.6–10.3)
CARDIAC TROPONIN I PNL SERPL HS: 5 NG/L (ref 0–20)
CHLORIDE SERPL-SCNC: 104 MMOL/L (ref 98–107)
CO2 SERPL-SCNC: 26 MMOL/L (ref 21–32)
CREAT SERPL-MCNC: 1.43 MG/DL (ref 0.5–1.3)
EGFRCR SERPLBLD CKD-EPI 2021: 55 ML/MIN/1.73M*2
EOSINOPHIL # BLD AUTO: 0.13 X10*3/UL (ref 0–0.7)
EOSINOPHIL NFR BLD AUTO: 2.3 %
ERYTHROCYTE [DISTWIDTH] IN BLOOD BY AUTOMATED COUNT: 15 % (ref 11.5–14.5)
GLUCOSE SERPL-MCNC: 102 MG/DL (ref 74–99)
HCT VFR BLD AUTO: 30.1 % (ref 41–52)
HGB BLD-MCNC: 9.5 G/DL (ref 13.5–17.5)
IMM GRANULOCYTES # BLD AUTO: 0.02 X10*3/UL (ref 0–0.7)
IMM GRANULOCYTES NFR BLD AUTO: 0.4 % (ref 0–0.9)
LACTATE SERPL-SCNC: 1.4 MMOL/L (ref 0.4–2)
LIPASE SERPL-CCNC: 4 U/L (ref 9–82)
LYMPHOCYTES # BLD AUTO: 1.37 X10*3/UL (ref 1.2–4.8)
LYMPHOCYTES NFR BLD AUTO: 24.1 %
MCH RBC QN AUTO: 30.4 PG (ref 26–34)
MCHC RBC AUTO-ENTMCNC: 31.6 G/DL (ref 32–36)
MCV RBC AUTO: 97 FL (ref 80–100)
MONOCYTES # BLD AUTO: 0.47 X10*3/UL (ref 0.1–1)
MONOCYTES NFR BLD AUTO: 8.3 %
NEUTROPHILS # BLD AUTO: 3.67 X10*3/UL (ref 1.2–7.7)
NEUTROPHILS NFR BLD AUTO: 64.4 %
NRBC BLD-RTO: 0 /100 WBCS (ref 0–0)
PLATELET # BLD AUTO: 237 X10*3/UL (ref 150–450)
POTASSIUM SERPL-SCNC: 3.7 MMOL/L (ref 3.5–5.3)
PROT SERPL-MCNC: 7.4 G/DL (ref 6.4–8.2)
RBC # BLD AUTO: 3.12 X10*6/UL (ref 4.5–5.9)
SODIUM SERPL-SCNC: 140 MMOL/L (ref 136–145)
WBC # BLD AUTO: 5.7 X10*3/UL (ref 4.4–11.3)

## 2025-06-30 PROCEDURE — 2550000001 HC RX 255 CONTRASTS: Performed by: PHYSICIAN ASSISTANT

## 2025-06-30 PROCEDURE — 36415 COLL VENOUS BLD VENIPUNCTURE: CPT | Performed by: PHYSICIAN ASSISTANT

## 2025-06-30 PROCEDURE — 2500000004 HC RX 250 GENERAL PHARMACY W/ HCPCS (ALT 636 FOR OP/ED): Performed by: PHYSICIAN ASSISTANT

## 2025-06-30 PROCEDURE — 96374 THER/PROPH/DIAG INJ IV PUSH: CPT | Mod: 59

## 2025-06-30 PROCEDURE — 71275 CT ANGIOGRAPHY CHEST: CPT

## 2025-06-30 PROCEDURE — 99285 EMERGENCY DEPT VISIT HI MDM: CPT | Mod: 25

## 2025-06-30 PROCEDURE — 83605 ASSAY OF LACTIC ACID: CPT | Performed by: PHYSICIAN ASSISTANT

## 2025-06-30 PROCEDURE — 85025 COMPLETE CBC W/AUTO DIFF WBC: CPT | Performed by: PHYSICIAN ASSISTANT

## 2025-06-30 PROCEDURE — 96376 TX/PRO/DX INJ SAME DRUG ADON: CPT | Mod: 59

## 2025-06-30 PROCEDURE — 84484 ASSAY OF TROPONIN QUANT: CPT | Performed by: PHYSICIAN ASSISTANT

## 2025-06-30 PROCEDURE — 83690 ASSAY OF LIPASE: CPT | Performed by: PHYSICIAN ASSISTANT

## 2025-06-30 PROCEDURE — 80053 COMPREHEN METABOLIC PANEL: CPT | Performed by: PHYSICIAN ASSISTANT

## 2025-06-30 PROCEDURE — 93005 ELECTROCARDIOGRAM TRACING: CPT

## 2025-06-30 PROCEDURE — 74177 CT ABD & PELVIS W/CONTRAST: CPT

## 2025-06-30 PROCEDURE — 96361 HYDRATE IV INFUSION ADD-ON: CPT

## 2025-06-30 PROCEDURE — 96375 TX/PRO/DX INJ NEW DRUG ADDON: CPT | Mod: 59

## 2025-06-30 RX ORDER — HYDROMORPHONE HYDROCHLORIDE 1 MG/ML
1 INJECTION, SOLUTION INTRAMUSCULAR; INTRAVENOUS; SUBCUTANEOUS ONCE
Status: COMPLETED | OUTPATIENT
Start: 2025-06-30 | End: 2025-06-30

## 2025-06-30 RX ORDER — ONDANSETRON HYDROCHLORIDE 2 MG/ML
4 INJECTION, SOLUTION INTRAVENOUS ONCE
Status: COMPLETED | OUTPATIENT
Start: 2025-06-30 | End: 2025-06-30

## 2025-06-30 RX ORDER — OXYCODONE HYDROCHLORIDE 5 MG/1
5 TABLET ORAL EVERY 6 HOURS PRN
Qty: 12 TABLET | Refills: 0 | Status: SHIPPED | OUTPATIENT
Start: 2025-06-30 | End: 2025-07-12 | Stop reason: HOSPADM

## 2025-06-30 RX ADMIN — HYDROMORPHONE HYDROCHLORIDE 1 MG: 1 INJECTION, SOLUTION INTRAMUSCULAR; INTRAVENOUS; SUBCUTANEOUS at 10:12

## 2025-06-30 RX ADMIN — HYDROMORPHONE HYDROCHLORIDE 1 MG: 1 INJECTION, SOLUTION INTRAMUSCULAR; INTRAVENOUS; SUBCUTANEOUS at 06:43

## 2025-06-30 RX ADMIN — SODIUM CHLORIDE 1000 ML: 0.9 INJECTION, SOLUTION INTRAVENOUS at 08:09

## 2025-06-30 RX ADMIN — IOHEXOL 75 ML: 350 INJECTION, SOLUTION INTRAVENOUS at 07:53

## 2025-06-30 RX ADMIN — ONDANSETRON 4 MG: 2 INJECTION INTRAMUSCULAR; INTRAVENOUS at 06:43

## 2025-06-30 ASSESSMENT — LIFESTYLE VARIABLES
TOTAL SCORE: 0
HAVE PEOPLE ANNOYED YOU BY CRITICIZING YOUR DRINKING: NO
HAVE YOU EVER FELT YOU SHOULD CUT DOWN ON YOUR DRINKING: NO
EVER HAD A DRINK FIRST THING IN THE MORNING TO STEADY YOUR NERVES TO GET RID OF A HANGOVER: NO
EVER FELT BAD OR GUILTY ABOUT YOUR DRINKING: NO

## 2025-06-30 ASSESSMENT — PAIN DESCRIPTION - PAIN TYPE: TYPE: ACUTE PAIN

## 2025-06-30 ASSESSMENT — PAIN DESCRIPTION - LOCATION
LOCATION: ABDOMEN
LOCATION_2: BACK

## 2025-06-30 ASSESSMENT — PAIN SCALES - GENERAL
PAINLEVEL_OUTOF10: 5 - MODERATE PAIN
PAINLEVEL_OUTOF10: 8
PAINLEVEL_OUTOF10: 10 - WORST POSSIBLE PAIN
PAINLEVEL_OUTOF10: 10 - WORST POSSIBLE PAIN

## 2025-06-30 ASSESSMENT — PAIN DESCRIPTION - FREQUENCY: FREQUENCY: CONSTANT/CONTINUOUS

## 2025-06-30 ASSESSMENT — PAIN - FUNCTIONAL ASSESSMENT: PAIN_FUNCTIONAL_ASSESSMENT: 0-10

## 2025-06-30 NOTE — PROGRESS NOTES
Social Work Note  6/30/2025  SW received a call from ED KATHERINE on 6/27/2025.  Patient was there due to pain.  She asked about the hospice agency patient is using and later called back that [facility] had discharged him the day before.  Patient wished to go with [facility] (Rosemarie @[phone]).  SW checked patient's chart for discharge to follow up and be sure the hospice referral was completed.  Patient is still in the hospital.  EFRA Rivers, [facility] [phone]

## 2025-06-30 NOTE — ED PROVIDER NOTES
HPI   Chief Complaint   Patient presents with    Abdominal Pain    Flank Pain     Abd pain, flank pain x 1 yr. Pt currently being treated for lung ca.       A 64-year-old male patient comes into the emergency department today with history of cancer that he describes as spreading.  He said multiple visits over the last couple weeks to the ER.  States he has developed right-sided abdominal pain, right rib pain.  States this started yesterday.  States his overall pain is down 10 on the pain scale.  States he is not sleeping.  This purpose comes in the emergency department today for the evaluation.  Otherwise no complaints present time.              Patient History   Medical History[1]  Surgical History[2]  Family History[3]  Social History[4]    Physical Exam   ED Triage Vitals [06/30/25 0607]   Temperature Heart Rate Respirations BP   36.7 °C (98.1 °F) 58 18 141/70      Pulse Ox Temp Source Heart Rate Source Patient Position   98 % Temporal Monitor Sitting      BP Location FiO2 (%)     Right arm --       Physical Exam  Constitutional:       General: He is in acute distress.      Appearance: Normal appearance. He is not ill-appearing or diaphoretic.   HENT:      Head: Normocephalic and atraumatic.      Nose: Nose normal.   Eyes:      Extraocular Movements: Extraocular movements intact.      Conjunctiva/sclera: Conjunctivae normal.      Pupils: Pupils are equal, round, and reactive to light.   Cardiovascular:      Rate and Rhythm: Normal rate and regular rhythm.   Pulmonary:      Effort: Pulmonary effort is normal. No respiratory distress.      Breath sounds: Normal breath sounds. No stridor. No wheezing.   Abdominal:      Tenderness: There is abdominal tenderness in the right upper quadrant. There is right CVA tenderness.   Musculoskeletal:         General: Normal range of motion.      Cervical back: Normal range of motion.   Skin:     General: Skin is warm and dry.   Neurological:      General: No focal deficit  present.      Mental Status: He is alert and oriented to person, place, and time. Mental status is at baseline.           ED Course & MDM   Diagnoses as of 06/30/25 1002   Pathological fracture of vertebra, unspecified pathological cause, initial encounter   Liver masses                 No data recorded     Logan Coma Scale Score: 15 (06/30/25 0609 : Kelvin Clay RN)                           Medical Decision Making  A 64-year-old male patient comes into the emergency department today with history of cancer that he describes as spreading.  He said multiple visits over the last couple weeks to the ER.  States he has developed right-sided abdominal pain, right rib pain.  States this started yesterday.  States his overall pain is down 10 on the pain scale.  States he is not sleeping.  This purpose comes in the emergency department today for the evaluation.  Otherwise no complaints present time.    EKG, laboratory studies ordered to rule out arrhythmias, Hyacinth abnormalities, leukocytosis, acute kidney injury.  CT PE study ordered to rule out pulmonary emboli, rib lesions, CT abdomen pelvis ordered to rule out acute intra-abdominal surgical need.  IV Dilaudid IV Zofran ordered for patient's pain and potential nausea secondary to narcotic pain medication.    Patient has negative troponin negative lipase creatinine 1.4 GFR 55, lactate negative no leukocytosis or left shift patient's hemoglobin 9.5 which is improved from prior laboratory studies.  Patient has no PE but does show fractures in T1 T9 which are pathologic from patient's lesions.  Patient states he has hospice coming out of the home today.  Patient also has some old rib fractures and hepatic masses.  Patient feels comfortable going home and seeing hospice.  Will give him medication for home.  Patient agrees with this plan expressed verbal understanding.  Questions were answered.    Historian is the patient    Diagnosis: Pathologic vertebral fractures,  hepatic mass        Labs Reviewed   CBC WITH AUTO DIFFERENTIAL - Abnormal       Result Value    WBC 5.7      nRBC 0.0      RBC 3.12 (*)     Hemoglobin 9.5 (*)     Hematocrit 30.1 (*)     MCV 97      MCH 30.4      MCHC 31.6 (*)     RDW 15.0 (*)     Platelets 237      Neutrophils % 64.4      Immature Granulocytes %, Automated 0.4      Lymphocytes % 24.1      Monocytes % 8.3      Eosinophils % 2.3      Basophils % 0.5      Neutrophils Absolute 3.67      Immature Granulocytes Absolute, Automated 0.02      Lymphocytes Absolute 1.37      Monocytes Absolute 0.47      Eosinophils Absolute 0.13      Basophils Absolute 0.03     COMPREHENSIVE METABOLIC PANEL - Abnormal    Glucose 102 (*)     Sodium 140      Potassium 3.7      Chloride 104      Bicarbonate 26      Anion Gap 14      Urea Nitrogen 21      Creatinine 1.43 (*)     eGFR 55 (*)     Calcium 9.3      Albumin 3.8      Alkaline Phosphatase 93      Total Protein 7.4      AST 9      Bilirubin, Total 0.3      ALT 5 (*)    LIPASE - Abnormal    Lipase 4 (*)     Narrative:     Venipuncture immediately after or during the administration of Metamizole may lead to falsely low results. Testing should be performed immediately prior to Metamizole dosing.   LACTATE - Normal    Lactate 1.4      Narrative:     Venipuncture immediately after or during the administration of Metamizole may lead to falsely low results. Testing should be performed immediately prior to Metamizole dosing.   TROPONIN I, HIGH SENSITIVITY - Normal    Troponin I, High Sensitivity 5      Narrative:     Less than 99th percentile of normal range cutoff-  Female and children under 18 years old <14 ng/L; Male <21 ng/L: Negative  Repeat testing should be performed if clinically indicated.     Female and children under 18 years old 14-50 ng/L; Male 21-50 ng/L:  Consistent with possible cardiac damage and possible increased clinical   risk. Serial measurements may help to assess extent of myocardial damage.     >50 ng/L:  Consistent with cardiac damage, increased clinical risk and  myocardial infarction. Serial measurements may help assess extent of   myocardial damage.      NOTE: Children less than 1 year old may have higher baseline troponin   levels and results should be interpreted in conjunction with the overall   clinical context.     NOTE: Troponin I testing is performed using a different   testing methodology at [facility] than at other   [facility]. Direct result comparisons should only   be made within the same method.        CT angio chest for pulmonary embolism   Final Result        No CT evidence of pulmonary embolism.        New pathologic fractures at T1 and T9 vertebral bodies with   associated 6 mm retropulsion and mild-to-moderate canal stenosis at   T1.        Multiple bilateral subacute nondisplaced rib fractures, new when   compared to 12/26/2024, favoring non-pathologic.        Mildly increased size right perihilar mass and intrathoracic   lymphadenopathy.        Mixed changes of hepatic masses with decreased size of multilevel   masses and increased size of 1 mass. Stable mild periportal   lymphadenopathy.        MACRO        Jessica Seay discussed the significance and urgency of this   critical finding by EPIC secure chat with  SHARMILA HUIZAR on 6/30/2025   at 8:57 am.  (**-RCF-**) Findings:  There are multiple critical   findings. See findings.        Signed by: Jessica Seay 6/30/2025 8:59 AM   Dictation workstation:   [id number]      CT abdomen pelvis w IV contrast   Final Result        No CT evidence of pulmonary embolism.        New pathologic fractures at T1 and T9 vertebral bodies with   associated 6 mm retropulsion and mild-to-moderate canal stenosis at   T1.        Multiple bilateral subacute nondisplaced rib fractures, new when   compared to 12/26/2024, favoring non-pathologic.        Mildly increased size right perihilar mass and intrathoracic   lymphadenopathy.        Mixed  changes of hepatic masses with decreased size of multilevel   masses and increased size of 1 mass. Stable mild periportal   lymphadenopathy.        MACRO        Jessica Seay discussed the significance and urgency of this   critical finding by EPIC secure chat with  SHARMILA ROMAN on 6/30/2025   at 8:57 am.  (**-RCF-**) Findings:  There are multiple critical   findings. See findings.        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)     Arthritis     Chronic pain disorder     COPD (chronic obstructive pulmonary disease) (Multi)     Degenerative disc disease, lumbar     Depression     Essential (primary) hypertension     Benign essential hypertension    Personal history of other diseases of the digestive system     History of diverticulosis    Personal history of other diseases of the musculoskeletal system and connective tissue     History of gout    Personal history of other diseases of the respiratory system     History of COPD    Psychiatric illness     Substance abuse     Suicide attempt (Multi)    [2]   Past Surgical History:  Procedure Laterality Date    ELBOW SURGERY      OTHER SURGICAL HISTORY  05/06/2020    Flexor tendon repair    OTHER SURGICAL HISTORY  05/06/2020    Oral surgery    OTHER SURGICAL HISTORY  05/06/2020    Shoulder surgery   [3] No family history on file.  [4]   Social History  Tobacco Use    Smoking status: Every Day     Current packs/day: 1.00     Average packs/day: 1 pack/day for 15.0 years (15.0 ttl pk-yrs)     Types: Cigarettes    Smokeless tobacco: Never    Vaping Use    Vaping status: Never Used   Substance Use Topics    Alcohol use: Yes     Comment: occasional    Drug use: Not Currently        Min Roman PA-C  06/30/25 1002

## 2025-07-02 ENCOUNTER — SOCIAL WORK (OUTPATIENT)
Dept: CASE MANAGEMENT | Facility: HOSPITAL | Age: 65
End: 2025-07-02
Payer: MEDICARE

## 2025-07-02 NOTE — PROGRESS NOTES
Social Work Note  7/2/2025 SW contacted patient today to see how he was doing.  When this writer checked about [facility] checking on him he stated they weren't following.  He did find a brochure and later realized this was where the nurse was from who saw him yesterday.  Patient stated his pain has gotten bad again and he was out of his medication.  SW encouraged him to contact his nurse and he stated he called her earlier.  SW explained they would get back with him and to talk with them about what pain medication has helped him.  Mary Kay Obrien, EFRA, \[facility]\"" [phone]

## 2025-07-08 ENCOUNTER — APPOINTMENT (OUTPATIENT)
Dept: RADIOLOGY | Facility: HOSPITAL | Age: 65
End: 2025-07-08
Payer: MEDICARE

## 2025-07-08 ENCOUNTER — HOSPITAL ENCOUNTER (EMERGENCY)
Facility: HOSPITAL | Age: 65
Discharge: HOSPICE | End: 2025-07-08
Attending: STUDENT IN AN ORGANIZED HEALTH CARE EDUCATION/TRAINING PROGRAM
Payer: MEDICARE

## 2025-07-08 ASSESSMENT — PAIN DESCRIPTION - PROGRESSION
CLINICAL_PROGRESSION: NOT CHANGED
CLINICAL_PROGRESSION: GRADUALLY IMPROVING
CLINICAL_PROGRESSION: NOT CHANGED

## 2025-07-08 ASSESSMENT — PAIN SCALES - GENERAL
PAINLEVEL_OUTOF10: 10 - WORST POSSIBLE PAIN
PAINLEVEL_OUTOF10: 9
PAINLEVEL_OUTOF10: 8

## 2025-07-08 ASSESSMENT — PAIN DESCRIPTION - LOCATION
LOCATION: ABDOMEN

## 2025-07-08 ASSESSMENT — PAIN DESCRIPTION - ORIENTATION
ORIENTATION: ANTERIOR
ORIENTATION: RIGHT
ORIENTATION: ANTERIOR

## 2025-07-08 ASSESSMENT — PAIN DESCRIPTION - PAIN TYPE
TYPE: CHRONIC PAIN
TYPE: ACUTE PAIN
TYPE: ACUTE PAIN

## 2025-07-08 ASSESSMENT — PAIN - FUNCTIONAL ASSESSMENT
PAIN_FUNCTIONAL_ASSESSMENT: 0-10

## 2025-07-08 ASSESSMENT — PAIN DESCRIPTION - FREQUENCY
FREQUENCY: CONSTANT/CONTINUOUS

## 2025-07-08 ASSESSMENT — PAIN DESCRIPTION - DESCRIPTORS
DESCRIPTORS: STABBING;ACHING
DESCRIPTORS: ACHING
DESCRIPTORS: ACHING;SPASM;SORE

## 2025-07-08 ASSESSMENT — PAIN DESCRIPTION - ONSET
ONSET: ONGOING
ONSET: ONGOING

## 2025-07-08 NOTE — ED PROVIDER NOTES
HPI   Chief Complaint   Patient presents with    Abdominal Pain     Pt c/o abdominal pain right side has cancer hospice gave him pain meds but he hasn't heard from them in a week       Patient is a 64-year-old male with history of COPD, hypertension, lung cancer not currently on treatment, substance use who presents for abdominal pain.  Patient states he has been having right lower quadrant pain as well as right low back pain.  No urinary retention, bowel control problems, saddle anesthesia.  Denies chest pain, shortness of breath, fevers, chills, nausea, vomiting, diarrhea, dysuria, hematuria, polyuria.  No trauma.  States this feels similar to his previous episodes.  States he ran out of his pain medications.              Patient History   Medical History[1]  Surgical History[2]  Family History[3]  Social History[4]    Physical Exam   ED Triage Vitals [07/08/25 0732]   Temperature Heart Rate Respirations BP   36.6 °C (97.9 °F) 73 16 (!) 187/95      Pulse Ox Temp Source Heart Rate Source Patient Position   95 % Temporal Monitor --      BP Location FiO2 (%)     -- --       Physical Exam  Constitutional:       General: He is not in acute distress.  HENT:      Head: Normocephalic.   Eyes:      Extraocular Movements: Extraocular movements intact.      Conjunctiva/sclera: Conjunctivae normal.      Pupils: Pupils are equal, round, and reactive to light.   Cardiovascular:      Rate and Rhythm: Normal rate and regular rhythm.      Pulses: Normal pulses.      Heart sounds: Normal heart sounds.   Pulmonary:      Effort: Pulmonary effort is normal.      Breath sounds: Normal breath sounds.   Abdominal:      General: There is no distension.      Palpations: Abdomen is soft. There is no mass.      Tenderness: There is abdominal tenderness (RLQ). There is right CVA tenderness and guarding. There is no left CVA tenderness.   Musculoskeletal:         General: No deformity.      Cervical back: Normal range of motion and neck  supple.      Right lower leg: No edema.      Left lower leg: No edema.      Comments: No C/T/L-spine tenderness to palpation, step-offs or deformities.   Skin:     General: Skin is warm and dry.      Findings: No lesion or rash.   Neurological:      General: No focal deficit present.      Mental Status: He is alert and oriented to person, place, and time. Mental status is at baseline.      Cranial Nerves: No cranial nerve deficit.      Sensory: No sensory deficit.      Motor: No weakness.   Psychiatric:         Mood and Affect: Mood normal.           ED Course & MDM   Diagnoses as of 07/09/25 0945   Right lower quadrant abdominal pain   Metastatic malignant neoplasm, unspecified site (Multi)                 No data recorded                                 Medical Decision Making  Patient is a 64-year-old male with above-stated past medical history presents for abdominal pain.  I have low suspicion for ACS.  CMP shows a mildly elevated alk phos, however no other derangements of his LFTs.  I low suspicion for acute biliary pathology.  No elevation in his creatinine.  Lipase is not consistent pancreatitis.  CBC shows no leukocytosis, anemia is improved versus his previous value.  Urinalysis is not consistent with urinary tract infection.  CT scan showed similar size liver lesions, lytic lesion and soft tissue component at the sacral ala extending into the right S2-S3 foramen.  These appear similar to his scans from June 30.  Patient has no neurologic deficits or signs of spinal dural abscess, cauda equina, cord compression syndrome.  Patient was treated with pain medications.  He feels comfortable turning home.  I did advise him to follow-up with his cancer care team and hospice physicians.  He stated understanding agreement.  He was given return precautions.  He stated understanding agreement.  Patient was discharged home in stable condition.  He was given a short course of pain medications and advised to follow-up  with his hospice physician regarding additional pain medication.    Disclaimer: This note was dictated using speech recognition software. Minor errors in transcription may be present. Please call if questions.     Jack España MD  [facility] Emergency Medicine  Contact on [employer]        Problems Addressed:  Metastatic malignant neoplasm, unspecified site (Multi): acute illness or injury  Right lower quadrant abdominal pain: acute illness or injury    Amount and/or Complexity of Data Reviewed  Labs: ordered.  Radiology: ordered and independent interpretation performed.        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)     Arthritis     Chronic pain disorder     COPD (chronic obstructive pulmonary disease) (Multi)     Degenerative disc disease, lumbar     Depression     Essential (primary) hypertension     Benign essential hypertension    Personal history of other diseases of the digestive system     History of diverticulosis    Personal history of other diseases of the musculoskeletal system and connective tissue     History of gout    Personal history of other diseases of the respiratory system     History of COPD    Psychiatric illness     Substance abuse     Suicide attempt (Multi)    [2]   Past Surgical History:  Procedure Laterality Date    ELBOW SURGERY      OTHER SURGICAL HISTORY  05/06/2020    Flexor tendon repair    OTHER SURGICAL HISTORY  05/06/2020    Oral surgery    OTHER SURGICAL HISTORY  05/06/2020    Shoulder surgery   [3] No family history on file.  [4]   Social History  Tobacco Use    Smoking status: Every Day     Current packs/day: 1.00     Average packs/day: 1 pack/day for 15.0 years (15.0 ttl pk-yrs)     Types: Cigarettes    Smokeless tobacco: Never   Vaping Use    Vaping status: Never Used   Substance Use Topics    Alcohol use: Yes     Comment: occasional    Drug use: Not Currently        Jack España MD  07/09/25 0937

## 2025-07-10 ENCOUNTER — HOSPITAL ENCOUNTER (INPATIENT)
Facility: HOSPITAL | Age: 65
LOS: 1 days | Discharge: HOME | DRG: 948 | End: 2025-07-12
Attending: EMERGENCY MEDICINE | Admitting: INTERNAL MEDICINE
Payer: MEDICARE

## 2025-07-10 DIAGNOSIS — C79.51 METASTASIS TO BONE (MULTI): ICD-10-CM

## 2025-07-10 DIAGNOSIS — M54.50 ACUTE EXACERBATION OF CHRONIC LOW BACK PAIN: Primary | ICD-10-CM

## 2025-07-10 DIAGNOSIS — Z74.09 DECREASED AMBULATION STATUS: ICD-10-CM

## 2025-07-10 DIAGNOSIS — G89.29 ACUTE EXACERBATION OF CHRONIC LOW BACK PAIN: Primary | ICD-10-CM

## 2025-07-10 PROCEDURE — 96374 THER/PROPH/DIAG INJ IV PUSH: CPT | Mod: 59

## 2025-07-10 PROCEDURE — 2500000004 HC RX 250 GENERAL PHARMACY W/ HCPCS (ALT 636 FOR OP/ED): Mod: JZ | Performed by: PHYSICIAN ASSISTANT

## 2025-07-10 PROCEDURE — 2500000005 HC RX 250 GENERAL PHARMACY W/O HCPCS: Performed by: PHYSICIAN ASSISTANT

## 2025-07-10 PROCEDURE — 99285 EMERGENCY DEPT VISIT HI MDM: CPT | Mod: 25 | Performed by: EMERGENCY MEDICINE

## 2025-07-10 PROCEDURE — 96372 THER/PROPH/DIAG INJ SC/IM: CPT | Performed by: PHYSICIAN ASSISTANT

## 2025-07-10 RX ORDER — HYDROMORPHONE HYDROCHLORIDE 1 MG/ML
1 INJECTION, SOLUTION INTRAMUSCULAR; INTRAVENOUS; SUBCUTANEOUS ONCE
Status: COMPLETED | OUTPATIENT
Start: 2025-07-10 | End: 2025-07-10

## 2025-07-10 RX ORDER — OXYCODONE AND ACETAMINOPHEN 5; 325 MG/1; MG/1
1 TABLET ORAL EVERY 6 HOURS PRN
Qty: 9 TABLET | Refills: 0 | Status: SHIPPED | OUTPATIENT
Start: 2025-07-10 | End: 2025-07-13

## 2025-07-10 RX ORDER — LIDOCAINE 560 MG/1
2 PATCH PERCUTANEOUS; TOPICAL; TRANSDERMAL ONCE
Status: COMPLETED | OUTPATIENT
Start: 2025-07-10 | End: 2025-07-11

## 2025-07-10 RX ADMIN — HYDROMORPHONE HYDROCHLORIDE 1 MG: 1 INJECTION, SOLUTION INTRAMUSCULAR; INTRAVENOUS; SUBCUTANEOUS at 22:59

## 2025-07-10 RX ADMIN — LIDOCAINE 2 PATCH: 4 PATCH TOPICAL at 22:59

## 2025-07-10 ASSESSMENT — PAIN DESCRIPTION - DESCRIPTORS: DESCRIPTORS: ACHING;SHARP;SPASM

## 2025-07-10 ASSESSMENT — PAIN - FUNCTIONAL ASSESSMENT: PAIN_FUNCTIONAL_ASSESSMENT: 0-10

## 2025-07-10 ASSESSMENT — PAIN DESCRIPTION - PROGRESSION: CLINICAL_PROGRESSION: NOT CHANGED

## 2025-07-10 ASSESSMENT — PAIN DESCRIPTION - PAIN TYPE: TYPE: ACUTE PAIN

## 2025-07-10 ASSESSMENT — PAIN DESCRIPTION - ONSET: ONSET: ONGOING

## 2025-07-10 ASSESSMENT — PAIN DESCRIPTION - FREQUENCY: FREQUENCY: CONSTANT/CONTINUOUS

## 2025-07-10 ASSESSMENT — LIFESTYLE VARIABLES
HAVE PEOPLE ANNOYED YOU BY CRITICIZING YOUR DRINKING: NO
TOTAL SCORE: 0
EVER HAD A DRINK FIRST THING IN THE MORNING TO STEADY YOUR NERVES TO GET RID OF A HANGOVER: NO
HAVE YOU EVER FELT YOU SHOULD CUT DOWN ON YOUR DRINKING: NO
EVER FELT BAD OR GUILTY ABOUT YOUR DRINKING: NO

## 2025-07-10 ASSESSMENT — PAIN SCALES - GENERAL: PAINLEVEL_OUTOF10: 10 - WORST POSSIBLE PAIN

## 2025-07-10 ASSESSMENT — PAIN DESCRIPTION - LOCATION: LOCATION: BACK

## 2025-07-11 PROBLEM — G89.3 CANCER-RELATED BREAKTHROUGH PAIN: Status: ACTIVE | Noted: 2025-07-11

## 2025-07-11 PROCEDURE — 2500000004 HC RX 250 GENERAL PHARMACY W/ HCPCS (ALT 636 FOR OP/ED): Performed by: INTERNAL MEDICINE

## 2025-07-11 PROCEDURE — 2500000002 HC RX 250 W HCPCS SELF ADMINISTERED DRUGS (ALT 637 FOR MEDICARE OP, ALT 636 FOR OP/ED): Performed by: INTERNAL MEDICINE

## 2025-07-11 PROCEDURE — 2500000001 HC RX 250 WO HCPCS SELF ADMINISTERED DRUGS (ALT 637 FOR MEDICARE OP): Performed by: INTERNAL MEDICINE

## 2025-07-11 PROCEDURE — G0378 HOSPITAL OBSERVATION PER HR: HCPCS

## 2025-07-11 PROCEDURE — 80365 DRUG SCREENING OXYCODONE: CPT | Mod: ELYLAB | Performed by: INTERNAL MEDICINE

## 2025-07-11 PROCEDURE — 2500000005 HC RX 250 GENERAL PHARMACY W/O HCPCS: Performed by: INTERNAL MEDICINE

## 2025-07-11 PROCEDURE — S4991 NICOTINE PATCH NONLEGEND: HCPCS | Performed by: INTERNAL MEDICINE

## 2025-07-11 PROCEDURE — 99222 1ST HOSP IP/OBS MODERATE 55: CPT | Performed by: INTERNAL MEDICINE

## 2025-07-11 PROCEDURE — 99223 1ST HOSP IP/OBS HIGH 75: CPT | Performed by: NURSE PRACTITIONER

## 2025-07-11 RX ORDER — GABAPENTIN 400 MG/1
400 CAPSULE ORAL 3 TIMES DAILY
Status: DISCONTINUED | OUTPATIENT
Start: 2025-07-11 | End: 2025-07-12 | Stop reason: HOSPADM

## 2025-07-11 RX ORDER — TALC
3 POWDER (GRAM) TOPICAL NIGHTLY PRN
Status: DISCONTINUED | OUTPATIENT
Start: 2025-07-11 | End: 2025-07-12 | Stop reason: HOSPADM

## 2025-07-11 RX ORDER — ONDANSETRON HYDROCHLORIDE 2 MG/ML
4 INJECTION, SOLUTION INTRAVENOUS EVERY 8 HOURS PRN
Status: DISCONTINUED | OUTPATIENT
Start: 2025-07-11 | End: 2025-07-12 | Stop reason: HOSPADM

## 2025-07-11 RX ORDER — KETOROLAC TROMETHAMINE 30 MG/ML
15 INJECTION, SOLUTION INTRAMUSCULAR; INTRAVENOUS EVERY 6 HOURS SCHEDULED
Status: DISCONTINUED | OUTPATIENT
Start: 2025-07-12 | End: 2025-07-12 | Stop reason: HOSPADM

## 2025-07-11 RX ORDER — HYDROMORPHONE HYDROCHLORIDE 1 MG/ML
0.6 INJECTION, SOLUTION INTRAMUSCULAR; INTRAVENOUS; SUBCUTANEOUS ONCE
Status: COMPLETED | OUTPATIENT
Start: 2025-07-11 | End: 2025-07-11

## 2025-07-11 RX ORDER — OXYCODONE HYDROCHLORIDE 5 MG/1
15 TABLET ORAL EVERY 4 HOURS PRN
Refills: 0 | Status: DISCONTINUED | OUTPATIENT
Start: 2025-07-11 | End: 2025-07-12 | Stop reason: HOSPADM

## 2025-07-11 RX ORDER — FLUTICASONE FUROATE AND VILANTEROL 200; 25 UG/1; UG/1
1 POWDER RESPIRATORY (INHALATION) DAILY
Status: DISCONTINUED | OUTPATIENT
Start: 2025-07-11 | End: 2025-07-12 | Stop reason: HOSPADM

## 2025-07-11 RX ORDER — ACETAMINOPHEN 325 MG/1
975 TABLET ORAL EVERY 8 HOURS SCHEDULED
Status: DISCONTINUED | OUTPATIENT
Start: 2025-07-11 | End: 2025-07-12 | Stop reason: HOSPADM

## 2025-07-11 RX ORDER — OXYCODONE HYDROCHLORIDE 5 MG/1
10 TABLET ORAL EVERY 4 HOURS PRN
Refills: 0 | Status: DISCONTINUED | OUTPATIENT
Start: 2025-07-11 | End: 2025-07-12 | Stop reason: HOSPADM

## 2025-07-11 RX ORDER — KETOROLAC TROMETHAMINE 30 MG/ML
15 INJECTION, SOLUTION INTRAMUSCULAR; INTRAVENOUS EVERY 8 HOURS SCHEDULED
Status: DISCONTINUED | OUTPATIENT
Start: 2025-07-11 | End: 2025-07-11

## 2025-07-11 RX ORDER — OXYCODONE HYDROCHLORIDE 5 MG/1
10 TABLET ORAL EVERY 4 HOURS PRN
Refills: 0 | Status: DISCONTINUED | OUTPATIENT
Start: 2025-07-11 | End: 2025-07-11

## 2025-07-11 RX ORDER — ALBUTEROL SULFATE 0.83 MG/ML
2.5 SOLUTION RESPIRATORY (INHALATION) EVERY 4 HOURS PRN
Status: DISCONTINUED | OUTPATIENT
Start: 2025-07-11 | End: 2025-07-12 | Stop reason: HOSPADM

## 2025-07-11 RX ORDER — FLUTICASONE FUROATE AND VILANTEROL 200; 25 UG/1; UG/1
1 POWDER RESPIRATORY (INHALATION) DAILY
Status: CANCELLED | OUTPATIENT
Start: 2025-07-11

## 2025-07-11 RX ORDER — CYCLOBENZAPRINE HCL 10 MG
10 TABLET ORAL 3 TIMES DAILY PRN
Status: DISCONTINUED | OUTPATIENT
Start: 2025-07-11 | End: 2025-07-12 | Stop reason: HOSPADM

## 2025-07-11 RX ORDER — ONDANSETRON 4 MG/1
4 TABLET, ORALLY DISINTEGRATING ORAL EVERY 8 HOURS PRN
Status: DISCONTINUED | OUTPATIENT
Start: 2025-07-11 | End: 2025-07-12 | Stop reason: HOSPADM

## 2025-07-11 RX ORDER — IBUPROFEN 200 MG
1 TABLET ORAL DAILY
Status: DISCONTINUED | OUTPATIENT
Start: 2025-07-11 | End: 2025-07-12 | Stop reason: HOSPADM

## 2025-07-11 RX ORDER — HYDROMORPHONE HYDROCHLORIDE 1 MG/ML
1 INJECTION, SOLUTION INTRAMUSCULAR; INTRAVENOUS; SUBCUTANEOUS ONCE
Status: COMPLETED | OUTPATIENT
Start: 2025-07-11 | End: 2025-07-11

## 2025-07-11 RX ORDER — AMOXICILLIN 250 MG
1 CAPSULE ORAL 2 TIMES DAILY
Status: DISCONTINUED | OUTPATIENT
Start: 2025-07-11 | End: 2025-07-12 | Stop reason: HOSPADM

## 2025-07-11 RX ORDER — DULOXETIN HYDROCHLORIDE 30 MG/1
60 CAPSULE, DELAYED RELEASE ORAL 2 TIMES DAILY
Status: DISCONTINUED | OUTPATIENT
Start: 2025-07-11 | End: 2025-07-12 | Stop reason: HOSPADM

## 2025-07-11 RX ORDER — POLYETHYLENE GLYCOL 3350 17 G/17G
17 POWDER, FOR SOLUTION ORAL DAILY PRN
Status: DISCONTINUED | OUTPATIENT
Start: 2025-07-11 | End: 2025-07-12 | Stop reason: HOSPADM

## 2025-07-11 RX ORDER — OXYCODONE HYDROCHLORIDE 5 MG/1
5 TABLET ORAL EVERY 4 HOURS PRN
Refills: 0 | Status: DISCONTINUED | OUTPATIENT
Start: 2025-07-11 | End: 2025-07-11

## 2025-07-11 RX ORDER — FAMOTIDINE 20 MG/1
20 TABLET, FILM COATED ORAL 2 TIMES DAILY
Status: DISCONTINUED | OUTPATIENT
Start: 2025-07-11 | End: 2025-07-12 | Stop reason: HOSPADM

## 2025-07-11 RX ORDER — GABAPENTIN 300 MG/1
300 CAPSULE ORAL 3 TIMES DAILY
Status: DISCONTINUED | OUTPATIENT
Start: 2025-07-11 | End: 2025-07-11

## 2025-07-11 RX ADMIN — OXYCODONE HYDROCHLORIDE 10 MG: 5 TABLET ORAL at 13:46

## 2025-07-11 RX ADMIN — CYCLOBENZAPRINE HYDROCHLORIDE 10 MG: 10 TABLET, FILM COATED ORAL at 19:45

## 2025-07-11 RX ADMIN — ACETAMINOPHEN 975 MG: 325 TABLET ORAL at 22:07

## 2025-07-11 RX ADMIN — OXYCODONE HYDROCHLORIDE 15 MG: 5 TABLET ORAL at 19:45

## 2025-07-11 RX ADMIN — HYDROMORPHONE HYDROCHLORIDE 1 MG: 1 INJECTION, SOLUTION INTRAMUSCULAR; INTRAVENOUS; SUBCUTANEOUS at 22:07

## 2025-07-11 RX ADMIN — FLUTICASONE FUROATE AND VILANTEROL TRIFENATATE 1 PUFF: 200; 25 POWDER RESPIRATORY (INHALATION) at 13:39

## 2025-07-11 RX ADMIN — DULOXETINE 60 MG: 30 CAPSULE, DELAYED RELEASE ORAL at 11:58

## 2025-07-11 RX ADMIN — OXYCODONE HYDROCHLORIDE 10 MG: 5 TABLET ORAL at 05:25

## 2025-07-11 RX ADMIN — Medication 3 MG: at 01:35

## 2025-07-11 RX ADMIN — KETOROLAC TROMETHAMINE 15 MG: 30 INJECTION, SOLUTION INTRAMUSCULAR at 23:59

## 2025-07-11 RX ADMIN — OXYCODONE HYDROCHLORIDE 10 MG: 5 TABLET ORAL at 01:35

## 2025-07-11 RX ADMIN — HYDROMORPHONE HYDROCHLORIDE 0.6 MG: 1 INJECTION, SOLUTION INTRAMUSCULAR; INTRAVENOUS; SUBCUTANEOUS at 11:58

## 2025-07-11 RX ADMIN — ACETAMINOPHEN 975 MG: 325 TABLET ORAL at 05:26

## 2025-07-11 RX ADMIN — DULOXETINE 60 MG: 30 CAPSULE, DELAYED RELEASE ORAL at 19:45

## 2025-07-11 RX ADMIN — FAMOTIDINE 20 MG: 20 TABLET, FILM COATED ORAL at 17:19

## 2025-07-11 RX ADMIN — KETOROLAC TROMETHAMINE 15 MG: 30 INJECTION, SOLUTION INTRAMUSCULAR at 05:26

## 2025-07-11 RX ADMIN — HYDROMORPHONE HYDROCHLORIDE 0.6 MG: 1 INJECTION, SOLUTION INTRAMUSCULAR; INTRAVENOUS; SUBCUTANEOUS at 17:29

## 2025-07-11 RX ADMIN — KETOROLAC TROMETHAMINE 15 MG: 30 INJECTION, SOLUTION INTRAMUSCULAR at 00:51

## 2025-07-11 RX ADMIN — KETOROLAC TROMETHAMINE 15 MG: 30 INJECTION, SOLUTION INTRAMUSCULAR at 13:39

## 2025-07-11 RX ADMIN — GABAPENTIN 400 MG: 400 CAPSULE ORAL at 19:45

## 2025-07-11 RX ADMIN — NICOTINE 1 PATCH: 14 PATCH, EXTENDED RELEASE TRANSDERMAL at 02:23

## 2025-07-11 RX ADMIN — ACETAMINOPHEN 975 MG: 325 TABLET ORAL at 01:35

## 2025-07-11 RX ADMIN — OXYCODONE HYDROCHLORIDE 10 MG: 5 TABLET ORAL at 09:25

## 2025-07-11 RX ADMIN — ACETAMINOPHEN 975 MG: 325 TABLET ORAL at 13:39

## 2025-07-11 SDOH — SOCIAL STABILITY: SOCIAL INSECURITY: ARE YOU MARRIED, WIDOWED, DIVORCED, SEPARATED, NEVER MARRIED, OR LIVING WITH A PARTNER?: NEVER MARRIED

## 2025-07-11 SDOH — SOCIAL STABILITY: SOCIAL INSECURITY: DOES ANYONE TRY TO KEEP YOU FROM HAVING/CONTACTING OTHER FRIENDS OR DOING THINGS OUTSIDE YOUR HOME?: NO

## 2025-07-11 SDOH — ECONOMIC STABILITY: INCOME INSECURITY: IN THE PAST 12 MONTHS HAS THE ELECTRIC, GAS, OIL, OR WATER COMPANY THREATENED TO SHUT OFF SERVICES IN YOUR HOME?: NO

## 2025-07-11 SDOH — ECONOMIC STABILITY: FOOD INSECURITY: WITHIN THE PAST 12 MONTHS, THE FOOD YOU BOUGHT JUST DIDN'T LAST AND YOU DIDN'T HAVE MONEY TO GET MORE.: NEVER TRUE

## 2025-07-11 SDOH — SOCIAL STABILITY: SOCIAL NETWORK: IN A TYPICAL WEEK, HOW MANY TIMES DO YOU TALK ON THE PHONE WITH FAMILY, FRIENDS, OR NEIGHBORS?: THREE TIMES A WEEK

## 2025-07-11 SDOH — SOCIAL STABILITY: SOCIAL INSECURITY: WITHIN THE LAST YEAR, HAVE YOU BEEN HUMILIATED OR EMOTIONALLY ABUSED IN OTHER WAYS BY YOUR PARTNER OR EX-PARTNER?: NO

## 2025-07-11 SDOH — HEALTH STABILITY: PHYSICAL HEALTH
HOW OFTEN DO YOU NEED TO HAVE SOMEONE HELP YOU WHEN YOU READ INSTRUCTIONS, PAMPHLETS, OR OTHER WRITTEN MATERIAL FROM YOUR DOCTOR OR PHARMACY?: SOMETIMES

## 2025-07-11 SDOH — ECONOMIC STABILITY: FOOD INSECURITY: WITHIN THE PAST 12 MONTHS, YOU WORRIED THAT YOUR FOOD WOULD RUN OUT BEFORE YOU GOT THE MONEY TO BUY MORE.: NEVER TRUE

## 2025-07-11 SDOH — SOCIAL STABILITY: SOCIAL INSECURITY: HAVE YOU HAD THOUGHTS OF HARMING ANYONE ELSE?: NO

## 2025-07-11 SDOH — SOCIAL STABILITY: SOCIAL NETWORK: HOW OFTEN DO YOU ATTEND CHURCH OR RELIGIOUS SERVICES?: NEVER

## 2025-07-11 SDOH — SOCIAL STABILITY: SOCIAL INSECURITY: DO YOU FEEL UNSAFE GOING BACK TO THE PLACE WHERE YOU ARE LIVING?: NO

## 2025-07-11 SDOH — SOCIAL STABILITY: SOCIAL INSECURITY: DO YOU FEEL ANYONE HAS EXPLOITED OR TAKEN ADVANTAGE OF YOU FINANCIALLY OR OF YOUR PERSONAL PROPERTY?: NO

## 2025-07-11 SDOH — HEALTH STABILITY: PHYSICAL HEALTH: ON AVERAGE, HOW MANY DAYS PER WEEK DO YOU ENGAGE IN MODERATE TO STRENUOUS EXERCISE (LIKE A BRISK WALK)?: 0 DAYS

## 2025-07-11 SDOH — SOCIAL STABILITY: SOCIAL INSECURITY: WITHIN THE LAST YEAR, HAVE YOU BEEN AFRAID OF YOUR PARTNER OR EX-PARTNER?: NO

## 2025-07-11 SDOH — SOCIAL STABILITY: SOCIAL NETWORK: HOW OFTEN DO YOU ATTEND MEETINGS OF THE CLUBS OR ORGANIZATIONS YOU BELONG TO?: NEVER

## 2025-07-11 SDOH — SOCIAL STABILITY: SOCIAL NETWORK: HOW OFTEN DO YOU GET TOGETHER WITH FRIENDS OR RELATIVES?: ONCE A WEEK

## 2025-07-11 SDOH — ECONOMIC STABILITY: HOUSING INSECURITY: IN THE LAST 12 MONTHS, WAS THERE A TIME WHEN YOU WERE NOT ABLE TO PAY THE MORTGAGE OR RENT ON TIME?: NO

## 2025-07-11 SDOH — ECONOMIC STABILITY: TRANSPORTATION INSECURITY: IN THE PAST 12 MONTHS, HAS LACK OF TRANSPORTATION KEPT YOU FROM MEDICAL APPOINTMENTS OR FROM GETTING MEDICATIONS?: NO

## 2025-07-11 SDOH — ECONOMIC STABILITY: FOOD INSECURITY: HOW HARD IS IT FOR YOU TO PAY FOR THE VERY BASICS LIKE FOOD, HOUSING, MEDICAL CARE, AND HEATING?: NOT VERY HARD

## 2025-07-11 SDOH — ECONOMIC STABILITY: HOUSING INSECURITY: IN THE PAST 12 MONTHS, HOW MANY TIMES HAVE YOU MOVED WHERE YOU WERE LIVING?: 4

## 2025-07-11 SDOH — HEALTH STABILITY: PHYSICAL HEALTH: ON AVERAGE, HOW MANY MINUTES DO YOU ENGAGE IN EXERCISE AT THIS LEVEL?: 0 MIN

## 2025-07-11 SDOH — SOCIAL STABILITY: SOCIAL INSECURITY: WERE YOU ABLE TO COMPLETE ALL THE BEHAVIORAL HEALTH SCREENINGS?: YES

## 2025-07-11 SDOH — ECONOMIC STABILITY: HOUSING INSECURITY: AT ANY TIME IN THE PAST 12 MONTHS, WERE YOU HOMELESS OR LIVING IN A SHELTER (INCLUDING NOW)?: NO

## 2025-07-11 SDOH — SOCIAL STABILITY: SOCIAL INSECURITY: ABUSE: ADULT

## 2025-07-11 SDOH — HEALTH STABILITY: MENTAL HEALTH: EXPERIENCED ANY OF THE FOLLOWING LIFE EVENTS: LIFE-THREATENING ILLNESS OR INJURY

## 2025-07-11 SDOH — SOCIAL STABILITY: SOCIAL INSECURITY: HAS ANYONE EVER THREATENED TO HURT YOUR FAMILY OR YOUR PETS?: NO

## 2025-07-11 SDOH — SOCIAL STABILITY: SOCIAL INSECURITY: ARE THERE ANY APPARENT SIGNS OF INJURIES/BEHAVIORS THAT COULD BE RELATED TO ABUSE/NEGLECT?: NO

## 2025-07-11 SDOH — SOCIAL STABILITY: SOCIAL INSECURITY: ARE YOU OR HAVE YOU BEEN THREATENED OR ABUSED PHYSICALLY, EMOTIONALLY, OR SEXUALLY BY ANYONE?: NO

## 2025-07-11 SDOH — SOCIAL STABILITY: SOCIAL INSECURITY: HAVE YOU HAD ANY THOUGHTS OF HARMING ANYONE ELSE?: NO

## 2025-07-11 ASSESSMENT — PAIN DESCRIPTION - DESCRIPTORS
DESCRIPTORS: ACHING;SHARP
DESCRIPTORS: ACHING;SHARP

## 2025-07-11 ASSESSMENT — COGNITIVE AND FUNCTIONAL STATUS - GENERAL
DAILY ACTIVITIY SCORE: 24
MOBILITY SCORE: 24
PATIENT BASELINE BEDBOUND: NO
DAILY ACTIVITIY SCORE: 24
DAILY ACTIVITIY SCORE: 24
MOBILITY SCORE: 24
MOBILITY SCORE: 24

## 2025-07-11 ASSESSMENT — ENCOUNTER SYMPTOMS
SORE THROAT: 0
DIZZINESS: 0
NERVOUS/ANXIOUS: 0
COUGH: 0
EYE PAIN: 0
SHORTNESS OF BREATH: 0
PALPITATIONS: 0
ABDOMINAL PAIN: 0
HEADACHES: 0
CHILLS: 0
DYSPHORIC MOOD: 0
VOMITING: 0
NECK PAIN: 1
HEMATURIA: 0
DIARRHEA: 0
WOUND: 0
ARTHRALGIAS: 1
DYSURIA: 0
BACK PAIN: 1
FEVER: 0
NAUSEA: 0

## 2025-07-11 ASSESSMENT — ACTIVITIES OF DAILY LIVING (ADL)
WALKS IN HOME: INDEPENDENT
HEARING - RIGHT EAR: FUNCTIONAL
DRESSING YOURSELF: INDEPENDENT
ASSISTIVE_DEVICE: EYEGLASSES;DENTURES UPPER;DENTURES LOWER
EFFECT OF PAIN ON DAILY ACTIVITIES: YES
JUDGMENT_ADEQUATE_SAFELY_COMPLETE_DAILY_ACTIVITIES: YES
DRESSING YOURSELF: INDEPENDENT
ASSISTIVE_DEVICE: DENTURES LOWER;DENTURES UPPER;EYEGLASSES
HEARING - LEFT EAR: FUNCTIONAL
TOILETING: INDEPENDENT
JUDGMENT_ADEQUATE_SAFELY_COMPLETE_DAILY_ACTIVITIES: YES
FEEDING YOURSELF: INDEPENDENT
ADEQUATE_TO_COMPLETE_ADL: YES
LACK_OF_TRANSPORTATION: NO
GROOMING: INDEPENDENT
BATHING: INDEPENDENT
EFFECT OF PAIN ON DAILY ACTIVITIES: YES
ADEQUATE_TO_COMPLETE_ADL: YES
HEARING - RIGHT EAR: FUNCTIONAL
PATIENT'S MEMORY ADEQUATE TO SAFELY COMPLETE DAILY ACTIVITIES?: YES
PATIENT'S MEMORY ADEQUATE TO SAFELY COMPLETE DAILY ACTIVITIES?: YES
LACK_OF_TRANSPORTATION: NO
TOILETING: INDEPENDENT
GROOMING: INDEPENDENT
WALKS IN HOME: INDEPENDENT
BATHING: INDEPENDENT
FEEDING YOURSELF: INDEPENDENT

## 2025-07-11 ASSESSMENT — PAIN - FUNCTIONAL ASSESSMENT
PAIN_FUNCTIONAL_ASSESSMENT: 0-10

## 2025-07-11 ASSESSMENT — LIFESTYLE VARIABLES
HOW OFTEN DO YOU HAVE A DRINK CONTAINING ALCOHOL: MONTHLY OR LESS
PRESCIPTION_ABUSE_PAST_12_MONTHS: NO
SKIP TO QUESTIONS 9-10: 1
AUDIT-C TOTAL SCORE: 1
AUDIT-C TOTAL SCORE: 1
HOW OFTEN DO YOU HAVE 6 OR MORE DRINKS ON ONE OCCASION: NEVER
HOW MANY STANDARD DRINKS CONTAINING ALCOHOL DO YOU HAVE ON A TYPICAL DAY: 1 OR 2
SUBSTANCE_ABUSE_PAST_12_MONTHS: NO

## 2025-07-11 ASSESSMENT — PAIN DESCRIPTION - LOCATION
LOCATION: BACK
LOCATION: BACK

## 2025-07-11 ASSESSMENT — PAIN SCALES - GENERAL
PAINLEVEL_OUTOF10: 8
PAINLEVEL_OUTOF10: 8
PAINLEVEL_OUTOF10: 10 - WORST POSSIBLE PAIN
PAINLEVEL_OUTOF10: 8
PAINLEVEL_OUTOF10: 0 - NO PAIN
PAINLEVEL_OUTOF10: 7
PAINLEVEL_OUTOF10: 10 - WORST POSSIBLE PAIN

## 2025-07-11 NOTE — H&P
"History Of Present Illness  Joe Magaña is a 64 y.o. male with a history of COPD, chronic pain syndrome, hypertension, depression, polysubstance abuse, gout, metastatic non-small cell lung cancer, BPH, and hypothyroidism.  He presented to the emergency department with chief complaint of pain out-of-control.  Patient was a somewhat challenging historian.  He reported that he had been in hospice for at least 6 months.  He reported that he recently changed hospice providers because he felt his initial hospice provider was not giving him adequate medication to relieve his pain.  That was roughly 2 weeks prior to this presentation.  Subsequent to that he established with [facility] but reported pain out-of-control.  He noted that they only would give him four 5 mg tablets of oxycodone per day to treat his cancer related pain and this was simply not enough.  He noted they would not give him more pain medicine because he still drives and has a part-time job.  He noted that his pain is out of control due to his cancer.  He reported that he was on chemotherapy through Dr. Graves but his body could not tolerate the treatments.  He noted that he was \"put on hospice\" due to this.  He expressed frustration that he could not get the level of pain control he needs.  He noted \"I was on pain management years ago and they gave me all kinds of pain medicine and didn't care if I drove.\"    He came to the emergency room late on 7/10 due to out-of-control pain.  He was due to see his hospice team in the next day but felt it could not wait.  He walked to the ED from a couple blocks away for treatment per his report.  He was given 1 mg of IV hydromorphone in the ED but noted this did nothing to help his pain.  He noted that he felt like he could not go home in the state he was in.  Hence the decision was made for admission due to intractable cancer related pain.     Past Medical History  He has a past medical history of Addiction " to drug (Multi), Arthritis, Chronic pain disorder, COPD (chronic obstructive pulmonary disease) (Multi), Degenerative disc disease, lumbar, Depression, Essential (primary) hypertension, Personal history of other diseases of the digestive system, Personal history of other diseases of the musculoskeletal system and connective tissue, Personal history of other diseases of the respiratory system, Psychiatric illness, Substance abuse, and Suicide attempt (Multi).    Surgical History  He has a past surgical history that includes Other surgical history (05/06/2020); Other surgical history (05/06/2020); Other surgical history (05/06/2020); and Elbow surgery.     Social History  He reports that he has been smoking cigarettes. He has a 15 pack-year smoking history. He has never used smokeless tobacco. He reports current alcohol use. He reports that he does not currently use drugs.    Family History  Reviewed and noncontributory     Allergies  Patient has no known allergies.    Review of Systems   Constitutional:  Negative for chills and fever.   HENT:  Negative for postnasal drip and sore throat.    Eyes:  Negative for pain and visual disturbance.   Respiratory:  Negative for cough and shortness of breath.    Cardiovascular:  Negative for chest pain, palpitations and leg swelling.   Gastrointestinal:  Negative for abdominal pain, diarrhea, nausea and vomiting.   Genitourinary:  Negative for dysuria and hematuria.   Musculoskeletal:  Positive for arthralgias, back pain and neck pain.   Skin:  Negative for rash and wound.   Neurological:  Negative for dizziness and headaches.   Psychiatric/Behavioral:  Negative for dysphoric mood. The patient is not nervous/anxious.         Physical Exam  Vitals and nursing note reviewed.   Constitutional:       General: He is not in acute distress.     Appearance: He is underweight. He is ill-appearing.   HENT:      Head: Normocephalic and atraumatic.      Right Ear: External ear normal.       Left Ear: External ear normal.      Nose: Nose normal. No rhinorrhea.      Mouth/Throat:      Mouth: Mucous membranes are moist.      Pharynx: Oropharynx is clear. No oropharyngeal exudate.   Eyes:      General: No scleral icterus.        Right eye: No discharge.         Left eye: No discharge.      Conjunctiva/sclera: Conjunctivae normal.   Cardiovascular:      Rate and Rhythm: Normal rate and regular rhythm.      Pulses: Normal pulses.      Heart sounds: Normal heart sounds. No murmur heard.  Pulmonary:      Effort: Pulmonary effort is normal. No respiratory distress.      Breath sounds: Decreased breath sounds present. No wheezing or rales.   Abdominal:      General: Abdomen is flat. There is no distension.      Palpations: Abdomen is soft.      Tenderness: There is no abdominal tenderness.   Musculoskeletal:         General: No tenderness.      Right lower leg: No edema.      Left lower leg: No edema.   Skin:     General: Skin is warm and dry.      Capillary Refill: Capillary refill takes less than 2 seconds.      Findings: No rash.   Neurological:      General: No focal deficit present.      Mental Status: He is alert and oriented to person, place, and time. Mental status is at baseline.   Psychiatric:         Attention and Perception: Attention normal.         Mood and Affect: Affect is angry.         Behavior: Behavior is agitated.         Cognition and Memory: Cognition normal.          Last Recorded Vitals  BP (!) 185/93 (BP Location: Right arm, Patient Position: Sitting)   Pulse 61   Temp 37.3 °C (99.1 °F) (Temporal)   Resp 20   Wt 68 kg (150 lb)   SpO2 99%     Relevant Results               Assessment/Plan     Intractable Cancer-Related Pain  - Discussed at length with patient.  OARRS report reviewed.  Patient has received multiple prescriptions for narcotics from different locations.  -Situation is very challenging given the fact that he does have a hospice related terminal diagnosis.  However he  "also conveyed a sense that he was \"put on\" hospice, not that it was recommended.  When asked what his goals of care are, he did report his only goal is symptomatic relief from his pain and not pursuing further treatment of his underlying cancer.  This does seem appropriate for hospice therapy but patient has very limited insight into the implications of driving or operating heavy machinery while taking high-dose narcotic pain medication.  This likely is a driving force behind his current dosing of medicine.  -Situation is further complicated by likely underlying psychiatric illness.  - For now, we will admit to hospital to help sort out these issues.  Will give scheduled ketorolac 15 mg every 8 hours IV.  Will continue Lidoderm patch as well to most painful sites on back.  Scheduled acetaminophen 975 mg every 8 hours.  -Will make oxycodone 5 to 10 mg available every 4 hours as needed for pain based on scale along with cyclobenzaprine as needed for muscle spasms.  -Patient does not appear necessarily uncomfortable in bed and was able to ambulate 2 blocks to the hospital for treatment.  I do not feel additional IV narcotics are indicated at this point.  -On our conversation, patient seems to understand the goal of hospice therapy and acknowledges that he does not want to pursue treatment for cancer but instead wants symptom control.  He just had laboratory studies performed 2 days prior on another visit to the ED.  As such we will hold off on any further diagnostic studies at this point as it would be unlikely to change our goal of treatment.  -Consult palliative care to help ensure patient understands the implications of hospice therapy as well as the activity limitations that would be required for him to work well with the hospice team.  Involve  as well to coordinate with hospice care if this indeed is his ultimate goal.  -Will also place on senokot S BID scheduled given use of " narcotics    COPD  -Patient denies trouble breathing and does not use inhalers at home  -Will make PRN albuterol nebs available       Min Cooper MD

## 2025-07-11 NOTE — ED PROVIDER NOTES
HPI   Chief Complaint   Patient presents with   • Back Pain     Pt states he has CA in lungs with Mets with chronic back pain. States last 2 days back pain has become unbearable       64-year-old male with a past medical history of lung cancer removed with metastases to the spine, hypertension, COPD presents emergency room with chief complaint of persistent low back pain.  The patient states that he is in hospice and that the pain medication he was prescribed a couple days ago has run out.  The pain is constant keeps him up at night he describes the pain as sharp and stabbing is worse with weightbearing.  He denies any saddle paresthesias, any bladder or bowel dysfunction.  He denies any urinary symptoms.  Denies any fevers, chills, nausea or vomiting.  Patient states that this is a chronic ongoing issue.  The patient walked to the hospital from home.  I did review the patient's EMR and his most recent ED visit back on July 8 he underwent a workup including a CT scan which shows a lytic lesion with a soft tissue component within the right sacral alae at the S2 focally extending into the right S2-S3 foramen and the right SI joint concerning for osseous metastases.      History provided by:  Patient and medical records          Patient History   Medical History[1]  Surgical History[2]  Family History[3]  Social History[4]    Physical Exam   ED Triage Vitals [07/10/25 2158]   Temperature Heart Rate Respirations BP   36.4 °C (97.5 °F) 74 18 172/89      Pulse Ox Temp Source Heart Rate Source Patient Position   98 % Temporal Monitor Sitting      BP Location FiO2 (%)     Right arm --       Physical Exam  Vitals and nursing note reviewed.   Constitutional:       General: He is awake. He is not in acute distress.     Appearance: Normal appearance. He is underweight. He is not ill-appearing, toxic-appearing or diaphoretic.   HENT:      Head: Normocephalic and atraumatic.      Right Ear: Tympanic membrane, ear canal and  external ear normal.      Left Ear: Tympanic membrane, ear canal and external ear normal.      Nose: Nose normal.      Mouth/Throat:      Mouth: Mucous membranes are moist.      Pharynx: Oropharynx is clear.   Eyes:      Extraocular Movements: Extraocular movements intact.      Conjunctiva/sclera: Conjunctivae normal.      Pupils: Pupils are equal, round, and reactive to light.   Cardiovascular:      Rate and Rhythm: Normal rate and regular rhythm.      Pulses: Normal pulses.      Heart sounds: Normal heart sounds.   Pulmonary:      Effort: Pulmonary effort is normal.      Breath sounds: Examination of the right-upper field reveals wheezing. Examination of the left-upper field reveals wheezing. Wheezing present. No rhonchi or rales.   Abdominal:      General: Abdomen is flat. Bowel sounds are normal.      Palpations: Abdomen is soft. There is no mass.      Tenderness: There is no abdominal tenderness. There is no guarding.   Musculoskeletal:         General: No swelling or tenderness. Normal range of motion.      Cervical back: Normal, normal range of motion and neck supple.      Thoracic back: Normal.      Lumbar back: Spasms present. No bony tenderness.        Back:       Comments: Patient reports right-sided lower back pain.  No midline tenderness on palpation, no sign of trauma such as abrasions or bruises, no bony step-off.   Skin:     General: Skin is warm and dry.      Capillary Refill: Capillary refill takes less than 2 seconds.      Findings: No rash.   Neurological:      General: No focal deficit present.      Mental Status: He is alert and oriented to person, place, and time. Mental status is at baseline.      GCS: GCS eye subscore is 4. GCS verbal subscore is 5. GCS motor subscore is 6.      Sensory: Sensation is intact.      Motor: Motor function is intact.      Coordination: Coordination is intact.      Gait: Gait is intact.   Psychiatric:         Attention and Perception: Attention and perception  normal.         Mood and Affect: Mood and affect normal.         Speech: Speech normal.         Behavior: Behavior normal. Behavior is cooperative.         Thought Content: Thought content normal.         Cognition and Memory: Cognition and memory normal.         Judgment: Judgment normal.           ED Course & MDM   Diagnoses as of 07/10/25 2310   Acute exacerbation of chronic low back pain   Metastasis to bone (Multi)   Decreased ambulation status                 No data recorded     [place] Coma Scale Score: 15 (07/10/25 2200 : Lacy Reyna RN)                           Medical Decision Making  64-year-old male with a past medical history of lung cancer removed with metastases to the spine, hypertension, COPD presents emergency room with chief complaint of persistent low back pain.  The patient states that he is in hospice and that the pain medication he was prescribed a couple days ago has run out.  The pain is constant keeps him up at night he describes the pain as sharp and stabbing is worse with weightbearing.  He denies any saddle paresthesias, any bladder or bowel dysfunction.  He denies any urinary symptoms.  Denies any fevers, chills, nausea or vomiting.  Patient states that this is a chronic ongoing issue.  The patient walked to the hospital from home.  I did review the patient's EMR and his most recent ED visit back on July 8 he underwent a workup including a CT scan which shows a lytic lesion with a soft tissue component within the right sacral alae at the S2 focally extending into the right S2-S3 foramen and the right SI joint concerning for osseous metastases.  Temperature 36.4, heart rate 74, respirations 18, pulse ox is 90% on room air, blood pressure is 172/89.  I did medicate the patient with Dilaudid 1 mg IM.  Given his persistent and breakthrough pain I did recommend and offer admission for pain control.  Patient states that he is supposed to see his hospice nurse in the morning and prefers to  go home with pain medicine and discuss options with his hospice nurse.  Denies any bladder or bowel dysfunction or saddle paresthesias I do not suspect cauda equina syndrome epidural abscess or cord compression syndrome.  He has no neurological deficits on exam he is able to ambulate without difficulty.  He denies any abdominal pain I do not suspect AAA.  I did advise him to return back to the ER sooner with any concerns or worsening of symptoms.  He was discharged home with a short course of narcotic pain medication, he was advised of the sedating effects of the pain medication.  He was encouraged to return back with any concerns or worsening of symptoms all questions answered prior to discharge  Prior to be discharged to patient change his mind and states due to his intractable pain he does not feel safe going home and is requesting to be admitted for his severe back pain.  I spoke with the hospitalist Dr. Cooper who will admit the patient and they will consult hospice.        Procedure  Procedures       Memo Morales PA-C  07/10/25 6551           [1]  Past Medical History:  Diagnosis Date   • 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)    • Arthritis    • Chronic pain disorder    • COPD (chronic obstructive pulmonary disease) (Multi)    • Degenerative disc disease, lumbar    • Depression    • Essential (primary) hypertension     Benign essential hypertension   • Personal history of other diseases of the digestive system     History of diverticulosis   • Personal history of other diseases of the musculoskeletal system and connective tissue     History of gout   • Personal history of other diseases of the respiratory system     History of COPD   • Psychiatric illness    • Substance abuse    • Suicide attempt (Multi)    [2]  Past Surgical History:  Procedure Laterality Date   • ELBOW SURGERY     • OTHER SURGICAL HISTORY  05/06/2020    Flexor tendon repair   • OTHER SURGICAL HISTORY  05/06/2020    Oral surgery   • OTHER SURGICAL  HISTORY  05/06/2020    Shoulder surgery   [3]  No family history on file.  [4]  Social History  Tobacco Use   • Smoking status: Every Day     Current packs/day: 1.00     Average packs/day: 1 pack/day for 15.0 years (15.0 ttl pk-yrs)     Types: Cigarettes   • Smokeless tobacco: Never   Vaping Use   • Vaping status: Never Used   Substance Use Topics   • Alcohol use: Yes     Comment: occasional   • Drug use: Not Currently      Memo Morales PA-C  07/10/25 3944

## 2025-07-11 NOTE — DISCHARGE INSTRUCTIONS
Please follow up with [facility] for plans for respite care at [facility].   Please call or follow up with Dr. Jha should you choose to pursue palliative radiation for better pain control.  Discussed your treatment plans with your hospice nurse in the morning.  Please return to the ER with any concerns or worsening of symptoms.

## 2025-07-11 NOTE — CARE PLAN
The patient's goals for the shift include decrease in pain    The clinical goals for the shift include maintain patient safety and comfort

## 2025-07-11 NOTE — CONSULTS
Nutrition Note:   Reason for Assessment: Admission nursing screening    Patient is a 64 y.o. male presenting with cancer-related breakthrough pain. Consult generated from admissions screening. Chart reviewed - code status is DNR comfort measures only, pt had been on hospice prior to admission. Palliative care has been consulted. Full nutrition assessment not appropriate at this time. Briefly spoke with pt - pt denies nutrition concerns or needs at this time. He does not drink any ONS and does not want any but was encouraged to notify staff if he has any nutrition-related needs. Spoke with nurse about nutrition plan. Please message RD for any nutrition questions/concerns.     Time Spent (min): 15 minutes

## 2025-07-11 NOTE — PROGRESS NOTES
07/11/25 1034   Discharge Planning   Living Arrangements Alone   Support Systems Friends/neighbors   Assistance Needed PTA - reports being independent at baseline   Type of Residence Private residence;Homeless   Home or Post Acute Services In home services   Type of Home Care Services Hospice   Expected Discharge Disposition HospiceHome   Does the patient need discharge transport arranged? Yes   [facility] coordination needed? Yes   Patient Choice   Provider Choice list and CMS website (https://medicare.gov/care-compare#search) for post-acute Quality and Resource Measure Data were provided and reviewed with: Patient   Patient / Family choosing to utilize agency / facility established prior to hospitalization Yes   Intensity of Service   Intensity of Service 0-30 min     Presented for cancer related uncontrolled pain. Active with [facility] PTA, TCC spoke with Rosemarie their liaison and she will come see the pt today. TCC spoke with pt who states he currently has a safe place he is staying and has no concerns. Will dc HOME once pain management plan is figured out.

## 2025-07-11 NOTE — CONSULTS
Consults    Reason For Consult  Goals of care/assist with communication with hospice       History Of Present Illness  Joe Magaña is a 64 y.o. male with past medical history of Stage IV lung cancer with known liver and bone mets.  Most recently diagnosed with mets to left T10 transverse process with pathologic fracture.  Multiple subacute fractures including left 7th, 9-12th ribs and right lateral 7th rib.  There is also a destructive lytic lesion with soft tissue component within the right sacral ala at the level of S2 measuring 3x3.2x2.2.  The soft tissue component focall extends into the right S2-S3 foramen and into the right SI joint.  Metastatic liver lesions are of similar size to prior.  Patient was previously active with [facility] hospice services but was discharged from their practice for compliance issues.  Recently initiated with [facility].  He has been to our ED 4x since 6/22.  He was reporting poor pain control and was supposed to be seen by [facility] today but felt his pain was to severe and presented to the ED again last evening.  Palliative care consulted for assistance in clarifying goals of care and facilitating communication with hospice team.    ROS: C/O pain in mid/upper back and lower part of spine.  Bowel movements normal. Urinating well.  Decreased appetite and early satiety but tolerating diet.      Personal/Social History  He reports that he has been smoking cigarettes. He has a 15 pack-year smoking history. He has never used smokeless tobacco. He reports current alcohol use. He reports that he does not currently use drugs.    Past Medical History  He has a past medical history of 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), Arthritis, Chronic pain disorder, COPD (chronic obstructive pulmonary disease) (Multi), Degenerative disc disease, lumbar, Depression, Essential (primary) hypertension, Personal history of other diseases of the digestive system, Personal history of other diseases of the  "musculoskeletal system and connective tissue, Personal history of other diseases of the respiratory system, Psychiatric illness, Substance abuse, and Suicide attempt (Multi).    Surgical History  He has a past surgical history that includes Other surgical history (05/06/2020); Other surgical history (05/06/2020); Other surgical history (05/06/2020); and Elbow surgery.     Family History  Family History[1]  Allergies  Patient has no known allergies.     Physical Exam  GEN: awake and alert, somewhat irritable; appears uncomfortable  RESP: even and regular  NEURO: alert and oriented x3, follows commands.  Spine tenderness with palpation especially at T9-10 area    Last Recorded Vitals  Blood pressure 164/82, pulse 59, temperature 36.2 °C (97.2 °F), resp. rate 20, height 1.854 m (6' 1\"), weight 68 kg (150 lb), SpO2 96%.    Relevant Results  Scheduled medications  Scheduled Medications[2]  Continuous medications  Continuous Medications[3]  PRN medications  PRN Medications[4]    No results found. However, due to the size of the patient record, not all encounters were searched. Please check Results Review for a complete set of results.       Assessment/Plan   -Met with patient at length.  Red flags include social situation, possible previous opioid misuse.  Has been discharged from [facility] for noncompliance.  Is now with [facility].  They will be visiting patient later today  -OARRS reviewed, concerning for multiple prescriptions from multiple providers.  Has filled medications at multiple different pharmacies in various locations.    1630-Met with Rosemarie from [facility] and expressed concerns, opioid misuse.  [facility] has told patient his narcotic dosage will not be increased unless he agrees to sell his car.  He continues to drive and is working part time.  She has discussed placement with him so there can be close monitoring of opioid use.  He will think about this.    PAIN  -oxycodone 5-10mg " q4PRN  -dilaudid prn, at discretion of attending  -bowel regimen per primary, high risk for opioid induced constipation  -could consider long acting medication like fentanyl patch v methadone which patient was on previously.  Would need coordination with hospice team as well  -can offer palliative xrt for pain control, discussed with patient.  He will think about it.  Should he be amenable will need to let Dr. Jha know who will assist in getting him scheduled with rad/onc.    ETHICS  -DNC    DISPO  -pending further discussions with hospice    I spent 95 minutes in the professional and overall care of this patient.      Leonor Miller, APRN-CNP         [1] No family history on file.  [2] acetaminophen, 975 mg, oral, q8h TITA  gabapentin, 300 mg, oral, TID  ketorolac, 15 mg, intravenous, q8h TITA  lidocaine, 2 patch, transdermal, Once  nicotine, 1 patch, transdermal, Daily  sennosides-docusate sodium, 1 tablet, oral, BID  [3]    [4] PRN medications: albuterol, cyclobenzaprine, melatonin, ondansetron ODT **OR** ondansetron, oxyCODONE **OR** oxyCODONE, polyethylene glycol

## 2025-07-11 NOTE — CARE PLAN
PT seen and examined. C/o back and RUQ pain consistent with his known metastases. Says pain regimen thru hospice has not been adequate. Complicated situation as he is still working and driving and hospice reticent to prescribe higher doses while he is still driving. For now will work on pain control here. D/w palliative and hospice, working to find a compromise. Otherwise he remains stable.

## 2025-07-12 VITALS
BODY MASS INDEX: 19.88 KG/M2 | HEART RATE: 60 BPM | DIASTOLIC BLOOD PRESSURE: 89 MMHG | RESPIRATION RATE: 17 BRPM | WEIGHT: 150 LBS | SYSTOLIC BLOOD PRESSURE: 162 MMHG | TEMPERATURE: 97.3 F | OXYGEN SATURATION: 95 % | HEIGHT: 73 IN

## 2025-07-12 PROBLEM — G89.29 ACUTE EXACERBATION OF CHRONIC LOW BACK PAIN: Status: ACTIVE | Noted: 2025-07-12

## 2025-07-12 PROBLEM — M54.50 ACUTE EXACERBATION OF CHRONIC LOW BACK PAIN: Status: ACTIVE | Noted: 2025-07-12

## 2025-07-12 PROCEDURE — S4991 NICOTINE PATCH NONLEGEND: HCPCS | Performed by: INTERNAL MEDICINE

## 2025-07-12 PROCEDURE — 1100000001 HC PRIVATE ROOM DAILY

## 2025-07-12 PROCEDURE — 2500000001 HC RX 250 WO HCPCS SELF ADMINISTERED DRUGS (ALT 637 FOR MEDICARE OP): Performed by: INTERNAL MEDICINE

## 2025-07-12 PROCEDURE — 2500000002 HC RX 250 W HCPCS SELF ADMINISTERED DRUGS (ALT 637 FOR MEDICARE OP, ALT 636 FOR OP/ED): Performed by: INTERNAL MEDICINE

## 2025-07-12 PROCEDURE — 2500000004 HC RX 250 GENERAL PHARMACY W/ HCPCS (ALT 636 FOR OP/ED): Mod: JZ | Performed by: INTERNAL MEDICINE

## 2025-07-12 PROCEDURE — 2500000004 HC RX 250 GENERAL PHARMACY W/ HCPCS (ALT 636 FOR OP/ED): Mod: JW | Performed by: INTERNAL MEDICINE

## 2025-07-12 PROCEDURE — 99239 HOSP IP/OBS DSCHRG MGMT >30: CPT | Performed by: INTERNAL MEDICINE

## 2025-07-12 RX ORDER — HYDROMORPHONE HYDROCHLORIDE 0.2 MG/ML
0.2 INJECTION INTRAMUSCULAR; INTRAVENOUS; SUBCUTANEOUS EVERY 4 HOURS PRN
Status: DISCONTINUED | OUTPATIENT
Start: 2025-07-12 | End: 2025-07-12 | Stop reason: HOSPADM

## 2025-07-12 RX ADMIN — CYCLOBENZAPRINE HYDROCHLORIDE 10 MG: 10 TABLET, FILM COATED ORAL at 08:57

## 2025-07-12 RX ADMIN — FAMOTIDINE 20 MG: 20 TABLET, FILM COATED ORAL at 08:57

## 2025-07-12 RX ADMIN — DOCUSATE SODIUM 50MG AND SENNOSIDES 8.6MG 1 TABLET: 8.6; 5 TABLET, FILM COATED ORAL at 08:57

## 2025-07-12 RX ADMIN — OXYCODONE HYDROCHLORIDE 15 MG: 5 TABLET ORAL at 01:04

## 2025-07-12 RX ADMIN — OXYCODONE HYDROCHLORIDE 15 MG: 5 TABLET ORAL at 09:30

## 2025-07-12 RX ADMIN — GABAPENTIN 400 MG: 400 CAPSULE ORAL at 08:57

## 2025-07-12 RX ADMIN — OXYCODONE HYDROCHLORIDE 15 MG: 5 TABLET ORAL at 05:24

## 2025-07-12 RX ADMIN — DULOXETINE 60 MG: 30 CAPSULE, DELAYED RELEASE ORAL at 08:57

## 2025-07-12 RX ADMIN — GABAPENTIN 400 MG: 400 CAPSULE ORAL at 14:49

## 2025-07-12 RX ADMIN — FLUTICASONE FUROATE AND VILANTEROL TRIFENATATE 1 PUFF: 200; 25 POWDER RESPIRATORY (INHALATION) at 09:01

## 2025-07-12 RX ADMIN — ACETAMINOPHEN 975 MG: 325 TABLET ORAL at 13:01

## 2025-07-12 RX ADMIN — NICOTINE 1 PATCH: 14 PATCH, EXTENDED RELEASE TRANSDERMAL at 08:57

## 2025-07-12 RX ADMIN — KETOROLAC TROMETHAMINE 15 MG: 30 INJECTION, SOLUTION INTRAMUSCULAR at 05:24

## 2025-07-12 RX ADMIN — HYDROMORPHONE HYDROCHLORIDE 0.2 MG: 0.2 INJECTION, SOLUTION INTRAMUSCULAR; INTRAVENOUS; SUBCUTANEOUS at 14:49

## 2025-07-12 RX ADMIN — KETOROLAC TROMETHAMINE 15 MG: 30 INJECTION, SOLUTION INTRAMUSCULAR at 12:58

## 2025-07-12 RX ADMIN — ACETAMINOPHEN 975 MG: 325 TABLET ORAL at 05:24

## 2025-07-12 ASSESSMENT — PAIN - FUNCTIONAL ASSESSMENT
PAIN_FUNCTIONAL_ASSESSMENT: 0-10

## 2025-07-12 ASSESSMENT — PAIN DESCRIPTION - LOCATION
LOCATION: BACK
LOCATION: BACK

## 2025-07-12 ASSESSMENT — PAIN SCALES - GENERAL
PAINLEVEL_OUTOF10: 7
PAINLEVEL_OUTOF10: 3
PAINLEVEL_OUTOF10: 8
PAINLEVEL_OUTOF10: 7
PAINLEVEL_OUTOF10: 7

## 2025-07-12 NOTE — DISCHARGE SUMMARY
Discharge Diagnosis  Cancer-related breakthrough pain  Metastatic NSCLC           Issues Requiring Follow-Up  Follow up with hospice  Follow up with oncology to discuss possible palliative radiation    Discharge Meds     Medication List      START taking these medications     oxyCODONE-acetaminophen 5-325 mg tablet; Commonly known as: Percocet;   Take 1 tablet by mouth every 6 hours if needed for severe pain (7 - 10)   for up to 3 days.     CONTINUE taking these medications     albuterol 90 mcg/actuation inhaler; Inhale 1-2 puffs every 6 hours if   needed for wheezing or shortness of breath.   cholecalciferol 50 mcg (2,000 units) capsule; Commonly known as: Vitamin   D3; Take 1 capsule (50 mcg) by mouth once daily.   cloNIDine 0.1 mg tablet; Commonly known as: Catapres; Take 1 tablet (0.1   mg) by mouth once daily at bedtime.   cyanocobalamin 1,000 mcg tablet; Commonly known as: Vitamin B-12; Take 1   tablet (1,000 mcg) by mouth once daily.   dexAMETHasone 4 mg tablet; Commonly known as: Decadron; Take 1 tablet (4   mg) by mouth once daily for 10 days.   DULoxetine 60 mg DR capsule; Commonly known as: Cymbalta; Take 1 capsule   (60 mg) by mouth 2 times a day.   fluticasone furoate-vilanteroL 200-25 mcg/dose inhaler; Commonly known   as: Breo Ellipta; Inhale 1 puff once daily.   folic acid 1 mg tablet; Commonly known as: Folvite; Take 1 tablet (1,000   mcg) by mouth once daily.   gabapentin 300 mg capsule; Commonly known as: Neurontin; Take 1 capsule   (300 mg) by mouth 3 times a day.   naloxone 4 mg/0.1 mL nasal spray; Commonly known as: Narcan; Administer   1 spray (4 mg) into affected nostril(s) if needed for opioid reversal. May   repeat every 2-3 minutes if needed, alternating nostrils, until medical   assistance becomes available.   nicotine 21 mg/24 hr patch; Commonly known as: Nicoderm CQ; Place 1   patch over 24 hours on the skin once daily for 41 doses.   omeprazole 20 mg DR capsule; Commonly known as:  PriLOSEC; Take 1 capsule   (20 mg) by mouth once daily in the morning. Take before meals. Do not   crush or chew.   ondansetron 8 mg tablet; Commonly known as: Zofran; Take 1 tablet (8 mg)   by mouth every 8 hours if needed for nausea or vomiting.   prochlorperazine 10 mg tablet; Commonly known as: Compazine; Take 1   tablet (10 mg) by mouth every 6 hours if needed for nausea or vomiting.   QUEtiapine 25 mg tablet; Commonly known as: SEROquel; Take 1 tablet (25   mg) by mouth once daily at bedtime.     STOP taking these medications     oxyCODONE 5 mg immediate release tablet; Commonly known as: Roxicodone     ASK your doctor about these medications     oxyCODONE 5 mg immediate release tablet; Commonly known as: Roxicodone;   Take 1 tablet (5 mg) by mouth every 6 hours if needed for severe pain (7 -   10) for up to 3 days.; Ask about: Should I take this medication?       Test Results Pending At Discharge  Pending Labs       Order Current Status    OOB Internal Tracking In process    Opiate Confirmation, Urine In process            Hospital Course   4-year-old male with past medical history of COPD, chronic pain, hypertension, substance abuse, metastatic non-small cell lung cancer, hypothyroidism, who presented to the emergency department with further controlled pain.  He is on hospice for his metastatic lung cancer.  He had recently changed hospice providers and felt like he was having inadequate pain control.  He was admitted and treated with higher doses of pain medication.  Palliative care was consulted and his care was discussed with his hospice agency.  There were concerns about higher doses of narcotics given that he is still driving and working part-time.  After discussions with patient and hospice it was ultimately decided that placement at [facility] for respite care and better pain control would be reasonable.  Patient desires to go home first to take care of some things and plans to pursue respite  care later this week.  This was discussed with care coordination as well as a hospice agency.  He is comfortable going home on his current pain regimen.  He will follow with hospice as an outpatient to set up this respite care.  Also discussed with his oncologist and there may be a role for palliative radiation.  Patient is considering whether he would want this.  Discussed that he should follow-up with his oncologist for further discussions regarding palliative radiation should he desire.  Otherwise he remains stable with better controlled pain for discharge today.  Greater than 30-minute spent discharge activities.    Pertinent Physical Exam At Time of Discharge  Physical Exam  GEN: healthy appearing, appears stated age, NAD  HEENT: NCAT, PERRLA, EOMI, moist mucous membranes  NECK: supple, no masses  CV: RRR, no m/r/g, no LE edema  LUNGS: CTAB, no w/r/c  ABD: soft, NT, ND, NBS  SKIN: no rashes  MSK; no gross deformities, normal joints  NEURO: A+Ox3, no FND  PSYCH: appropriate mood, affect      Outpatient Follow-Up  Future Appointments   Date Time Provider Department Center   8/26/2025  3:00 PM Christian Hartmann MD [id number] Justo Kuhn MD

## 2025-07-12 NOTE — CARE PLAN
The patient's goals for the shift include decrease in pain    The clinical goals for the shift include Maintain patient safety and comfort    Over the shift, the patient did not make progress toward the following goals. Barriers to progression include underxtand poc. Recommendations to address these barriers include education.

## 2025-07-12 NOTE — CARE PLAN
The patient's goals for the shift include decrease in pain    The clinical goals for the shift include Patient's pain will be managed at acceptable levels    Problem: Discharge Planning  Goal: Discharge to home or other facility with appropriate resources  Outcome: Progressing     Problem: Chronic Conditions and Co-morbidities  Goal: Patient's chronic conditions and co-morbidity symptoms are monitored and maintained or improved  Outcome: Progressing     Problem: Nutrition  Goal: Nutrient intake appropriate for maintaining nutritional needs  Outcome: Progressing     Problem: Pain  Goal: Takes deep breaths with improved pain control throughout the shift  Outcome: Progressing  Goal: Turns in bed with improved pain control throughout the shift  Outcome: Progressing  Goal: Walks with improved pain control throughout the shift  Outcome: Progressing  Goal: Performs ADL's with improved pain control throughout shift  Outcome: Progressing  Goal: Free from opioid side effects throughout the shift  Outcome: Progressing  Goal: Free from acute confusion related to pain meds throughout the shift  Outcome: Progressing     Problem: Fall/Injury  Goal: Not fall by end of shift  Outcome: Progressing  Goal: Be free from injury by end of the shift  Outcome: Progressing  Goal: Verbalize understanding of personal risk factors for fall in the hospital  Outcome: Progressing  Goal: Pace activities to prevent fatigue by end of the shift  Outcome: Progressing

## 2025-07-12 NOTE — PROGRESS NOTES
Updated by Rosemarie Hogue with [facility] patient to look at [facility] for a respite which is covered for 5 days. Patient was active with [facility] prior to admission. Referral was sent in [facility] spoke with [place] [facility] for [facility] that they would not be able to accept under respite before Monday as would like to complete a medicaid screen. Writer was updated by attending that patient wants to go home today and get a few things arranged before goes to [facility] and he will go ahead and discharge. Writer called and updated Rosemarie with [facility] and she will notified the hospice of team return back home and follow-up with [facility] on Monday. SARBJIT Redd

## 2025-07-14 ENCOUNTER — HOSPITAL ENCOUNTER (EMERGENCY)
Facility: HOSPITAL | Age: 65
Discharge: HOME | End: 2025-07-14
Attending: EMERGENCY MEDICINE
Payer: MEDICARE

## 2025-07-14 VITALS
WEIGHT: 150 LBS | HEART RATE: 64 BPM | HEIGHT: 73 IN | DIASTOLIC BLOOD PRESSURE: 93 MMHG | BODY MASS INDEX: 19.88 KG/M2 | TEMPERATURE: 98.1 F | OXYGEN SATURATION: 97 % | SYSTOLIC BLOOD PRESSURE: 179 MMHG | RESPIRATION RATE: 16 BRPM

## 2025-07-14 DIAGNOSIS — G89.3 CHRONIC PAIN DUE TO NEOPLASM: Primary | ICD-10-CM

## 2025-07-14 DIAGNOSIS — Z51.5 HOSPICE CARE: ICD-10-CM

## 2025-07-14 LAB
ALBUMIN SERPL BCP-MCNC: 3.6 G/DL (ref 3.4–5)
ALP SERPL-CCNC: 113 U/L (ref 33–136)
ALT SERPL W P-5'-P-CCNC: 4 U/L (ref 10–52)
ANION GAP SERPL CALC-SCNC: 13 MMOL/L (ref 10–20)
AST SERPL W P-5'-P-CCNC: 9 U/L (ref 9–39)
BASOPHILS # BLD AUTO: 0.03 X10*3/UL (ref 0–0.1)
BASOPHILS NFR BLD AUTO: 0.5 %
BILIRUB SERPL-MCNC: 0.3 MG/DL (ref 0–1.2)
BUN SERPL-MCNC: 22 MG/DL (ref 6–23)
CALCIUM SERPL-MCNC: 9.1 MG/DL (ref 8.6–10.3)
CHLORIDE SERPL-SCNC: 106 MMOL/L (ref 98–107)
CO2 SERPL-SCNC: 22 MMOL/L (ref 21–32)
CREAT SERPL-MCNC: 1.04 MG/DL (ref 0.5–1.3)
EGFRCR SERPLBLD CKD-EPI 2021: 81 ML/MIN/1.73M*2
EOSINOPHIL # BLD AUTO: 0.02 X10*3/UL (ref 0–0.7)
EOSINOPHIL NFR BLD AUTO: 0.3 %
ERYTHROCYTE [DISTWIDTH] IN BLOOD BY AUTOMATED COUNT: 15.9 % (ref 11.5–14.5)
GLUCOSE SERPL-MCNC: 85 MG/DL (ref 74–99)
HCT VFR BLD AUTO: 29.3 % (ref 41–52)
HGB BLD-MCNC: 9.4 G/DL (ref 13.5–17.5)
IMM GRANULOCYTES # BLD AUTO: 0.02 X10*3/UL (ref 0–0.7)
IMM GRANULOCYTES NFR BLD AUTO: 0.3 % (ref 0–0.9)
LYMPHOCYTES # BLD AUTO: 1.04 X10*3/UL (ref 1.2–4.8)
LYMPHOCYTES NFR BLD AUTO: 17.7 %
MCH RBC QN AUTO: 31.1 PG (ref 26–34)
MCHC RBC AUTO-ENTMCNC: 32.1 G/DL (ref 32–36)
MCV RBC AUTO: 97 FL (ref 80–100)
MONOCYTES # BLD AUTO: 0.32 X10*3/UL (ref 0.1–1)
MONOCYTES NFR BLD AUTO: 5.4 %
NEUTROPHILS # BLD AUTO: 4.46 X10*3/UL (ref 1.2–7.7)
NEUTROPHILS NFR BLD AUTO: 75.8 %
NRBC BLD-RTO: 0 /100 WBCS (ref 0–0)
PLATELET # BLD AUTO: 229 X10*3/UL (ref 150–450)
POTASSIUM SERPL-SCNC: 3.8 MMOL/L (ref 3.5–5.3)
PROT SERPL-MCNC: 6.4 G/DL (ref 6.4–8.2)
RBC # BLD AUTO: 3.02 X10*6/UL (ref 4.5–5.9)
SODIUM SERPL-SCNC: 137 MMOL/L (ref 136–145)
WBC # BLD AUTO: 5.9 X10*3/UL (ref 4.4–11.3)

## 2025-07-14 PROCEDURE — 99284 EMERGENCY DEPT VISIT MOD MDM: CPT | Mod: 25 | Performed by: EMERGENCY MEDICINE

## 2025-07-14 PROCEDURE — 96361 HYDRATE IV INFUSION ADD-ON: CPT

## 2025-07-14 PROCEDURE — 96375 TX/PRO/DX INJ NEW DRUG ADDON: CPT

## 2025-07-14 PROCEDURE — 2500000002 HC RX 250 W HCPCS SELF ADMINISTERED DRUGS (ALT 637 FOR MEDICARE OP, ALT 636 FOR OP/ED): Performed by: EMERGENCY MEDICINE

## 2025-07-14 PROCEDURE — 96374 THER/PROPH/DIAG INJ IV PUSH: CPT

## 2025-07-14 PROCEDURE — 2500000004 HC RX 250 GENERAL PHARMACY W/ HCPCS (ALT 636 FOR OP/ED): Mod: JZ | Performed by: EMERGENCY MEDICINE

## 2025-07-14 PROCEDURE — 36415 COLL VENOUS BLD VENIPUNCTURE: CPT | Performed by: EMERGENCY MEDICINE

## 2025-07-14 PROCEDURE — 85025 COMPLETE CBC W/AUTO DIFF WBC: CPT | Performed by: EMERGENCY MEDICINE

## 2025-07-14 PROCEDURE — 96376 TX/PRO/DX INJ SAME DRUG ADON: CPT

## 2025-07-14 PROCEDURE — 80053 COMPREHEN METABOLIC PANEL: CPT | Performed by: EMERGENCY MEDICINE

## 2025-07-14 PROCEDURE — S4991 NICOTINE PATCH NONLEGEND: HCPCS | Performed by: EMERGENCY MEDICINE

## 2025-07-14 RX ORDER — HYDROMORPHONE HYDROCHLORIDE 1 MG/ML
1 INJECTION, SOLUTION INTRAMUSCULAR; INTRAVENOUS; SUBCUTANEOUS ONCE
Status: COMPLETED | OUTPATIENT
Start: 2025-07-14 | End: 2025-07-14

## 2025-07-14 RX ORDER — ONDANSETRON HYDROCHLORIDE 2 MG/ML
4 INJECTION, SOLUTION INTRAVENOUS ONCE
Status: COMPLETED | OUTPATIENT
Start: 2025-07-14 | End: 2025-07-14

## 2025-07-14 RX ORDER — IBUPROFEN 200 MG
1 TABLET ORAL ONCE
Status: DISCONTINUED | OUTPATIENT
Start: 2025-07-14 | End: 2025-07-14 | Stop reason: HOSPADM

## 2025-07-14 RX ADMIN — HYDROMORPHONE HYDROCHLORIDE 1 MG: 1 INJECTION, SOLUTION INTRAMUSCULAR; INTRAVENOUS; SUBCUTANEOUS at 15:22

## 2025-07-14 RX ADMIN — ONDANSETRON 4 MG: 2 INJECTION INTRAMUSCULAR; INTRAVENOUS at 13:25

## 2025-07-14 RX ADMIN — NICOTINE 1 PATCH: 21 PATCH, EXTENDED RELEASE TRANSDERMAL at 16:30

## 2025-07-14 RX ADMIN — HYDROMORPHONE HYDROCHLORIDE 1 MG: 1 INJECTION, SOLUTION INTRAMUSCULAR; INTRAVENOUS; SUBCUTANEOUS at 13:26

## 2025-07-14 RX ADMIN — SODIUM CHLORIDE 500 ML: 0.9 INJECTION, SOLUTION INTRAVENOUS at 13:20

## 2025-07-14 ASSESSMENT — PAIN - FUNCTIONAL ASSESSMENT
PAIN_FUNCTIONAL_ASSESSMENT: 0-10

## 2025-07-14 ASSESSMENT — PAIN DESCRIPTION - DESCRIPTORS
DESCRIPTORS: DISCOMFORT

## 2025-07-14 ASSESSMENT — PAIN SCALES - GENERAL
PAINLEVEL_OUTOF10: 10 - WORST POSSIBLE PAIN
PAINLEVEL_OUTOF10: 10 - WORST POSSIBLE PAIN
PAINLEVEL_OUTOF10: 8
PAINLEVEL_OUTOF10: 8

## 2025-07-14 ASSESSMENT — PAIN DESCRIPTION - LOCATION: LOCATION: ABDOMEN

## 2025-07-14 ASSESSMENT — LIFESTYLE VARIABLES
HAVE YOU EVER FELT YOU SHOULD CUT DOWN ON YOUR DRINKING: NO
EVER FELT BAD OR GUILTY ABOUT YOUR DRINKING: NO
TOTAL SCORE: 0
HAVE PEOPLE ANNOYED YOU BY CRITICIZING YOUR DRINKING: NO
EVER HAD A DRINK FIRST THING IN THE MORNING TO STEADY YOUR NERVES TO GET RID OF A HANGOVER: NO

## 2025-07-14 NOTE — PROGRESS NOTES
Rcvd referral from ER, stating that pt arrived in ER and reports that he was supposed to go to [facility] today for respite with [facility] but he has been unsuccessful in reaching anyone from [facility] so far this morning.  Spoke with Intake @ [facility] who states that they were unaware of pt's discharge to home however it is documented that [facility] was notified.  Spoke with SARBJIT Carr from [facility] who states that she will follow up with [facility] about transfer there for respite today.  SHELLI Mccoy    room air

## 2025-07-14 NOTE — ED PROVIDER NOTES
HPI   Chief Complaint   Patient presents with    Abdominal Pain     HX of cancer, supposed to be transferred by hospice to nursing home         History provided by:  Patient    Chief Complaint   Patient presents with    Abdominal Pain     HX of cancer, supposed to be transferred by hospice to nursing home       History of Present Illness:  Joe Magaña is a 64 y.o. male presents with request for help getting into inpatient hospice.  The patient has been getting at home hospice but he has been unable to care for himself and maintain his activities of daily living due to his pain.  The patient was supposed to be getting transferred into a nursing home with hospice today but when he called they told him that the nurse that was helping him with this transition is on vacation.  The patient himself does not have any new complaints just his chronic pain related to his cancer.      PMFSH:   As per HPI, otherwise nurses notes reviewed in EMR.    Past Medical History: Medical History[1]   Past Surgical History: Surgical History[2]   Family History: Family History[3]   Social History:  Social History[4]  Allergies: Allergies[5]  Current Outpatient Medications   Medication Instructions    albuterol 90 mcg/actuation inhaler 1-2 puffs, inhalation, Every 6 hours PRN    cholecalciferol (VITAMIN D3) 50 mcg, oral, Daily    cloNIDine (CATAPRES) 0.1 mg, oral, Nightly    cyanocobalamin (VITAMIN B-12) 1,000 mcg, oral, Daily    dexAMETHasone (DECADRON) 4 mg, oral, Daily    DULoxetine (CYMBALTA) 60 mg, oral, 2 times daily    fluticasone furoate-vilanteroL (Breo Ellipta) 200-25 mcg/dose inhaler 1 puff, inhalation, Daily    folic acid (FOLVITE) 1,000 mcg, oral, Daily    gabapentin (NEURONTIN) 300 mg, oral, 3 times daily    naloxone (NARCAN) 4 mg, nasal, As needed, May repeat every 2-3 minutes if needed, alternating nostrils, until medical assistance becomes available.    nicotine (Nicoderm CQ) 21 mg/24 hr patch 1 patch, transdermal, Daily  "   omeprazole (PRILOSEC) 20 mg, oral, Daily before breakfast, Do not crush or chew.    ondansetron (ZOFRAN) 8 mg, oral, Every 8 hours PRN    prochlorperazine (COMPAZINE) 10 mg, oral, Every 6 hours PRN    QUEtiapine (SEROQUEL) 25 mg, oral, Nightly              Patient History   Medical History[6]  Surgical History[7]  Family History[8]  Social History[9]    Physical Exam   ED Triage Vitals [07/14/25 1218]   Temperature Heart Rate Respirations BP   37.1 °C (98.8 °F) 77 18 (!) 167/97      Pulse Ox Temp Source Heart Rate Source Patient Position   98 % Temporal -- Sitting      BP Location FiO2 (%)     -- --       Physical Exam  Physical Exam:    ED Triage Vitals [07/14/25 1218]   Temperature Heart Rate Respirations BP   37.1 °C (98.8 °F) 77 18 (!) 167/97      Pulse Ox Temp Source Heart Rate Source Patient Position   98 % Temporal -- Sitting      BP Location FiO2 (%)     -- --         Patient Vitals for the past 24 hrs:   BP Temp Temp src Pulse Resp SpO2 Height Weight   07/14/25 1435 170/82 -- -- 67 16 97 % -- --   07/14/25 1320 (!) 151/95 36.5 °C (97.7 °F) Temporal 63 18 97 % -- --   07/14/25 1218 (!) 167/97 37.1 °C (98.8 °F) Temporal 77 18 98 % 1.854 m (6' 1\") 68 kg (150 lb)       Constitutional: Vital signs per nursing notes.  Well developed, well nourished.  Mild acute distress.    Psychiatric: alert and oriented to person, place, and time; no abnormalities of mood or affect; memory intact  Eyes: PERRL; conjunctivae and lids normal; EOMI  ENT: otoscopic exam of external canal and TM´s normal; nasal mucosa, turbinates, and septum normal; mouth, tongue, and pharynx normal; pharynx without edema, exudate, or injection  Respiratory: normal respiratory effort and excursion; no rales, rhonchi, or wheezes; equal air entry  Cardiovascular: regular rate and rhythm; no murmurs, rubs or gallops; symmetric pulses; no edema; normal capillary refill; distal pulses present  Neurological: normal speech; CN II-XII grossly intact; " normal motor and sensory function; no nystagmus  GI: no masses, tenderness, rebound or guarding; no palpable, pulsatile mass; no organomegaly; no hernia; normal bowel sounds; (-) Nash´s sign; (-) McBurney´s sign; (-) CVA tenderness  Lymphatic: no adenopathy of neck  Musculoskeletal: normal gait and station; normal digits and nails; no gross tendon or ligament injury; normal to palpation; normal strength/tone; neurovascular status intact; (-) Soraya´s sign; (-) straight leg raise; no palpable cords; calf circumference equal bilaterally  Skin: normal to inspection; normal to palpation; no rash  GCS: 15      ED Course & MDM   Diagnoses as of 07/14/25 1450   Chronic pain due to neoplasm   Hospice care                 No data recorded                                 Medical Decision Making  Medical Decision Making:    EKG:    Labs:   Labs Reviewed   CBC WITH AUTO DIFFERENTIAL - Abnormal       Result Value    WBC 5.9      nRBC 0.0      RBC 3.02 (*)     Hemoglobin 9.4 (*)     Hematocrit 29.3 (*)     MCV 97      MCH 31.1      MCHC 32.1      RDW 15.9 (*)     Platelets 229      Neutrophils % 75.8      Immature Granulocytes %, Automated 0.3      Lymphocytes % 17.7      Monocytes % 5.4      Eosinophils % 0.3      Basophils % 0.5      Neutrophils Absolute 4.46      Immature Granulocytes Absolute, Automated 0.02      Lymphocytes Absolute 1.04 (*)     Monocytes Absolute 0.32      Eosinophils Absolute 0.02      Basophils Absolute 0.03     COMPREHENSIVE METABOLIC PANEL - Abnormal    Glucose 85      Sodium 137      Potassium 3.8      Chloride 106      Bicarbonate 22      Anion Gap 13      Urea Nitrogen 22      Creatinine 1.04      eGFR 81      Calcium 9.1      Albumin 3.6      Alkaline Phosphatase 113      Total Protein 6.4      AST 9      Bilirubin, Total 0.3      ALT 4 (*)        Diagnostic Imaging:   No orders to display       ED Medication Administration:   Medications   HYDROmorphone (Dilaudid) injection 1 mg (has no  administration in time range)   HYDROmorphone (Dilaudid) injection 1 mg (1 mg intravenous Given 7/14/25 1326)   ondansetron (Zofran) injection 4 mg (4 mg intravenous Given 7/14/25 1325)   sodium chloride 0.9 % bolus 500 mL (0 mL intravenous Stopped 7/14/25 1437)       ED Course:     Joe Magaña is a 64 y.o. male presents with request for help with hospice placement   .    Differential Diagnoses Considered:  Failure to thrive, electrolyte disorder, dehydration    Patient presents with  generalized weakness .     Lab work to evaluate for evidence of severe anemia, thrombocytopenia, leukocytosis/leukopenia, or electrolyte abnormality (including hypokalemia, hyperkalemia, hypernatremia, hyponatremia, hyperglycemia, or hypoglycemia).             Diagnoses as of 07/14/25 1450   Chronic pain due to neoplasm   Hospice care       Abnormal Labs Reviewed   CBC WITH AUTO DIFFERENTIAL - Abnormal; Notable for the following components:       Result Value    RBC 3.02 (*)     Hemoglobin 9.4 (*)     Hematocrit 29.3 (*)     RDW 15.9 (*)     Lymphocytes Absolute 1.04 (*)     All other components within normal limits   COMPREHENSIVE METABOLIC PANEL - Abnormal; Notable for the following components:    ALT 4 (*)     All other components within normal limits       /82 (07/14/25 1435)    Temp      Pulse 67 (07/14/25 1435)   Resp 16 (07/14/25 1435)    SpO2 97 % (07/14/25 1435)          Diagnostic evaluation was completed.  Metabolic panel shows normal glucose.  Sodium potassium in the normal range.  Renal and liver function are in the normal range.  CBC shows normal white blood cell count mild anemia with a hemoglobin of 9.4.  Platelets are in the normal range.    Re-evaluation: Repeat evaluation at 2:45 PM shows the patient is feeling better.  Vital signs are stable.    Patient was treated with IV normal saline, IV Zofran and IV Dilaudid.    Medications administered improved the patient's condition.    Social work was consulted.   They were able to contact [facility].  They are arranging placement at a local nursing home for him to be placed.  They will contact nursing with notification of whether the ED or the nursing home will be setting up transport.    I discussed the results and discharge plan with the patient and/or family/friend if present.  I emphasized the importance of follow up with the physician I referred them to in the timeframe recommended.  I explained reasons for the patient to return to the Emergency Department.  Questions were addressed.  The patient and/or family/friend expressed understanding.      Shared decision making made with patient, and/or family, who agrees with plan.        Diagnosis:   1. Chronic pain due to neoplasm    2. Hospice care                    Procedure  Procedures         [1]   Past Medical History:  Diagnosis Date    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)     Arthritis     Chronic pain disorder     COPD (chronic obstructive pulmonary disease) (Multi)     Degenerative disc disease, lumbar     Depression     Essential (primary) hypertension     Benign essential hypertension    Personal history of other diseases of the digestive system     History of diverticulosis    Personal history of other diseases of the musculoskeletal system and connective tissue     History of gout    Personal history of other diseases of the respiratory system     History of COPD    Psychiatric illness     Substance abuse     Suicide attempt (Multi)    [2]   Past Surgical History:  Procedure Laterality Date    ELBOW SURGERY      OTHER SURGICAL HISTORY  05/06/2020    Flexor tendon repair    OTHER SURGICAL HISTORY  05/06/2020    Oral surgery    OTHER SURGICAL HISTORY  05/06/2020    Shoulder surgery   [3] No family history on file.  [4]   Social History  Tobacco Use    Smoking status: Every Day     Current packs/day: 1.00     Average packs/day: 1 pack/day for 15.0 years (15.0 ttl pk-yrs)     Types: Cigarettes    Smokeless tobacco: Never    Vaping Use    Vaping status: Never Used   Substance Use Topics    Alcohol use: Yes     Comment: occasional    Drug use: Not Currently   [5] No Known Allergies  [6]   Past Medical History:  Diagnosis Date    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)     Arthritis     Chronic pain disorder     COPD (chronic obstructive pulmonary disease) (Multi)     Degenerative disc disease, lumbar     Depression     Essential (primary) hypertension     Benign essential hypertension    Personal history of other diseases of the digestive system     History of diverticulosis    Personal history of other diseases of the musculoskeletal system and connective tissue     History of gout    Personal history of other diseases of the respiratory system     History of COPD    Psychiatric illness     Substance abuse     Suicide attempt (Multi)    [7]   Past Surgical History:  Procedure Laterality Date    ELBOW SURGERY      OTHER SURGICAL HISTORY  05/06/2020    Flexor tendon repair    OTHER SURGICAL HISTORY  05/06/2020    Oral surgery    OTHER SURGICAL HISTORY  05/06/2020    Shoulder surgery   [8] No family history on file.  [9]   Social History  Tobacco Use    Smoking status: Every Day     Current packs/day: 1.00     Average packs/day: 1 pack/day for 15.0 years (15.0 ttl pk-yrs)     Types: Cigarettes    Smokeless tobacco: Never   Vaping Use    Vaping status: Never Used   Substance Use Topics    Alcohol use: Yes     Comment: occasional    Drug use: Not Currently        Inderjit SAEZ MD  07/14/25 3968

## 2025-07-15 ENCOUNTER — HOSPITAL ENCOUNTER (EMERGENCY)
Facility: HOSPITAL | Age: 65
Discharge: HOME | End: 2025-07-15
Payer: MEDICARE

## 2025-07-15 ENCOUNTER — APPOINTMENT (OUTPATIENT)
Facility: CLINIC | Age: 65
End: 2025-07-15
Payer: MEDICARE

## 2025-07-15 VITALS
DIASTOLIC BLOOD PRESSURE: 77 MMHG | TEMPERATURE: 98.2 F | RESPIRATION RATE: 17 BRPM | WEIGHT: 150 LBS | OXYGEN SATURATION: 99 % | SYSTOLIC BLOOD PRESSURE: 176 MMHG | HEIGHT: 73 IN | BODY MASS INDEX: 19.88 KG/M2 | HEART RATE: 65 BPM

## 2025-07-15 DIAGNOSIS — Z51.5 HOSPICE CARE PATIENT: ICD-10-CM

## 2025-07-15 DIAGNOSIS — G89.3 CHRONIC PAIN DUE TO NEOPLASM: Primary | ICD-10-CM

## 2025-07-15 LAB
6MAM UR CFM-MCNC: <25 NG/ML
CODEINE UR CFM-MCNC: <50 NG/ML
HYDROCODONE CTO UR CFM-MCNC: <25 NG/ML
HYDROMORPHONE UR CFM-MCNC: 432 NG/ML
MORPHINE UR CFM-MCNC: <50 NG/ML
NORHYDROCODONE UR CFM-MCNC: <25 NG/ML
NOROXYCODONE UR CFM-MCNC: >1000 NG/ML
OXYCODONE UR CFM-MCNC: >2500 NG/ML
OXYMORPHONE UR CFM-MCNC: >2500 NG/ML

## 2025-07-15 PROCEDURE — 2500000004 HC RX 250 GENERAL PHARMACY W/ HCPCS (ALT 636 FOR OP/ED): Mod: JZ | Performed by: PHYSICIAN ASSISTANT

## 2025-07-15 PROCEDURE — 99284 EMERGENCY DEPT VISIT MOD MDM: CPT

## 2025-07-15 PROCEDURE — 2500000002 HC RX 250 W HCPCS SELF ADMINISTERED DRUGS (ALT 637 FOR MEDICARE OP, ALT 636 FOR OP/ED): Performed by: PHYSICIAN ASSISTANT

## 2025-07-15 PROCEDURE — 96372 THER/PROPH/DIAG INJ SC/IM: CPT | Performed by: PHYSICIAN ASSISTANT

## 2025-07-15 RX ORDER — FENTANYL 25 UG/1
1 PATCH TRANSDERMAL ONCE
Refills: 0 | Status: DISCONTINUED | OUTPATIENT
Start: 2025-07-15 | End: 2025-07-15 | Stop reason: HOSPADM

## 2025-07-15 RX ORDER — HYDROMORPHONE HYDROCHLORIDE 1 MG/ML
1 INJECTION, SOLUTION INTRAMUSCULAR; INTRAVENOUS; SUBCUTANEOUS ONCE
Status: COMPLETED | OUTPATIENT
Start: 2025-07-15 | End: 2025-07-15

## 2025-07-15 RX ORDER — OXYCODONE AND ACETAMINOPHEN 5; 325 MG/1; MG/1
1 TABLET ORAL EVERY 6 HOURS PRN
Qty: 9 TABLET | Refills: 0 | Status: SHIPPED | OUTPATIENT
Start: 2025-07-15 | End: 2025-07-18

## 2025-07-15 RX ORDER — FENTANYL 50 UG/1
1 PATCH TRANSDERMAL ONCE
Refills: 0 | Status: DISCONTINUED | OUTPATIENT
Start: 2025-07-15 | End: 2025-07-15 | Stop reason: DRUGHIGH

## 2025-07-15 RX ORDER — NALOXONE HYDROCHLORIDE 4 MG/.1ML
4 SPRAY NASAL AS NEEDED
Status: ON HOLD
Start: 2025-07-15 | End: 2025-08-08

## 2025-07-15 RX ADMIN — FENTANYL 1 PATCH: 25 PATCH TRANSDERMAL at 02:45

## 2025-07-15 RX ADMIN — HYDROMORPHONE HYDROCHLORIDE 1 MG: 1 INJECTION, SOLUTION INTRAMUSCULAR; INTRAVENOUS; SUBCUTANEOUS at 01:34

## 2025-07-15 ASSESSMENT — PAIN - FUNCTIONAL ASSESSMENT
PAIN_FUNCTIONAL_ASSESSMENT: 0-10
PAIN_FUNCTIONAL_ASSESSMENT: 0-10

## 2025-07-15 ASSESSMENT — PAIN DESCRIPTION - PAIN TYPE: TYPE: ACUTE PAIN;CHRONIC PAIN

## 2025-07-15 ASSESSMENT — PAIN DESCRIPTION - LOCATION
LOCATION: BACK
LOCATION: OTHER (COMMENT)

## 2025-07-15 ASSESSMENT — PAIN DESCRIPTION - DESCRIPTORS: DESCRIPTORS: SHARP;DULL

## 2025-07-15 ASSESSMENT — PAIN SCALES - GENERAL
PAINLEVEL_OUTOF10: 0 - NO PAIN
PAINLEVEL_OUTOF10: 10 - WORST POSSIBLE PAIN
PAINLEVEL_OUTOF10: 10 - WORST POSSIBLE PAIN

## 2025-07-15 ASSESSMENT — PAIN DESCRIPTION - FREQUENCY: FREQUENCY: CONSTANT/CONTINUOUS

## 2025-07-15 ASSESSMENT — PAIN DESCRIPTION - DIRECTION: RADIATING_TOWARDS: FRONT AND BACK

## 2025-07-15 NOTE — ED PROVIDER NOTES
HPI   Chief Complaint   Patient presents with    Back Pain     I'm a hospice pt and I'm supposed to be in a nursing home but they don't have any of my medications and I can't take this pain       64-year-old male presents from the nursing home with complaint of intractable back pain.  The patient was just seen in the ER earlier yesterday and was transferred to nursing home for hospice care.  The patient states that while waiting at the nursing home they were unable to get his pain medication ordered at time and he is complaining of persistent back pain.  He was requesting transfer to the ER for pain medication.  He states this is his chronic pain.  He denies any falls or injuries.  I did review the patient's EMR from yesterday and reviewed his lab work.  Patient denies any bladder or bowel dysfunction or saddle paresthesia.  Patient is requesting something for pain.      History provided by:  Patient and medical records          Patient History   Medical History[1]  Surgical History[2]  Family History[3]  Social History[4]    Physical Exam   ED Triage Vitals [07/15/25 0024]   Temperature Heart Rate Respirations BP   36.8 °C (98.2 °F) 63 18 (!) 189/85      Pulse Ox Temp Source Heart Rate Source Patient Position   98 % Temporal Monitor Sitting      BP Location FiO2 (%)     Right arm --       Physical Exam  Vitals and nursing note reviewed.   Constitutional:       Appearance: Normal appearance. He is normal weight.   HENT:      Head: Normocephalic and atraumatic.      Right Ear: Tympanic membrane, ear canal and external ear normal.      Left Ear: Tympanic membrane, ear canal and external ear normal.      Nose: Nose normal.      Mouth/Throat:      Mouth: Mucous membranes are moist.      Pharynx: Oropharynx is clear.   Eyes:      Extraocular Movements: Extraocular movements intact.      Conjunctiva/sclera: Conjunctivae normal.      Pupils: Pupils are equal, round, and reactive to light.   Cardiovascular:      Rate and  Rhythm: Normal rate and regular rhythm.      Pulses: Normal pulses.      Heart sounds: Normal heart sounds.   Pulmonary:      Effort: Pulmonary effort is normal.      Breath sounds: Normal breath sounds. No wheezing, rhonchi or rales.   Abdominal:      General: Abdomen is flat. Bowel sounds are normal.      Palpations: Abdomen is soft. There is no mass.      Tenderness: There is no abdominal tenderness. There is no guarding.   Musculoskeletal:         General: Tenderness present. No swelling.      Cervical back: Normal, normal range of motion and neck supple.      Thoracic back: Normal.      Lumbar back: Tenderness present. Decreased range of motion.        Back:       Comments: Diffuse lower back pain with spasm   Skin:     General: Skin is warm and dry.      Capillary Refill: Capillary refill takes less than 2 seconds.      Findings: No rash.   Neurological:      General: No focal deficit present.      Mental Status: He is alert and oriented to person, place, and time. Mental status is at baseline.      Sensory: No sensory deficit.   Psychiatric:         Mood and Affect: Mood normal.         Behavior: Behavior normal.         Thought Content: Thought content normal.         Judgment: Judgment normal.           ED Course & MDM   Diagnoses as of 07/15/25 0119   Chronic pain due to neoplasm   Hospice care patient                 No data recorded     Tere Coma Scale Score: 15 (07/15/25 0024 : Janet Chavarria RN)                           Medical Decision Making  64-year-old male presents from the nursing home with complaint of intractable back pain.  The patient was just seen in the ER earlier yesterday and was transferred to nursing home for hospice care.  The patient states that while waiting at the nursing home they were unable to get his pain medication ordered at time and he is complaining of persistent back pain.  He was requesting transfer to the ER for pain medication.  He states this is his chronic  pain.  He denies any falls or injuries.  I did review the patient's EMR from yesterday and reviewed his lab work.  Patient denies any bladder or bowel dysfunction or saddle paresthesia.  Patient is requesting something for pain.  Temperature 36 8, heart rate 63, respirations 18, blood pressure 189/85, pulse ox is 98% on room air    I did reach out to the hospice center and was informed by the RN that the patient became agitated because his pain medications had not been ordered in time and he did not want a wait all night for pain medicine.  Patient signed himself out to the hospice center and walked back to the hospital.  He is requesting admission to the hospital for intractable back pain with placement to a nursing home for rehab.  The patient was given Dilaudid 1 mg IM.  Discussed treatment plans with the patient.  He was given a fentanyl patch 75 mcg and was discharged.  The patient states he feels comfortable at home with a fentanyl patch and he will follow-up with his hospice doctor tomorrow to make arrangements for home care.  Patient was advised to return back to the ER with any concerns or worsening of symptoms.  Patient verbalizes understand agree with the plan disposition all questions answered prior to discharge        Procedure  Procedures         [1]   Past Medical History:  Diagnosis Date    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)     Arthritis     Chronic pain disorder     COPD (chronic obstructive pulmonary disease) (Multi)     Degenerative disc disease, lumbar     Depression     Essential (primary) hypertension     Benign essential hypertension    Personal history of other diseases of the digestive system     History of diverticulosis    Personal history of other diseases of the musculoskeletal system and connective tissue     History of gout    Personal history of other diseases of the respiratory system     History of COPD    Psychiatric illness     Substance abuse     Suicide attempt (Multi)    [2]   Past  Surgical History:  Procedure Laterality Date    ELBOW SURGERY      OTHER SURGICAL HISTORY  05/06/2020    Flexor tendon repair    OTHER SURGICAL HISTORY  05/06/2020    Oral surgery    OTHER SURGICAL HISTORY  05/06/2020    Shoulder surgery   [3] No family history on file.  [4]   Social History  Tobacco Use    Smoking status: Every Day     Current packs/day: 1.00     Average packs/day: 1 pack/day for 15.0 years (15.0 ttl pk-yrs)     Types: Cigarettes    Smokeless tobacco: Never   Vaping Use    Vaping status: Never Used   Substance Use Topics    Alcohol use: Yes     Comment: occasional    Drug use: Not Currently        Memo Morales PA-C  07/15/25 0159

## 2025-07-16 LAB
QRS DURATION: 88 MS
QT INTERVAL: 506 MS
QTC CALCULATION(BAZETT): 467 MS
QTC FREDERICIA: 479 MS
R AXIS: 52 DEGREES
T AXIS: 46 DEGREES
VENTRICULAR RATE: 51 BPM

## 2025-07-18 ENCOUNTER — HOSPITAL ENCOUNTER (EMERGENCY)
Age: 65
Discharge: HOME | End: 2025-07-18
Attending: EMERGENCY MEDICINE
Payer: MEDICARE

## 2025-07-18 VITALS
RESPIRATION RATE: 18 BRPM | SYSTOLIC BLOOD PRESSURE: 157 MMHG | OXYGEN SATURATION: 99 % | BODY MASS INDEX: 19.88 KG/M2 | HEART RATE: 79 BPM | TEMPERATURE: 99 F | DIASTOLIC BLOOD PRESSURE: 98 MMHG | WEIGHT: 150 LBS | HEIGHT: 73 IN

## 2025-07-18 DIAGNOSIS — G89.3 PAIN OF METASTATIC MALIGNANCY: Primary | ICD-10-CM

## 2025-07-18 PROCEDURE — 96372 THER/PROPH/DIAG INJ SC/IM: CPT

## 2025-07-18 PROCEDURE — 6360000002 HC RX W HCPCS: Performed by: EMERGENCY MEDICINE

## 2025-07-18 PROCEDURE — 99284 EMERGENCY DEPT VISIT MOD MDM: CPT

## 2025-07-18 PROCEDURE — 6370000000 HC RX 637 (ALT 250 FOR IP): Performed by: EMERGENCY MEDICINE

## 2025-07-18 RX ORDER — ACETAMINOPHEN 325 MG/1
650 TABLET ORAL EVERY 6 HOURS PRN
COMMUNITY

## 2025-07-18 RX ORDER — HYDROMORPHONE HYDROCHLORIDE 1 MG/ML
1 INJECTION, SOLUTION INTRAMUSCULAR; INTRAVENOUS; SUBCUTANEOUS ONCE
Status: COMPLETED | OUTPATIENT
Start: 2025-07-18 | End: 2025-07-18

## 2025-07-18 RX ORDER — PROMETHAZINE HYDROCHLORIDE 12.5 MG/1
25 TABLET ORAL ONCE
Status: COMPLETED | OUTPATIENT
Start: 2025-07-18 | End: 2025-07-18

## 2025-07-18 RX ORDER — CLONIDINE HYDROCHLORIDE 0.1 MG/1
0.1 TABLET ORAL DAILY
COMMUNITY

## 2025-07-18 RX ORDER — FENTANYL 25 UG/1
1 PATCH TRANSDERMAL
Refills: 0 | Status: DISCONTINUED | OUTPATIENT
Start: 2025-07-18 | End: 2025-07-19 | Stop reason: HOSPADM

## 2025-07-18 RX ADMIN — PROMETHAZINE HYDROCHLORIDE 25 MG: 12.5 TABLET ORAL at 22:50

## 2025-07-18 RX ADMIN — HYDROMORPHONE HYDROCHLORIDE 1 MG: 1 INJECTION, SOLUTION INTRAMUSCULAR; INTRAVENOUS; SUBCUTANEOUS at 22:50

## 2025-07-18 ASSESSMENT — PAIN - FUNCTIONAL ASSESSMENT
PAIN_FUNCTIONAL_ASSESSMENT: 0-10
PAIN_FUNCTIONAL_ASSESSMENT: PREVENTS OR INTERFERES SOME ACTIVE ACTIVITIES AND ADLS
PAIN_FUNCTIONAL_ASSESSMENT: ACTIVITIES ARE NOT PREVENTED

## 2025-07-18 ASSESSMENT — PAIN DESCRIPTION - DESCRIPTORS: DESCRIPTORS: DISCOMFORT

## 2025-07-18 ASSESSMENT — ENCOUNTER SYMPTOMS
NAUSEA: 0
VOMITING: 0
SHORTNESS OF BREATH: 0
ABDOMINAL PAIN: 0
SORE THROAT: 0
BACK PAIN: 1
EYE DISCHARGE: 0
EYE REDNESS: 0
COUGH: 0

## 2025-07-18 ASSESSMENT — PAIN DESCRIPTION - PAIN TYPE: TYPE: CHRONIC PAIN

## 2025-07-18 ASSESSMENT — PAIN SCALES - GENERAL
PAINLEVEL_OUTOF10: 10
PAINLEVEL_OUTOF10: 10

## 2025-07-18 ASSESSMENT — PAIN DESCRIPTION - LOCATION
LOCATION: ABDOMEN
LOCATION: BACK

## 2025-07-18 ASSESSMENT — LIFESTYLE VARIABLES
HOW MANY STANDARD DRINKS CONTAINING ALCOHOL DO YOU HAVE ON A TYPICAL DAY: PATIENT DOES NOT DRINK
HOW OFTEN DO YOU HAVE A DRINK CONTAINING ALCOHOL: NEVER

## 2025-07-19 NOTE — ED NOTES
Called [facility] to find out about patients medications for pain.  [phone].  Spoke with on call nurse Natasha.  Patient was prescribed medications on the 7/15 for 3 days worth.  Fentanyl patch, percocet, Patient has been to the ER for a few visits for pain control.  Advised hospice nurse that this patient needs pain control due to his medical condition.  Natasha from hospice is going to call her supervisor to see what they can do.  Dr Reyes notified.

## 2025-07-19 NOTE — ED NOTES
[facility]  called and updated that patient has been non compliant with medications, patient left the facility that he was sent to against medical advice, patient also taking more of his medication that is prescribed and keeps running out.  Hospice now has revoked their services as of today.  Linda will reach out to patients  for follow up with his care.  Dr Reyes notified.    Yes

## 2025-07-19 NOTE — ED PROVIDER NOTES
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 Recently was in hospital last week, sent to a facility for pain control, was waiting on hospice nurse for meds, they never showed up, so he went to the emergency room on monday         HISTORY OF PRESENT ILLNESS   (Location/Symptom, Timing/Onset, Context/Setting, Quality, Duration, Modifying Factors, Severity)  Note limiting factors.   Jose Patel is a 64 y.o. male who presents to the emergency department with chronic pain exacerbation.  He was recently admitted to a nursing home for hospice.  He left because was disappointed in the care.  He went back to the ER and was told to follow up with hospice.  He states no one is helping him.     The history is provided by the patient.   Back Pain  Location:  Generalized  Quality:  Aching  Radiates to:  Does not radiate  Pain severity:  Severe  Pain is:  Same all the time  Timing:  Constant  Progression:  Worsening  Chronicity:  New  Relieved by:  Nothing  Worsened by:  Nothing  Ineffective treatments:  Narcotics  Associated symptoms: no abdominal pain, no chest pain, no dysuria and no fever        Nursing Notes were reviewed.    REVIEW OF SYSTEMS    (2-9 systems for level 4, 10 or more for level 5)     Review of Systems   Constitutional:  Negative for chills and fever.   HENT:  Negative for ear pain and sore throat.    Eyes:  Negative for discharge and redness.   Respiratory:  Negative for cough and shortness of breath.    Cardiovascular:  Negative for chest pain and palpitations.   Gastrointestinal:  Negative for abdominal pain, nausea and

## 2025-08-06 DIAGNOSIS — C34.91 NSCLC OF RIGHT LUNG (MULTI): ICD-10-CM

## 2025-08-07 ENCOUNTER — HOSPITAL ENCOUNTER (EMERGENCY)
Facility: HOSPITAL | Age: 65
Discharge: HOME | End: 2025-08-07
Payer: MEDICARE

## 2025-08-07 ENCOUNTER — APPOINTMENT (OUTPATIENT)
Dept: RADIOLOGY | Facility: HOSPITAL | Age: 65
End: 2025-08-07
Payer: MEDICARE

## 2025-08-07 ENCOUNTER — APPOINTMENT (OUTPATIENT)
Dept: CARDIOLOGY | Facility: HOSPITAL | Age: 65
End: 2025-08-07
Payer: MEDICARE

## 2025-08-07 ENCOUNTER — HOSPITAL ENCOUNTER (INPATIENT)
Facility: HOSPITAL | Age: 65
LOS: 1 days | Discharge: HOSPICE | End: 2025-08-08
Attending: STUDENT IN AN ORGANIZED HEALTH CARE EDUCATION/TRAINING PROGRAM | Admitting: HOSPITALIST
Payer: MEDICARE

## 2025-08-07 VITALS
HEIGHT: 73 IN | SYSTOLIC BLOOD PRESSURE: 147 MMHG | OXYGEN SATURATION: 97 % | DIASTOLIC BLOOD PRESSURE: 84 MMHG | TEMPERATURE: 97.3 F | HEART RATE: 71 BPM | RESPIRATION RATE: 16 BRPM | BODY MASS INDEX: 19.88 KG/M2 | WEIGHT: 150 LBS

## 2025-08-07 DIAGNOSIS — T40.601A: Primary | ICD-10-CM

## 2025-08-07 DIAGNOSIS — G89.3 CHRONIC PAIN DUE TO NEOPLASM: ICD-10-CM

## 2025-08-07 DIAGNOSIS — R09.02 HYPOXIA: ICD-10-CM

## 2025-08-07 DIAGNOSIS — Z51.5 HOSPICE CARE PATIENT: ICD-10-CM

## 2025-08-07 DIAGNOSIS — G89.29 CHRONIC MIDLINE LOW BACK PAIN WITHOUT SCIATICA: Primary | ICD-10-CM

## 2025-08-07 DIAGNOSIS — M54.50 CHRONIC MIDLINE LOW BACK PAIN WITHOUT SCIATICA: Primary | ICD-10-CM

## 2025-08-07 DIAGNOSIS — C34.91 NSCLC OF RIGHT LUNG (MULTI): ICD-10-CM

## 2025-08-07 LAB
ALBUMIN SERPL BCP-MCNC: 3.8 G/DL (ref 3.4–5)
ALBUMIN SERPL BCP-MCNC: 3.9 G/DL (ref 3.4–5)
ALP SERPL-CCNC: 110 U/L (ref 33–136)
ALP SERPL-CCNC: 119 U/L (ref 33–136)
ALT SERPL W P-5'-P-CCNC: 5 U/L (ref 10–52)
ALT SERPL W P-5'-P-CCNC: 8 U/L (ref 10–52)
ANION GAP BLDV CALCULATED.4IONS-SCNC: 8 MMOL/L (ref 10–25)
ANION GAP SERPL CALC-SCNC: 13 MMOL/L (ref 10–20)
ANION GAP SERPL CALC-SCNC: 15 MMOL/L (ref 10–20)
APPEARANCE UR: CLEAR
APTT PPP: 28 SECONDS (ref 26–36)
APTT PPP: 34 SECONDS (ref 26–36)
AST SERPL W P-5'-P-CCNC: 10 U/L (ref 9–39)
AST SERPL W P-5'-P-CCNC: 13 U/L (ref 9–39)
BASE EXCESS BLDV CALC-SCNC: -2.1 MMOL/L (ref -2–3)
BASOPHILS # BLD AUTO: 0.03 X10*3/UL (ref 0–0.1)
BASOPHILS # BLD AUTO: 0.04 X10*3/UL (ref 0–0.1)
BASOPHILS NFR BLD AUTO: 0.3 %
BASOPHILS NFR BLD AUTO: 0.7 %
BILIRUB SERPL-MCNC: 0.4 MG/DL (ref 0–1.2)
BILIRUB SERPL-MCNC: 0.5 MG/DL (ref 0–1.2)
BILIRUB UR STRIP.AUTO-MCNC: NEGATIVE MG/DL
BODY TEMPERATURE: ABNORMAL
BUN SERPL-MCNC: 17 MG/DL (ref 6–23)
BUN SERPL-MCNC: 19 MG/DL (ref 6–23)
CA-I BLDV-SCNC: 1.19 MMOL/L (ref 1.1–1.33)
CALCIUM SERPL-MCNC: 8.8 MG/DL (ref 8.6–10.3)
CALCIUM SERPL-MCNC: 9.6 MG/DL (ref 8.6–10.3)
CARDIAC TROPONIN I PNL SERPL HS: 20 NG/L (ref 0–20)
CARDIAC TROPONIN I PNL SERPL HS: 7 NG/L (ref 0–20)
CHLORIDE BLDV-SCNC: 107 MMOL/L (ref 98–107)
CHLORIDE SERPL-SCNC: 102 MMOL/L (ref 98–107)
CHLORIDE SERPL-SCNC: 103 MMOL/L (ref 98–107)
CO2 SERPL-SCNC: 23 MMOL/L (ref 21–32)
CO2 SERPL-SCNC: 28 MMOL/L (ref 21–32)
COLOR UR: ABNORMAL
CREAT SERPL-MCNC: 1.09 MG/DL (ref 0.5–1.3)
CREAT SERPL-MCNC: 1.33 MG/DL (ref 0.5–1.3)
EGFRCR SERPLBLD CKD-EPI 2021: 60 ML/MIN/1.73M*2
EGFRCR SERPLBLD CKD-EPI 2021: 77 ML/MIN/1.73M*2
EOSINOPHIL # BLD AUTO: 0.03 X10*3/UL (ref 0–0.7)
EOSINOPHIL # BLD AUTO: 0.06 X10*3/UL (ref 0–0.7)
EOSINOPHIL NFR BLD AUTO: 0.3 %
EOSINOPHIL NFR BLD AUTO: 1 %
ERYTHROCYTE [DISTWIDTH] IN BLOOD BY AUTOMATED COUNT: 16.5 % (ref 11.5–14.5)
ERYTHROCYTE [DISTWIDTH] IN BLOOD BY AUTOMATED COUNT: 16.5 % (ref 11.5–14.5)
GLUCOSE BLD MANUAL STRIP-MCNC: 188 MG/DL (ref 74–99)
GLUCOSE BLD MANUAL STRIP-MCNC: 228 MG/DL (ref 74–99)
GLUCOSE BLDV-MCNC: 182 MG/DL (ref 74–99)
GLUCOSE SERPL-MCNC: 183 MG/DL (ref 74–99)
GLUCOSE SERPL-MCNC: 80 MG/DL (ref 74–99)
GLUCOSE UR STRIP.AUTO-MCNC: NORMAL MG/DL
HCO3 BLDV-SCNC: 24.9 MMOL/L (ref 22–26)
HCT VFR BLD AUTO: 28.5 % (ref 41–52)
HCT VFR BLD AUTO: 31.4 % (ref 41–52)
HCT VFR BLD EST: 28 % (ref 41–52)
HGB BLD-MCNC: 10 G/DL (ref 13.5–17.5)
HGB BLD-MCNC: 9.2 G/DL (ref 13.5–17.5)
HGB BLDV-MCNC: 9.4 G/DL (ref 13.5–17.5)
IMM GRANULOCYTES # BLD AUTO: 0.02 X10*3/UL (ref 0–0.7)
IMM GRANULOCYTES # BLD AUTO: 0.12 X10*3/UL (ref 0–0.7)
IMM GRANULOCYTES NFR BLD AUTO: 0.3 % (ref 0–0.9)
IMM GRANULOCYTES NFR BLD AUTO: 1 % (ref 0–0.9)
INHALED O2 CONCENTRATION: 21 %
INR PPP: 1 (ref 0.9–1.1)
INR PPP: 1 (ref 0.9–1.1)
KETONES UR STRIP.AUTO-MCNC: NEGATIVE MG/DL
LACTATE BLDV-SCNC: 3 MMOL/L (ref 0.4–2)
LACTATE SERPL-SCNC: 1 MMOL/L (ref 0.4–2)
LEUKOCYTE ESTERASE UR QL STRIP.AUTO: NEGATIVE
LIPASE SERPL-CCNC: 7 U/L (ref 9–82)
LYMPHOCYTES # BLD AUTO: 0.7 X10*3/UL (ref 1.2–4.8)
LYMPHOCYTES # BLD AUTO: 1.43 X10*3/UL (ref 1.2–4.8)
LYMPHOCYTES NFR BLD AUTO: 23.3 %
LYMPHOCYTES NFR BLD AUTO: 6 %
MAGNESIUM SERPL-MCNC: 2.17 MG/DL (ref 1.6–2.4)
MCH RBC QN AUTO: 31.4 PG (ref 26–34)
MCH RBC QN AUTO: 32.3 PG (ref 26–34)
MCHC RBC AUTO-ENTMCNC: 31.8 G/DL (ref 32–36)
MCHC RBC AUTO-ENTMCNC: 32.3 G/DL (ref 32–36)
MCV RBC AUTO: 100 FL (ref 80–100)
MCV RBC AUTO: 99 FL (ref 80–100)
MONOCYTES # BLD AUTO: 0.38 X10*3/UL (ref 0.1–1)
MONOCYTES # BLD AUTO: 0.58 X10*3/UL (ref 0.1–1)
MONOCYTES NFR BLD AUTO: 5 %
MONOCYTES NFR BLD AUTO: 6.2 %
NEUTROPHILS # BLD AUTO: 10.16 X10*3/UL (ref 1.2–7.7)
NEUTROPHILS # BLD AUTO: 4.21 X10*3/UL (ref 1.2–7.7)
NEUTROPHILS NFR BLD AUTO: 68.5 %
NEUTROPHILS NFR BLD AUTO: 87.4 %
NITRITE UR QL STRIP.AUTO: NEGATIVE
NRBC BLD-RTO: 0 /100 WBCS (ref 0–0)
NRBC BLD-RTO: 0 /100 WBCS (ref 0–0)
OXYHGB MFR BLDV: 73.3 % (ref 45–75)
PCO2 BLDV: 53 MM HG (ref 41–51)
PH BLDV: 7.28 PH (ref 7.33–7.43)
PH UR STRIP.AUTO: 6 [PH]
PLATELET # BLD AUTO: 212 X10*3/UL (ref 150–450)
PLATELET # BLD AUTO: 213 X10*3/UL (ref 150–450)
PO2 BLDV: 49 MM HG (ref 35–45)
POTASSIUM BLDV-SCNC: 3.3 MMOL/L (ref 3.5–5.3)
POTASSIUM SERPL-SCNC: 3.2 MMOL/L (ref 3.5–5.3)
POTASSIUM SERPL-SCNC: 3.9 MMOL/L (ref 3.5–5.3)
PROT SERPL-MCNC: 7 G/DL (ref 6.4–8.2)
PROT SERPL-MCNC: 7.5 G/DL (ref 6.4–8.2)
PROT UR STRIP.AUTO-MCNC: NEGATIVE MG/DL
PROTHROMBIN TIME: 11 SECONDS (ref 9.8–12.4)
PROTHROMBIN TIME: 11.2 SECONDS (ref 9.8–12.4)
RBC # BLD AUTO: 2.85 X10*6/UL (ref 4.5–5.9)
RBC # BLD AUTO: 3.18 X10*6/UL (ref 4.5–5.9)
RBC # UR STRIP.AUTO: NEGATIVE MG/DL
SAO2 % BLDV: 82 % (ref 45–75)
SODIUM BLDV-SCNC: 137 MMOL/L (ref 136–145)
SODIUM SERPL-SCNC: 137 MMOL/L (ref 136–145)
SODIUM SERPL-SCNC: 140 MMOL/L (ref 136–145)
SP GR UR STRIP.AUTO: 1.04
UROBILINOGEN UR STRIP.AUTO-MCNC: NORMAL MG/DL
WBC # BLD AUTO: 11.6 X10*3/UL (ref 4.4–11.3)
WBC # BLD AUTO: 6.1 X10*3/UL (ref 4.4–11.3)

## 2025-08-07 PROCEDURE — 71275 CT ANGIOGRAPHY CHEST: CPT | Performed by: RADIOLOGY

## 2025-08-07 PROCEDURE — 71275 CT ANGIOGRAPHY CHEST: CPT

## 2025-08-07 PROCEDURE — 71045 X-RAY EXAM CHEST 1 VIEW: CPT | Mod: FOREIGN READ | Performed by: RADIOLOGY

## 2025-08-07 PROCEDURE — 84132 ASSAY OF SERUM POTASSIUM: CPT | Performed by: STUDENT IN AN ORGANIZED HEALTH CARE EDUCATION/TRAINING PROGRAM

## 2025-08-07 PROCEDURE — 96375 TX/PRO/DX INJ NEW DRUG ADDON: CPT

## 2025-08-07 PROCEDURE — 99285 EMERGENCY DEPT VISIT HI MDM: CPT | Mod: 25

## 2025-08-07 PROCEDURE — 96374 THER/PROPH/DIAG INJ IV PUSH: CPT | Mod: 59

## 2025-08-07 PROCEDURE — 85730 THROMBOPLASTIN TIME PARTIAL: CPT | Performed by: STUDENT IN AN ORGANIZED HEALTH CARE EDUCATION/TRAINING PROGRAM

## 2025-08-07 PROCEDURE — 82947 ASSAY GLUCOSE BLOOD QUANT: CPT

## 2025-08-07 PROCEDURE — 96361 HYDRATE IV INFUSION ADD-ON: CPT

## 2025-08-07 PROCEDURE — 70450 CT HEAD/BRAIN W/O DYE: CPT | Performed by: SURGERY

## 2025-08-07 PROCEDURE — 84484 ASSAY OF TROPONIN QUANT: CPT | Performed by: STUDENT IN AN ORGANIZED HEALTH CARE EDUCATION/TRAINING PROGRAM

## 2025-08-07 PROCEDURE — 85730 THROMBOPLASTIN TIME PARTIAL: CPT

## 2025-08-07 PROCEDURE — 72128 CT CHEST SPINE W/O DYE: CPT | Performed by: STUDENT IN AN ORGANIZED HEALTH CARE EDUCATION/TRAINING PROGRAM

## 2025-08-07 PROCEDURE — 80053 COMPREHEN METABOLIC PANEL: CPT

## 2025-08-07 PROCEDURE — 2550000001 HC RX 255 CONTRASTS: Performed by: STUDENT IN AN ORGANIZED HEALTH CARE EDUCATION/TRAINING PROGRAM

## 2025-08-07 PROCEDURE — 83735 ASSAY OF MAGNESIUM: CPT | Performed by: STUDENT IN AN ORGANIZED HEALTH CARE EDUCATION/TRAINING PROGRAM

## 2025-08-07 PROCEDURE — 93005 ELECTROCARDIOGRAM TRACING: CPT

## 2025-08-07 PROCEDURE — 81003 URINALYSIS AUTO W/O SCOPE: CPT

## 2025-08-07 PROCEDURE — 96376 TX/PRO/DX INJ SAME DRUG ADON: CPT

## 2025-08-07 PROCEDURE — 2500000004 HC RX 250 GENERAL PHARMACY W/ HCPCS (ALT 636 FOR OP/ED): Performed by: STUDENT IN AN ORGANIZED HEALTH CARE EDUCATION/TRAINING PROGRAM

## 2025-08-07 PROCEDURE — G0508 CRIT CARE TELEHEA CONSULT 60: HCPCS | Performed by: PSYCHIATRY & NEUROLOGY

## 2025-08-07 PROCEDURE — 74177 CT ABD & PELVIS W/CONTRAST: CPT | Performed by: STUDENT IN AN ORGANIZED HEALTH CARE EDUCATION/TRAINING PROGRAM

## 2025-08-07 PROCEDURE — 70450 CT HEAD/BRAIN W/O DYE: CPT

## 2025-08-07 PROCEDURE — 83690 ASSAY OF LIPASE: CPT

## 2025-08-07 PROCEDURE — 72125 CT NECK SPINE W/O DYE: CPT | Performed by: SURGERY

## 2025-08-07 PROCEDURE — 84484 ASSAY OF TROPONIN QUANT: CPT

## 2025-08-07 PROCEDURE — 2550000001 HC RX 255 CONTRASTS

## 2025-08-07 PROCEDURE — 85610 PROTHROMBIN TIME: CPT

## 2025-08-07 PROCEDURE — 85025 COMPLETE CBC W/AUTO DIFF WBC: CPT

## 2025-08-07 PROCEDURE — 2500000004 HC RX 250 GENERAL PHARMACY W/ HCPCS (ALT 636 FOR OP/ED): Mod: JZ

## 2025-08-07 PROCEDURE — 96374 THER/PROPH/DIAG INJ IV PUSH: CPT

## 2025-08-07 PROCEDURE — 85025 COMPLETE CBC W/AUTO DIFF WBC: CPT | Performed by: STUDENT IN AN ORGANIZED HEALTH CARE EDUCATION/TRAINING PROGRAM

## 2025-08-07 PROCEDURE — 99291 CRITICAL CARE FIRST HOUR: CPT | Mod: 25 | Performed by: STUDENT IN AN ORGANIZED HEALTH CARE EDUCATION/TRAINING PROGRAM

## 2025-08-07 PROCEDURE — 72125 CT NECK SPINE W/O DYE: CPT

## 2025-08-07 PROCEDURE — 71275 CT ANGIOGRAPHY CHEST: CPT | Mod: FOREIGN READ | Performed by: STUDENT IN AN ORGANIZED HEALTH CARE EDUCATION/TRAINING PROGRAM

## 2025-08-07 PROCEDURE — 83605 ASSAY OF LACTIC ACID: CPT

## 2025-08-07 PROCEDURE — 85610 PROTHROMBIN TIME: CPT | Performed by: STUDENT IN AN ORGANIZED HEALTH CARE EDUCATION/TRAINING PROGRAM

## 2025-08-07 PROCEDURE — 36415 COLL VENOUS BLD VENIPUNCTURE: CPT | Performed by: STUDENT IN AN ORGANIZED HEALTH CARE EDUCATION/TRAINING PROGRAM

## 2025-08-07 PROCEDURE — 71045 X-RAY EXAM CHEST 1 VIEW: CPT

## 2025-08-07 PROCEDURE — 72131 CT LUMBAR SPINE W/O DYE: CPT | Performed by: STUDENT IN AN ORGANIZED HEALTH CARE EDUCATION/TRAINING PROGRAM

## 2025-08-07 PROCEDURE — 2500000001 HC RX 250 WO HCPCS SELF ADMINISTERED DRUGS (ALT 637 FOR MEDICARE OP): Performed by: STUDENT IN AN ORGANIZED HEALTH CARE EDUCATION/TRAINING PROGRAM

## 2025-08-07 PROCEDURE — 36415 COLL VENOUS BLD VENIPUNCTURE: CPT

## 2025-08-07 PROCEDURE — 74177 CT ABD & PELVIS W/CONTRAST: CPT

## 2025-08-07 RX ORDER — OXYCODONE AND ACETAMINOPHEN 5; 325 MG/1; MG/1
1 TABLET ORAL EVERY 6 HOURS PRN
Qty: 12 TABLET | Refills: 0 | Status: SHIPPED | OUTPATIENT
Start: 2025-08-07 | End: 2025-08-08 | Stop reason: HOSPADM

## 2025-08-07 RX ORDER — HYDROMORPHONE HYDROCHLORIDE 1 MG/ML
1 INJECTION, SOLUTION INTRAMUSCULAR; INTRAVENOUS; SUBCUTANEOUS ONCE
Status: COMPLETED | OUTPATIENT
Start: 2025-08-07 | End: 2025-08-07

## 2025-08-07 RX ORDER — NALOXONE HYDROCHLORIDE 1 MG/ML
1 INJECTION INTRAMUSCULAR; INTRAVENOUS; SUBCUTANEOUS ONCE
Status: COMPLETED | OUTPATIENT
Start: 2025-08-07 | End: 2025-08-07

## 2025-08-07 RX ORDER — LIDOCAINE 560 MG/1
1 PATCH PERCUTANEOUS; TOPICAL; TRANSDERMAL DAILY
Qty: 5 PATCH | Refills: 0 | Status: SHIPPED | OUTPATIENT
Start: 2025-08-07 | End: 2025-08-12

## 2025-08-07 RX ORDER — NAPROXEN SODIUM 220 MG/1
324 TABLET, FILM COATED ORAL ONCE
Status: COMPLETED | OUTPATIENT
Start: 2025-08-07 | End: 2025-08-07

## 2025-08-07 RX ORDER — POTASSIUM CHLORIDE 1.5 G/1.58G
40 POWDER, FOR SOLUTION ORAL ONCE
Status: COMPLETED | OUTPATIENT
Start: 2025-08-07 | End: 2025-08-07

## 2025-08-07 RX ORDER — ONDANSETRON 4 MG/1
4 TABLET, ORALLY DISINTEGRATING ORAL EVERY 8 HOURS PRN
Qty: 20 TABLET | Refills: 0 | Status: SHIPPED | OUTPATIENT
Start: 2025-08-07 | End: 2025-08-14

## 2025-08-07 RX ORDER — ONDANSETRON HYDROCHLORIDE 2 MG/ML
4 INJECTION, SOLUTION INTRAVENOUS ONCE
Status: COMPLETED | OUTPATIENT
Start: 2025-08-07 | End: 2025-08-07

## 2025-08-07 RX ADMIN — SODIUM CHLORIDE, SODIUM LACTATE, POTASSIUM CHLORIDE, AND CALCIUM CHLORIDE 1000 ML: .6; .31; .03; .02 INJECTION, SOLUTION INTRAVENOUS at 23:24

## 2025-08-07 RX ADMIN — ONDANSETRON 4 MG: 2 INJECTION INTRAMUSCULAR; INTRAVENOUS at 08:55

## 2025-08-07 RX ADMIN — ASPIRIN 324 MG: 81 TABLET, CHEWABLE ORAL at 23:58

## 2025-08-07 RX ADMIN — HYDROMORPHONE HYDROCHLORIDE 1 MG: 1 INJECTION, SOLUTION INTRAMUSCULAR; INTRAVENOUS; SUBCUTANEOUS at 08:55

## 2025-08-07 RX ADMIN — IOHEXOL 75 ML: 350 INJECTION, SOLUTION INTRAVENOUS at 09:20

## 2025-08-07 RX ADMIN — NALOXONE HYDROCHLORIDE 1 MG: 1 INJECTION PARENTERAL at 21:16

## 2025-08-07 RX ADMIN — HYDROMORPHONE HYDROCHLORIDE 1 MG: 1 INJECTION, SOLUTION INTRAMUSCULAR; INTRAVENOUS; SUBCUTANEOUS at 11:24

## 2025-08-07 RX ADMIN — IOHEXOL 75 ML: 350 INJECTION, SOLUTION INTRAVENOUS at 22:58

## 2025-08-07 RX ADMIN — POTASSIUM CHLORIDE 40 MEQ: 1.5 POWDER, FOR SOLUTION ORAL at 23:58

## 2025-08-07 ASSESSMENT — PAIN DESCRIPTION - PAIN TYPE: TYPE: CHRONIC PAIN

## 2025-08-07 ASSESSMENT — PAIN DESCRIPTION - LOCATION
LOCATION: BACK

## 2025-08-07 ASSESSMENT — PAIN SCALES - GENERAL
PAINLEVEL_OUTOF10: 8
PAINLEVEL_OUTOF10: 10 - WORST POSSIBLE PAIN
PAINLEVEL_OUTOF10: 10 - WORST POSSIBLE PAIN
PAINLEVEL_OUTOF10: 7

## 2025-08-07 ASSESSMENT — PAIN DESCRIPTION - DESCRIPTORS: DESCRIPTORS: ACHING;SHARP

## 2025-08-07 ASSESSMENT — LIFESTYLE VARIABLES
EVER FELT BAD OR GUILTY ABOUT YOUR DRINKING: NO
HAVE YOU EVER FELT YOU SHOULD CUT DOWN ON YOUR DRINKING: NO
TOTAL SCORE: 0
EVER HAD A DRINK FIRST THING IN THE MORNING TO STEADY YOUR NERVES TO GET RID OF A HANGOVER: NO
HAVE PEOPLE ANNOYED YOU BY CRITICIZING YOUR DRINKING: NO

## 2025-08-07 ASSESSMENT — PAIN - FUNCTIONAL ASSESSMENT
PAIN_FUNCTIONAL_ASSESSMENT: 0-10

## 2025-08-07 NOTE — ED PROVIDER NOTES
HPI   Chief Complaint   Patient presents with    Back Pain     Pt c/o back pain and bilateral rib pain.        History provided by: Patient    Limitations to history: None    CC: Back pain    HPI: 64-year-old male with a history of COPD, hypertension, metastatic small cell lung cancer, hypothyroidism presents the emergency department to be evaluated for back pain.  Patient states that he has pain in his upper and lower back that radiates to his abdomen and bilateral ribs.  Patient states that this is the same pain he typically has associated with his metastatic small cell lung cancer but states that the pain is worse than usual.  Denies any injury.  He denies numbness tingling or weakness in the lower extremities.  Denies saddle anesthesia, urinary tension, stool incontinence.  Denies recent spinal injection or history of IV drug use.  Denies fever and chills.  Denies weakness and fatigue.  He is still able to ambulate given his pain.  He denies chest pain or shortness of breath.  Denies pleuritic pain and hemoptysis.  Denies cough.  Reports nausea due to the pain.  Denies vomiting.  Denies any urinary complaints.  Denies blood in the urine or stool.  Denies rash.  Patient is currently been taking ibuprofen and Tylenol for his discomfort.  He is trying to establish care at a hospice center.  Patient is not currently undergoing any treatment for his cancer.    ROS: Negative unless mentioned in HPI    Medical Hx: Allergies reviewed.  Immunizations are up-to-date.    Physical exam:    Constitutional: Sitting comfortably in the room and in no distress.  Oriented to person, place, time, and situation.    HEENT: Head is normocephalic, atraumatic. Patient's airway is patent.  Tympanic membranes are clear bilaterally.  Nasal mucosa clear.  Mouth with normal mucosa.  Throat is not erythematous and there are no oropharyngeal exudates, uvula is midline.  No obvious facial deformities.    Eyes: Clear bilaterally.  Pupils are  equal round and reactive to light and accommodation.  Extraocular movements intact.      Cardiac: Regular rate, regular rhythm.  Heart sounds S1, S2.  No murmurs, rubs, or gallops.  PMI nondisplaced.  No JVD.    Respiratory: Regular respiratory rate and effort.  Breath sounds are clear and equal bilaterally, no adventitious lung sounds.  Patient is speaking in full sentences and is in no apparent respiratory distress. No use of accessory muscles.      Gastrointestinal: Abdomen is diffusely tender but soft and nondistended.  There are no obvious deformities.  No rebound tenderness or guarding.  Bowel sounds are normal active.    Genitourinary: No CVA or flank tenderness.    Musculoskeletal: Muscular and midline tenderness in the thoracic and lumbar region.  Patient has reproducible tenderness over the bilateral anterior ribs.  No obvious skin or bony deformities.  Patient has equal range of motion in all extremities and no strength deficiencies.   Capillary refill less than 3 seconds.  Strong peripheral pulses.  No sensory deficits.    Neurological: Patient is alert and oriented.  No focal deficits.  5/5 strength in all extremities.  Cranial nerves II through XII intact. GCS15.     Skin: Skin is normal color for race and is warm, dry, and intact.  No evidence of trauma.  No lesions, rashes, bruising, jaundice, or masses.    Psych: Appropriate mood and affect.  No apparent risk to self or others.    Heme/lymph: No adenopathy, lymphadenopathy, or splenomegaly    Physical exam is otherwise negative unless stated above or in history of present illness.              Patient History   Medical History[1]  Surgical History[2]  Family History[3]  Social History[4]    Physical Exam   ED Triage Vitals [08/07/25 0816]   Temperature Heart Rate Respirations BP   36.3 °C (97.3 °F) 69 20 167/90      Pulse Ox Temp Source Heart Rate Source Patient Position   97 % Temporal Monitor Sitting      BP Location FiO2 (%)     Right arm --        Physical Exam      ED Course & MDM   Diagnoses as of 08/07/25 1111   Chronic midline low back pain without sciatica     Patient updated on plan for lab testing, IV insertion, radiology imaging, and medications to be administered while in the ER (if indicated). Patient updated on expected wait times for testing and results. Patient provided my name and told to ask any staff member for questions or concerns if they should arise. Electronic medical record reviewed.     [facility]    Patient presented to the emergency department with the chief complaint of abdominal pain and back pain. Muscular and midline tenderness in the thoracic and lumbar region.  Patient has reproducible tenderness over the bilateral anterior ribs.  No obvious skin or bony deformities.  Patient has equal range of motion in all extremities and no strength deficiencies.   Capillary refill less than 3 seconds.  Strong peripheral pulses.  No sensory deficits.  Patient has diffuse abdominal tenderness palpation but abdomen is soft and nondistended.  On arrival to the emergency department, vital signs were within normal limits    Will obtain basic blood work, EKG and troponin, lactate and lipase, urinalysis, CT abdomen and pelvis, CTA to evaluate for pneumonia or PE given his cancer history, CT of the spine, CT of the L-spine.    At this time patient has no history or physical exam findings that would suggest acute cord compression syndrome or cauda equina.  Low suspicion for spinal abscess or mass.  Patient does not require emergent MRI at this time.      Patient given fluids as well as Zofran for nausea and Dilaudid for discomfort.      EKG performed at 850 and interpreted by me.  Sinus bradycardia 59 beats minute.  Occasional PVCs but no consecutive PVCs. There is no AV block.  No ST elevation or depression.  No prolonged QT.      CBC reveals normocytic anemia similar to baseline.  CMP is unremarkable.  Lactate is 1.0.  Lipase is 7.  Troponin is 7  making ACS less likely.  Urinalysis reveals no blood or sign of urinary tract infection.  CT of the T-spine and L-spine reveals several areas of degenerative changes as well as chronic lytic lesions and pathologic compression fractures that are similar to previous. patient has several osteolytic masses as well.  CTA of the chest reveals no pneumonia or PE.  CT abdomen and pelvis reveals no kidney stone or obstruction.  Patient has several other chronic and incidental findings noted on his CT scans.      Patient states that his pain is dramatically improved.  He would prefer to go home at this time and does not wish to be admitted.  Patient is asking for 1 further dose of IV pain medication before he is discharged.  He is currently on the phone with one of the local hospice centers.  Patient will be discharged with lidocaine patches, p.o. Zofran, and as needed Percocet.  I did review his OARRS report.  I do believe this is reasonable given that he does have a history of cancer with pathologic fractures.  All questions and concerns addressed.  Reasons to return to ER discussed.  Patient verbalized understanding and agreement with the treatment plan and they remained hemodynamically stable in the ER.    This note was dictated using a speech recognition program.  While an attempt was made at proof-reading to minimize errors, minor errors in transcription may be present            No data recorded     Tere Coma Scale Score: 15 (08/07/25 0826 : Janet Estrella RN)                           Medical Decision Making      Procedure  Procedures         [1]   Past Medical History:  Diagnosis Date    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)     Arthritis     Chronic pain disorder     COPD (chronic obstructive pulmonary disease) (Multi)     Degenerative disc disease, lumbar     Depression     Essential (primary) hypertension     Benign essential hypertension    Personal history of other diseases of the digestive system     History of  diverticulosis    Personal history of other diseases of the musculoskeletal system and connective tissue     History of gout    Personal history of other diseases of the respiratory system     History of COPD    Psychiatric illness     Substance abuse     Suicide attempt (Multi)    [2]   Past Surgical History:  Procedure Laterality Date    ELBOW SURGERY      OTHER SURGICAL HISTORY  05/06/2020    Flexor tendon repair    OTHER SURGICAL HISTORY  05/06/2020    Oral surgery    OTHER SURGICAL HISTORY  05/06/2020    Shoulder surgery   [3] No family history on file.  [4]   Social History  Tobacco Use    Smoking status: Every Day     Current packs/day: 1.00     Average packs/day: 1 pack/day for 15.0 years (15.0 ttl pk-yrs)     Types: Cigarettes    Smokeless tobacco: Never   Vaping Use    Vaping status: Never Used   Substance Use Topics    Alcohol use: Yes     Comment: occasional    Drug use: Not Currently        Juvenal Brice PA-C  08/07/25 111

## 2025-08-08 ENCOUNTER — PHARMACY VISIT (OUTPATIENT)
Dept: PHARMACY | Facility: CLINIC | Age: 65
End: 2025-08-08
Payer: COMMERCIAL

## 2025-08-08 ENCOUNTER — APPOINTMENT (OUTPATIENT)
Dept: CARDIOLOGY | Facility: HOSPITAL | Age: 65
End: 2025-08-08
Payer: MEDICARE

## 2025-08-08 ENCOUNTER — SOCIAL WORK (OUTPATIENT)
Dept: CASE MANAGEMENT | Facility: HOSPITAL | Age: 65
End: 2025-08-08
Payer: MEDICARE

## 2025-08-08 VITALS
DIASTOLIC BLOOD PRESSURE: 79 MMHG | TEMPERATURE: 96.8 F | OXYGEN SATURATION: 98 % | BODY MASS INDEX: 19.88 KG/M2 | RESPIRATION RATE: 17 BRPM | WEIGHT: 150 LBS | HEART RATE: 71 BPM | HEIGHT: 73 IN | SYSTOLIC BLOOD PRESSURE: 151 MMHG

## 2025-08-08 PROBLEM — T40.601A: Status: ACTIVE | Noted: 2025-08-08

## 2025-08-08 LAB
ALBUMIN SERPL BCP-MCNC: 3.9 G/DL (ref 3.4–5)
ALP SERPL-CCNC: 121 U/L (ref 33–136)
ALT SERPL W P-5'-P-CCNC: 9 U/L (ref 10–52)
ANION GAP SERPL CALC-SCNC: 13 MMOL/L (ref 10–20)
AST SERPL W P-5'-P-CCNC: 14 U/L (ref 9–39)
ATRIAL RATE: 59 BPM
ATRIAL RATE: 69 BPM
ATRIAL RATE: 73 BPM
ATRIAL RATE: 89 BPM
BASOPHILS # BLD AUTO: 0.02 X10*3/UL (ref 0–0.1)
BASOPHILS NFR BLD AUTO: 0.2 %
BILIRUB SERPL-MCNC: 0.4 MG/DL (ref 0–1.2)
BUN SERPL-MCNC: 18 MG/DL (ref 6–23)
CALCIUM SERPL-MCNC: 8.8 MG/DL (ref 8.6–10.3)
CARDIAC TROPONIN I PNL SERPL HS: 61 NG/L (ref 0–20)
CHLORIDE SERPL-SCNC: 103 MMOL/L (ref 98–107)
CO2 SERPL-SCNC: 26 MMOL/L (ref 21–32)
CREAT SERPL-MCNC: 1.05 MG/DL (ref 0.5–1.3)
EGFRCR SERPLBLD CKD-EPI 2021: 80 ML/MIN/1.73M*2
EOSINOPHIL # BLD AUTO: 0 X10*3/UL (ref 0–0.7)
EOSINOPHIL NFR BLD AUTO: 0 %
ERYTHROCYTE [DISTWIDTH] IN BLOOD BY AUTOMATED COUNT: 16.3 % (ref 11.5–14.5)
FLUAV RNA RESP QL NAA+PROBE: NOT DETECTED
FLUBV RNA RESP QL NAA+PROBE: NOT DETECTED
GLUCOSE SERPL-MCNC: 97 MG/DL (ref 74–99)
HCT VFR BLD AUTO: 28.9 % (ref 41–52)
HGB BLD-MCNC: 9.2 G/DL (ref 13.5–17.5)
HOLD SPECIMEN: NORMAL
IMM GRANULOCYTES # BLD AUTO: 0.04 X10*3/UL (ref 0–0.7)
IMM GRANULOCYTES NFR BLD AUTO: 0.4 % (ref 0–0.9)
LACTATE BLDV-SCNC: 1.6 MMOL/L (ref 0.4–2)
LACTATE SERPL-SCNC: 1.6 MMOL/L (ref 0.4–2)
LYMPHOCYTES # BLD AUTO: 0.51 X10*3/UL (ref 1.2–4.8)
LYMPHOCYTES NFR BLD AUTO: 5.5 %
MAGNESIUM SERPL-MCNC: 2 MG/DL (ref 1.6–2.4)
MCH RBC QN AUTO: 31.7 PG (ref 26–34)
MCHC RBC AUTO-ENTMCNC: 31.8 G/DL (ref 32–36)
MCV RBC AUTO: 100 FL (ref 80–100)
MONOCYTES # BLD AUTO: 0.5 X10*3/UL (ref 0.1–1)
MONOCYTES NFR BLD AUTO: 5.3 %
NEUTROPHILS # BLD AUTO: 8.28 X10*3/UL (ref 1.2–7.7)
NEUTROPHILS NFR BLD AUTO: 88.6 %
NRBC BLD-RTO: 0 /100 WBCS (ref 0–0)
P AXIS: 33 DEGREES
P AXIS: 40 DEGREES
P AXIS: 54 DEGREES
P AXIS: 60 DEGREES
P OFFSET: 191 MS
P OFFSET: 194 MS
P OFFSET: 198 MS
P OFFSET: 199 MS
P ONSET: 145 MS
P ONSET: 150 MS
P ONSET: 154 MS
P ONSET: 158 MS
PLATELET # BLD AUTO: 233 X10*3/UL (ref 150–450)
POTASSIUM SERPL-SCNC: 4 MMOL/L (ref 3.5–5.3)
PR INTERVAL: 118 MS
PR INTERVAL: 126 MS
PR INTERVAL: 132 MS
PR INTERVAL: 146 MS
PROT SERPL-MCNC: 7.4 G/DL (ref 6.4–8.2)
Q ONSET: 216 MS
Q ONSET: 217 MS
Q ONSET: 217 MS
Q ONSET: 218 MS
QRS COUNT: 10 BEATS
QRS COUNT: 11 BEATS
QRS COUNT: 11 BEATS
QRS COUNT: 15 BEATS
QRS DURATION: 104 MS
QRS DURATION: 128 MS
QRS DURATION: 146 MS
QRS DURATION: 82 MS
QT INTERVAL: 424 MS
QT INTERVAL: 454 MS
QT INTERVAL: 478 MS
QT INTERVAL: 480 MS
QTC CALCULATION(BAZETT): 473 MS
QTC CALCULATION(BAZETT): 500 MS
QTC CALCULATION(BAZETT): 514 MS
QTC CALCULATION(BAZETT): 515 MS
QTC FREDERICIA: 475 MS
QTC FREDERICIA: 483 MS
QTC FREDERICIA: 485 MS
QTC FREDERICIA: 503 MS
R AXIS: 49 DEGREES
R AXIS: 56 DEGREES
R AXIS: 58 DEGREES
R AXIS: 75 DEGREES
RBC # BLD AUTO: 2.9 X10*6/UL (ref 4.5–5.9)
SARS-COV-2 RNA RESP QL NAA+PROBE: NOT DETECTED
SODIUM SERPL-SCNC: 138 MMOL/L (ref 136–145)
T AXIS: 31 DEGREES
T AXIS: 31 DEGREES
T AXIS: 48 DEGREES
T AXIS: 54 DEGREES
T OFFSET: 428 MS
T OFFSET: 444 MS
T OFFSET: 457 MS
T OFFSET: 457 MS
VENTRICULAR RATE: 59 BPM
VENTRICULAR RATE: 69 BPM
VENTRICULAR RATE: 73 BPM
VENTRICULAR RATE: 89 BPM
WBC # BLD AUTO: 9.4 X10*3/UL (ref 4.4–11.3)

## 2025-08-08 PROCEDURE — 84484 ASSAY OF TROPONIN QUANT: CPT | Performed by: STUDENT IN AN ORGANIZED HEALTH CARE EDUCATION/TRAINING PROGRAM

## 2025-08-08 PROCEDURE — 87636 SARSCOV2 & INF A&B AMP PRB: CPT | Performed by: STUDENT IN AN ORGANIZED HEALTH CARE EDUCATION/TRAINING PROGRAM

## 2025-08-08 PROCEDURE — 83605 ASSAY OF LACTIC ACID: CPT | Performed by: STUDENT IN AN ORGANIZED HEALTH CARE EDUCATION/TRAINING PROGRAM

## 2025-08-08 PROCEDURE — 2500000004 HC RX 250 GENERAL PHARMACY W/ HCPCS (ALT 636 FOR OP/ED): Performed by: STUDENT IN AN ORGANIZED HEALTH CARE EDUCATION/TRAINING PROGRAM

## 2025-08-08 PROCEDURE — 83735 ASSAY OF MAGNESIUM: CPT | Performed by: NURSE PRACTITIONER

## 2025-08-08 PROCEDURE — 93005 ELECTROCARDIOGRAM TRACING: CPT

## 2025-08-08 PROCEDURE — 1200000002 HC GENERAL ROOM WITH TELEMETRY DAILY

## 2025-08-08 PROCEDURE — 36415 COLL VENOUS BLD VENIPUNCTURE: CPT | Performed by: STUDENT IN AN ORGANIZED HEALTH CARE EDUCATION/TRAINING PROGRAM

## 2025-08-08 PROCEDURE — 2500000001 HC RX 250 WO HCPCS SELF ADMINISTERED DRUGS (ALT 637 FOR MEDICARE OP)

## 2025-08-08 PROCEDURE — 94640 AIRWAY INHALATION TREATMENT: CPT

## 2025-08-08 PROCEDURE — 80053 COMPREHEN METABOLIC PANEL: CPT | Performed by: NURSE PRACTITIONER

## 2025-08-08 PROCEDURE — 99221 1ST HOSP IP/OBS SF/LOW 40: CPT | Performed by: ORTHOPAEDIC SURGERY

## 2025-08-08 PROCEDURE — 99223 1ST HOSP IP/OBS HIGH 75: CPT | Performed by: NURSE PRACTITIONER

## 2025-08-08 PROCEDURE — 85025 COMPLETE CBC W/AUTO DIFF WBC: CPT | Performed by: NURSE PRACTITIONER

## 2025-08-08 PROCEDURE — 99239 HOSP IP/OBS DSCHRG MGMT >30: CPT

## 2025-08-08 PROCEDURE — 2500000002 HC RX 250 W HCPCS SELF ADMINISTERED DRUGS (ALT 637 FOR MEDICARE OP, ALT 636 FOR OP/ED): Performed by: STUDENT IN AN ORGANIZED HEALTH CARE EDUCATION/TRAINING PROGRAM

## 2025-08-08 PROCEDURE — 2500000001 HC RX 250 WO HCPCS SELF ADMINISTERED DRUGS (ALT 637 FOR MEDICARE OP): Performed by: NURSE PRACTITIONER

## 2025-08-08 PROCEDURE — 2500000004 HC RX 250 GENERAL PHARMACY W/ HCPCS (ALT 636 FOR OP/ED): Performed by: NURSE PRACTITIONER

## 2025-08-08 PROCEDURE — RXMED WILLOW AMBULATORY MEDICATION CHARGE

## 2025-08-08 RX ORDER — HEPARIN SODIUM 5000 [USP'U]/ML
5000 INJECTION, SOLUTION INTRAVENOUS; SUBCUTANEOUS EVERY 8 HOURS SCHEDULED
Status: DISCONTINUED | OUTPATIENT
Start: 2025-08-08 | End: 2025-08-08 | Stop reason: HOSPADM

## 2025-08-08 RX ORDER — ONDANSETRON 4 MG/1
4 TABLET, FILM COATED ORAL EVERY 8 HOURS PRN
Status: DISCONTINUED | OUTPATIENT
Start: 2025-08-08 | End: 2025-08-08 | Stop reason: HOSPADM

## 2025-08-08 RX ORDER — PANTOPRAZOLE SODIUM 40 MG/10ML
40 INJECTION, POWDER, LYOPHILIZED, FOR SOLUTION INTRAVENOUS DAILY
Status: DISCONTINUED | OUTPATIENT
Start: 2025-08-08 | End: 2025-08-08

## 2025-08-08 RX ORDER — NALOXONE HYDROCHLORIDE 4 MG/.1ML
4 SPRAY NASAL AS NEEDED
Qty: 2 EACH | Refills: 0 | Status: SHIPPED | OUTPATIENT
Start: 2025-08-08

## 2025-08-08 RX ORDER — ONDANSETRON HYDROCHLORIDE 2 MG/ML
4 INJECTION, SOLUTION INTRAVENOUS EVERY 8 HOURS PRN
Status: DISCONTINUED | OUTPATIENT
Start: 2025-08-08 | End: 2025-08-08 | Stop reason: HOSPADM

## 2025-08-08 RX ORDER — IPRATROPIUM BROMIDE AND ALBUTEROL SULFATE 2.5; .5 MG/3ML; MG/3ML
3 SOLUTION RESPIRATORY (INHALATION) EVERY 20 MIN
Status: COMPLETED | OUTPATIENT
Start: 2025-08-08 | End: 2025-08-08

## 2025-08-08 RX ORDER — OXYCODONE AND ACETAMINOPHEN 5; 325 MG/1; MG/1
1 TABLET ORAL EVERY 6 HOURS PRN
Qty: 12 TABLET | Refills: 0 | Status: SHIPPED | OUTPATIENT
Start: 2025-08-08 | End: 2025-08-11

## 2025-08-08 RX ORDER — PANTOPRAZOLE SODIUM 40 MG/1
40 TABLET, DELAYED RELEASE ORAL DAILY
Status: DISCONTINUED | OUTPATIENT
Start: 2025-08-08 | End: 2025-08-08 | Stop reason: HOSPADM

## 2025-08-08 RX ORDER — OXYCODONE AND ACETAMINOPHEN 5; 325 MG/1; MG/1
1 TABLET ORAL EVERY 6 HOURS PRN
Refills: 0 | Status: DISCONTINUED | OUTPATIENT
Start: 2025-08-08 | End: 2025-08-08 | Stop reason: HOSPADM

## 2025-08-08 RX ORDER — ALUMINUM HYDROXIDE, MAGNESIUM HYDROXIDE, AND SIMETHICONE 1200; 120; 1200 MG/30ML; MG/30ML; MG/30ML
20 SUSPENSION ORAL 4 TIMES DAILY PRN
Status: DISCONTINUED | OUTPATIENT
Start: 2025-08-08 | End: 2025-08-08 | Stop reason: HOSPADM

## 2025-08-08 RX ORDER — ACETAMINOPHEN 650 MG/1
650 SUPPOSITORY RECTAL EVERY 4 HOURS PRN
Status: DISCONTINUED | OUTPATIENT
Start: 2025-08-08 | End: 2025-08-08 | Stop reason: HOSPADM

## 2025-08-08 RX ORDER — PREDNISONE 20 MG/1
60 TABLET ORAL ONCE
Status: COMPLETED | OUTPATIENT
Start: 2025-08-08 | End: 2025-08-08

## 2025-08-08 RX ORDER — ACETAMINOPHEN 160 MG/5ML
650 SOLUTION ORAL EVERY 4 HOURS PRN
Status: DISCONTINUED | OUTPATIENT
Start: 2025-08-08 | End: 2025-08-08 | Stop reason: HOSPADM

## 2025-08-08 RX ORDER — ACETAMINOPHEN 325 MG/1
650 TABLET ORAL EVERY 4 HOURS PRN
Status: DISCONTINUED | OUTPATIENT
Start: 2025-08-08 | End: 2025-08-08 | Stop reason: HOSPADM

## 2025-08-08 RX ORDER — ONDANSETRON 8 MG/1
8 TABLET, FILM COATED ORAL EVERY 8 HOURS PRN
Qty: 30 TABLET | Refills: 0 | Status: SHIPPED | OUTPATIENT
Start: 2025-08-08

## 2025-08-08 RX ADMIN — IPRATROPIUM BROMIDE AND ALBUTEROL SULFATE 3 ML: 2.5; .5 SOLUTION RESPIRATORY (INHALATION) at 01:12

## 2025-08-08 RX ADMIN — ONDANSETRON 4 MG: 2 INJECTION INTRAMUSCULAR; INTRAVENOUS at 15:13

## 2025-08-08 RX ADMIN — PANTOPRAZOLE SODIUM 40 MG: 40 TABLET, DELAYED RELEASE ORAL at 06:40

## 2025-08-08 RX ADMIN — PREDNISONE 60 MG: 20 TABLET ORAL at 00:59

## 2025-08-08 RX ADMIN — OXYCODONE HYDROCHLORIDE AND ACETAMINOPHEN 1 TABLET: 5; 325 TABLET ORAL at 10:28

## 2025-08-08 RX ADMIN — IPRATROPIUM BROMIDE AND ALBUTEROL SULFATE 3 ML: 2.5; .5 SOLUTION RESPIRATORY (INHALATION) at 01:11

## 2025-08-08 RX ADMIN — IPRATROPIUM BROMIDE AND ALBUTEROL SULFATE 3 ML: 2.5; .5 SOLUTION RESPIRATORY (INHALATION) at 01:07

## 2025-08-08 RX ADMIN — ALUMINUM HYDROXIDE, MAGNESIUM HYDROXIDE, AND SIMETHICONE 20 ML: 200; 200; 20 SUSPENSION ORAL at 14:29

## 2025-08-08 SDOH — SOCIAL STABILITY: SOCIAL NETWORK: HOW OFTEN DO YOU ATTEND CHURCH OR RELIGIOUS SERVICES?: NEVER

## 2025-08-08 SDOH — SOCIAL STABILITY: SOCIAL NETWORK: HOW OFTEN DO YOU ATTEND MEETINGS OF THE CLUBS OR ORGANIZATIONS YOU BELONG TO?: NEVER

## 2025-08-08 SDOH — ECONOMIC STABILITY: FOOD INSECURITY: WITHIN THE PAST 12 MONTHS, THE FOOD YOU BOUGHT JUST DIDN'T LAST AND YOU DIDN'T HAVE MONEY TO GET MORE.: NEVER TRUE

## 2025-08-08 SDOH — SOCIAL STABILITY: SOCIAL INSECURITY: DO YOU FEEL UNSAFE GOING BACK TO THE PLACE WHERE YOU ARE LIVING?: NO

## 2025-08-08 SDOH — ECONOMIC STABILITY: TRANSPORTATION INSECURITY: IN THE PAST 12 MONTHS, HAS LACK OF TRANSPORTATION KEPT YOU FROM MEDICAL APPOINTMENTS OR FROM GETTING MEDICATIONS?: NO

## 2025-08-08 SDOH — SOCIAL STABILITY: SOCIAL INSECURITY: WITHIN THE LAST YEAR, HAVE YOU BEEN AFRAID OF YOUR PARTNER OR EX-PARTNER?: NO

## 2025-08-08 SDOH — ECONOMIC STABILITY: FOOD INSECURITY: HOW HARD IS IT FOR YOU TO PAY FOR THE VERY BASICS LIKE FOOD, HOUSING, MEDICAL CARE, AND HEATING?: NOT VERY HARD

## 2025-08-08 SDOH — SOCIAL STABILITY: SOCIAL NETWORK: HOW OFTEN DO YOU GET TOGETHER WITH FRIENDS OR RELATIVES?: ONCE A WEEK

## 2025-08-08 SDOH — ECONOMIC STABILITY: INCOME INSECURITY: IN THE PAST 12 MONTHS HAS THE ELECTRIC, GAS, OIL, OR WATER COMPANY THREATENED TO SHUT OFF SERVICES IN YOUR HOME?: NO

## 2025-08-08 SDOH — SOCIAL STABILITY: SOCIAL INSECURITY: ARE THERE ANY APPARENT SIGNS OF INJURIES/BEHAVIORS THAT COULD BE RELATED TO ABUSE/NEGLECT?: NO

## 2025-08-08 SDOH — SOCIAL STABILITY: SOCIAL INSECURITY: WITHIN THE LAST YEAR, HAVE YOU BEEN HUMILIATED OR EMOTIONALLY ABUSED IN OTHER WAYS BY YOUR PARTNER OR EX-PARTNER?: NO

## 2025-08-08 SDOH — ECONOMIC STABILITY: FOOD INSECURITY: WITHIN THE PAST 12 MONTHS, YOU WORRIED THAT YOUR FOOD WOULD RUN OUT BEFORE YOU GOT THE MONEY TO BUY MORE.: NEVER TRUE

## 2025-08-08 SDOH — SOCIAL STABILITY: SOCIAL INSECURITY: ARE YOU MARRIED, WIDOWED, DIVORCED, SEPARATED, NEVER MARRIED, OR LIVING WITH A PARTNER?: NEVER MARRIED

## 2025-08-08 SDOH — HEALTH STABILITY: PHYSICAL HEALTH: ON AVERAGE, HOW MANY MINUTES DO YOU ENGAGE IN EXERCISE AT THIS LEVEL?: 0 MIN

## 2025-08-08 SDOH — SOCIAL STABILITY: SOCIAL NETWORK: IN A TYPICAL WEEK, HOW MANY TIMES DO YOU TALK ON THE PHONE WITH FAMILY, FRIENDS, OR NEIGHBORS?: THREE TIMES A WEEK

## 2025-08-08 SDOH — ECONOMIC STABILITY: HOUSING INSECURITY: IN THE PAST 12 MONTHS, HOW MANY TIMES HAVE YOU MOVED WHERE YOU WERE LIVING?: 4

## 2025-08-08 SDOH — SOCIAL STABILITY: SOCIAL INSECURITY: ARE YOU OR HAVE YOU BEEN THREATENED OR ABUSED PHYSICALLY, EMOTIONALLY, OR SEXUALLY BY ANYONE?: NO

## 2025-08-08 SDOH — ECONOMIC STABILITY: HOUSING INSECURITY: AT ANY TIME IN THE PAST 12 MONTHS, WERE YOU HOMELESS OR LIVING IN A SHELTER (INCLUDING NOW)?: NO

## 2025-08-08 SDOH — ECONOMIC STABILITY: HOUSING INSECURITY: IN THE LAST 12 MONTHS, WAS THERE A TIME WHEN YOU WERE NOT ABLE TO PAY THE MORTGAGE OR RENT ON TIME?: NO

## 2025-08-08 SDOH — HEALTH STABILITY: PHYSICAL HEALTH: ON AVERAGE, HOW MANY DAYS PER WEEK DO YOU ENGAGE IN MODERATE TO STRENUOUS EXERCISE (LIKE A BRISK WALK)?: 0 DAYS

## 2025-08-08 SDOH — SOCIAL STABILITY: SOCIAL INSECURITY: ABUSE: ADULT

## 2025-08-08 SDOH — SOCIAL STABILITY: SOCIAL INSECURITY: HAVE YOU HAD ANY THOUGHTS OF HARMING ANYONE ELSE?: NO

## 2025-08-08 SDOH — SOCIAL STABILITY: SOCIAL INSECURITY: WERE YOU ABLE TO COMPLETE ALL THE BEHAVIORAL HEALTH SCREENINGS?: YES

## 2025-08-08 SDOH — SOCIAL STABILITY: SOCIAL INSECURITY: HAS ANYONE EVER THREATENED TO HURT YOUR FAMILY OR YOUR PETS?: NO

## 2025-08-08 SDOH — SOCIAL STABILITY: SOCIAL INSECURITY: HAVE YOU HAD THOUGHTS OF HARMING ANYONE ELSE?: NO

## 2025-08-08 SDOH — SOCIAL STABILITY: SOCIAL INSECURITY: DOES ANYONE TRY TO KEEP YOU FROM HAVING/CONTACTING OTHER FRIENDS OR DOING THINGS OUTSIDE YOUR HOME?: NO

## 2025-08-08 SDOH — SOCIAL STABILITY: SOCIAL INSECURITY: DO YOU FEEL ANYONE HAS EXPLOITED OR TAKEN ADVANTAGE OF YOU FINANCIALLY OR OF YOUR PERSONAL PROPERTY?: NO

## 2025-08-08 ASSESSMENT — COGNITIVE AND FUNCTIONAL STATUS - GENERAL
PATIENT BASELINE BEDBOUND: NO
MOBILITY SCORE: 24
DAILY ACTIVITIY SCORE: 24

## 2025-08-08 ASSESSMENT — ACTIVITIES OF DAILY LIVING (ADL)
EFFECT OF PAIN ON DAILY ACTIVITIES: YES
FEEDING YOURSELF: INDEPENDENT
HEARING - LEFT EAR: FUNCTIONAL
ASSISTIVE_DEVICE: DENTURES LOWER;DENTURES UPPER;EYEGLASSES
JUDGMENT_ADEQUATE_SAFELY_COMPLETE_DAILY_ACTIVITIES: YES
WALKS IN HOME: INDEPENDENT
HEARING - RIGHT EAR: FUNCTIONAL
PATIENT'S MEMORY ADEQUATE TO SAFELY COMPLETE DAILY ACTIVITIES?: YES
DRESSING YOURSELF: INDEPENDENT
GROOMING: INDEPENDENT
LACK_OF_TRANSPORTATION: NO
ADEQUATE_TO_COMPLETE_ADL: YES
TOILETING: INDEPENDENT
BATHING: INDEPENDENT

## 2025-08-08 ASSESSMENT — PAIN DESCRIPTION - DESCRIPTORS
DESCRIPTORS: SHARP
DESCRIPTORS: ACHING;SHARP;THROBBING

## 2025-08-08 ASSESSMENT — PATIENT HEALTH QUESTIONNAIRE - PHQ9
SUM OF ALL RESPONSES TO PHQ9 QUESTIONS 1 & 2: 0
1. LITTLE INTEREST OR PLEASURE IN DOING THINGS: NOT AT ALL
2. FEELING DOWN, DEPRESSED OR HOPELESS: NOT AT ALL

## 2025-08-08 ASSESSMENT — PAIN DESCRIPTION - LOCATION: LOCATION: BACK

## 2025-08-08 ASSESSMENT — LIFESTYLE VARIABLES
HOW OFTEN DO YOU HAVE 6 OR MORE DRINKS ON ONE OCCASION: NEVER
HOW OFTEN DO YOU HAVE A DRINK CONTAINING ALCOHOL: MONTHLY OR LESS
SKIP TO QUESTIONS 9-10: 1
AUDIT-C TOTAL SCORE: 1
HOW MANY STANDARD DRINKS CONTAINING ALCOHOL DO YOU HAVE ON A TYPICAL DAY: 1 OR 2
AUDIT-C TOTAL SCORE: 1

## 2025-08-08 ASSESSMENT — PAIN - FUNCTIONAL ASSESSMENT
PAIN_FUNCTIONAL_ASSESSMENT: 0-10
PAIN_FUNCTIONAL_ASSESSMENT: 0-10

## 2025-08-08 ASSESSMENT — PAIN SCALES - GENERAL
PAINLEVEL_OUTOF10: 10 - WORST POSSIBLE PAIN
PAINLEVEL_OUTOF10: 7

## 2025-08-08 NOTE — PROGRESS NOTES
"   08/08/25 1046   Discharge Planning   Living Arrangements Friends   Support Systems Friends/neighbors   Type of Residence Private residence   Number of Stairs to Enter Residence 4   Number of Stairs Within Residence 3   Home or Post Acute Services In home services   Type of Home Care Services Hospice   Expected Discharge Disposition Home     Met with pt. Who states he lives with a room mate,  2 fl. Home, independent prior, driving and works part-time approx 15 hrs plus per week at \"[employer]\".  He states he was receiving hospice services with [facility], now no longer active with them and was to meet with [facility] yesterday and became hospitalized. Spoke with bruce [facility] who confirms they are no longer active with pt.  As they dropped him d/t noncompliance and demanding high doses of pain meds which she states would need to be given in a facility.  They also offered him respite stay at [facility] which he declined.   Per ENRIQUETA marie she is discharging pt. Can return home and meet with [facility].  Pt. Aware.  Referral made to [facility].    Update:  [facility] has accepted case.  "

## 2025-08-08 NOTE — ED PROVIDER NOTES
HPI   No chief complaint on file.      Patient is a 64-year-old male with history of non-small cell lung cancer, hypertension, COPD, hypothyroid, substance abuse who presents for okay unresponsiveness.  Patient was reportedly found down at work.  EMS arrived and checked his blood sugar, approximately 300.  Patient was given intranasal Narcan and had improvement in his responsiveness.  Patient was being bagged for short period of time.  No chest compressions given.  Patient does not provide symptom history. Patient denies any recent chest pain, shortness of breath, fevers, chills, vomiting, diarrhea, abdominal pain. Patient states he did not take any illicit substances today.  States he did take his Percocet, however he states he got these from a pharmacy.  Denies any extra doses.              Patient History   Medical History[1]  Surgical History[2]  Family History[3]  Social History[4]    Physical Exam   ED Triage Vitals   Temp Pulse Resp BP   -- -- -- --      SpO2 Temp src Heart Rate Source Patient Position   -- -- -- --      BP Location FiO2 (%)     -- --       Physical Exam  Constitutional:       General: He is not in acute distress.  HENT:      Head: Normocephalic.     Eyes:      Extraocular Movements: Extraocular movements intact.      Conjunctiva/sclera: Conjunctivae normal.      Pupils: Pupils are equal, round, and reactive to light.       Cardiovascular:      Rate and Rhythm: Normal rate and regular rhythm.      Pulses: Normal pulses.      Heart sounds: Normal heart sounds.   Pulmonary:      Effort: Pulmonary effort is normal.      Breath sounds: Rhonchi (bilateral) present.   Abdominal:      General: There is no distension.      Palpations: Abdomen is soft. There is no mass.      Tenderness: There is no abdominal tenderness. There is no guarding.     Musculoskeletal:         General: No deformity.      Cervical back: Normal range of motion and neck supple.      Right lower leg: No edema.      Left lower  leg: No edema.     Skin:     General: Skin is warm and dry.      Findings: No lesion or rash.     Neurological:      General: No focal deficit present.      Mental Status: He is alert. Mental status is at baseline.      Cranial Nerves: No cranial nerve deficit.      Sensory: No sensory deficit.      Motor: No weakness.      Comments: Confused on year.  NIH 2 (1 aphasia,1 questions), VAN negative.    Psychiatric:         Mood and Affect: Mood normal.           ED Course & MDM   Diagnoses as of 08/08/25 0132   Hypoxia   Narcotic overdose, accidental or unintentional, initial encounter (Multi)                 No data recorded                                 Medical Decision Making  EKG on my interpretation, rate 89, rhythm regular, axis normal, , , QTc 515, T waves: Unremarkable, ST segments: No elevations or depressions, interpretation: Normal sinus rhythm, right bundle branch block, no STEMI    EKG on my interpretation: Rate 69, rhythm irregular, axis normal, , , QTc 514, T waves: Unremarkable, ST segments: No elevations or depressions, interpretation: Sinus rhythm with PVCs, no STEMI    EKG on my interpretation: Rate 73, rhythm regular, axis normal, , , QTc 500, T waves: Unremarkable, ST segments: No elevations or depressions, interpretation: Normal sinus rhythm, no STEMI, prolonged QT.    Patient is a 64-year-old male with above-stated past medical history who presents for stroke symptoms and unresponsiveness.  Given patient's aphasia and confusion on the date on arrival, a stroke alert was activated.  Patient CT head was negative for intracranial bleed, signs of stroke.  Patient is Van negative and his NIH is less than 6, no indication for CT angio.  Patient's case was reviewed with the stroke attending who did not feel the patient was a candidate for TNK, I agree with their assessment.  CT C-spine was negative for fracture, dislocation.  Low suspicion that his compression of  T1 is the cause for symptoms.  While in the emergency department.  Patient was hypoxic to the mid to low 80s.  He had a good waveform.  The patient had a CT angio earlier in the day, he has cancer and I was concerned for possible acute PE, therefore CT angio was ordered which was negative.  No sign of cardiac tamponade, Boerhaave's, pneumothorax, pneumonia.  I low suspicion for dissection.  Patient was stable on 3 L nasal cannula oxygen.  Patient was given aspirin given his possible stroke.  Patient's EKG is nonischemic, his initial troponins are negative, repeat are elevated at 61, he has no chest pain and denies shortness of breath, I have low suspicion for ACS.  Heparin was deferred.  Patient's hyperkalemia was treated.  He has a borderline leukocytosis, though have low suspicion for infection at this time.  Fluids and COVID are negative.  Patient did have rhonchorous lung sounds, he was given prednisone and DuoNebs in the event he is suffering from a COPD exacerbation.  Given patient's presentation and workup, I believe he would benefit from admission.  I discussed this case with medicine service who accept the patient.    Disclaimer: This note was dictated using speech recognition software. Minor errors in transcription may be present. Please call if questions.     Jack España MD  [facility]  Contact on [employer]          Problems Addressed:  Hypoxia: acute illness or injury  Narcotic overdose, accidental or unintentional, initial encounter (Multi): acute illness or injury    Amount and/or Complexity of Data Reviewed  Labs: ordered.  Radiology: ordered.  ECG/medicine tests: ordered and independent interpretation performed.        Procedure  Critical Care    Performed by: Jack España MD  Authorized by: Jack España MD    Critical care provider statement:     Critical care time (minutes):  32    Critical care time was exclusive of:  Separately billable procedures and treating other  patients    Critical care was necessary to treat or prevent imminent or life-threatening deterioration of the following conditions:  Other (use comment box to enter another option) (Stroke alert)    Critical care was time spent personally by me on the following activities:  Development of treatment plan with patient or surrogate, discussions with consultants, evaluation of patient's response to treatment, examination of patient, obtaining history from patient or surrogate, ordering and performing treatments and interventions, ordering and review of laboratory studies, ordering and review of radiographic studies, pulse oximetry, re-evaluation of patient's condition and review of old charts    Care discussed with: admitting provider             [1]   Past Medical History:  Diagnosis Date    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)     Arthritis     Chronic pain disorder     COPD (chronic obstructive pulmonary disease) (Multi)     Degenerative disc disease, lumbar     Depression     Essential (primary) hypertension     Benign essential hypertension    Personal history of other diseases of the digestive system     History of diverticulosis    Personal history of other diseases of the musculoskeletal system and connective tissue     History of gout    Personal history of other diseases of the respiratory system     History of COPD    Psychiatric illness     Substance abuse     Suicide attempt (Multi)    [2]   Past Surgical History:  Procedure Laterality Date    ELBOW SURGERY      OTHER SURGICAL HISTORY  05/06/2020    Flexor tendon repair    OTHER SURGICAL HISTORY  05/06/2020    Oral surgery    OTHER SURGICAL HISTORY  05/06/2020    Shoulder surgery   [3] No family history on file.  [4]   Social History  Tobacco Use    Smoking status: Every Day     Current packs/day: 1.00     Average packs/day: 1 pack/day for 15.0 years (15.0 ttl pk-yrs)     Types: Cigarettes    Smokeless tobacco: Never   Vaping Use    Vaping status: Never Used    Substance Use Topics    Alcohol use: Yes     Comment: occasional    Drug use: Not Currently        Jack España MD  08/08/25 0136

## 2025-08-08 NOTE — CONSULTS
"Reason For Consult  T1 and T9 compression fractures pathologic in nature for metastatic lung disease    History Of Present Illness  Joe Magaña is a 64 y.o. male presenting with increasing back pain no neurogenic symptoms    Patient recently was given Narcan is having quite a bit of pain no neurologic symptoms.     Past Medical History  He has a past medical history of 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), Arthritis, Chronic pain disorder, COPD (chronic obstructive pulmonary disease) (Multi), Degenerative disc disease, lumbar, Depression, Essential (primary) hypertension, Personal history of other diseases of the digestive system, Personal history of other diseases of the musculoskeletal system and connective tissue, Personal history of other diseases of the respiratory system, Psychiatric illness, Substance abuse, and Suicide attempt (Multi).    Surgical History  He has a past surgical history that includes Other surgical history (05/06/2020); Other surgical history (05/06/2020); Other surgical history (05/06/2020); and Elbow surgery.     Social History  He reports that he has been smoking cigarettes. He has a 15 pack-year smoking history. He has never used smokeless tobacco. He reports current alcohol use. He reports that he does not currently use drugs.    Family History  Family History[1]     Allergies  Patient has no known allergies.    Review of Systems  Lung cancer     Physical Exam  Head normocephalic atraumatic   heart regular rate and rhythm  Abdominal exam nontender nondistended  Lungs congested  Back  Tenderness to touch midline    No radiculopathy    Motor function lower extremity intact    No progressive deformity    No obvious skin breakdown    Pain localized to the thoracic lumbar junction and upper thoracic spine  Last Recorded Vitals  Blood pressure 151/79, pulse 71, temperature 36 °C (96.8 °F), temperature source Temporal, resp. rate 17, height 1.854 m (6' 1\"), weight 68 kg (150 lb), SpO2 98%.   "       Relevant Results      Scheduled medications  Scheduled Medications[2]  Continuous medications  Continuous Medications[3]  PRN medications  PRN Medications[4]  Results for orders placed or performed during the hospital encounter of 08/07/25 (from the past 24 hours)   POCT GLUCOSE   Result Value Ref Range    POCT Glucose 228 (H) 74 - 99 mg/dL   ECG 12 lead   Result Value Ref Range    Ventricular Rate 89 BPM    Atrial Rate 89 BPM    MI Interval 132 ms    QRS Duration 146 ms    QT Interval 424 ms    QTC Calculation(Bazett) 515 ms    P Axis 40 degrees    R Axis 75 degrees    T Axis 31 degrees    QRS Count 15 beats    Q Onset 216 ms    P Onset 150 ms    P Offset 199 ms    T Offset 428 ms    QTC Fredericia 483 ms   POCT GLUCOSE   Result Value Ref Range    POCT Glucose 188 (H) 74 - 99 mg/dL   CBC and Auto Differential   Result Value Ref Range    WBC 11.6 (H) 4.4 - 11.3 x10*3/uL    nRBC 0.0 0.0 - 0.0 /100 WBCs    RBC 2.85 (L) 4.50 - 5.90 x10*6/uL    Hemoglobin 9.2 (L) 13.5 - 17.5 g/dL    Hematocrit 28.5 (L) 41.0 - 52.0 %     80 - 100 fL    MCH 32.3 26.0 - 34.0 pg    MCHC 32.3 32.0 - 36.0 g/dL    RDW 16.5 (H) 11.5 - 14.5 %    Platelets 212 150 - 450 x10*3/uL    Neutrophils % 87.4 40.0 - 80.0 %    Immature Granulocytes %, Automated 1.0 (H) 0.0 - 0.9 %    Lymphocytes % 6.0 13.0 - 44.0 %    Monocytes % 5.0 2.0 - 10.0 %    Eosinophils % 0.3 0.0 - 6.0 %    Basophils % 0.3 0.0 - 2.0 %    Neutrophils Absolute 10.16 (H) 1.20 - 7.70 x10*3/uL    Immature Granulocytes Absolute, Automated 0.12 0.00 - 0.70 x10*3/uL    Lymphocytes Absolute 0.70 (L) 1.20 - 4.80 x10*3/uL    Monocytes Absolute 0.58 0.10 - 1.00 x10*3/uL    Eosinophils Absolute 0.03 0.00 - 0.70 x10*3/uL    Basophils Absolute 0.03 0.00 - 0.10 x10*3/uL   Comprehensive metabolic panel   Result Value Ref Range    Glucose 183 (H) 74 - 99 mg/dL    Sodium 137 136 - 145 mmol/L    Potassium 3.2 (L) 3.5 - 5.3 mmol/L    Chloride 102 98 - 107 mmol/L    Bicarbonate 23 21 - 32  mmol/L    Anion Gap 15 10 - 20 mmol/L    Urea Nitrogen 19 6 - 23 mg/dL    Creatinine 1.33 (H) 0.50 - 1.30 mg/dL    eGFR 60 (L) >60 mL/min/1.73m*2    Calcium 8.8 8.6 - 10.3 mg/dL    Albumin 3.8 3.4 - 5.0 g/dL    Alkaline Phosphatase 110 33 - 136 U/L    Total Protein 7.0 6.4 - 8.2 g/dL    AST 13 9 - 39 U/L    Bilirubin, Total 0.4 0.0 - 1.2 mg/dL    ALT 8 (L) 10 - 52 U/L   Protime-INR   Result Value Ref Range    Protime 11.2 9.8 - 12.4 seconds    INR 1.0 0.9 - 1.1   APTT   Result Value Ref Range    aPTT 28 26 - 36 seconds   BLOOD GAS VENOUS FULL PANEL   Result Value Ref Range    POCT pH, Venous 7.28 (L) 7.33 - 7.43 pH    POCT pCO2, Venous 53 (H) 41 - 51 mm Hg    POCT pO2, Venous 49 (H) 35 - 45 mm Hg    POCT SO2, Venous 82 (H) 45 - 75 %    POCT Oxy Hemoglobin, Venous 73.3 45.0 - 75.0 %    POCT Hematocrit Calculated, Venous 28.0 (L) 41.0 - 52.0 %    POCT Sodium, Venous 137 136 - 145 mmol/L    POCT Potassium, Venous 3.3 (L) 3.5 - 5.3 mmol/L    POCT Chloride, Venous 107 98 - 107 mmol/L    POCT Ionized Calicum, Venous 1.19 1.10 - 1.33 mmol/L    POCT Glucose, Venous 182 (H) 74 - 99 mg/dL    POCT Lactate, Venous 3.0 (H) 0.4 - 2.0 mmol/L    POCT Base Excess, Venous -2.1 (L) -2.0 - 3.0 mmol/L    POCT HCO3 Calculated, Venous 24.9 22.0 - 26.0 mmol/L    POCT Hemoglobin, Venous 9.4 (L) 13.5 - 17.5 g/dL    POCT Anion Gap, Venous 8.0 (L) 10.0 - 25.0 mmol/L    Patient Temperature      FiO2 21 %   Troponin I, High Sensitivity, Initial   Result Value Ref Range    Troponin I, High Sensitivity 20 0 - 20 ng/L   Lavender Top   Result Value Ref Range    Extra Tube Hold for add-ons.    Gray Top   Result Value Ref Range    Extra Tube Hold for add-ons.    ECG 12 lead   Result Value Ref Range    Ventricular Rate 73 BPM    Atrial Rate 73 BPM    MT Interval 126 ms    QRS Duration 104 ms    QT Interval 454 ms    QTC Calculation(Bazett) 500 ms    P Axis 54 degrees    R Axis 49 degrees    T Axis 48 degrees    QRS Count 11 beats    Q Onset 217 ms    P  Onset 154 ms    P Offset 194 ms    T Offset 444 ms    QTC Fredericia 485 ms   Sars-CoV-2 and Influenza A/B PCR   Result Value Ref Range    Flu A Result Not Detected Not Detected    Flu B Result Not Detected Not Detected    Coronavirus 2019, PCR Not Detected Not Detected   ECG 12 lead   Result Value Ref Range    Ventricular Rate 69 BPM    Atrial Rate 69 BPM    VA Interval 118 ms    QRS Duration 128 ms    QT Interval 480 ms    QTC Calculation(Bazett) 514 ms    P Axis 33 degrees    R Axis 56 degrees    T Axis 31 degrees    QRS Count 11 beats    Q Onset 217 ms    P Onset 158 ms    P Offset 191 ms    T Offset 457 ms    QTC Fredericia 503 ms   Troponin, High Sensitivity, 1 Hour   Result Value Ref Range    Troponin I, High Sensitivity 61 (HH) 0 - 20 ng/L   Blood Gas Lactic Acid, Venous   Result Value Ref Range    POCT Lactate, Venous 1.6 0.4 - 2.0 mmol/L   Lactate   Result Value Ref Range    Lactate 1.6 0.4 - 2.0 mmol/L   Comprehensive metabolic panel   Result Value Ref Range    Glucose 97 74 - 99 mg/dL    Sodium 138 136 - 145 mmol/L    Potassium 4.0 3.5 - 5.3 mmol/L    Chloride 103 98 - 107 mmol/L    Bicarbonate 26 21 - 32 mmol/L    Anion Gap 13 10 - 20 mmol/L    Urea Nitrogen 18 6 - 23 mg/dL    Creatinine 1.05 0.50 - 1.30 mg/dL    eGFR 80 >60 mL/min/1.73m*2    Calcium 8.8 8.6 - 10.3 mg/dL    Albumin 3.9 3.4 - 5.0 g/dL    Alkaline Phosphatase 121 33 - 136 U/L    Total Protein 7.4 6.4 - 8.2 g/dL    AST 14 9 - 39 U/L    Bilirubin, Total 0.4 0.0 - 1.2 mg/dL    ALT 9 (L) 10 - 52 U/L   CBC and Auto Differential   Result Value Ref Range    WBC 9.4 4.4 - 11.3 x10*3/uL    nRBC 0.0 0.0 - 0.0 /100 WBCs    RBC 2.90 (L) 4.50 - 5.90 x10*6/uL    Hemoglobin 9.2 (L) 13.5 - 17.5 g/dL    Hematocrit 28.9 (L) 41.0 - 52.0 %     80 - 100 fL    MCH 31.7 26.0 - 34.0 pg    MCHC 31.8 (L) 32.0 - 36.0 g/dL    RDW 16.3 (H) 11.5 - 14.5 %    Platelets 233 150 - 450 x10*3/uL    Neutrophils % 88.6 40.0 - 80.0 %    Immature Granulocytes %,  Automated 0.4 0.0 - 0.9 %    Lymphocytes % 5.5 13.0 - 44.0 %    Monocytes % 5.3 2.0 - 10.0 %    Eosinophils % 0.0 0.0 - 6.0 %    Basophils % 0.2 0.0 - 2.0 %    Neutrophils Absolute 8.28 (H) 1.20 - 7.70 x10*3/uL    Immature Granulocytes Absolute, Automated 0.04 0.00 - 0.70 x10*3/uL    Lymphocytes Absolute 0.51 (L) 1.20 - 4.80 x10*3/uL    Monocytes Absolute 0.50 0.10 - 1.00 x10*3/uL    Eosinophils Absolute 0.00 0.00 - 0.70 x10*3/uL    Basophils Absolute 0.02 0.00 - 0.10 x10*3/uL   Magnesium   Result Value Ref Range    Magnesium 2.00 1.60 - 2.40 mg/dL   SST TOP   Result Value Ref Range    Extra Tube Hold for add-ons.      *Note: Due to a large number of results and/or encounters for the requested time period, some results have not been displayed. A complete set of results can be found in Results Review.        Assessment/Plan     Stable T1 and T9 compression fractures  Pain management  No bracing required  If pain management was failing and continued pain recommend consultation with pain management and neuro spine services for possible kyphoplasty.  Patient had had previous kyphoplasty in the lower spine from Dr. Felix who he has not been seeing lately    Patient is being placed in hospice so pain control would be appropriate with medications at this time    Memo Sands MD         [1] No family history on file.  [2] heparin (porcine), 5,000 Units, subcutaneous, q8h TITA  pantoprazole, 40 mg, oral, Daily    [3]    [4] PRN medications: acetaminophen **OR** acetaminophen **OR** acetaminophen, ondansetron **OR** ondansetron, oxyCODONE-acetaminophen

## 2025-08-08 NOTE — DISCHARGE SUMMARY
Discharge Diagnosis  Narcotic overdose, accidental or unintentional, initial encounter (Multi)  Chronic cancer related pain           Issues Requiring Follow-Up  Follow up with hospice and PCP    Discharge Meds     Medication List      CHANGE how you take these medications     oxyCODONE-acetaminophen 5-325 mg tablet; Commonly known as: Percocet;   Take 1 tablet by mouth every 6 hours if needed for moderate pain (4 - 6)   or severe pain (7 - 10) for up to 3 days.; What changed: reasons to take   this     CONTINUE taking these medications     albuterol 90 mcg/actuation inhaler; Inhale 1-2 puffs every 6 hours if   needed for wheezing or shortness of breath.   cholecalciferol 50 mcg (2,000 units) capsule; Commonly known as: Vitamin   D3; Take 1 capsule (50 mcg) by mouth once daily.   cyanocobalamin 1,000 mcg tablet; Commonly known as: Vitamin B-12; Take 1   tablet (1,000 mcg) by mouth once daily.   folic acid 1 mg tablet; Commonly known as: Folvite; Take 1 tablet (1,000   mcg) by mouth once daily.   lidocaine 4 % patch; Place 1 patch over 12 hours on the skin once daily   for 5 days. Remove & discard patch within 12 hours or as directed by MD.   naloxone 4 mg/0.1 mL nasal spray; Commonly known as: Narcan; Administer   1 spray (4 mg) into affected nostril(s) if needed for opioid reversal for   up to 2 doses. Give 1 spray as a single dose in one nostril. May repeat   every 2-3 minutes in alternating nostrils until medical assistance becomes   available.   nicotine 21 mg/24 hr patch; Commonly known as: Nicoderm CQ; Place 1   patch over 24 hours on the skin once daily for 41 doses.   omeprazole 20 mg DR capsule; Commonly known as: PriLOSEC; Take 1 capsule   (20 mg) by mouth once daily in the morning. Take before meals. Do not   crush or chew.   ondansetron 8 mg tablet; Commonly known as: Zofran; Take 1 tablet (8 mg)   by mouth every 8 hours if needed for nausea or vomiting.   ondansetron ODT 4 mg disintegrating tablet;  Commonly known as:   Zofran-ODT; Dissolve 1 tablet (4 mg) in the mouth every 8 hours if needed   for nausea or vomiting for up to 7 days.   prochlorperazine 10 mg tablet; Commonly known as: Compazine; Take 1   tablet (10 mg) by mouth every 6 hours if needed for nausea or vomiting.   QUEtiapine 25 mg tablet; Commonly known as: SEROquel; Take 1 tablet (25   mg) by mouth once daily at bedtime.     STOP taking these medications     cloNIDine 0.1 mg tablet; Commonly known as: Catapres   dexAMETHasone 4 mg tablet; Commonly known as: Decadron   DULoxetine 60 mg DR capsule; Commonly known as: Cymbalta   fluticasone furoate-vilanteroL 200-25 mcg/dose inhaler; Commonly known   as: Breo Ellipta   gabapentin 300 mg capsule; Commonly known as: Neurontin       Test Results Pending At Discharge  Pending Labs       Order Current Status    Extra Tubes Preliminary result    SST TOP Preliminary result            Hospital Course   This is a 62-year-old male with past medical history including HTN, hypothyroidism, COPD, polysubstance abuse, SI, opioid dependence, and NSCLC of right lung who presented to [facility] on 8/7 for unintentional overdose using his opioids for chronic cancer-related pain.  Patient was treated with Narcan and improved.  Telestroke was consulted in the ED, was deemed to have a low suspicion for stroke, and therefore did not require evaluation.  Patient's troponin was elevated, likely secondary to demand ischemia in the setting of hypoxia and bradypnea.  The patient's respiratory status remains adequate and he has not needed any repeated doses of Narcan.  The patient will be given 3 days of Percocet as needed for severe pain and a refill of narcan was added.  He should follow-up with [facility] hospice on discharge for further pain management.  confirmed hospice will be following the patient. Imaging showed extensive metastatic disease of the spine resulting in multiple compression fractures and masses that are  causing severe stenosis.  Orthopedics consulted, recommending pain management and neuro spine consult.  Since the patient is on hospice and his goals of care is to maintain quality of life, no further consults were made.  With treatment, the patient's symptoms have improved and he is requesting discharge home.  The patient will be discharged home with hospice today.  He is hemodynamically stable at time of discharge.    Plan of care was discussed extensively with patient.  Patient verbalized understanding through teach back method.  All question and concerns addressed upon examination.     Of note, this documentation is completed using the Dragon Dictation system (voice recognition software). There may be spelling and/or grammatical errors that were not corrected prior to final submission.      Pertinent Physical Exam At Time of Discharge  Physical Exam  Constitutional:       Appearance: He is ill-appearing (chronically).   HENT:      Head: Normocephalic.      Mouth/Throat:      Mouth: Mucous membranes are moist.     Eyes:      Pupils: Pupils are equal, round, and reactive to light.       Cardiovascular:      Rate and Rhythm: Normal rate and regular rhythm.      Heart sounds: Normal heart sounds, S1 normal and S2 normal.   Pulmonary:      Effort: Pulmonary effort is normal.      Breath sounds: Normal breath sounds. Decreased air movement present.   Abdominal:      General: Bowel sounds are normal.      Palpations: Abdomen is soft.      Tenderness: There is no abdominal tenderness.     Musculoskeletal:         General: Normal range of motion.      Cervical back: Neck supple.      Right lower leg: No edema.      Left lower leg: No edema.     Skin:     General: Skin is warm.     Neurological:      Mental Status: He is alert and oriented to person, place, and time.      Motor: No weakness.     Psychiatric:         Mood and Affect: Mood normal.         Behavior: Behavior normal.         Outpatient Follow-Up  Future  Appointments   Date Time Provider Department Center   8/26/2025  3:00 PM Christian Hartmann MD [id number] Justo Whiteside PA-C    I spent 35 minutes on discharge. Time calculated includes bedside evaluation, counseling, and outpatient care coordination.

## 2025-08-08 NOTE — CONSULTS
Subjective   HPI  Joe Magaña is a 64 y.o. Right-handed male with PMH below who presented to the hospital on 8/7/2025 with reported unresponsiveness and reported speech disturbances once became more awake not aware of month and w/ speech disturbances.       Historian: Patient and ED Provider     Consult type/reason:  Critical Acute Stroke evaluation/Code Stroke      Last known well: Patient was seen in the ER earlier this morning, though specific time not clearly documented. Last seen in ER earlier in morning time before noon    Time Stroke symptoms reported: Patient was found unresponsive unclear when EMS called, requiring brief bagging before breathing independently.    Patient presented with altered mental status after being found unresponsive. Required brief assisted ventilation before resuming spontaneous breathing. Patient reports severe pain involving neck, back, and generalized areas. Patient demonstrates confusion and disorientation, providing nonsensical responses to some questions. Patient reports tinnitus and severe pain limiting ability to concentrate during examination.  Patient stated doesn't recall what happened. He last recall taking his pain meds when went to work but doesn't recall when. He stated he might have taken more meds than he usually does. He only complaint currently from his chronic pain in his back and neck.       Current antiplatelet/anticoagulation used: No blood thinners mentioned.        Pertinent negatives: Denies illicit drug use when directly questioned, denies history of stroke or heart attack.        Had stroke symptoms resolved at time of presentation: No       Current antiplatelet/anticoagulant use: None    Stroke Risk Factors:  Hypertension, Metastatic Cancer, and reported Hx substance use   Stroke Hx: none      Prior Functional Status (Modified [place] Scale):  1 The patient has no significant disability; able to carry out all pre-stroke activities.    Time initial page :   09:26 PM    Time return Call : as above    Time on Camera :  09:31 pm, 9:37 PM    Virtual Visit start time:  as above        Time CT head reviewed: 09:28 PM    Contraindication for thrombolytics such as history of hemorrhage, extracranial hemorrhage, MI or stroke in the last 3 months, intra-axial neoplasm, anticoagulation use or, other contraindication:  No clear contraindication mentioned aside from unknown LKW    Patient BP:  153/89  Patient glucose: 228            Past Medical History  Medical History[1]  Surgical History  Surgical History[2]  Social History  Social History[3]  Allergies  Patient has no known allergies.    Home Medications  Current Outpatient Medications   Medication Instructions    albuterol 90 mcg/actuation inhaler 1-2 puffs, inhalation, Every 6 hours PRN    cholecalciferol (VITAMIN D3) 50 mcg, oral, Daily    cloNIDine (CATAPRES) 0.1 mg, oral, Nightly    cyanocobalamin (VITAMIN B-12) 1,000 mcg, oral, Daily    dexAMETHasone (DECADRON) 4 mg, oral, Daily    DULoxetine (CYMBALTA) 60 mg, oral, 2 times daily    fluticasone furoate-vilanteroL (Breo Ellipta) 200-25 mcg/dose inhaler 1 puff, inhalation, Daily    folic acid (FOLVITE) 1,000 mcg, oral, Daily    gabapentin (NEURONTIN) 300 mg, oral, 3 times daily    lidocaine 4 % patch 1 patch, transdermal, Daily, Remove & discard patch within 12 hours or as directed by MD.    naloxone (NARCAN) 4 mg, nasal, As needed, Give 1 spray as a single dose in one nostril. May repeat every 2-3 minutes in alternating nostrils until medical assistance becomes available.    nicotine (Nicoderm CQ) 21 mg/24 hr patch 1 patch, transdermal, Daily    omeprazole (PRILOSEC) 20 mg, oral, Daily before breakfast, Do not crush or chew.    ondansetron (ZOFRAN) 8 mg, oral, Every 8 hours PRN    ondansetron ODT (ZOFRAN-ODT) 4 mg, oral, Every 8 hours PRN    oxyCODONE-acetaminophen (Percocet) 5-325 mg tablet 1 tablet, oral, Every 6 hours PRN    prochlorperazine (COMPAZINE) 10 mg, oral,  "Every 6 hours PRN    QUEtiapine (SEROQUEL) 25 mg, oral, Nightly           Objective   24h Vitals  Heart Rate:  [69-71]   Temperature:  [36.3 °C (97.3 °F)]   Respirations:  [16-20]   BP: (147-167)/(80-90)   Height:  [185.4 cm (6' 1\")]   Weight:  [68 kg (150 lb)]   Pulse Ox:  [96 %-97 %]      Physical Exam  Of note, certain features of exam limited due to telemedicine limitations, in-person exam would be helpful for subtle signs that can't be detected via telemedicine (neglect, vision field cut and vision neglect specifically).    Neurological Exam  Physical Exam    Alert    Oriented to self, place and time  No  aphasia or dysarthria present   Visual fields full b/l to the in-person staff present and on camera. No clear vision neglect present   No  gaze preference   Pupil appear symmetric per in-person assist  EOMI  No face asymmetry. Tongue midline   Motor and sensory testing done with the assistance of in person staff member as below   Able to move all limbs with full ROM without drift, 5/5. No clear pronator drift in arms.  With limitation in leg movement mainly due to pain     Sensation intact b/l to light touch w/o neglect.  No clear nystagmus on camera. No incoordination on FTN/HTS b/l.        .nihss          NIHSS  Level of consciousness: 0 = Alert  LOC Question: 0 = Both correct  LOC Commands: 0 = Obeys both  Best Gaze: 0 = Normal  Visual Field: 0 = No visual loss  Facial Paresis: 0 = Normal  LUE: 0 = No drift; 10 sec  RUE: 0 = No drift; 10 sec  LLE: 0 = No drift; 5 sec  RLE: 0 = No drift; 5 sec  Dysarthria: 0 = Normal  Best Language: 0 = No aphasia  Limb Ataxia: 0 = Absent  Sensory: 0 = Absent  Neglect: 0 = None  NIHSS Score: 0     Recent Labs  Results from last 72 hours   Lab Units 08/07/25  0856   SODIUM mmol/L 140   POTASSIUM mmol/L 3.9   BUN mg/dL 17   CREATININE mg/dL 1.09   CALCIUM mg/dL 9.6      Results from last 72 hours   Lab Units 08/07/25  0856   WBC AUTO x10*3/uL 6.1   HEMOGLOBIN g/dL 10.0* "   HEMATOCRIT % 31.4*   PLATELETS AUTO x10*3/uL 213      Results from last 72 hours   Lab Units 08/07/25  0856   INR  1.0       Lab Results   Component Value Date    HGBA1C 5.3 02/21/2024    HGBA1C 5.5 07/20/2023    HGBA1C 5.3 06/11/2021    LDLF 63 07/20/2023    LDLF 74 05/19/2023    LDLF 87 06/11/2021        Imaging  CT head results: CT brain attack head wo IV contrast  Result Date: 8/7/2025  Interpreted By:  Bill Dunne, STUDY: CT BRAIN ATTACK HEAD WO IV CONTRAST; CT CERVICAL SPINE WO IV CONTRAST; ;  8/7/2025 9:27 pm; 8/7/2025 9:30 pm   INDICATION: Signs/Symptoms:Stroke Evaluation; Signs/Symptoms:found down.     COMPARISON: Head CT of 08/17/2024. Correlation also made to CT thoracic spine of 06/22/2025.   ACCESSION NUMBER(S): [id number]; [id number]   ORDERING CLINICIAN: JUAN DIEGO MONTES   TECHNIQUE: Noncontrast CT exams of the head and cervical spine with multiplanar reformations.   FINDINGS: BRAIN PARENCHYMA: Advanced volume loss.  There is periventricular and subcortical white matter hypoattenuation, most in keeping with chronic microvascular ischemic change. Gray-white matter interfaces are preserved. No mass, mass effect or midline shift.   HEMORRHAGE: No acute intracranial hemorrhage. VENTRICLES and EXTRA-AXIAL SPACES: Normal size. No abnormal hyperdense vessel. EXTRACRANIAL SOFT TISSUES: Within normal limits. PARANASAL SINUSES/MASTOIDS: The visualized paranasal sinuses and mastoid air cells are aerated. Mucous retention cyst or polyp in the right maxillary sinus. CALVARIUM: No depressed skull fracture. No destructive osseous lesion.   OTHER FINDINGS: None.   CERVICAL SPINE:   ALIGNMENT: Normal. VERTEBRAE: No acute fracture is seen. There is a similar appearance of moderate pathologic compression of the T1 vertebral body, with is nearly completely replaced by tumor, with relative preservation of its right aspect. Tumor extends into the left posterior elements, as before. There is ventral epidural  extension causing mild thecal sac compression, as before. SPINAL CANAL: No critical spinal canal stenosis. PREVERTEBRAL SOFT TISSUES: No prevertebral soft tissue swelling. LUNG APICES: Mild emphysema.   OTHER FINDINGS: None.       No evidence of acute intracranial abnormality.   No acute cervical spine fracture or malalignment.   Similar appearance of moderate pathologic compression of the T1 vertebral body, with tumor extending into the left posterior elements, with similar mild ventral epidural extension causing mild thecal sac narrowing.     MACRO: Bill Dunne discussed the significance and urgency of this critical finding by EPIC secure chat with  JUAN DIEGO MONTES on 8/7/2025 at 9:39 pm.  (**-RCF-**) Findings:  See findings.     Signed by: Bill Dunne 8/7/2025 9:39 PM Dictation workstation:   [id number]    CT head wo IV contrast  Result Date: 8/17/2024  Interpreted By:  Sal Mathias, STUDY: CT HEAD WO IV CONTRAST;  8/17/2024 7:00 pm   INDICATION: Signs/Symptoms:dizziness.   COMPARISON: CT head 05/25/2024. MRI brain 08/05/2024.   ACCESSION NUMBER(S): [id number]   ORDERING CLINICIAN: JEAN ESCOBAR   TECHNIQUE: Noncontrast axial CT images of head were obtained with coronal and sagittal reconstructed images.   FINDINGS: BRAIN PARENCHYMA: Gray-white differentiation is preserved. No mass-effect, midline shift or effacement of cerebral sulci. Confluent periventricular and subcortical white matter hypodensities, nonspecific but often seen in the setting of chronic microangiopathic change.   HEMORRHAGE: No acute intracranial hemorrhage.   VENTRICLES and EXTRA-AXIAL SPACES: The ventricles and sulci are within normal limits for brain volume. No abnormal extra-axial fluid collection.   ORBITS: The visualized orbits and globes are within normal limits.   EXTRACRANIAL SOFT TISSUES: Soft tissue swelling of the left forehead.   PARANASAL SINUSES/MASTOIDS: The visualized paranasal sinuses and mastoid air cells are well  aerated.   CALVARIUM: No depressed skull fracture.         1. No CT evidence of acute large territory infarction or transcortical edema at this time. No intracranial hemorrhage. 2. Extensive white matter changes similar to previous exams. These were characterized as probable changes of demyelination on recent MRI. Please see that report for further discussion. 3. Soft tissue swelling of the left forehead suggestive of a hematoma/contusion. Correlate for trauma.       MACRO: None   Signed by: Sal Mathias 8/17/2024 7:42 PM Dictation workstation:   [id number]      I have personally reviewed the following images and agree with the formal interpretation with the following edits/additions.    CT head without contrast:   My independent review:  CTH imaging personally reviewed, showed no acute ischemic / hemorrhagic changes   Similar to 2024 Prior scans to compare   Leukoaraiosis with generalized atrophy  No clear acute hypodensity seen  No acute hyper density seen.  No encephalomalacia / midline shift / hydro present.        CTA Head and Neck Imaging:  CTA imaging not performed       Official report: Reviewed for above    Assessment:     Assessment/Plan   64 y.o. male with Reported acute encephalopathy/unresponsiveness improved with narcan appear currently resolved.     Neurologic Manifestations: as above  Deficits: NIHSS as above      Diagnosis:  Suspect alternate etiology (toxic metabolic etiology for encephalopathy and decreased responsiveness  Hyperglycemia   Leukoaraiosis     Thrombolytic candidate: No    IV Thrombolysis IV Thrombolysis Checklist        IV Thrombolysis Given: No; Thrombolysis contraindication reason: Time from Last Known Well (or stroke onset) is >4.5 hours           Patient is a candidate for thrombectomy:  yes/no: No; contraindication reason: Vascular imaging not performed      Disposition:  Patient will remain at referring facility for further evaluation and management.      If pending vascular  imaging, please notify with results, follow hospital protocol.        Initial treatment: None  Anti-platelets or Anti-coagulation management: Not indicated  Vascular Risk Factors: as above        Recommendations:  Currently exam non focal with patient improved with narcan. No aphasia detected as patient became more awake. Wouldn't need stroke evaluation from my end.  Per patient he stated might have taken more pain pills than usual today.   Call with updates       The plan was discussed with the requesting provider/primary staff.  Of note, Evaluation limited due to observational assessment of telemedicine evaluation. In-person exam may be helpful for more subtle signs that cannot be detected via telemedicine and recommended to ensure absence of these findings.     I attest that I introduced myself to the patient, provided my credentials, and disclosed my location. Based on evaluation and/or discussion with members of the patient's treatment team, a telemedicine appear to be an appropriate and effective mean of providing the service.     Virtual or Telephone Consent  An interactive audio and video telecommunication system which permits real time communications between the patient (at the originating site) and provider (at the distant site) was utilized to provide this telehealth service.   Verbal consent was requested and obtained from Joe Magaña on this date, 08/07/25 for a telehealth visit.      The patient with reported acute symptoms suspecting acute neurological symptoms/deficits  requiring emergent evaluation and management for potential acute stroke that initially thought to have high probability of life-threatening deterioration requiring my continuous attention and high-complexity medical decision-making. Critical care time spent - 60 minutes for Evaluation and management of this condition along with the need for emergent interpretation of diagnostic studies necessary for immediate decision-making,  coordination of multidisciplinary care, including discussions with ED, other care teams and family           Lucho Melton MD             [1]   Past Medical History:  Diagnosis Date    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)     Arthritis     Chronic pain disorder     COPD (chronic obstructive pulmonary disease) (Multi)     Degenerative disc disease, lumbar     Depression     Essential (primary) hypertension     Benign essential hypertension    Personal history of other diseases of the digestive system     History of diverticulosis    Personal history of other diseases of the musculoskeletal system and connective tissue     History of gout    Personal history of other diseases of the respiratory system     History of COPD    Psychiatric illness     Substance abuse     Suicide attempt (Multi)    [2]   Past Surgical History:  Procedure Laterality Date    ELBOW SURGERY      OTHER SURGICAL HISTORY  05/06/2020    Flexor tendon repair    OTHER SURGICAL HISTORY  05/06/2020    Oral surgery    OTHER SURGICAL HISTORY  05/06/2020    Shoulder surgery   [3]   Social History  Tobacco Use    Smoking status: Every Day     Current packs/day: 1.00     Average packs/day: 1 pack/day for 15.0 years (15.0 ttl pk-yrs)     Types: Cigarettes    Smokeless tobacco: Never   Vaping Use    Vaping status: Never Used   Substance Use Topics    Alcohol use: Yes     Comment: occasional    Drug use: Not Currently

## 2025-08-08 NOTE — CARE PLAN
The patient's goals for the shift include pain management and sleep    The clinical goals for the shift include initiate plan of care

## 2025-08-08 NOTE — PROGRESS NOTES
Social Work Note  8/8/2025 SW received a message from the call center that hospice needed records on patient.  ANNIKA reached out to the  at the hospital and found that [facility] is going to assess patient.  Records requested were faxed and confirmation received.   at hospital notified the records were going to [facility].  SW will remain available to assist patient.  EFRA Poon, [facility] [phone]    Atrial fibrillation, unspecified type    CAD (coronary artery disease)    HLD (hyperlipidemia)    HTN (hypertension)    Renal calculus    Transient cerebral ischemia, unspecified type    Type 2 diabetes mellitus without complication, without long-term current use of insulin

## 2025-08-08 NOTE — PROGRESS NOTES
Physical Therapy                 Therapy Communication Note    Patient Name: Joe Magaña  MRN: [id number]  Department: [facility]  Room: 1018/1018-A  Today's Date: 8/8/2025     Discipline: Physical Therapy    Comment: PT order received and chart reviewed. Pt has extensive metastatic disease and pt. is currently active with hospice per [facility]; therefore, will discharge PT order.

## 2025-08-08 NOTE — H&P
Medical Group History and Physical    ASSESSMENT & PLAN:     Unintentional opiate overdose  Chronic cancer pain  DNR CC  - Consult neurology - ER wanting to r/o stroke although we have low suspicion for acute CVA, will defer additional imaging to neuro service  - pain control  - monitor respiratory status until sure able to protect airway independently w/o any repeated doses of narcan  - NIH 0   - PT/OT eval   - TCC eval discharge planning    Elevated trop   - likely d/t demand / hypoxia   - no c/o chest pain, EKG stable   - repeat in AM    VTE Prophylaxis: defer    Janet Paiz, UMBERTO-CNP    HISTORY OF PRESENT ILLNESS:   Chief Complaint: Unintentional drug overdose    History Of Present Illness:    Joe Magaña is a 64 y.o. male with a significant past medical history of HTN, hypothyroid, COPD, polysubstance abuse, SI, opioid dependence, NSCLC of Right lung presenting to [facility] after taking multiple narcotic pain pills for his cancer pain.  Treated with Narcan improved, neuro evaluated in ER noting low suspicion for stroke or further workup, however ER wanting to rule out stroke - we do both have low suspicion for acute CVA.     Given patient's uncontrolled pain d/t cancer and his DNRCC status I will admit under general medicine to ensure full resolution of drug overdose recovery.  Trend trop in AM.    Plan for discharge in a.m. after PT/OT and neuro eval.     VSS ready for admission to general medicine for unintentional drug overdose.    Review of systems: 10 point review of systems is otherwise negative except as mentioned above.    PAST HISTORIES:       Past Medical History:  Medical Problems       Problem List       * (Principal) Narcotic overdose, accidental or unintentional, initial encounter (Multi)    Moderate episode of recurrent major depressive disorder    HTN (hypertension)    COPD (chronic obstructive pulmonary disease) (Multi)    Polysubstance abuse    Diverticulitis    Gout    Suicide  attempt (Multi)    Lung mass    Mediastinal lymphadenopathy    NSCLC of right lung (Multi)    Urinary tract infectious disease    Overview Signed 8/17/2024  8:26 PM by OCTAVIA Oviedo   Comment on above: UTI SORES ON LT LEG AND BLEEDING         Stricture of urethral meatus in male    Opioid dependence    Infection due to fungus    BPH with obstruction/lower urinary tract symptoms    Chemotherapy-induced neutropenia    Hypothyroidism (acquired)    SOB (shortness of breath)    Pain, low back    Cancer-related breakthrough pain    Acute exacerbation of chronic low back pain           Past Surgical History:  Surgical History[1]       Social History:  He reports that he has been smoking cigarettes. He has a 15 pack-year smoking history. He has never used smokeless tobacco. He reports current alcohol use. He reports that he does not currently use drugs.    Family History:  Family History[2]     Allergies:  Patient has no known allergies.    OBJECTIVE:       Last Recorded Vitals:  Vitals:    08/07/25 2313 08/07/25 2353 08/08/25 0107 08/08/25 0108   BP: 147/81 134/77  (!) 158/95   BP Location: Right arm Right arm  Right arm   Patient Position: Lying Lying  Lying   Pulse: 72 73  81   Resp: 16 16  18   Temp:       TempSrc:       SpO2: 97% 94% 96% 95%   Weight:       Height:           Last I/O:  No intake/output data recorded.    Physical Exam  Vitals and nursing note reviewed.   Constitutional:       General: He is in acute distress.      Appearance: Normal appearance. He is ill-appearing.   HENT:      Head: Normocephalic and atraumatic.      Nose: Nose normal.      Mouth/Throat:      Mouth: Mucous membranes are moist.      Pharynx: Oropharynx is clear.     Eyes:      Extraocular Movements: Extraocular movements intact.      Conjunctiva/sclera: Conjunctivae normal.      Pupils: Pupils are equal, round, and reactive to light.       Cardiovascular:      Rate and Rhythm: Normal rate and regular rhythm.      Pulses:  Normal pulses.      Heart sounds: Normal heart sounds.   Pulmonary:      Effort: Pulmonary effort is normal.      Breath sounds: Normal breath sounds.   Abdominal:      General: Abdomen is flat.      Palpations: Abdomen is soft.   Genitourinary:     Comments: Not assessed    Musculoskeletal:         General: Normal range of motion.      Cervical back: Normal range of motion and neck supple.     Skin:     General: Skin is warm and dry.      Capillary Refill: Capillary refill takes less than 2 seconds.      Coloration: Skin is pale.     Neurological:      General: No focal deficit present.      Mental Status: He is alert and oriented to person, place, and time. Mental status is at baseline.      Motor: Weakness present.     Psychiatric:         Mood and Affect: Mood normal.         Behavior: Behavior normal.         Thought Content: Thought content normal.         Judgment: Judgment normal.           Scheduled Medications  Scheduled Medications[3]  PRN Medications  PRN Medications[4]  Continuous Medications  Continuous Medications[5]    Outpatient Medications:  Prior to Admission medications   Medication Sig Start Date End Date Taking? Authorizing Provider   albuterol 90 mcg/actuation inhaler Inhale 1-2 puffs every 6 hours if needed for wheezing or shortness of breath. 12/6/24 1/5/25  Sonam Toledo, APRN-CNP   cholecalciferol (Vitamin D3) 50 mcg (2,000 unit) capsule Take 1 capsule (50 mcg) by mouth once daily. 9/26/24 9/21/25  Aminta Jha MD   cloNIDine (Catapres) 0.1 mg tablet Take 1 tablet (0.1 mg) by mouth once daily at bedtime. 2/23/24 3/24/24  Beto Leung APRN-CNP   cyanocobalamin (Vitamin B-12) 1,000 mcg tablet Take 1 tablet (1,000 mcg) by mouth once daily. 9/26/24 9/26/25  Aminta Jha MD   dexAMETHasone (Decadron) 4 mg tablet Take 1 tablet (4 mg) by mouth once daily for 10 days. 10/7/24 10/17/24  Inderjit SAEZ MD   DULoxetine (Cymbalta) 60 mg DR capsule Take 1 capsule (60 mg) by mouth  2 times a day. 8/17/24 11/15/24  Bhavik Martin MD   fluticasone furoate-vilanteroL (Breo Ellipta) 200-25 mcg/dose inhaler Inhale 1 puff once daily. 8/17/24 9/16/24  Bhavik Martin MD   folic acid (Folvite) 1 mg tablet Take 1 tablet (1,000 mcg) by mouth once daily. 11/21/24   OCTAVIA Caraballo   gabapentin (Neurontin) 300 mg capsule Take 1 capsule (300 mg) by mouth 3 times a day. 8/17/24 11/15/24  Bhavik Martin MD   lidocaine 4 % patch Place 1 patch over 12 hours on the skin once daily for 5 days. Remove & discard patch within 12 hours or as directed by MD. 8/7/25 8/12/25  Juvenal Brice PA-C   naloxone (Narcan) 4 mg/0.1 mL nasal spray Administer 1 spray (4 mg) into affected nostril(s) if needed for opioid reversal for up to 2 doses. Give 1 spray as a single dose in one nostril. May repeat every 2-3 minutes in alternating nostrils until medical assistance becomes available. 7/15/25   Memo Morales PA-C   nicotine (Nicoderm CQ) 21 mg/24 hr patch Place 1 patch over 24 hours on the skin once daily for 41 doses. 8/19/24 9/29/24  OCTAVIA Ojeda   omeprazole (PriLOSEC) 20 mg DR capsule Take 1 capsule (20 mg) by mouth once daily in the morning. Take before meals. Do not crush or chew. 9/26/24 9/26/25  Aminta Jha MD   ondansetron (Zofran) 8 mg tablet Take 1 tablet (8 mg) by mouth every 8 hours if needed for nausea or vomiting. 8/8/24   Aminta Jha MD   ondansetron ODT (Zofran-ODT) 4 mg disintegrating tablet Dissolve 1 tablet (4 mg) in the mouth every 8 hours if needed for nausea or vomiting for up to 7 days. 8/7/25 8/14/25  Juvenal Brice PA-C   oxyCODONE-acetaminophen (Percocet) 5-325 mg tablet Take 1 tablet by mouth every 6 hours if needed for severe pain (7 - 10) for up to 3 days. 8/7/25 8/10/25  Juvenal Brice PA-C   prochlorperazine (Compazine) 10 mg tablet Take 1 tablet (10 mg) by mouth every 6 hours if needed for nausea or vomiting. 8/8/24   Aminta Jha,  MD   QUEtiapine (SEROquel) 25 mg tablet Take 1 tablet (25 mg) by mouth once daily at bedtime. 8/17/24 11/15/24  Bhavik Martin MD       LABS AND IMAGING:     Labs:  Results for orders placed or performed during the hospital encounter of 08/07/25 (from the past 24 hours)   Magnesium   Result Value Ref Range    Magnesium 2.17 1.60 - 2.40 mg/dL   POCT GLUCOSE   Result Value Ref Range    POCT Glucose 228 (H) 74 - 99 mg/dL   POCT GLUCOSE   Result Value Ref Range    POCT Glucose 188 (H) 74 - 99 mg/dL   CBC and Auto Differential   Result Value Ref Range    WBC 11.6 (H) 4.4 - 11.3 x10*3/uL    nRBC 0.0 0.0 - 0.0 /100 WBCs    RBC 2.85 (L) 4.50 - 5.90 x10*6/uL    Hemoglobin 9.2 (L) 13.5 - 17.5 g/dL    Hematocrit 28.5 (L) 41.0 - 52.0 %     80 - 100 fL    MCH 32.3 26.0 - 34.0 pg    MCHC 32.3 32.0 - 36.0 g/dL    RDW 16.5 (H) 11.5 - 14.5 %    Platelets 212 150 - 450 x10*3/uL    Neutrophils % 87.4 40.0 - 80.0 %    Immature Granulocytes %, Automated 1.0 (H) 0.0 - 0.9 %    Lymphocytes % 6.0 13.0 - 44.0 %    Monocytes % 5.0 2.0 - 10.0 %    Eosinophils % 0.3 0.0 - 6.0 %    Basophils % 0.3 0.0 - 2.0 %    Neutrophils Absolute 10.16 (H) 1.20 - 7.70 x10*3/uL    Immature Granulocytes Absolute, Automated 0.12 0.00 - 0.70 x10*3/uL    Lymphocytes Absolute 0.70 (L) 1.20 - 4.80 x10*3/uL    Monocytes Absolute 0.58 0.10 - 1.00 x10*3/uL    Eosinophils Absolute 0.03 0.00 - 0.70 x10*3/uL    Basophils Absolute 0.03 0.00 - 0.10 x10*3/uL   Comprehensive metabolic panel   Result Value Ref Range    Glucose 183 (H) 74 - 99 mg/dL    Sodium 137 136 - 145 mmol/L    Potassium 3.2 (L) 3.5 - 5.3 mmol/L    Chloride 102 98 - 107 mmol/L    Bicarbonate 23 21 - 32 mmol/L    Anion Gap 15 10 - 20 mmol/L    Urea Nitrogen 19 6 - 23 mg/dL    Creatinine 1.33 (H) 0.50 - 1.30 mg/dL    eGFR 60 (L) >60 mL/min/1.73m*2    Calcium 8.8 8.6 - 10.3 mg/dL    Albumin 3.8 3.4 - 5.0 g/dL    Alkaline Phosphatase 110 33 - 136 U/L    Total Protein 7.0 6.4 - 8.2 g/dL    AST 13  9 - 39 U/L    Bilirubin, Total 0.4 0.0 - 1.2 mg/dL    ALT 8 (L) 10 - 52 U/L   Protime-INR   Result Value Ref Range    Protime 11.2 9.8 - 12.4 seconds    INR 1.0 0.9 - 1.1   APTT   Result Value Ref Range    aPTT 28 26 - 36 seconds   BLOOD GAS VENOUS FULL PANEL   Result Value Ref Range    POCT pH, Venous 7.28 (L) 7.33 - 7.43 pH    POCT pCO2, Venous 53 (H) 41 - 51 mm Hg    POCT pO2, Venous 49 (H) 35 - 45 mm Hg    POCT SO2, Venous 82 (H) 45 - 75 %    POCT Oxy Hemoglobin, Venous 73.3 45.0 - 75.0 %    POCT Hematocrit Calculated, Venous 28.0 (L) 41.0 - 52.0 %    POCT Sodium, Venous 137 136 - 145 mmol/L    POCT Potassium, Venous 3.3 (L) 3.5 - 5.3 mmol/L    POCT Chloride, Venous 107 98 - 107 mmol/L    POCT Ionized Calicum, Venous 1.19 1.10 - 1.33 mmol/L    POCT Glucose, Venous 182 (H) 74 - 99 mg/dL    POCT Lactate, Venous 3.0 (H) 0.4 - 2.0 mmol/L    POCT Base Excess, Venous -2.1 (L) -2.0 - 3.0 mmol/L    POCT HCO3 Calculated, Venous 24.9 22.0 - 26.0 mmol/L    POCT Hemoglobin, Venous 9.4 (L) 13.5 - 17.5 g/dL    POCT Anion Gap, Venous 8.0 (L) 10.0 - 25.0 mmol/L    Patient Temperature      FiO2 21 %   Troponin I, High Sensitivity, Initial   Result Value Ref Range    Troponin I, High Sensitivity 20 0 - 20 ng/L   Lavender Top   Result Value Ref Range    Extra Tube Hold for add-ons.    Gray Top   Result Value Ref Range    Extra Tube Hold for add-ons.    Sars-CoV-2 and Influenza A/B PCR   Result Value Ref Range    Flu A Result Not Detected Not Detected    Flu B Result Not Detected Not Detected    Coronavirus 2019, PCR Not Detected Not Detected   Troponin, High Sensitivity, 1 Hour   Result Value Ref Range    Troponin I, High Sensitivity 61 (HH) 0 - 20 ng/L   Blood Gas Lactic Acid, Venous   Result Value Ref Range    POCT Lactate, Venous 1.6 0.4 - 2.0 mmol/L   Lactate   Result Value Ref Range    Lactate 1.6 0.4 - 2.0 mmol/L     *Note: Due to a large number of results and/or encounters for the requested time period, some results have  not been displayed. A complete set of results can be found in Results Review.        Imaging:  CT angio chest for pulmonary embolism   Final Result   1.No evidence of pulmonary embolism.   2.Stable right midlung mass measuring approximately 3.5 x 2.1 cm.   3.Stable pathologic-appearing fractures of the T1 and T9   vertebral bodies.   4.Stable nondisplaced fractures of the right seventh and left   seventh and ninth through twelfth ribs.   5.Stable left adrenal 2.5 cm hypodense nodule.   6.Overall stable exam.   Signed by José Miguel Aguirre      XR chest 1 view   Final Result   *1.9 cm x 3.0 cm density projecting over the posterior RIGHT   seventh rib (right upper lobe).  Recent CT confirms this lesion.   Signed by Farhan Nelson MD      CT cervical spine wo IV contrast   Final Result   No evidence of acute intracranial abnormality.        No acute cervical spine fracture or malalignment.        Similar appearance of moderate pathologic compression of the T1   vertebral body, with tumor extending into the left posterior   elements, with similar mild ventral epidural extension causing mild   thecal sac narrowing.             MACRO:   Bill Dunne discussed the significance and urgency of this critical   finding by EPIC secure chat with  JUAN DIEGO MONTES on 8/7/2025 at 9:39   pm.  (**-RCF-**) Findings:  See findings.             Signed by: Bill Dunne 8/7/2025 9:39 PM   Dictation workstation:   [id number]      CT brain attack head wo IV contrast   Final Result   No evidence of acute intracranial abnormality.        No acute cervical spine fracture or malalignment.        Similar appearance of moderate pathologic compression of the T1   vertebral body, with tumor extending into the left posterior   elements, with similar mild ventral epidural extension causing mild   thecal sac narrowing.             MACRO:   Bill Dunne discussed the significance and urgency of this critical   finding by EPIC secure chat with  JUAN DIEGO MONTES  on 8/7/2025 at 9:39   pm.  (**-RCF-**) Findings:  See findings.             Signed by: Bill Dunne 8/7/2025 9:39 PM   Dictation workstation:   [id number]                [1]   Past Surgical History:  Procedure Laterality Date    ELBOW SURGERY      OTHER SURGICAL HISTORY  05/06/2020    Flexor tendon repair    OTHER SURGICAL HISTORY  05/06/2020    Oral surgery    OTHER SURGICAL HISTORY  05/06/2020    Shoulder surgery   [2] No family history on file.  [3] [4] [5]

## 2025-08-08 NOTE — DISCHARGE INSTRUCTIONS
Follow up with your PCP within 1 week of discharge  You should follow up with Stuart mendez    Thank you for choosing [facility]. It has been a pleasure taking part in your medical care. Please follow up with your primary care provider as instructed. If your symptoms should persist or worsen, please contact your primary care physician, or in the case of an emergency proceed to the nearest Emergency Room for further care. If you have any questions about the care you received, please call [facility] at [phone]. Thank you again!

## 2025-08-11 LAB — HOLD SPECIMEN: NORMAL

## 2025-08-12 PROBLEM — E87.20 LACTIC ACIDOSIS: Status: ACTIVE | Noted: 2025-01-01

## 2025-08-13 ENCOUNTER — APPOINTMENT (OUTPATIENT)
Dept: OTHER | Facility: HOSPITAL | Age: 65
End: 2025-08-13
Payer: MEDICARE

## 2025-08-13 ENCOUNTER — TELEPHONE (OUTPATIENT)
Dept: HEMATOLOGY/ONCOLOGY | Facility: CLINIC | Age: 65
End: 2025-08-13
Payer: MEDICARE

## 2025-08-16 LAB
BACTERIA BLD CULT: NORMAL
BACTERIA BLD CULT: NORMAL

## 2025-08-19 NOTE — DOCUMENTATION CLARIFICATION NOTE
"    PATIENT:               FARA NATION  ACCT #:                  [id number]  MRN:                       [id number]  :                       1960  ADMIT DATE:       2025 8:23 AM  DISCH DATE:        2025 11:36 AM  RESPONDING PROVIDER #:        [id number]          PROVIDER RESPONSE TEXT:    Toxic metabolic encephalopathy 2/2 narcotic overdose    CDI QUERY TEXT:    Clarification        Instruction:    Based on your assessment of the patient and the clinical information, please provide the requested documentation by clicking on the appropriate radio button and enter any additional information if prompted.    Question: Please further clarify the most likely etiology of the altered mental status as    When answering this query, please exercise your independent professional judgment. The fact that a question is being asked, does not imply that any particular answer is desired or expected.    The patient's clinical indicators include:  Clinical Information: 64 year old male with known metastatic lung Ca, presented  following unintentional overdose using his opioids for chronic cancer-related pain. Improved with Narcan, discharged  with hospice consult.    Clinical Indicators:  Neurology Consultation Note  per Dr. Melton states: \"Suspect alternate etiology (toxic metabolic etiology for encephalopathy and decreased responsiveness\"  \"Patient presented with altered mental status after being found unresponsive.\"  \"Patient demonstrates confusion and disorientation, providing nonsensical responses to some questions.\"  \"Patient stated doesn't recall what happened. He last recall taking his pain meds when went to work but doesn't recall when. He stated he might have taken more meds than he usually does.\"  \"64 y.o. male with reported acute encephalopathy/unresponsiveness improved with narcan appear currently resolved. \"  \"CTH imaging personally reviewed, showed no acute ischemic / hemorrhagic changes  Similar to " "2024 Prior scans to compare\"    ED Note 8/7 per Dr. España states: \"Confused on year.  NIH 2 (1 aphasia,1 questions), VAN negative\"  \"Given patient's aphasia and confusion on the date on arrival, a stroke alert was activated. \"    Nursing Assessment 8/7 2118: Disoriented to situation.  Nursing Assessment 8/7 2209: Disoriented to situation.      Treatment: Monitor neuro status, neurology consult, CT brain.    Risk Factors: Narcotic overdose, accidental or unintentional.  Options provided:  -- Toxic encephalopathy 2/2 narcotic overdose  -- Toxic metabolic encephalopathy 2/2 narcotic overdose  -- Other - I will add my own diagnosis  -- Refer to Clinical Documentation Reviewer    Query created by: Mary Davison on 8/12/2025 11:15 AM      Electronically signed by:  BELKYS JAUREGUI PA-C 8/19/2025 7:33 AM          "

## 2025-08-26 ENCOUNTER — APPOINTMENT (OUTPATIENT)
Dept: ENDOCRINOLOGY | Facility: CLINIC | Age: 65
End: 2025-08-26
Payer: MEDICARE

## (undated) DEVICE — SUTURE NONABSORBABLE MONOFILAMENT 3-0 PS-1 18 IN BLK ETHILON 1663H

## (undated) DEVICE — PAD,ABDOMINAL,8"X10",ST,LF: Brand: MEDLINE

## (undated) DEVICE — 5.5 MM CANNULA AND OBTURATOR,                                    CONICAL TIP, DISTAL HOLES

## (undated) DEVICE — KNEE ARTHROSCOPY II-LF: Brand: MEDLINE INDUSTRIES, INC.

## (undated) DEVICE — 3M™ MICROFOAM™ TAPE 1528-4: Brand: 3M™ MICROFOAM™

## (undated) DEVICE — MEDI-VAC NON-CONDUCTIVE SUCTION TUBING: Brand: CARDINAL HEALTH

## (undated) DEVICE — BASIC SINGLE BASIN-LF: Brand: MEDLINE INDUSTRIES, INC.

## (undated) DEVICE — [AGGRESSIVE 6-FLUTE BARREL BUR, ARTHROSCOPIC SHAVER BLADE,  DO NOT RESTERILIZE,  DO NOT USE IF PACKAGE IS DAMAGED,  KEEP DRY,  KEEP AWAY FROM SUNLIGHT]: Brand: FORMULA

## (undated) DEVICE — FLUID CONTROL SHOULDER DRAPE PACK: Brand: CONVERTORS

## (undated) DEVICE — GLOVE ORANGE PI 7   MSG9070

## (undated) DEVICE — OMNI JUGS® LARGE VOLUME CANISTER WITH SOCK: Brand: DEROYAL

## (undated) DEVICE — GAUZE SPONGES,12 PLY: Brand: CURITY

## (undated) DEVICE — MAT FLR SURG QUICKWICK 28X54 IN DISP

## (undated) DEVICE — TUBING PMP L8FT LNG W/ CONN FOR AR-6400 REDEUCE

## (undated) DEVICE — DRESSING GZ W1XL8IN COT XRFRM N ADH OVERWRAP CURAD

## (undated) DEVICE — U-DRAPE: Brand: CONVERTORS

## (undated) DEVICE — GLOVE SURG SZ 75 L12IN FNGR THK94MIL STD WHT LTX FREE

## (undated) DEVICE — MEDI-VAC TUBING CONNECT "T" POLYPROPYLENE: Brand: CARDINAL HEALTH

## (undated) DEVICE — TUBE IRRIG L8IN LNG PT W/ CONN FOR PMP SYS REDEUCE

## (undated) DEVICE — GLOVE ORANGE PI 7 1/2   MSG9075

## (undated) DEVICE — CHLORAPREP 26ML ORANGE

## (undated) DEVICE — SUTURE PROL SZ 0 L30IN NONABSORBABLE BLU L36MM CT-1 1/2 CIR 8424H

## (undated) DEVICE — BLADE SAW RESECTOR CUT 4MM

## (undated) DEVICE — CANNULA ARTHSCP L7CM DIA7MM TRNSLUC THRD FLX W/ NO SQUIRT

## (undated) DEVICE — SLEEVE TRAC FOAM COBAN DISP FOR 3 PNT SHLDR DISTR SYS STAR

## (undated) DEVICE — AMBIENT SUPER TURBOVAC 90: Brand: COBLATION

## (undated) DEVICE — GLOVE SURG SZ 7 L12IN FNGR THK94MIL TRNSLUC YEL LTX HYDRGEL

## (undated) DEVICE — PASSER SUT DIA1.8MM 25DEG R TIGHT CRV STIFF SHFT SHRP

## (undated) DEVICE — COVER LT HNDL BLU PLAS

## (undated) DEVICE — 3M™ STERI-DRAPE™ U-DRAPE 1015: Brand: STERI-DRAPE™